# Patient Record
Sex: MALE | Race: WHITE | NOT HISPANIC OR LATINO | Employment: OTHER | URBAN - METROPOLITAN AREA
[De-identification: names, ages, dates, MRNs, and addresses within clinical notes are randomized per-mention and may not be internally consistent; named-entity substitution may affect disease eponyms.]

---

## 2020-11-18 PROCEDURE — U0003 INFECTIOUS AGENT DETECTION BY NUCLEIC ACID (DNA OR RNA); SEVERE ACUTE RESPIRATORY SYNDROME CORONAVIRUS 2 (SARS-COV-2) (CORONAVIRUS DISEASE [COVID-19]), AMPLIFIED PROBE TECHNIQUE, MAKING USE OF HIGH THROUGHPUT TECHNOLOGIES AS DESCRIBED BY CMS-2020-01-R: HCPCS | Performed by: FAMILY MEDICINE

## 2022-12-03 ENCOUNTER — HOSPITAL ENCOUNTER (INPATIENT)
Facility: HOSPITAL | Age: 62
LOS: 17 days | Discharge: HOME | End: 2022-12-21
Attending: INTERNAL MEDICINE | Admitting: FAMILY MEDICINE
Payer: MEDICAID

## 2022-12-03 ENCOUNTER — APPOINTMENT (OUTPATIENT)
Dept: GENERAL RADIOLOGY | Facility: HOSPITAL | Age: 62
End: 2022-12-03
Payer: MEDICAID

## 2022-12-03 DIAGNOSIS — R06.02 SHORTNESS OF BREATH: ICD-10-CM

## 2022-12-03 DIAGNOSIS — J44.1 COPD WITH ACUTE EXACERBATION: ICD-10-CM

## 2022-12-03 DIAGNOSIS — J10.1 INFLUENZA A: ICD-10-CM

## 2022-12-03 DIAGNOSIS — R09.02 HYPOXIA: Primary | ICD-10-CM

## 2022-12-03 LAB
ALBUMIN SERPL-MCNC: 4.1 G/DL (ref 3.5–5.2)
ALBUMIN/GLOB SERPL: 1.4 G/DL
ALP SERPL-CCNC: 53 U/L (ref 39–117)
ALT SERPL W P-5'-P-CCNC: 15 U/L (ref 1–41)
AMORPH URATE CRY URNS QL MICRO: ABNORMAL /HPF
ANION GAP SERPL CALCULATED.3IONS-SCNC: 15 MMOL/L (ref 5–15)
AST SERPL-CCNC: 20 U/L (ref 1–40)
BACTERIA UR QL AUTO: ABNORMAL /HPF
BILIRUB SERPL-MCNC: 0.3 MG/DL (ref 0–1.2)
BILIRUB UR QL STRIP: NEGATIVE
BUN SERPL-MCNC: 20 MG/DL (ref 8–23)
BUN/CREAT SERPL: 27.4 (ref 7–25)
CALCIUM SPEC-SCNC: 9.1 MG/DL (ref 8.6–10.5)
CHLORIDE SERPL-SCNC: 91 MMOL/L (ref 98–107)
CLARITY UR: CLEAR
CO2 SERPL-SCNC: 24 MMOL/L (ref 22–29)
COLOR UR: YELLOW
CREAT SERPL-MCNC: 0.73 MG/DL (ref 0.76–1.27)
DEPRECATED RDW RBC AUTO: 49.4 FL (ref 37–54)
EGFRCR SERPLBLD CKD-EPI 2021: 102.2 ML/MIN/1.73
ERYTHROCYTE [DISTWIDTH] IN BLOOD BY AUTOMATED COUNT: 15 % (ref 12.3–15.4)
FLUAV SUBTYP SPEC NAA+PROBE: DETECTED
FLUBV RNA ISLT QL NAA+PROBE: NOT DETECTED
GLOBULIN UR ELPH-MCNC: 2.9 GM/DL
GLUCOSE SERPL-MCNC: 91 MG/DL (ref 65–99)
GLUCOSE UR STRIP-MCNC: NEGATIVE MG/DL
GRAN CASTS URNS QL MICRO: ABNORMAL /LPF
HCT VFR BLD AUTO: 44 % (ref 37.5–51)
HGB BLD-MCNC: 15.4 G/DL (ref 13–17.7)
HGB UR QL STRIP.AUTO: NEGATIVE
HYALINE CASTS UR QL AUTO: ABNORMAL /LPF
KETONES UR QL STRIP: NEGATIVE
LEUKOCYTE ESTERASE UR QL STRIP.AUTO: NEGATIVE
LYMPHOCYTES # BLD MANUAL: 0.6 10*3/MM3 (ref 0.7–3.1)
LYMPHOCYTES NFR BLD MANUAL: 6 % (ref 5–12)
MAGNESIUM SERPL-MCNC: 1.7 MG/DL (ref 1.6–2.4)
MCH RBC QN AUTO: 31.5 PG (ref 26.6–33)
MCHC RBC AUTO-ENTMCNC: 35.1 G/DL (ref 31.5–35.7)
MCV RBC AUTO: 89.8 FL (ref 79–97)
MONOCYTES # BLD: 0.6 10*3/MM3 (ref 0.1–0.9)
MUCOUS THREADS URNS QL MICRO: ABNORMAL /HPF
NEUTROPHILS # BLD AUTO: 8.8 10*3/MM3 (ref 1.7–7)
NEUTROPHILS NFR BLD MANUAL: 81 % (ref 42.7–76)
NEUTS BAND NFR BLD MANUAL: 7 % (ref 0–5)
NITRITE UR QL STRIP: NEGATIVE
NT-PROBNP SERPL-MCNC: 109.5 PG/ML (ref 0–900)
PH UR STRIP.AUTO: 5.5 [PH] (ref 5–8)
PLAT MORPH BLD: NORMAL
PLATELET # BLD AUTO: 148 10*3/MM3 (ref 140–450)
PMV BLD AUTO: 8.1 FL (ref 6–12)
POTASSIUM SERPL-SCNC: 4.5 MMOL/L (ref 3.5–5.2)
PROT SERPL-MCNC: 7 G/DL (ref 6–8.5)
PROT UR QL STRIP: ABNORMAL
RBC # BLD AUTO: 4.9 10*6/MM3 (ref 4.14–5.8)
RBC # UR STRIP: ABNORMAL /HPF
RBC MORPH BLD: NORMAL
REF LAB TEST METHOD: ABNORMAL
SARS-COV-2 RNA PNL SPEC NAA+PROBE: NOT DETECTED
SCAN SLIDE: NORMAL
SODIUM SERPL-SCNC: 130 MMOL/L (ref 136–145)
SP GR UR STRIP: 1.02 (ref 1–1.03)
SQUAMOUS #/AREA URNS HPF: ABNORMAL /HPF
TROPONIN T SERPL-MCNC: <0.01 NG/ML (ref 0–0.03)
UROBILINOGEN UR QL STRIP: ABNORMAL
VARIANT LYMPHS NFR BLD MANUAL: 6 % (ref 19.6–45.3)
WBC # UR STRIP: ABNORMAL /HPF
WBC MORPH BLD: NORMAL
WBC NRBC COR # BLD: 10 10*3/MM3 (ref 3.4–10.8)

## 2022-12-03 PROCEDURE — 94799 UNLISTED PULMONARY SVC/PX: CPT

## 2022-12-03 PROCEDURE — 93005 ELECTROCARDIOGRAM TRACING: CPT | Performed by: EMERGENCY MEDICINE

## 2022-12-03 PROCEDURE — 87502 INFLUENZA DNA AMP PROBE: CPT

## 2022-12-03 PROCEDURE — 94760 N-INVAS EAR/PLS OXIMETRY 1: CPT

## 2022-12-03 PROCEDURE — 84484 ASSAY OF TROPONIN QUANT: CPT

## 2022-12-03 PROCEDURE — 85025 COMPLETE CBC W/AUTO DIFF WBC: CPT

## 2022-12-03 PROCEDURE — 94761 N-INVAS EAR/PLS OXIMETRY MLT: CPT

## 2022-12-03 PROCEDURE — 25010000002 METHYLPREDNISOLONE PER 125 MG

## 2022-12-03 PROCEDURE — 85007 BL SMEAR W/DIFF WBC COUNT: CPT

## 2022-12-03 PROCEDURE — 99285 EMERGENCY DEPT VISIT HI MDM: CPT

## 2022-12-03 PROCEDURE — 25010000002 ENOXAPARIN PER 10 MG: Performed by: NURSE PRACTITIONER

## 2022-12-03 PROCEDURE — 83735 ASSAY OF MAGNESIUM: CPT

## 2022-12-03 PROCEDURE — 94664 DEMO&/EVAL PT USE INHALER: CPT

## 2022-12-03 PROCEDURE — 80053 COMPREHEN METABOLIC PANEL: CPT

## 2022-12-03 PROCEDURE — 87635 SARS-COV-2 COVID-19 AMP PRB: CPT

## 2022-12-03 PROCEDURE — 81001 URINALYSIS AUTO W/SCOPE: CPT

## 2022-12-03 PROCEDURE — 71045 X-RAY EXAM CHEST 1 VIEW: CPT

## 2022-12-03 PROCEDURE — 87086 URINE CULTURE/COLONY COUNT: CPT

## 2022-12-03 PROCEDURE — 25010000002 METHYLPREDNISOLONE PER 125 MG: Performed by: NURSE PRACTITIONER

## 2022-12-03 PROCEDURE — 94640 AIRWAY INHALATION TREATMENT: CPT

## 2022-12-03 PROCEDURE — 83880 ASSAY OF NATRIURETIC PEPTIDE: CPT

## 2022-12-03 RX ORDER — ASPIRIN 81 MG/1
81 TABLET ORAL DAILY
Status: DISCONTINUED | OUTPATIENT
Start: 2022-12-04 | End: 2022-12-21 | Stop reason: HOSPADM

## 2022-12-03 RX ORDER — IPRATROPIUM BROMIDE AND ALBUTEROL SULFATE 2.5; .5 MG/3ML; MG/3ML
3 SOLUTION RESPIRATORY (INHALATION)
Status: DISCONTINUED | OUTPATIENT
Start: 2022-12-03 | End: 2022-12-04

## 2022-12-03 RX ORDER — UMECLIDINIUM BROMIDE AND VILANTEROL TRIFENATATE 62.5; 25 UG/1; UG/1
1 POWDER RESPIRATORY (INHALATION)
COMMUNITY

## 2022-12-03 RX ORDER — GLIPIZIDE 5 MG/1
10 TABLET ORAL
Status: DISCONTINUED | OUTPATIENT
Start: 2022-12-04 | End: 2022-12-21 | Stop reason: HOSPADM

## 2022-12-03 RX ORDER — SODIUM CHLORIDE 0.9 % (FLUSH) 0.9 %
3 SYRINGE (ML) INJECTION EVERY 12 HOURS SCHEDULED
Status: DISCONTINUED | OUTPATIENT
Start: 2022-12-03 | End: 2022-12-21 | Stop reason: HOSPADM

## 2022-12-03 RX ORDER — ASPIRIN 81 MG/1
81 TABLET ORAL DAILY
COMMUNITY

## 2022-12-03 RX ORDER — FAMOTIDINE 20 MG/1
40 TABLET, FILM COATED ORAL DAILY
Status: DISCONTINUED | OUTPATIENT
Start: 2022-12-03 | End: 2022-12-15

## 2022-12-03 RX ORDER — MONTELUKAST SODIUM 10 MG/1
10 TABLET ORAL NIGHTLY
Status: DISCONTINUED | OUTPATIENT
Start: 2022-12-03 | End: 2022-12-21 | Stop reason: HOSPADM

## 2022-12-03 RX ORDER — OSELTAMIVIR PHOSPHATE 75 MG/1
75 CAPSULE ORAL EVERY 12 HOURS SCHEDULED
Status: COMPLETED | OUTPATIENT
Start: 2022-12-03 | End: 2022-12-08

## 2022-12-03 RX ORDER — ALBUTEROL SULFATE 90 UG/1
2 AEROSOL, METERED RESPIRATORY (INHALATION) ONCE
Status: COMPLETED | OUTPATIENT
Start: 2022-12-03 | End: 2022-12-03

## 2022-12-03 RX ORDER — ALBUTEROL SULFATE 90 UG/1
2 AEROSOL, METERED RESPIRATORY (INHALATION) EVERY 4 HOURS PRN
Status: DISCONTINUED | OUTPATIENT
Start: 2022-12-03 | End: 2022-12-08

## 2022-12-03 RX ORDER — ACETAMINOPHEN 325 MG/1
650 TABLET ORAL EVERY 4 HOURS PRN
Status: DISCONTINUED | OUTPATIENT
Start: 2022-12-03 | End: 2022-12-21 | Stop reason: HOSPADM

## 2022-12-03 RX ORDER — SODIUM CHLORIDE 0.9 % (FLUSH) 0.9 %
10 SYRINGE (ML) INJECTION AS NEEDED
Status: DISCONTINUED | OUTPATIENT
Start: 2022-12-03 | End: 2022-12-21 | Stop reason: HOSPADM

## 2022-12-03 RX ORDER — LISINOPRIL 20 MG/1
40 TABLET ORAL EVERY EVENING
Status: DISCONTINUED | OUTPATIENT
Start: 2022-12-04 | End: 2022-12-17

## 2022-12-03 RX ORDER — METHYLPREDNISOLONE SODIUM SUCCINATE 125 MG/2ML
80 INJECTION, POWDER, LYOPHILIZED, FOR SOLUTION INTRAMUSCULAR; INTRAVENOUS EVERY 12 HOURS
Status: DISCONTINUED | OUTPATIENT
Start: 2022-12-03 | End: 2022-12-04

## 2022-12-03 RX ORDER — ENOXAPARIN SODIUM 100 MG/ML
40 INJECTION SUBCUTANEOUS
Status: DISCONTINUED | OUTPATIENT
Start: 2022-12-03 | End: 2022-12-21 | Stop reason: HOSPADM

## 2022-12-03 RX ORDER — SODIUM CHLORIDE 0.9 % (FLUSH) 0.9 %
3-10 SYRINGE (ML) INJECTION AS NEEDED
Status: DISCONTINUED | OUTPATIENT
Start: 2022-12-03 | End: 2022-12-21 | Stop reason: HOSPADM

## 2022-12-03 RX ORDER — METHYLPREDNISOLONE SODIUM SUCCINATE 125 MG/2ML
125 INJECTION, POWDER, LYOPHILIZED, FOR SOLUTION INTRAMUSCULAR; INTRAVENOUS ONCE
Status: COMPLETED | OUTPATIENT
Start: 2022-12-03 | End: 2022-12-03

## 2022-12-03 RX ORDER — ONDANSETRON 2 MG/ML
4 INJECTION INTRAMUSCULAR; INTRAVENOUS EVERY 6 HOURS PRN
Status: DISCONTINUED | OUTPATIENT
Start: 2022-12-03 | End: 2022-12-21 | Stop reason: HOSPADM

## 2022-12-03 RX ORDER — NITROGLYCERIN 0.4 MG/1
0.4 TABLET SUBLINGUAL
Status: DISCONTINUED | OUTPATIENT
Start: 2022-12-03 | End: 2022-12-21 | Stop reason: HOSPADM

## 2022-12-03 RX ORDER — SODIUM CHLORIDE 9 MG/ML
40 INJECTION, SOLUTION INTRAVENOUS AS NEEDED
Status: DISCONTINUED | OUTPATIENT
Start: 2022-12-03 | End: 2022-12-21 | Stop reason: HOSPADM

## 2022-12-03 RX ADMIN — METHYLPREDNISOLONE SODIUM SUCCINATE 80 MG: 125 INJECTION, POWDER, FOR SOLUTION INTRAMUSCULAR; INTRAVENOUS at 21:00

## 2022-12-03 RX ADMIN — Medication 3 ML: at 21:01

## 2022-12-03 RX ADMIN — ALBUTEROL SULFATE 2 PUFF: 108 INHALANT RESPIRATORY (INHALATION) at 11:05

## 2022-12-03 RX ADMIN — FAMOTIDINE 40 MG: 20 TABLET, FILM COATED ORAL at 21:01

## 2022-12-03 RX ADMIN — MONTELUKAST 10 MG: 10 TABLET, FILM COATED ORAL at 21:19

## 2022-12-03 RX ADMIN — METFORMIN HYDROCHLORIDE 1000 MG: 500 TABLET ORAL at 21:19

## 2022-12-03 RX ADMIN — IPRATROPIUM BROMIDE AND ALBUTEROL SULFATE 3 ML: .5; 3 SOLUTION RESPIRATORY (INHALATION) at 21:34

## 2022-12-03 RX ADMIN — METHYLPREDNISOLONE SODIUM SUCCINATE 125 MG: 125 INJECTION, POWDER, FOR SOLUTION INTRAMUSCULAR; INTRAVENOUS at 10:53

## 2022-12-03 RX ADMIN — SODIUM CHLORIDE 500 ML: 9 INJECTION, SOLUTION INTRAVENOUS at 12:03

## 2022-12-03 RX ADMIN — ENOXAPARIN SODIUM 40 MG: 100 INJECTION SUBCUTANEOUS at 21:00

## 2022-12-03 RX ADMIN — OSELTAMIVIR PHOSPHATE 75 MG: 75 CAPSULE ORAL at 21:01

## 2022-12-03 NOTE — ED NOTES
Patient is sating at 88% on RA.  States he feels that his chest congestion is breaking up.  Reported O2 sats to provider. Patient placed back on 2L NC

## 2022-12-03 NOTE — ED NOTES
Patient stated he was sweating. He asked for something to eat.  After checking with HCP, patient was given a sandwich and drink.  Call light within reach.  No new complaints.

## 2022-12-03 NOTE — ED NOTES
Oxygen turned down to 2L.  Will monitor saturations.  Patient given water to drink after checking with provider.

## 2022-12-03 NOTE — ED PROVIDER NOTES
Subjective   History of Present Illness  Chief Complaint: Shortness of breath    Context: Patient is a pleasant 62-year-old obese  male presenting today with complaints of shortness of breath.  Patient states that he went to urgent care this morning because his \"breathing got so bad he could not stand anymore.\"  Patient states that the symptoms started on Wednesday, when he had a runny nose that he states was constant.  His symptoms have progressed since then and he has been having issues breathing as well.  He has had a cough but is not coughing up anything.  Patient reports smoking 1-1/2 packs of cigarettes daily.  He does not typically wear oxygen but presents today on 2 L by nasal cannula.  He reports a history of diabetes and high blood pressure.  He states that he takes metformin, glipizide, lisinopril, Singulair and a baby aspirin daily.  He has no known drug allergies.  He states that he is vaccinated against COVID and influenza and has had no known exposure.  He had a doctor's appointment on 11/29 with his primary care provider for basic diabetes check.  He states that his blood sugar had been elevated but everything checked out okay.  He denies any pain at this point.  He has no known drug allergies.  He denies use of drugs and alcohol.        Review of Systems   Constitutional: Negative for chills and fever.   HENT: Positive for rhinorrhea and sinus pressure. Negative for congestion and sore throat.    Respiratory: Positive for cough, chest tightness and shortness of breath.    Cardiovascular: Negative for chest pain and palpitations.   Gastrointestinal: Negative for abdominal pain, diarrhea, nausea and vomiting.   Genitourinary: Negative for flank pain and urgency.   Musculoskeletal: Negative for arthralgias and myalgias.   Skin: Negative.    Neurological: Negative for dizziness, syncope, weakness and headaches.   Psychiatric/Behavioral: Negative for confusion. The patient is not  nervous/anxious.    All other systems reviewed and are negative.      Past Medical History:   Diagnosis Date   • COPD (chronic obstructive pulmonary disease) (HCC)    • Diabetes mellitus (HCC)    • Hypertension        No Known Allergies    No past surgical history on file.    No family history on file.    Social History     Socioeconomic History   • Marital status: Single   Tobacco Use   • Smoking status: Every Day     Packs/day: 1.50     Years: 25.00     Pack years: 37.50     Types: Cigarettes     Passive exposure: Never   • Smokeless tobacco: Never   Vaping Use   • Vaping Use: Never used   • Passive vaping exposure: Yes   Substance and Sexual Activity   • Alcohol use: Yes     Comment: RARE   • Drug use: Never   • Sexual activity: Defer           Objective   Physical Exam  Vitals and nursing note reviewed.   Constitutional:       General: He is awake. He is not in acute distress.     Appearance: Normal appearance. He is well-developed. He is not ill-appearing.   HENT:      Head: Normocephalic and atraumatic. No raccoon eyes or Lopez's sign.      Right Ear: Hearing, tympanic membrane and ear canal normal.      Left Ear: Hearing, tympanic membrane and ear canal normal.   Eyes:      General: Vision grossly intact. Gaze aligned appropriately.      Extraocular Movements: Extraocular movements intact.      Pupils: Pupils are equal, round, and reactive to light.   Cardiovascular:      Rate and Rhythm: Normal rate.      Pulses: Normal pulses.      Heart sounds: Normal heart sounds. No murmur heard.  Pulmonary:      Effort: Pulmonary effort is normal. No respiratory distress.      Breath sounds: Examination of the right-upper field reveals decreased breath sounds. Examination of the left-upper field reveals decreased breath sounds. Examination of the right-lower field reveals decreased breath sounds. Examination of the left-lower field reveals decreased breath sounds. Decreased breath sounds present.   Chest:      Chest  wall: No tenderness.   Abdominal:      General: Bowel sounds are normal.      Palpations: Abdomen is soft.      Tenderness: There is no abdominal tenderness.   Musculoskeletal:         General: Normal range of motion.      Cervical back: Normal range of motion and neck supple.      Right lower leg: No edema.      Left lower leg: No edema.   Skin:     General: Skin is warm and dry.      Capillary Refill: Capillary refill takes less than 2 seconds.   Neurological:      General: No focal deficit present.      Mental Status: He is alert and oriented to person, place, and time. Mental status is at baseline.      GCS: GCS eye subscore is 4. GCS verbal subscore is 5. GCS motor subscore is 6.      Cranial Nerves: Cranial nerves 2-12 are intact.      Sensory: Sensation is intact.      Motor: Motor function is intact.      Deep Tendon Reflexes: Reflexes are normal and symmetric.   Psychiatric:         Mood and Affect: Mood normal.         Behavior: Behavior normal.         Procedures           ED Course  ED Course as of 12/03/22 1340   Sat Dec 03, 2022   1203 EKG was reviewed myself and read by Dr. White reveals sinus rhythm with a regular rate of 90.  No acute ST elevation or ectopy.  No significant change from previous study completed in 2019. [SJ]      ED Course User Index  [SJ] Gianna Cobos G, APRN      /62   Pulse 84   Temp 98.3 °F (36.8 °C)   Resp 18   Ht 180.3 cm (71\")   Wt 98.4 kg (217 lb)   SpO2 91%   BMI 30.27 kg/m²   Labs Reviewed   INFLUENZA ANTIGEN, RAPID - Abnormal; Notable for the following components:       Result Value    Influenza A PCR Detected (*)     All other components within normal limits   COMPREHENSIVE METABOLIC PANEL - Abnormal; Notable for the following components:    Creatinine 0.73 (*)     Sodium 130 (*)     Chloride 91 (*)     BUN/Creatinine Ratio 27.4 (*)     All other components within normal limits    Narrative:     GFR Normal >60  Chronic Kidney Disease <60  Kidney Failure  <15     URINALYSIS W/ MICROSCOPIC IF INDICATED (NO CULTURE) - Abnormal; Notable for the following components:    Protein, UA 30 mg/dL (1+) (*)     All other components within normal limits   URINALYSIS, MICROSCOPIC ONLY - Abnormal; Notable for the following components:    RBC, UA 0-2 (*)     WBC, UA 0-2 (*)     Bacteria, UA Trace (*)     Mucus, UA Small/1+ (*)     All other components within normal limits   MANUAL DIFFERENTIAL - Abnormal; Notable for the following components:    Neutrophil % 81.0 (*)     Lymphocyte % 6.0 (*)     Bands %  7.0 (*)     Neutrophils Absolute 8.80 (*)     Lymphocytes Absolute 0.60 (*)     All other components within normal limits   COVID-19,CEPHEID/LEONEL,COR/TORY/PAD/JUWAN/MAD IN-HOUSE,NP SWAB IN TRANSPORT MEDIA 3-4 HR TAT, RT-PCR - Normal    Narrative:     Fact sheet for providers: https://www.fda.gov/media/807387/download     Fact sheet for patients: https://www.fda.gov/media/856650/download  Fact sheet for providers: https://www.fda.gov/media/890155/download    Fact sheet for patients: https://www.fda.gov/media/384752/download    Test performed by PCR.   BNP (IN-HOUSE) - Normal    Narrative:     Among patients with dyspnea, NT-proBNP is highly sensitive for the detection of acute congestive heart failure. In addition NT-proBNP of <300 pg/ml effectively rules out acute congestive heart failure with 99% negative predictive value.     TROPONIN (IN-HOUSE) - Normal    Narrative:     Troponin T Reference Range:  <= 0.03 ng/mL-   Negative for AMI  >0.03 ng/mL-     Abnormal for myocardial necrosis.  Clinicians would have to utilize clinical acumen, EKG, Troponin and serial changes to determine if it is an Acute Myocardial Infarction or myocardial injury due to an underlying chronic condition.       Results may be falsely decreased if patient taking Biotin.     MAGNESIUM - Normal   CBC WITH AUTO DIFFERENTIAL - Normal    Narrative:     Modified report. Previous result was Hemogram on 12/3/2022 at  1043 EST.  The previously reported component NRBC is no longer being reported. Previous result was 0.1 /100 WBC (Reference Range: 0.0-0.2 /100 WBC) on 12/3/2022 at 1043 EST.   COVID PRE-OP / PRE-PROCEDURE SCREENING ORDER (NO ISOLATION)    Narrative:     The following orders were created for panel order COVID PRE-OP / PRE-PROCEDURE SCREENING ORDER (NO ISOLATION) - Swab, Nasopharynx.  Procedure                               Abnormality         Status                     ---------                               -----------         ------                     COVID-19,CEPHEID/LEONEL,CO...[[id number]]  Normal              Final result                 Please view results for these tests on the individual orders.   URINE CULTURE   SCAN SLIDE   CBC AND DIFFERENTIAL    Narrative:     The following orders were created for panel order CBC & Differential.  Procedure                               Abnormality         Status                     ---------                               -----------         ------                     CBC Auto Differential[[id number]]        Normal              Final result               Scan Slide[[id number]]                                       Final result                 Please view results for these tests on the individual orders.     Medications   sodium chloride 0.9 % flush 10 mL (has no administration in time range)   methylPREDNISolone sodium succinate (SOLU-Medrol) injection 125 mg (125 mg Intravenous Given 12/3/22 1053)   albuterol sulfate HFA (PROVENTIL HFA;VENTOLIN HFA;PROAIR HFA) inhaler 2 puff (2 puffs Inhalation Given 12/3/22 1105)   sodium chloride 0.9 % bolus 500 mL (0 mL Intravenous Stopped 12/3/22 1256)     XR Chest 1 View    Result Date: 12/3/2022   1. Chronic scarring in the right lung base. 2. Emphysema. 3. No active pulmonary disease.  Electronically Signed By-James Grey MD On:12/3/2022 11:39 AM This report was finalized on [id number] by  James Grey MD.                                          MDM  Number of Diagnoses or Management Options  Hypoxia  Influenza A  Shortness of breath  Diagnosis management comments: Patient was placed in a gown prior to assessment.  He is alert, nontoxic-appearing and stable on exam.  Patient presents on oxygen via nasal cannula.  No acute distress is noted on arrival.  IV was established labs were obtained.  Patient was medicated with prednisone.  He received an albuterol inhaler treatment and a fluid bolus in the emergency room.    Patient is influenza A positive today.  X-ray reveals chronic scarring in the right lung base with emphysema present.  No active pulmonary disease is noted.  Urinalysis reveals trace bacteria and culture was ordered.  No leukocytes or nitrites present.  White blood count is negative.  Patient is aware that discharge does not meAt this time, I do not believe patient requires UTI treatment due to asymptomatic bacteremia.  Troponin and BNP are negative.  COVID is negative.  Patient was taken off supplemental oxygen but oxygen saturations decreased to 88% with symptoms worsening.  Patient states that he is typically 95% on room air.  Patient was placed back on oxygen and it improved immediately.  Given this observation, I believe it is best to keep patient in the hospital tonight for further evaluation.  Patient is agreeable to admission at this time.  He verbalizes understanding.  He was going to be placed in the ED observation unit but is an [facility] patient.  I spoke with BETTY Bravo with the [facility] group.  She accepted patient on behalf of of Dr. Singh, who will be providing inpatient care.  Patient has remained alert and stable while in the emergency room.  He is in no acute distress.    Comorbidities: History of COPD, current smoker  Differentials: Influenza, COVID-19, pneumonia, acute UTI.  Not all inclusive of differentials considered.   EKG reviewed by me and interpreted by Dr. White    Lab Interpretation: As  above  Radiology Interpretation: As above    Appropriate PPE worn during exam.    This document is intended for medical expert use only.  Reading of this document by patients and/or patient's family without participating medical staff guidance may result in misinterpretation and unintended morbidity.  Any interpretation of such data is the responsibility of the patient and/or family member responsible for the patient in concert with their primary or specialist providers, not to be left for sources of online search as such as [employer], [employer] or similar queries.  Relying on these approaches to knowledge may result in misinterpretation, misguided goals of care and even death should patient or family members try recommendations outside of the realm of professional medical care in a supervised inpatient environment.    This medical document was created using Dragon dictation system. Some errors in speech recognition may occur.       Amount and/or Complexity of Data Reviewed  Clinical lab tests: ordered and reviewed  Tests in the radiology section of CPT®: ordered and reviewed    Risk of Complications, Morbidity, and/or Mortality  Presenting problems: high  Diagnostic procedures: high  Management options: high    Patient Progress  Patient progress: improved      Final diagnoses:   Influenza A   Hypoxia   Shortness of breath       ED Disposition  ED Disposition     ED Disposition   Decision to Admit    Condition   --    Comment   Level of Care: Telemetry [5]   Admitting Physician: NGOC ORTIZ [[id number]]   Attending Physician: NGOC ORTIZ [[id number]]               No follow-up provider specified.       Medication List      No changes were made to your prescriptions during this visit.          Gianna Cobos, APRN  12/03/22 0549

## 2022-12-03 NOTE — ED NOTES
Patient was picked up from [facility] due to low O2 sats. Patient states he started with SOA a few days ago.  It started with runny nose. Denies fever, has had runny stools.  No vomiting/nausea.  Patient feels like he could cough something up but is is no productive.   Patient was placed on O2 at [facility].  Sats mid to upper 90s on 4L.  Room air in ER is 86%.  Patient placed on 2L O2

## 2022-12-03 NOTE — Clinical Note
Level of Care: Telemetry [5]   Admitting Physician: NGOC ORTIZ [[id number]]   Attending Physician: NGOC ORTIZ [[id number]]

## 2022-12-04 PROBLEM — J44.1 COPD WITH ACUTE EXACERBATION: Status: ACTIVE | Noted: 2022-12-04

## 2022-12-04 LAB
ANION GAP SERPL CALCULATED.3IONS-SCNC: 10 MMOL/L (ref 5–15)
ANISOCYTOSIS BLD QL: ABNORMAL
BACTERIA SPEC AEROBE CULT: NO GROWTH
BUN SERPL-MCNC: 18 MG/DL (ref 8–23)
BUN/CREAT SERPL: 27.7 (ref 7–25)
CALCIUM SPEC-SCNC: 8.9 MG/DL (ref 8.6–10.5)
CHLORIDE SERPL-SCNC: 97 MMOL/L (ref 98–107)
CO2 SERPL-SCNC: 26 MMOL/L (ref 22–29)
CREAT SERPL-MCNC: 0.65 MG/DL (ref 0.76–1.27)
DEPRECATED RDW RBC AUTO: 49 FL (ref 37–54)
EGFRCR SERPLBLD CKD-EPI 2021: 105.9 ML/MIN/1.73
ERYTHROCYTE [DISTWIDTH] IN BLOOD BY AUTOMATED COUNT: 15.3 % (ref 12.3–15.4)
GLUCOSE BLDC GLUCOMTR-MCNC: 133 MG/DL (ref 70–105)
GLUCOSE BLDC GLUCOMTR-MCNC: 146 MG/DL (ref 70–105)
GLUCOSE SERPL-MCNC: 185 MG/DL (ref 65–99)
HCT VFR BLD AUTO: 43.4 % (ref 37.5–51)
HGB BLD-MCNC: 14.3 G/DL (ref 13–17.7)
LYMPHOCYTES # BLD MANUAL: 0.42 10*3/MM3 (ref 0.7–3.1)
LYMPHOCYTES NFR BLD MANUAL: 6 % (ref 5–12)
MCH RBC QN AUTO: 30.3 PG (ref 26.6–33)
MCHC RBC AUTO-ENTMCNC: 32.9 G/DL (ref 31.5–35.7)
MCV RBC AUTO: 92.1 FL (ref 79–97)
METAMYELOCYTES NFR BLD MANUAL: 2 % (ref 0–0)
MONOCYTES # BLD: 0.5 10*3/MM3 (ref 0.1–0.9)
NEUTROPHILS # BLD AUTO: 7.22 10*3/MM3 (ref 1.7–7)
NEUTROPHILS NFR BLD MANUAL: 71 % (ref 42.7–76)
NEUTS BAND NFR BLD MANUAL: 16 % (ref 0–5)
NEUTS VAC BLD QL SMEAR: ABNORMAL
PLAT MORPH BLD: NORMAL
PLATELET # BLD AUTO: 151 10*3/MM3 (ref 140–450)
PMV BLD AUTO: 8.5 FL (ref 6–12)
POTASSIUM SERPL-SCNC: 5.1 MMOL/L (ref 3.5–5.2)
RBC # BLD AUTO: 4.71 10*6/MM3 (ref 4.14–5.8)
SCAN SLIDE: NORMAL
SODIUM SERPL-SCNC: 133 MMOL/L (ref 136–145)
VARIANT LYMPHS NFR BLD MANUAL: 5 % (ref 19.6–45.3)
WBC NRBC COR # BLD: 8.3 10*3/MM3 (ref 3.4–10.8)

## 2022-12-04 PROCEDURE — 36415 COLL VENOUS BLD VENIPUNCTURE: CPT | Performed by: NURSE PRACTITIONER

## 2022-12-04 PROCEDURE — 94799 UNLISTED PULMONARY SVC/PX: CPT

## 2022-12-04 PROCEDURE — 25010000002 METHYLPREDNISOLONE PER 40 MG: Performed by: FAMILY MEDICINE

## 2022-12-04 PROCEDURE — 84484 ASSAY OF TROPONIN QUANT: CPT | Performed by: FAMILY MEDICINE

## 2022-12-04 PROCEDURE — 85007 BL SMEAR W/DIFF WBC COUNT: CPT | Performed by: NURSE PRACTITIONER

## 2022-12-04 PROCEDURE — 85025 COMPLETE CBC W/AUTO DIFF WBC: CPT | Performed by: NURSE PRACTITIONER

## 2022-12-04 PROCEDURE — 25010000002 ENOXAPARIN PER 10 MG: Performed by: NURSE PRACTITIONER

## 2022-12-04 PROCEDURE — 25010000002 METHYLPREDNISOLONE PER 125 MG: Performed by: NURSE PRACTITIONER

## 2022-12-04 PROCEDURE — 82962 GLUCOSE BLOOD TEST: CPT

## 2022-12-04 PROCEDURE — 80048 BASIC METABOLIC PNL TOTAL CA: CPT | Performed by: NURSE PRACTITIONER

## 2022-12-04 RX ORDER — NICOTINE POLACRILEX 4 MG
15 LOZENGE BUCCAL
Status: DISCONTINUED | OUTPATIENT
Start: 2022-12-04 | End: 2022-12-21 | Stop reason: HOSPADM

## 2022-12-04 RX ORDER — INSULIN LISPRO 100 [IU]/ML
2-9 INJECTION, SOLUTION INTRAVENOUS; SUBCUTANEOUS
Status: DISCONTINUED | OUTPATIENT
Start: 2022-12-04 | End: 2022-12-21 | Stop reason: HOSPADM

## 2022-12-04 RX ORDER — DEXTROSE MONOHYDRATE 25 G/50ML
25 INJECTION, SOLUTION INTRAVENOUS
Status: DISCONTINUED | OUTPATIENT
Start: 2022-12-04 | End: 2022-12-21 | Stop reason: HOSPADM

## 2022-12-04 RX ORDER — OLANZAPINE 10 MG/2ML
1 INJECTION, POWDER, LYOPHILIZED, FOR SOLUTION INTRAMUSCULAR
Status: DISCONTINUED | OUTPATIENT
Start: 2022-12-04 | End: 2022-12-21 | Stop reason: HOSPADM

## 2022-12-04 RX ORDER — METHYLPREDNISOLONE SODIUM SUCCINATE 40 MG/ML
40 INJECTION, POWDER, LYOPHILIZED, FOR SOLUTION INTRAMUSCULAR; INTRAVENOUS EVERY 12 HOURS
Status: DISCONTINUED | OUTPATIENT
Start: 2022-12-04 | End: 2022-12-05

## 2022-12-04 RX ORDER — IPRATROPIUM BROMIDE AND ALBUTEROL SULFATE 2.5; .5 MG/3ML; MG/3ML
3 SOLUTION RESPIRATORY (INHALATION)
Status: DISCONTINUED | OUTPATIENT
Start: 2022-12-04 | End: 2022-12-08

## 2022-12-04 RX ADMIN — MONTELUKAST 10 MG: 10 TABLET, FILM COATED ORAL at 20:06

## 2022-12-04 RX ADMIN — Medication 3 ML: at 08:57

## 2022-12-04 RX ADMIN — OSELTAMIVIR PHOSPHATE 75 MG: 75 CAPSULE ORAL at 08:55

## 2022-12-04 RX ADMIN — OSELTAMIVIR PHOSPHATE 75 MG: 75 CAPSULE ORAL at 20:06

## 2022-12-04 RX ADMIN — ASPIRIN 81 MG: 81 TABLET, COATED ORAL at 09:45

## 2022-12-04 RX ADMIN — Medication 3 ML: at 20:06

## 2022-12-04 RX ADMIN — METFORMIN HYDROCHLORIDE 1000 MG: 500 TABLET ORAL at 18:52

## 2022-12-04 RX ADMIN — METHYLPREDNISOLONE SODIUM SUCCINATE 80 MG: 125 INJECTION, POWDER, FOR SOLUTION INTRAMUSCULAR; INTRAVENOUS at 08:55

## 2022-12-04 RX ADMIN — LISINOPRIL 40 MG: 20 TABLET ORAL at 16:49

## 2022-12-04 RX ADMIN — IPRATROPIUM BROMIDE AND ALBUTEROL SULFATE 3 ML: 2.5; .5 SOLUTION RESPIRATORY (INHALATION) at 21:26

## 2022-12-04 RX ADMIN — GLIPIZIDE 10 MG: 5 TABLET ORAL at 16:49

## 2022-12-04 RX ADMIN — ENOXAPARIN SODIUM 40 MG: 100 INJECTION SUBCUTANEOUS at 15:35

## 2022-12-04 RX ADMIN — FAMOTIDINE 40 MG: 20 TABLET, FILM COATED ORAL at 08:53

## 2022-12-04 RX ADMIN — METFORMIN HYDROCHLORIDE 1000 MG: 500 TABLET ORAL at 08:53

## 2022-12-04 RX ADMIN — GLIPIZIDE 10 MG: 5 TABLET ORAL at 08:53

## 2022-12-04 RX ADMIN — METHYLPREDNISOLONE SODIUM SUCCINATE 40 MG: 40 INJECTION, POWDER, FOR SOLUTION INTRAMUSCULAR; INTRAVENOUS at 20:06

## 2022-12-04 RX ADMIN — BISOPROLOL FUMARATE: 5 TABLET ORAL at 08:54

## 2022-12-04 RX ADMIN — IPRATROPIUM BROMIDE AND ALBUTEROL SULFATE 3 ML: 2.5; .5 SOLUTION RESPIRATORY (INHALATION) at 15:08

## 2022-12-04 NOTE — H&P
Patient Care Team:  Qi Hicks APRN as PCP - General (Nurse Practitioner)    Chief Conplaint  Subjective     The patient is a 62 y.o. male presents with progressive shortness of breath with evidence of acute exacerbation of panlobular COPD with emphysema and an acute influenza A lower respiratory infection    HPI  The patient was in his usual state of health until approximately 1 week prior to presentation when he began experiencing insidious onset of some progressive weakness associated with a cough and some shortness of breath.  He decompensated and presented to the [facility] emergency room for evaluation where he was admitted with an acute exacerbation of COPD and an acute influenza A infection  Review of Systems  Review of Systems   Constitutional: Positive for activity change and appetite change.   Respiratory: Positive for shortness of breath. Negative for chest tightness.    Cardiovascular: Negative.    Gastrointestinal: Negative.    Neurological: Positive for weakness.       History  Past Medical History:   Diagnosis Date   • COPD (chronic obstructive pulmonary disease) (HCC)    • Diabetes mellitus (HCC)    • Hypertension      No past surgical history on file.  No family history on file.  Social History     Tobacco Use   • Smoking status: Every Day     Packs/day: 1.50     Years: 25.00     Pack years: 37.50     Types: Cigarettes     Passive exposure: Never   • Smokeless tobacco: Never   Vaping Use   • Vaping Use: Never used   • Passive vaping exposure: Yes   Substance Use Topics   • Alcohol use: Yes     Comment: RARE   • Drug use: Never     Allergies:  Patient has no known allergies.    Objective     Vital Signs  Temp:  [97.9 °F (36.6 °C)-98.4 °F (36.9 °C)] 97.9 °F (36.6 °C)  Heart Rate:  [80-89] 82  Resp:  [16-20] 16  BP: ()/(59-75) 131/75      Physical Exam:   Physical Exam  Vitals reviewed.   Constitutional:       General: He is not in acute distress.     Appearance: He is  ill-appearing and diaphoretic. He is not toxic-appearing.   HENT:      Head: Normocephalic and atraumatic.   Cardiovascular:      Rate and Rhythm: Rhythm irregular.      Heart sounds: Normal heart sounds.   Pulmonary:      Effort: No respiratory distress.      Breath sounds: Wheezing present.   Skin:     General: Skin is warm.   Neurological:      General: No focal deficit present.      Mental Status: He is alert.              Results Review:   CBC    Results from last 7 days   Lab Units 12/04/22  0655 12/03/22  1028   WBC 10*3/mm3 8.30 10.00   HEMOGLOBIN g/dL 14.3 15.4   PLATELETS 10*3/mm3 151 148     BMP   Results from last 7 days   Lab Units 12/04/22  0655 12/03/22  1028   SODIUM mmol/L 133* 130*   POTASSIUM mmol/L 5.1 4.5   CHLORIDE mmol/L 97* 91*   CO2 mmol/L 26.0 24.0   BUN mg/dL 18 20   CREATININE mg/dL 0.65* 0.73*   GLUCOSE mg/dL 185* 91   MAGNESIUM mg/dL  --  1.7     Cr Clearance Estimated Creatinine Clearance: 139 mL/min (A) (by C-G formula based on SCr of 0.65 mg/dL (L)).  Coag     HbA1C No results found for: HGBA1C  Blood Glucose No results found for: POCGLU  Infection     CMP   Results from last 7 days   Lab Units 12/04/22  0655 12/03/22  1028   SODIUM mmol/L 133* 130*   POTASSIUM mmol/L 5.1 4.5   CHLORIDE mmol/L 97* 91*   CO2 mmol/L 26.0 24.0   BUN mg/dL 18 20   CREATININE mg/dL 0.65* 0.73*   GLUCOSE mg/dL 185* 91   ALBUMIN g/dL  --  4.10   BILIRUBIN mg/dL  --  0.3   ALK PHOS U/L  --  53   AST (SGOT) U/L  --  20   ALT (SGPT) U/L  --  15     UA    Results from last 7 days   Lab Units 12/03/22  1100   NITRITE UA  Negative   WBC UA /HPF 0-2*   BACTERIA UA /HPF Trace*   SQUAM EPITHEL UA /HPF 0-2     Radiology(recent) XR Chest 1 View    Result Date: 12/3/2022   1. Chronic scarring in the right lung base. 2. Emphysema. 3. No active pulmonary disease.  Electronically Signed By-James Grey MD On:12/3/2022 11:39 AM This report was finalized on [id number] by  James Grey MD.       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     James Craig MD  12/04/22  11:26 EST

## 2022-12-04 NOTE — PLAN OF CARE
Problem: Adult Inpatient Plan of Care  Goal: Plan of Care Review  Outcome: Ongoing, Progressing  Flowsheets (Taken 12/3/2022 2106)  Progress: no change  Goal: Patient-Specific Goal (Individualized)  Outcome: Ongoing, Progressing  Goal: Absence of Hospital-Acquired Illness or Injury  Outcome: Ongoing, Progressing  Goal: Optimal Comfort and Wellbeing  Outcome: Ongoing, Progressing  Goal: Readiness for Transition of Care  Outcome: Ongoing, Progressing     Problem: Breathing Pattern Ineffective  Goal: Effective Breathing Pattern  Outcome: Ongoing, Progressing   Goal Outcome Evaluation:           Progress: no change   Patient being admitted with SOB requiring oxygen with the flu, patient started on home medications, and will be weaned off O2 as tolerated.

## 2022-12-05 ENCOUNTER — APPOINTMENT (OUTPATIENT)
Dept: CT IMAGING | Facility: HOSPITAL | Age: 62
End: 2022-12-05
Payer: MEDICAID

## 2022-12-05 LAB
ANION GAP SERPL CALCULATED.3IONS-SCNC: 10 MMOL/L (ref 5–15)
BUN SERPL-MCNC: 22 MG/DL (ref 8–23)
BUN/CREAT SERPL: 32.8 (ref 7–25)
CALCIUM SPEC-SCNC: 9.2 MG/DL (ref 8.6–10.5)
CHLORIDE SERPL-SCNC: 93 MMOL/L (ref 98–107)
CO2 SERPL-SCNC: 30 MMOL/L (ref 22–29)
CREAT SERPL-MCNC: 0.67 MG/DL (ref 0.76–1.27)
DEPRECATED RDW RBC AUTO: 49 FL (ref 37–54)
EGFRCR SERPLBLD CKD-EPI 2021: 104.9 ML/MIN/1.73
ERYTHROCYTE [DISTWIDTH] IN BLOOD BY AUTOMATED COUNT: 15.2 % (ref 12.3–15.4)
GLUCOSE BLDC GLUCOMTR-MCNC: 101 MG/DL (ref 70–105)
GLUCOSE BLDC GLUCOMTR-MCNC: 107 MG/DL (ref 70–105)
GLUCOSE BLDC GLUCOMTR-MCNC: 134 MG/DL (ref 70–105)
GLUCOSE BLDC GLUCOMTR-MCNC: 90 MG/DL (ref 70–105)
GLUCOSE SERPL-MCNC: 114 MG/DL (ref 65–99)
HCT VFR BLD AUTO: 44.6 % (ref 37.5–51)
HGB BLD-MCNC: 14.5 G/DL (ref 13–17.7)
LARGE PLATELETS: ABNORMAL
LYMPHOCYTES # BLD MANUAL: 0.26 10*3/MM3 (ref 0.7–3.1)
LYMPHOCYTES NFR BLD MANUAL: 10 % (ref 5–12)
MCH RBC QN AUTO: 30.3 PG (ref 26.6–33)
MCHC RBC AUTO-ENTMCNC: 32.6 G/DL (ref 31.5–35.7)
MCV RBC AUTO: 92.8 FL (ref 79–97)
MONOCYTES # BLD: 1.29 10*3/MM3 (ref 0.1–0.9)
NEUTROPHILS # BLD AUTO: 11.35 10*3/MM3 (ref 1.7–7)
NEUTROPHILS NFR BLD MANUAL: 77 % (ref 42.7–76)
NEUTS BAND NFR BLD MANUAL: 11 % (ref 0–5)
NEUTS VAC BLD QL SMEAR: ABNORMAL
PLATELET # BLD AUTO: 185 10*3/MM3 (ref 140–450)
PMV BLD AUTO: 8.6 FL (ref 6–12)
POTASSIUM SERPL-SCNC: 5.1 MMOL/L (ref 3.5–5.2)
RBC # BLD AUTO: 4.81 10*6/MM3 (ref 4.14–5.8)
RBC MORPH BLD: NORMAL
SCAN SLIDE: NORMAL
SODIUM SERPL-SCNC: 133 MMOL/L (ref 136–145)
TROPONIN T SERPL-MCNC: <0.01 NG/ML (ref 0–0.03)
VARIANT LYMPHS NFR BLD MANUAL: 2 % (ref 19.6–45.3)
WBC NRBC COR # BLD: 12.9 10*3/MM3 (ref 3.4–10.8)

## 2022-12-05 PROCEDURE — 71250 CT THORAX DX C-: CPT

## 2022-12-05 PROCEDURE — 94664 DEMO&/EVAL PT USE INHALER: CPT

## 2022-12-05 PROCEDURE — 25010000002 METHYLPREDNISOLONE PER 40 MG: Performed by: FAMILY MEDICINE

## 2022-12-05 PROCEDURE — 82962 GLUCOSE BLOOD TEST: CPT

## 2022-12-05 PROCEDURE — 94799 UNLISTED PULMONARY SVC/PX: CPT

## 2022-12-05 PROCEDURE — 25010000002 ENOXAPARIN PER 10 MG: Performed by: NURSE PRACTITIONER

## 2022-12-05 PROCEDURE — 63710000001 PREDNISONE PER 1 MG: Performed by: NURSE PRACTITIONER

## 2022-12-05 PROCEDURE — 94761 N-INVAS EAR/PLS OXIMETRY MLT: CPT

## 2022-12-05 RX ORDER — BUDESONIDE 0.5 MG/2ML
0.5 INHALANT ORAL
Status: DISCONTINUED | OUTPATIENT
Start: 2022-12-05 | End: 2022-12-21 | Stop reason: HOSPADM

## 2022-12-05 RX ORDER — PREDNISONE 20 MG/1
20 TABLET ORAL 2 TIMES DAILY WITH MEALS
Status: DISCONTINUED | OUTPATIENT
Start: 2022-12-05 | End: 2022-12-06

## 2022-12-05 RX ADMIN — LISINOPRIL 40 MG: 20 TABLET ORAL at 17:22

## 2022-12-05 RX ADMIN — METFORMIN HYDROCHLORIDE 1000 MG: 500 TABLET ORAL at 09:12

## 2022-12-05 RX ADMIN — BUDESONIDE 0.5 MG: 0.5 INHALANT RESPIRATORY (INHALATION) at 18:56

## 2022-12-05 RX ADMIN — OSELTAMIVIR PHOSPHATE 75 MG: 75 CAPSULE ORAL at 09:12

## 2022-12-05 RX ADMIN — FAMOTIDINE 40 MG: 20 TABLET, FILM COATED ORAL at 09:12

## 2022-12-05 RX ADMIN — IPRATROPIUM BROMIDE AND ALBUTEROL SULFATE 3 ML: 2.5; .5 SOLUTION RESPIRATORY (INHALATION) at 18:56

## 2022-12-05 RX ADMIN — METFORMIN HYDROCHLORIDE 1000 MG: 500 TABLET ORAL at 17:22

## 2022-12-05 RX ADMIN — MONTELUKAST 10 MG: 10 TABLET, FILM COATED ORAL at 20:00

## 2022-12-05 RX ADMIN — IPRATROPIUM BROMIDE AND ALBUTEROL SULFATE 3 ML: 2.5; .5 SOLUTION RESPIRATORY (INHALATION) at 15:04

## 2022-12-05 RX ADMIN — GLIPIZIDE 10 MG: 5 TABLET ORAL at 17:22

## 2022-12-05 RX ADMIN — GLIPIZIDE 10 MG: 5 TABLET ORAL at 09:12

## 2022-12-05 RX ADMIN — ENOXAPARIN SODIUM 40 MG: 100 INJECTION SUBCUTANEOUS at 17:22

## 2022-12-05 RX ADMIN — BISOPROLOL FUMARATE: 5 TABLET ORAL at 09:12

## 2022-12-05 RX ADMIN — OSELTAMIVIR PHOSPHATE 75 MG: 75 CAPSULE ORAL at 20:00

## 2022-12-05 RX ADMIN — Medication 3 ML: at 20:01

## 2022-12-05 RX ADMIN — ASPIRIN 81 MG: 81 TABLET, COATED ORAL at 09:12

## 2022-12-05 RX ADMIN — IPRATROPIUM BROMIDE AND ALBUTEROL SULFATE 3 ML: 2.5; .5 SOLUTION RESPIRATORY (INHALATION) at 08:51

## 2022-12-05 RX ADMIN — PREDNISONE 20 MG: 20 TABLET ORAL at 17:22

## 2022-12-05 RX ADMIN — Medication 3 ML: at 09:12

## 2022-12-05 RX ADMIN — METHYLPREDNISOLONE SODIUM SUCCINATE 40 MG: 40 INJECTION, POWDER, FOR SOLUTION INTRAMUSCULAR; INTRAVENOUS at 09:12

## 2022-12-05 NOTE — PROGRESS NOTES
LOS: 1 day   Patient Care Team:  Qi Hicks APRN as PCP - General (Nurse Practitioner)    Subjective     Difficulty breathing worse with exertion    Review of Systems   Constitutional: Positive for activity change and fatigue.   HENT: Negative.    Respiratory: Positive for cough and shortness of breath.    Cardiovascular: Negative.    Gastrointestinal: Negative.    Genitourinary: Negative.    Musculoskeletal: Negative.    Neurological: Positive for weakness.   Psychiatric/Behavioral: Negative.            Objective     Vital Signs  Temp:  [97.4 °F (36.3 °C)-98 °F (36.7 °C)] 97.6 °F (36.4 °C)  Heart Rate:  [73-87] 73  Resp:  [18-24] 20  BP: (113-145)/(69-81) 127/81      Physical Exam  Vitals reviewed.   Constitutional:       Appearance: He is not ill-appearing.   HENT:      Head: Normocephalic and atraumatic.      Right Ear: External ear normal.      Left Ear: External ear normal.      Nose: Nose normal.      Mouth/Throat:      Mouth: Mucous membranes are dry.   Eyes:      General:         Right eye: No discharge.         Left eye: No discharge.   Cardiovascular:      Rate and Rhythm: Normal rate and regular rhythm.      Pulses: Normal pulses.      Heart sounds: Normal heart sounds.   Pulmonary:      Effort: Pulmonary effort is normal.      Breath sounds: Wheezing present.   Abdominal:      General: Bowel sounds are normal.      Palpations: Abdomen is soft.   Musculoskeletal:         General: Normal range of motion.      Cervical back: Normal range of motion.   Skin:     General: Skin is warm and dry.   Neurological:      Mental Status: He is alert and oriented to person, place, and time.   Psychiatric:         Behavior: Behavior normal.              Results Review:    Lab Results (last 24 hours)     Procedure Component Value Units Date/Time    POC Glucose Once [[id number]]  (Normal) Collected: 12/05/22 1144    Specimen: Blood Updated: 12/05/22 1146     Glucose 90 mg/dL      Comment: Serial Number:  [id number]:  [id number]       POC Glucose Once [[id number]]  (Abnormal) Collected: 12/05/22 0730    Specimen: Blood Updated: 12/05/22 0732     Glucose 107 mg/dL      Comment: Serial Number: [id number]:  [id number]       Manual Differential [[id number]]  (Abnormal) Collected: 12/04/22 2348    Specimen: Blood Updated: 12/05/22 0112     Neutrophil % 77.0 %      Lymphocyte % 2.0 %      Monocyte % 10.0 %      Bands %  11.0 %      Neutrophils Absolute 11.35 10*3/mm3      Lymphocytes Absolute 0.26 10*3/mm3      Monocytes Absolute 1.29 10*3/mm3      RBC Morphology Normal     Vacuolated Neutrophils Slight/1+     Large Platelets Slight/1+    CBC & Differential [[id number]]  (Abnormal) Collected: 12/04/22 2348    Specimen: Blood Updated: 12/05/22 0112    Narrative:      The following orders were created for panel order CBC & Differential.  Procedure                               Abnormality         Status                     ---------                               -----------         ------                     CBC Auto Differential[[id number]]        Abnormal            Final result               Scan Slide[[id number]]                                       Final result                 Please view results for these tests on the individual orders.    Scan Slide [[id number]] Collected: 12/04/22 2348    Specimen: Blood Updated: 12/05/22 0112     Scan Slide --     Comment: See Manual Differential Results       CBC Auto Differential [[id number]]  (Abnormal) Collected: 12/04/22 2348    Specimen: Blood Updated: 12/05/22 0112     WBC 12.90 10*3/mm3      RBC 4.81 10*6/mm3      Hemoglobin 14.5 g/dL      Hematocrit 44.6 %      MCV 92.8 fL      MCH 30.3 pg      MCHC 32.6 g/dL      RDW 15.2 %      RDW-SD 49.0 fl      MPV 8.6 fL      Platelets 185 10*3/mm3     Narrative:      Modified report. Previous result was Hemogram on 12/5/2022 at 0015 EST.  The previously reported component NRBC is no longer being reported. Previous result was  0.0 /100 WBC (Reference Range: 0.0-0.2 /100 WBC) on 12/5/2022 at 0015 EST.    Basic Metabolic Panel [[id number]]  (Abnormal) Collected: 12/04/22 2348    Specimen: Blood Updated: 12/05/22 0022     Glucose 114 mg/dL      BUN 22 mg/dL      Creatinine 0.67 mg/dL      Sodium 133 mmol/L      Potassium 5.1 mmol/L      Chloride 93 mmol/L      CO2 30.0 mmol/L      Calcium 9.2 mg/dL      BUN/Creatinine Ratio 32.8     Anion Gap 10.0 mmol/L      eGFR 104.9 mL/min/1.73      Comment: National Kidney Foundation and American Society of Nephrology (ASN) Task Force recommended calculation based on the Chronic Kidney Disease Epidemiology Collaboration (CKD-EPI) equation refit without adjustment for race.       Narrative:      GFR Normal >60  Chronic Kidney Disease <60  Kidney Failure <15      Troponin [[id number]]  (Normal) Collected: 12/04/22 2348    Specimen: Blood Updated: 12/05/22 0022     Troponin T <0.010 ng/mL     Narrative:      Troponin T Reference Range:  <= 0.03 ng/mL-   Negative for AMI  >0.03 ng/mL-     Abnormal for myocardial necrosis.  Clinicians would have to utilize clinical acumen, EKG, Troponin and serial changes to determine if it is an Acute Myocardial Infarction or myocardial injury due to an underlying chronic condition.       Results may be falsely decreased if patient taking Biotin.      POC Glucose Once [[id number]]  (Normal) Collected: 12/05/22 0009    Specimen: Blood Updated: 12/05/22 0010     Glucose 101 mg/dL      Comment: Serial Number: [id number]:  [id number]       POC Glucose Once [[id number]]  (Abnormal) Collected: 12/04/22 1719    Specimen: Blood Updated: 12/04/22 1720     Glucose 133 mg/dL      Comment: Serial Number: [id number]:  [id number]       Urine Culture - Urine, Urine, Clean Catch [[id number]]  (Normal) Collected: 12/03/22 1100    Specimen: Urine, Clean Catch Updated: 12/04/22 1459     Urine Culture No growth           Imaging Results (Last 24 Hours)     Procedure Component Value  Units Date/Time    CT Chest Without Contrast Diagnostic [[id number]] Resulted: 12/05/22 1300     Updated: 12/05/22 1307               I reviewed the patient's new clinical results.    Medication Review:   Scheduled Meds:aspirin, 81 mg, Oral, Daily  bisoprolol-hydroCHLOROthiazide (ZIAC) 10-6.25 mg combo dose, , Oral, Q24H  enoxaparin, 40 mg, Subcutaneous, Q24H  famotidine, 40 mg, Oral, Daily  glipizide, 10 mg, Oral, BID AC  insulin lispro, 2-9 Units, Subcutaneous, TID With Meals  ipratropium-albuterol, 3 mL, Nebulization, 4x Daily - RT  lisinopril, 40 mg, Oral, Q PM  metFORMIN, 1,000 mg, Oral, BID With Meals  montelukast, 10 mg, Oral, Nightly  oseltamivir, 75 mg, Oral, Q12H  predniSONE, 20 mg, Oral, BID With Meals  sodium chloride, 3 mL, Intravenous, Q12H      Continuous Infusions:Pharmacy to Dose enoxaparin (LOVENOX),       PRN Meds:.•  acetaminophen  •  albuterol sulfate HFA  •  dextrose  •  dextrose  •  glucagon (human recombinant)  •  nitroglycerin  •  ondansetron  •  Pharmacy to Dose enoxaparin (LOVENOX)  •  [COMPLETED] Insert Peripheral IV **AND** sodium chloride  •  sodium chloride  •  sodium chloride     Interval History:    12/5 patient continues with shortness of breath; will add corticosteroid nebs; CT chest; continue supportive care may need oxygen at home temporarily; leukocytosis most likely secondary to steroids     Assessment & Plan     Acute COPD exacerbation most likely related to flu A  Diabetes  Hypertension  Tobacco abuse     Plan for disposition: CARA Barraza  12/05/22  13:31 EST

## 2022-12-05 NOTE — PLAN OF CARE
Goal Outcome Evaluation:   VSS. Aox4. Up ad ezra. No complaints of pain. Maintaining 90% O2 on 3 L o2. Call light in reach. Able to make needs known. Care plan ongoing.

## 2022-12-05 NOTE — CASE MANAGEMENT/SOCIAL WORK
Discharge Planning Assessment  [facility]     Patient Name: Maximino Bhatia  MRN: [id number]  Today's Date: 12/5/2022    Admit Date: 12/3/2022    Plan: Watch for home o2 needs   Discharge Needs Assessment     Row Name 12/05/22 1512       Living Environment    Current Living Arrangements home    Primary Care Provided by self    Provides Primary Care For no one    Family Caregiver if Needed none    Able to Return to Prior Arrangements yes       Resource/Environmental Concerns    Resource/Environmental Concerns none    Transportation Concerns none       Transition Planning    Patient/Family Anticipates Transition to home    Patient/Family Anticipated Services at Transition none    Transportation Anticipated family or friend will provide       Discharge Needs Assessment    Equipment Currently Used at Home none    Concerns to be Addressed discharge planning    Anticipated Changes Related to Illness none    Equipment Needed After Discharge none    Provided Post Acute Provider List? N/A    Provided Post Acute Provider Quality & Resource List? N/A               Discharge Plan     Row Name 12/05/22 1513       Plan    Plan Watch for home o2 needs    Plan Comments Denies dc needs at this time. PCP and pharmacy confirmed. Barriers to dc: neds. Iv steroids, oxygenation              Continued Care and Services - Admitted Since 12/3/2022    Coordination has not been started for this encounter.       Expected Discharge Date and Time     Expected Discharge Date Expected Discharge Time    Dec 6, 2022          Demographic Summary     Row Name 12/05/22 1512       General Information    Reason for Consult discharge planning    Preferred Language English    Row Name 12/05/22 1448       General Information    Admission Type inpatient    Arrived From emergency department    Referral Source admission list               Functional Status     Row Name 12/05/22 1512       Functional Status    Usual Activity Tolerance moderate    Current Activity  Tolerance moderate       Functional Status, IADL    Medications independent    Meal Preparation independent    Housekeeping independent    Laundry independent    Shopping independent       Mental Status    General Appearance WDL WDL       Mental Status Summary    Recent Changes in Mental Status/Cognitive Functioning no changes                         Norma Foy RN

## 2022-12-05 NOTE — PAYOR COMM NOTE
Maximino Murillo (62 y.o. Male)   Requesting IP Auth for ER Medical Admit  Policy no. [id number]  AUTHORIZATION PENDING  PLEASE FORWARD DETERMINATION TO FOLLOWING CONTACT:    CONSUELO VERMA LPN UR  Utilization Review Nurse  [facility]  Direct & confidential phone # [phone]  Fax # [phone]      Date of Birth   1960    Social Security Number       Address   [address] [place] [address] [place] [address]    Home Phone   [phone]    MRN   [id number]       Christianity   None    Marital Status   Single                            Admission Date   12/3/22    Admission Type   Emergency    Admitting Provider   Judit Singh MD    Attending Provider   Judit Singh MD    Department, Room/Bed   [facility] SURGICAL INPATIENT, 4131/1       Discharge Date       Discharge Disposition       Discharge Destination                               Attending Provider: Judit Singh MD    Allergies: No Known Allergies    Isolation: Droplet   Infection: Influenza (12/03/22)   Code Status: CPR    Ht: 180.3 cm (70.98\")   Wt: 98.4 kg (216 lb 14.9 oz)    Admission Cmt: None   Principal Problem: COPD with acute exacerbation (HCC) [J44.1]                 Active Insurance as of 12/3/2022     Primary Coverage     Payor Plan Insurance Group Employer/Plan Group    [id number]     Payor Plan Address Payor Plan Phone Number Payor Plan Fax Number Effective Dates    MAIL STOP:   4/8/2020 - None Entered    [address] [address]       [facility] [address]       Subscriber Name Subscriber Birth Date Member ID       MAXIMINO MURILLO 1960 [id number]                 Emergency Contacts      (Rel.) Home Phone Work Phone Mobile Phone    ERIN CERVANTES (Friend) [phone] -- --               History & Physical      James Craig MD at 12/04/22 1126          Patient Care Team:  Qi Hicks APRN as PCP - General (Nurse Practitioner)    Chief  Conplaint  Subjective      The patient is a 62 y.o. male presents with progressive shortness of breath with evidence of acute exacerbation of panlobular COPD with emphysema and an acute influenza A lower respiratory infection    HPI  The patient was in his usual state of health until approximately 1 week prior to presentation when he began experiencing insidious onset of some progressive weakness associated with a cough and some shortness of breath.  He decompensated and presented to the [facility] emergency room for evaluation where he was admitted with an acute exacerbation of COPD and an acute influenza A infection  Review of Systems  Review of Systems   Constitutional: Positive for activity change and appetite change.   Respiratory: Positive for shortness of breath. Negative for chest tightness.    Cardiovascular: Negative.    Gastrointestinal: Negative.    Neurological: Positive for weakness.       History  Past Medical History:   Diagnosis Date   • COPD (chronic obstructive pulmonary disease) (HCC)    • Diabetes mellitus (HCC)    • Hypertension      No past surgical history on file.  No family history on file.  Social History     Tobacco Use   • Smoking status: Every Day     Packs/day: 1.50     Years: 25.00     Pack years: 37.50     Types: Cigarettes     Passive exposure: Never   • Smokeless tobacco: Never   Vaping Use   • Vaping Use: Never used   • Passive vaping exposure: Yes   Substance Use Topics   • Alcohol use: Yes     Comment: RARE   • Drug use: Never     Allergies:  Patient has no known allergies.    Objective      Vital Signs  Temp:  [97.9 °F (36.6 °C)-98.4 °F (36.9 °C)] 97.9 °F (36.6 °C)  Heart Rate:  [80-89] 82  Resp:  [16-20] 16  BP: ()/(59-75) 131/75      Physical Exam:   Physical Exam  Vitals reviewed.   Constitutional:       General: He is not in acute distress.     Appearance: He is ill-appearing and diaphoretic. He is not toxic-appearing.   HENT:      Head: Normocephalic and  atraumatic.   Cardiovascular:      Rate and Rhythm: Rhythm irregular.      Heart sounds: Normal heart sounds.   Pulmonary:      Effort: No respiratory distress.      Breath sounds: Wheezing present.   Skin:     General: Skin is warm.   Neurological:      General: No focal deficit present.      Mental Status: He is alert.              Results Review:   CBC    Results from last 7 days   Lab Units 12/04/22  0655 12/03/22  1028   WBC 10*3/mm3 8.30 10.00   HEMOGLOBIN g/dL 14.3 15.4   PLATELETS 10*3/mm3 151 148     BMP   Results from last 7 days   Lab Units 12/04/22  0655 12/03/22  1028   SODIUM mmol/L 133* 130*   POTASSIUM mmol/L 5.1 4.5   CHLORIDE mmol/L 97* 91*   CO2 mmol/L 26.0 24.0   BUN mg/dL 18 20   CREATININE mg/dL 0.65* 0.73*   GLUCOSE mg/dL 185* 91   MAGNESIUM mg/dL  --  1.7     Cr Clearance Estimated Creatinine Clearance: 139 mL/min (A) (by C-G formula based on SCr of 0.65 mg/dL (L)).  Coag     HbA1C No results found for: HGBA1C  Blood Glucose No results found for: POCGLU  Infection     CMP   Results from last 7 days   Lab Units 12/04/22  0655 12/03/22  1028   SODIUM mmol/L 133* 130*   POTASSIUM mmol/L 5.1 4.5   CHLORIDE mmol/L 97* 91*   CO2 mmol/L 26.0 24.0   BUN mg/dL 18 20   CREATININE mg/dL 0.65* 0.73*   GLUCOSE mg/dL 185* 91   ALBUMIN g/dL  --  4.10   BILIRUBIN mg/dL  --  0.3   ALK PHOS U/L  --  53   AST (SGOT) U/L  --  20   ALT (SGPT) U/L  --  15     UA    Results from last 7 days   Lab Units 12/03/22  1100   NITRITE UA  Negative   WBC UA /HPF 0-2*   BACTERIA UA /HPF Trace*   SQUAM EPITHEL UA /HPF 0-2     Radiology(recent) XR Chest 1 View    Result Date: 12/3/2022   1. Chronic scarring in the right lung base. 2. Emphysema. 3. No active pulmonary disease.  Electronically Signed By-James Grey MD On:12/3/2022 11:39 AM This report was finalized on [id number] by  James Grey MD.       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     James Craig MD  12/04/22  11:26 EST          Electronically signed by James Craig MD at 12/04/22 1129          Emergency Department Notes      Laya Francisco, RN at 12/03/22 1012        [place] [facility] [place] sats. Patient states he started with SOA a few days ago.  It started with runny nose. Denies fever, has had runny stools.  No vomiting/nausea.  Patient feels like he could cough something up but is is no productive.   Patient was [place] [facility]  Sats mid to upper 90s on 4L.  Room air in ER is 86%.  Patient placed on 2L O2    Electronically signed by Laya Francisco RN at 12/03/22 1014     Laya Francisco RN at 12/03/22 1101        Patient resting.  Urine sample obtained.  No call light within reach.      Electronically signed by Laya Francisco RN at 12/03/22 1101     Gainna Cobos APRN at 12/03/22 1145     Attestation signed by Manpreet Henry MD at 12/03/22 1359        NON FACE TO FACE: This visit was performed by BOTH a physician and an APC. I performed all aspects of the MDM as documented.  Manpreet Henry MD 12/3/2022 13:58 EST                         Subjective   History of Present Illness  Chief Complaint: Shortness of breath    Context: Patient is a pleasant 62-year-old obese  male presenting today with complaints of shortness of  breath.  Patient states that he went to urgent care this morning because his \"breathing got so bad he could not stand anymore.\"  Patient states that the symptoms started on Wednesday, when he had a runny nose that he states was constant.  His symptoms have progressed since then and he has been having issues breathing as well.  He has had a cough but is not coughing up anything.  Patient reports smoking 1-1/2 packs of cigarettes daily.  He does not typically wear oxygen but presents today on 2 L by nasal cannula.  He reports a history of diabetes and high blood pressure.  He states that he takes metformin, glipizide, lisinopril, Singulair and a baby aspirin daily.  He has no known drug allergies.  He states that he is vaccinated against COVID and influenza and has had no known exposure.  He had a doctor's appointment on 11/29 with his primary care provider for basic diabetes check.  He states that his blood sugar had been elevated but everything checked out okay.  He denies any pain at this point.  He has no known drug allergies.  He denies use of drugs and alcohol.        Review of Systems   Constitutional: Negative for chills and fever.   HENT: Positive for rhinorrhea and sinus pressure. Negative for congestion and sore throat.    Respiratory: Positive for cough, chest tightness and shortness of breath.    Cardiovascular: Negative for chest pain and palpitations.   Gastrointestinal: Negative for abdominal pain, diarrhea, nausea and vomiting.   Genitourinary: Negative for flank pain and urgency.   Musculoskeletal: Negative for arthralgias and myalgias.   Skin: Negative.    Neurological: Negative for dizziness, syncope, weakness and headaches.   Psychiatric/Behavioral: Negative for confusion. The patient is not nervous/anxious.    All other systems reviewed and are negative.      Past Medical History:   Diagnosis Date   • COPD (chronic obstructive pulmonary disease) (HCC)    • Diabetes mellitus (HCC)    •  Hypertension        No Known Allergies    No past surgical history on file.    No family history on file.    Social History     Socioeconomic History   • Marital status: Single   Tobacco Use   • Smoking status: Every Day     Packs/day: 1.50     Years: 25.00     Pack years: 37.50     Types: Cigarettes     Passive exposure: Never   • Smokeless tobacco: Never   Vaping Use   • Vaping Use: Never used   • Passive vaping exposure: Yes   Substance and Sexual Activity   • Alcohol use: Yes     Comment: RARE   • Drug use: Never   • Sexual activity: Defer           Objective   Physical Exam  Vitals and nursing note reviewed.   Constitutional:       General: He is awake. He is not in acute distress.     Appearance: Normal appearance. He is well-developed. He is not ill-appearing.   HENT:      Head: Normocephalic and atraumatic. No raccoon eyes or Lopez's sign.      Right Ear: Hearing, tympanic membrane and ear canal normal.      Left Ear: Hearing, tympanic membrane and ear canal normal.   Eyes:      General: Vision grossly intact. Gaze aligned appropriately.      Extraocular Movements: Extraocular movements intact.      Pupils: Pupils are equal, round, and reactive to light.   Cardiovascular:      Rate and Rhythm: Normal rate.      Pulses: Normal pulses.      Heart sounds: Normal heart sounds. No murmur heard.  Pulmonary:      Effort: Pulmonary effort is normal. No respiratory distress.      Breath sounds: Examination of the right-upper field reveals decreased breath sounds. Examination of the left-upper field reveals decreased breath sounds. Examination of the right-lower field reveals decreased breath sounds. Examination of the left-lower field reveals decreased breath sounds. Decreased breath sounds present.   Chest:      Chest wall: No tenderness.   Abdominal:      General: Bowel sounds are normal.      Palpations: Abdomen is soft.      Tenderness: There is no abdominal tenderness.   Musculoskeletal:         General: Normal  range of motion.      Cervical back: Normal range of motion and neck supple.      Right lower leg: No edema.      Left lower leg: No edema.   Skin:     General: Skin is warm and dry.      Capillary Refill: Capillary refill takes less than 2 seconds.   Neurological:      General: No focal deficit present.      Mental Status: He is alert and oriented to person, place, and time. Mental status is at baseline.      GCS: GCS eye subscore is 4. GCS verbal subscore is 5. GCS motor subscore is 6.      Cranial Nerves: Cranial nerves 2-12 are intact.      Sensory: Sensation is intact.      Motor: Motor function is intact.      Deep Tendon Reflexes: Reflexes are normal and symmetric.   Psychiatric:         Mood and Affect: Mood normal.         Behavior: Behavior normal.         Procedures          ED Course  ED Course as of 12/03/22 1340   Sat Dec 03, 2022   1203 EKG was reviewed myself and read by Dr. Whiet reveals sinus rhythm with a regular rate of 90.  No acute ST elevation or ectopy.  No significant change from previous study completed in 2019. [SJ]      ED Course User Index  [SJ] Gianna Cobos G, APRN      /62   Pulse 84   Temp 98.3 °F (36.8 °C)   Resp 18   Ht 180.3 cm (71\")   Wt 98.4 kg (217 lb)   SpO2 91%   BMI 30.27 kg/m²   Labs Reviewed   INFLUENZA ANTIGEN, RAPID - Abnormal; Notable for the following components:       Result Value    Influenza A PCR Detected (*)     All other components within normal limits   COMPREHENSIVE METABOLIC PANEL - Abnormal; Notable for the following components:    Creatinine 0.73 (*)     Sodium 130 (*)     Chloride 91 (*)     BUN/Creatinine Ratio 27.4 (*)     All other components within normal limits    Narrative:     GFR Normal >60  Chronic Kidney Disease <60  Kidney Failure <15     URINALYSIS W/ MICROSCOPIC IF INDICATED (NO CULTURE) - Abnormal; Notable for the following components:    Protein, UA 30 mg/dL (1+) (*)     All other components within normal limits   URINALYSIS,  MICROSCOPIC ONLY - Abnormal; Notable for the following components:    RBC, UA 0-2 (*)     WBC, UA 0-2 (*)     Bacteria, UA Trace (*)     Mucus, UA Small/1+ (*)     All other components within normal limits   MANUAL DIFFERENTIAL - Abnormal; Notable for the following components:    Neutrophil % 81.0 (*)     Lymphocyte % 6.0 (*)     Bands %  7.0 (*)     Neutrophils Absolute 8.80 (*)     Lymphocytes Absolute 0.60 (*)     All other components within normal limits   COVID-19,CEPHEID/LEONEL,COR/TORY/PAD/JUWAN/MAD IN-HOUSE,NP SWAB IN TRANSPORT MEDIA 3-4 HR TAT, RT-PCR - Normal    Narrative:     Fact sheet for providers: https://www.fda.gov/media/890462/download     Fact sheet for patients: https://www.fda.gov/media/477690/download  Fact sheet for providers: https://www.fda.gov/media/092842/download    Fact sheet for patients: https://www.[employer].gov/media/073918/download    Test performed by PCR.   BNP (IN-HOUSE) - Normal    Narrative:     Among patients with dyspnea, NT-proBNP is highly sensitive for the detection of acute congestive heart failure. In addition NT-proBNP of <300 pg/ml effectively rules out acute congestive heart failure with 99% negative predictive value.     TROPONIN (IN-HOUSE) - Normal    Narrative:     Troponin T Reference Range:  <= 0.03 ng/mL-   Negative for AMI  >0.03 ng/mL-     Abnormal for myocardial necrosis.  Clinicians would have to utilize clinical acumen, EKG, Troponin and serial changes to determine if it is an Acute Myocardial Infarction or myocardial injury due to an underlying chronic condition.       Results may be falsely decreased if patient taking Biotin.     MAGNESIUM - Normal   CBC WITH AUTO DIFFERENTIAL - Normal    Narrative:     Modified report. Previous result was Hemogram on 12/3/2022 at 1043 EST.  The previously reported component NRBC is no longer being reported. Previous result was 0.1 /100 WBC (Reference Range: 0.0-0.2 /100 WBC) on 12/3/2022 at 1043 EST.   COVID PRE-OP / PRE-PROCEDURE  SCREENING ORDER (NO ISOLATION)    Narrative:     The following orders were created for panel order COVID PRE-OP / PRE-PROCEDURE SCREENING ORDER (NO ISOLATION) - Swab, Nasopharynx.  Procedure                               Abnormality         Status                     ---------                               -----------         ------                     COVID-19,CEPHEID/LEONEL,CO...[[id number]]  Normal              Final result                 Please view results for these tests on the individual orders.   URINE CULTURE   SCAN SLIDE   CBC AND DIFFERENTIAL    Narrative:     The following orders were created for panel order CBC & Differential.  Procedure                               Abnormality         Status                     ---------                               -----------         ------                     CBC Auto Differential[[id number]]        Normal              Final result               Scan Slide[[id number]]                                       Final result                 Please view results for these tests on the individual orders.     Medications   sodium chloride 0.9 % flush 10 mL (has no administration in time range)   methylPREDNISolone sodium succinate (SOLU-Medrol) injection 125 mg (125 mg Intravenous Given 12/3/22 1053)   albuterol sulfate HFA (PROVENTIL HFA;VENTOLIN HFA;PROAIR HFA) inhaler 2 puff (2 puffs Inhalation Given 12/3/22 1105)   sodium chloride 0.9 % bolus 500 mL (0 mL Intravenous Stopped 12/3/22 1256)     XR Chest 1 View    Result Date: 12/3/2022   1. Chronic scarring in the right lung base. 2. Emphysema. 3. No active pulmonary disease.  Electronically Signed By-James Grey MD On:12/3/2022 11:39 AM This report was finalized on [id number] by  James Grey MD.                                         MDM  Number of Diagnoses or Management Options  Hypoxia  Influenza A  Shortness of breath  Diagnosis management comments: Patient was placed in a gown prior to assessment.  He is alert,  nontoxic-appearing and stable on exam.  Patient presents on oxygen via nasal cannula.  No acute distress is noted on arrival.  IV was established labs were obtained.  Patient was medicated with prednisone.  He received an albuterol inhaler treatment and a fluid bolus in the emergency room.    Patient is influenza A positive today.  X-ray reveals chronic scarring in the right lung base with emphysema present.  No active pulmonary disease is noted.  Urinalysis reveals trace bacteria and culture was ordered.  No leukocytes or nitrites present.  White blood count is negative.  Patient is aware that discharge does not meAt this time, I do not believe patient requires UTI treatment due to asymptomatic bacteremia.  Troponin and BNP are negative.  COVID is negative.  Patient was taken off supplemental oxygen but oxygen saturations decreased to 88% with symptoms worsening.  Patient states that he is typically 95% on room air.  Patient was placed back on oxygen and it improved immediately.  Given this observation, I believe it is best to keep patient in the hospital tonight for further evaluation.  Patient is agreeable to admission at this time.  He verbalizes understanding.  He was going to be placed in the ED observation unit but is an [facility] patient.  I spoke with BETTY Bravo with the [facility] group.  She accepted patient on behalf of of Dr. Singh, who will be providing inpatient care.  Patient has remained alert and stable while in the emergency room.  He is in no acute distress.    Comorbidities: History of COPD, current smoker  Differentials: Influenza, COVID-19, pneumonia, acute UTI.  Not all inclusive of differentials considered.   EKG reviewed by me and interpreted by Dr. White    Lab Interpretation: As above  Radiology Interpretation: As above    Appropriate PPE worn during exam.    This document is intended for medical expert use only.  Reading of this document by patients and/or patient's family without  participating medical staff guidance may result in misinterpretation and unintended morbidity.  Any interpretation of such data is the responsibility of the patient and/or family member responsible for the patient in concert with their primary or specialist providers, not to be left for sources of online search as such as [employer], [employer] or similar queries.  Relying on these approaches to knowledge may result in misinterpretation, misguided goals of care and even death should patient or family members try recommendations outside of the realm of professional medical care in a supervised inpatient environment.    This medical document was created using Dragon dictation system. Some errors in speech recognition may occur.       Amount and/or Complexity of Data Reviewed  Clinical lab tests: ordered and reviewed  Tests in the radiology section of CPT®: ordered and reviewed    Risk of Complications, Morbidity, and/or Mortality  Presenting problems: high  Diagnostic procedures: high  Management options: high    Patient Progress  Patient progress: improved      Final diagnoses:   Influenza A   Hypoxia   Shortness of breath       ED Disposition  ED Disposition     ED Disposition   Decision to Admit    Condition   --    Comment   Level of Care: Telemetry [5]   Admitting Physician: NGOC ORTIZ [[id number]]   Attending Physician: NGOC ORTIZ [[id number]]               No follow-up provider specified.       Medication List      No changes were made to your prescriptions during this visit.          Gianna Cobos APRN  12/03/22 1340      Electronically signed by Manpreet Henry MD at 12/03/22 1359     Laya Francisco, RN at 12/03/22 1210        Oxygen turned down to 2L.  Will monitor saturations.  Patient given water to drink after checking with provider.    Electronically signed by Laya Francisco RN at 12/03/22 1210     Laya Francisco RN at 12/03/22 1240        Patient O2 turned off.  Will continue to monitor O2  sats.    Electronically signed by Laya Francisco RN at 12/03/22 1240     Laya Francisco RN at 12/03/22 1251        Patient is sating at 88% on RA.  States he feels that his chest congestion is breaking up.  Reported O2 sats to provider. Patient placed back on 2L NC    Electronically signed by Laya Francisco RN at 12/03/22 1257     Laya Francisco RN at 12/03/22 1258        Patient O2 sats staying at 89% on 2L.  Changed O2 to 3L NC.  O2 sats up to 92%    Electronically signed by Laya Francisco RN at 12/03/22 1250     Laya Francisco RN at 12/03/22 4236        Patient stated he was sweating. He asked for something to eat.  After checking with HCP, patient was given a sandwich and drink.  Call light within reach.  No new complaints.    Electronically signed by Laya Francisco RN at 12/03/22 6743

## 2022-12-05 NOTE — PLAN OF CARE
Goal Outcome Evaluation:  Plan of Care Reviewed With: patient        Progress: no change  Outcome Evaluation: Pt on 3L NC. Had some shortness of breath present at the beginning of the shift but after receiving breathing treatment shortness of breath subsided. Pt is able to ambulate by himself. Has had no complaints of pain and is able to make needs known. VSS and call light is within reach. Plan ongoing.

## 2022-12-06 LAB
ARTERIAL PATENCY WRIST A: POSITIVE
ATMOSPHERIC PRESS: ABNORMAL MM[HG]
BASE EXCESS BLDA CALC-SCNC: 6.8 MMOL/L (ref 0–3)
BDY SITE: ABNORMAL
CO2 BLDA-SCNC: 37.1 MMOL/L (ref 22–29)
GLUCOSE BLDC GLUCOMTR-MCNC: 170 MG/DL (ref 70–105)
GLUCOSE BLDC GLUCOMTR-MCNC: 182 MG/DL (ref 70–105)
GLUCOSE BLDC GLUCOMTR-MCNC: 222 MG/DL (ref 70–105)
HCO3 BLDA-SCNC: 35.2 MMOL/L (ref 21–28)
HEMODILUTION: NO
INHALED O2 CONCENTRATION: 36 %
MODALITY: ABNORMAL
PCO2 BLDA: 62.5 MM HG (ref 35–48)
PH BLDA: 7.36 PH UNITS (ref 7.35–7.45)
PO2 BLDA: 54 MM HG (ref 83–108)
QT INTERVAL: 332 MS
SAO2 % BLDCOA: 85 % (ref 94–98)

## 2022-12-06 PROCEDURE — 94799 UNLISTED PULMONARY SVC/PX: CPT

## 2022-12-06 PROCEDURE — 25010000002 ENOXAPARIN PER 10 MG: Performed by: NURSE PRACTITIONER

## 2022-12-06 PROCEDURE — 94664 DEMO&/EVAL PT USE INHALER: CPT

## 2022-12-06 PROCEDURE — 82803 BLOOD GASES ANY COMBINATION: CPT

## 2022-12-06 PROCEDURE — 82962 GLUCOSE BLOOD TEST: CPT

## 2022-12-06 PROCEDURE — 36600 WITHDRAWAL OF ARTERIAL BLOOD: CPT

## 2022-12-06 PROCEDURE — 63710000001 PREDNISONE PER 1 MG: Performed by: NURSE PRACTITIONER

## 2022-12-06 PROCEDURE — 94660 CPAP INITIATION&MGMT: CPT

## 2022-12-06 PROCEDURE — 94761 N-INVAS EAR/PLS OXIMETRY MLT: CPT

## 2022-12-06 PROCEDURE — 25010000002 METHYLPREDNISOLONE PER 40 MG: Performed by: NURSE PRACTITIONER

## 2022-12-06 PROCEDURE — 63710000001 INSULIN LISPRO (HUMAN) PER 5 UNITS: Performed by: FAMILY MEDICINE

## 2022-12-06 RX ORDER — METHYLPREDNISOLONE SODIUM SUCCINATE 40 MG/ML
40 INJECTION, POWDER, LYOPHILIZED, FOR SOLUTION INTRAMUSCULAR; INTRAVENOUS EVERY 8 HOURS
Status: DISCONTINUED | OUTPATIENT
Start: 2022-12-06 | End: 2022-12-07

## 2022-12-06 RX ADMIN — INSULIN LISPRO 2 UNITS: 100 INJECTION, SOLUTION INTRAVENOUS; SUBCUTANEOUS at 12:00

## 2022-12-06 RX ADMIN — Medication 3 ML: at 08:56

## 2022-12-06 RX ADMIN — BISOPROLOL FUMARATE: 5 TABLET ORAL at 08:52

## 2022-12-06 RX ADMIN — METFORMIN HYDROCHLORIDE 1000 MG: 500 TABLET ORAL at 17:24

## 2022-12-06 RX ADMIN — BUDESONIDE 0.5 MG: 0.5 INHALANT RESPIRATORY (INHALATION) at 17:40

## 2022-12-06 RX ADMIN — GLIPIZIDE 10 MG: 5 TABLET ORAL at 17:24

## 2022-12-06 RX ADMIN — OSELTAMIVIR PHOSPHATE 75 MG: 75 CAPSULE ORAL at 08:52

## 2022-12-06 RX ADMIN — METHYLPREDNISOLONE SODIUM SUCCINATE 40 MG: 40 INJECTION, POWDER, FOR SOLUTION INTRAMUSCULAR; INTRAVENOUS at 20:30

## 2022-12-06 RX ADMIN — MONTELUKAST 10 MG: 10 TABLET, FILM COATED ORAL at 20:30

## 2022-12-06 RX ADMIN — INSULIN LISPRO 4 UNITS: 100 INJECTION, SOLUTION INTRAVENOUS; SUBCUTANEOUS at 17:24

## 2022-12-06 RX ADMIN — GLIPIZIDE 10 MG: 5 TABLET ORAL at 08:52

## 2022-12-06 RX ADMIN — Medication 3 ML: at 20:29

## 2022-12-06 RX ADMIN — IPRATROPIUM BROMIDE AND ALBUTEROL SULFATE 3 ML: 2.5; .5 SOLUTION RESPIRATORY (INHALATION) at 11:42

## 2022-12-06 RX ADMIN — BUDESONIDE 0.5 MG: 0.5 INHALANT RESPIRATORY (INHALATION) at 08:25

## 2022-12-06 RX ADMIN — FAMOTIDINE 40 MG: 20 TABLET, FILM COATED ORAL at 08:55

## 2022-12-06 RX ADMIN — ENOXAPARIN SODIUM 40 MG: 100 INJECTION SUBCUTANEOUS at 17:24

## 2022-12-06 RX ADMIN — PREDNISONE 20 MG: 20 TABLET ORAL at 08:55

## 2022-12-06 RX ADMIN — IPRATROPIUM BROMIDE AND ALBUTEROL SULFATE 3 ML: 2.5; .5 SOLUTION RESPIRATORY (INHALATION) at 17:40

## 2022-12-06 RX ADMIN — OSELTAMIVIR PHOSPHATE 75 MG: 75 CAPSULE ORAL at 20:30

## 2022-12-06 RX ADMIN — ASPIRIN 81 MG: 81 TABLET, COATED ORAL at 08:55

## 2022-12-06 RX ADMIN — IPRATROPIUM BROMIDE AND ALBUTEROL SULFATE 3 ML: 2.5; .5 SOLUTION RESPIRATORY (INHALATION) at 08:25

## 2022-12-06 RX ADMIN — IPRATROPIUM BROMIDE AND ALBUTEROL SULFATE 3 ML: 2.5; .5 SOLUTION RESPIRATORY (INHALATION) at 14:50

## 2022-12-06 RX ADMIN — METHYLPREDNISOLONE SODIUM SUCCINATE 40 MG: 40 INJECTION, POWDER, FOR SOLUTION INTRAMUSCULAR; INTRAVENOUS at 12:00

## 2022-12-06 RX ADMIN — INSULIN LISPRO 2 UNITS: 100 INJECTION, SOLUTION INTRAVENOUS; SUBCUTANEOUS at 08:55

## 2022-12-06 RX ADMIN — METFORMIN HYDROCHLORIDE 1000 MG: 500 TABLET ORAL at 08:52

## 2022-12-06 RX ADMIN — LISINOPRIL 40 MG: 20 TABLET ORAL at 17:24

## 2022-12-06 NOTE — PLAN OF CARE
Goal Outcome Evaluation:  Plan of Care Reviewed With: patient           Outcome Evaluation: Pt with increased signs of respiratory distress, ABGs ordered per provided, provider notified of results. Pt transferring to PCU bed today.

## 2022-12-06 NOTE — PROGRESS NOTES
LOS: 2 days   Patient Care Team:  Qi Hicks APRN as PCP - General (Nurse Practitioner)    Subjective     Difficulty breathing worse with exertion    Review of Systems   Constitutional: Positive for activity change and fatigue.   HENT: Negative.    Respiratory: Positive for cough and shortness of breath.    Cardiovascular: Negative.    Gastrointestinal: Negative.    Genitourinary: Negative.    Musculoskeletal: Negative.    Neurological: Positive for weakness.   Psychiatric/Behavioral: Negative.            Objective     Vital Signs  Temp:  [97.2 °F (36.2 °C)-98.2 °F (36.8 °C)] 98.2 °F (36.8 °C)  Heart Rate:  [] 107  Resp:  [18-26] 24  BP: (117-140)/(73-81) 137/81      Physical Exam  Vitals reviewed.   Constitutional:       Appearance: He is not ill-appearing.   HENT:      Head: Normocephalic and atraumatic.      Right Ear: External ear normal.      Left Ear: External ear normal.      Nose: Nose normal.      Mouth/Throat:      Mouth: Mucous membranes are dry.   Eyes:      General:         Right eye: No discharge.         Left eye: No discharge.   Cardiovascular:      Rate and Rhythm: Normal rate and regular rhythm.      Pulses: Normal pulses.      Heart sounds: Normal heart sounds.   Pulmonary:      Effort: Tachypnea, accessory muscle usage and respiratory distress present.      Breath sounds: Wheezing and rhonchi present.   Abdominal:      General: Bowel sounds are normal.      Palpations: Abdomen is soft.   Musculoskeletal:         General: Normal range of motion.      Cervical back: Normal range of motion.   Skin:     General: Skin is warm and dry.   Neurological:      Mental Status: He is alert and oriented to person, place, and time.   Psychiatric:         Behavior: Behavior normal.              Results Review:    Lab Results (last 24 hours)     Procedure Component Value Units Date/Time    POC Glucose Once [[id number]]  (Abnormal) Collected: 12/06/22 0759    Specimen: Blood Updated: 12/06/22  0802     Glucose 182 mg/dL      Comment: Serial Number: [id number]:  [id number]       POC Glucose Once [[id number]]  (Abnormal) Collected: 12/05/22 1640    Specimen: Blood Updated: 12/05/22 1641     Glucose 134 mg/dL      Comment: Serial Number: [id number]:  [id number]       POC Glucose Once [[id number]]  (Normal) Collected: 12/05/22 1144    Specimen: Blood Updated: 12/05/22 1146     Glucose 90 mg/dL      Comment: Serial Number: [id number]:  [id number]              Imaging Results (Last 24 Hours)     Procedure Component Value Units Date/Time    CT Chest Without Contrast Diagnostic [[id number]] Collected: 12/05/22 1453     Updated: 12/05/22 1500    Narrative:         DATE OF EXAM:  12/5/2022 12:59 PM     PROCEDURE:  CT CHEST WO CONTRAST DIAGNOSTIC-     INDICATIONS:   Dyspnea, chronic, unclear etiology; R09.02-Hypoxemia; J10.1-Influenza  due to other identified influenza virus with other respiratory  manifestations; R06.02-Shortness of breath     COMPARISON:   CT chest without contrast 04/16/2013, chest radiograph 12/03/2022     TECHNIQUE:  Routine transaxial slices were obtained through the chest without the  administration of intravenous contrast. Reconstructed coronal and  sagittal images were also obtained. Automated exposure control and  iterative construction methods were used.      FINDINGS:  Visualized soft tissues of the lower neck without acute abnormality.  Heart size normal. Three-vessel coronary artery calcifications noted. No  pericardial effusion or pleural effusion. Scattered mediastinal lymph  nodes below size criteria. No hilar adenopathy. No axillary adenopathy.     Trachea and mainstem bronchi are patent. Severe emphysema. No  pneumothorax. No new pulmonary nodule or mass. Respiratory motion  partially limits pulmonary parenchymal assessment. Mild tree-in-bud  nodularity noted in the posterior right upper lobe and bilateral lower  lobes suggesting endobronchial infection or  inflammation.     Upper abdomen demonstrates normal noncontrast visualized portions of the  liver, spleen, and right adrenal gland. Bilateral adrenal gland nodules  noted on the right measuring 1.8 cm and on the left measuring 2.9 cm  which is stable likely adenomas. Remote healed fracture at the right  lateral ninth rib. No aggressive osseous lesion or fracture.       Impression:      1. Mild tree-in-bud nodularity involving the bilateral lower lobes and  posterior right upper lobe most consistent with endobronchial infectious  or inflammatory process.  2. Severe emphysema.  3. Extensive coronary calcifications.     Electronically Signed By-Boone Rasheed MD On:12/5/2022 2:58 PM  This report was finalized on [id number] by  Boone Rasheed MD.               I reviewed the patient's new clinical results.    Medication Review:   Scheduled Meds:aspirin, 81 mg, Oral, Daily  bisoprolol-hydroCHLOROthiazide (ZIAC) 10-6.25 mg combo dose, , Oral, Q24H  budesonide, 0.5 mg, Nebulization, BID - RT  enoxaparin, 40 mg, Subcutaneous, Q24H  famotidine, 40 mg, Oral, Daily  glipizide, 10 mg, Oral, BID AC  insulin lispro, 2-9 Units, Subcutaneous, TID With Meals  ipratropium-albuterol, 3 mL, Nebulization, 4x Daily - RT  lisinopril, 40 mg, Oral, Q PM  metFORMIN, 1,000 mg, Oral, BID With Meals  montelukast, 10 mg, Oral, Nightly  oseltamivir, 75 mg, Oral, Q12H  predniSONE, 20 mg, Oral, BID With Meals  sodium chloride, 3 mL, Intravenous, Q12H      Continuous Infusions:Pharmacy to Dose enoxaparin (LOVENOX),       PRN Meds:.•  acetaminophen  •  albuterol sulfate HFA  •  dextrose  •  dextrose  •  glucagon (human recombinant)  •  nitroglycerin  •  ondansetron  •  Pharmacy to Dose enoxaparin (LOVENOX)  •  [COMPLETED] Insert Peripheral IV **AND** sodium chloride  •  sodium chloride  •  sodium chloride     Interval History:    12/5 patient continues with shortness of breath; will add corticosteroid nebs; CT chest; continue supportive care may need  oxygen at home temporarily; leukocytosis most likely secondary to steroids     12/6 worsening respiratory status today with distress will add pulm consult and abg; CT scan yesterday with severe emphysema and will increase steroids    Assessment & Plan     Acute COPD exacerbation most likely related to flu A  Diabetes  Hypertension  Tobacco abuse     Plan for disposition: CARA Barraza  12/06/22  10:17 EST

## 2022-12-06 NOTE — CONSULTS
[facility]: [facility]         CONSULT NOTE    Patient Identification:  Maximino Bhatia  62 y.o.  male  1960  [id number]            Requesting physician: Attending physician    Reason for Consultation: SOA, COPD exacerbation        History of Present Illness:  61 y/o male with h/o COPD, DM, and HTN who presented 12/3/2022 with c/o 1 wk of cough, SOA, weakness and URI symptoms.  he was admitted with an acute exacerbation of COPD and an acute influenza A infection  Due to tachypnea he was transferred to PCU and placed on BiPAP.      Assessment:  COPD with acute exacerbation (HCC)  Acute hypoxemic respiratory failure  Influenza A infection  CT chest 12/5/2022: mild bibasilar infection, severe emphysema  DM  HTN  Tob smoking      Recommendations:    Oxygen supplement and titration to maintain saturation 90 to 95%: AVAPS  Bronchodilatores  ASA  BP control  Iv steroids  Singulair  Tamiflu  DVT/GI prophylaxis  Check daily labs and correct electrolytes as needed        Review of Sytems:  Constitutional: Negative for chills, fever and positive for malaise/fatigue.   HENT: Negative.    Eyes: Negative.    Cardiovascular: Negative.    Respiratory: Positive for cough and shortness of breath.    Skin: Negative.    Musculoskeletal: Negative.    Gastrointestinal: Negative.    Genitourinary: Negative.    Neurological: Negative.    Psychiatric/Behavioral: Negative.    Past Medical History:  Past Medical History:   Diagnosis Date   • COPD (chronic obstructive pulmonary disease) (HCC)    • Diabetes mellitus (HCC)    • Hypertension        Past Surgical History:  No past surgical history on file.     Home Meds:  Medications Prior to Admission   Medication Sig Dispense Refill Last Dose   • aspirin 81 MG EC tablet Take 81 mg by mouth Daily.   12/3/2022   • bisoprolol-hydrochlorothiazide (ZIAC) 10-6.25 MG per tablet Take 1 tablet by mouth Daily.   12/3/2022   • glipizide (GLUCOTROL) 10 MG tablet Take 10 mg by mouth 2 (Two) Times  a Day Before Meals.   12/3/2022   • lisinopril (PRINIVIL,ZESTRIL) 40 MG tablet Take 40 mg by mouth Every Evening.   12/3/2022   • metFORMIN (GLUCOPHAGE) 1000 MG tablet Take 1,000 mg by mouth 2 (Two) Times a Day With Meals.   12/3/2022   • montelukast (SINGULAIR) 10 MG tablet Take 10 mg by mouth Every Night.   12/2/2022   • Multiple Vitamin (multivitamin) capsule Take 1 capsule by mouth Daily.   12/3/2022   • Umeclidinium-Vilanterol (Anoro Ellipta) 62.5-25 MCG/ACT aerosol powder  inhaler Inhale 1 puff Daily.   12/3/2022   • albuterol sulfate  (90 Base) MCG/ACT inhaler Inhale 2 puffs Every 4 (Four) Hours As Needed for Wheezing. 18 g 0        Allergies:  No Known Allergies    Social History:   Social History     Socioeconomic History   • Marital status: Single   Tobacco Use   • Smoking status: Every Day     Packs/day: 1.50     Years: 25.00     Pack years: 37.50     Types: Cigarettes     Passive exposure: Never   • Smokeless tobacco: Never   Vaping Use   • Vaping Use: Never used   • Passive vaping exposure: Yes   Substance and Sexual Activity   • Alcohol use: Yes     Comment: RARE   • Drug use: Never   • Sexual activity: Defer       Family History:  No family history on file.    Physical Exam:  /92 (BP Location: Left arm, Patient Position: Lying)   Pulse 85   Temp 97.7 °F (36.5 °C) (Oral)   Resp 20   Ht 180.3 cm (70.98\")   Wt 98.4 kg (216 lb 14.9 oz)   SpO2 95%   BMI 30.27 kg/m²  Body mass index is 30.27 kg/m². 95% 98.4 kg (216 lb 14.9 oz)  General Appearance:  Alert   HEENT:  Normocephalic, without obvious abnormality, Conjunctiva/corneas clear,.   Nares normal, no drainage     Neck:  Supple, symmetrical, trachea midline. No JVD.  Lungs /Chest wall:   Bilateral basal rhonchi, respirations unlabored, symmetrical wall movement.     Heart:  Regular rate and rhythm, S1 S2 normal  Abdomen: Soft, non-tender, no masses, no organomegaly.    Extremities: No edema, no clubbing or cyanosis    LABS:  Lab  Results   Component Value Date    CALCIUM 9.2 12/04/2022     Results from last 7 days   Lab Units 12/04/22  2348 12/04/22  0655 12/03/22  1028   MAGNESIUM mg/dL  --   --  1.7   SODIUM mmol/L 133* 133* 130*   POTASSIUM mmol/L 5.1 5.1 4.5   CHLORIDE mmol/L 93* 97* 91*   CO2 mmol/L 30.0* 26.0 24.0   BUN mg/dL 22 18 20   CREATININE mg/dL 0.67* 0.65* 0.73*   GLUCOSE mg/dL 114* 185* 91   CALCIUM mg/dL 9.2 8.9 9.1   WBC 10*3/mm3 12.90* 8.30 10.00   HEMOGLOBIN g/dL 14.5 14.3 15.4   PLATELETS 10*3/mm3 185 151 148   ALT (SGPT) U/L  --   --  15   AST (SGOT) U/L  --   --  20   PROBNP pg/mL  --   --  109.5     Lab Results   Component Value Date    TROPONINT <0.010 12/04/2022     Results from last 7 days   Lab Units 12/04/22  2348 12/03/22  1028   TROPONIN T ng/mL <0.010 <0.010     Results from last 7 days   Lab Units 12/03/22  1100   URINECX  No growth         Results from last 7 days   Lab Units 12/06/22  1143   PH, ARTERIAL pH units 7.359   PCO2, ARTERIAL mm Hg 62.5*   PO2 ART mm Hg 54.0*   O2 SATURATION ART % 85.0*   MODALITY  Cannula     Results from last 7 days   Lab Units 12/03/22  1025   INFLUENZA B PCR  Not Detected   INFLUENZA A PCR  Detected*         Results from last 7 days   Lab Units 12/03/22  1100   URINECX  No growth     No results found for: TSH  Estimated Creatinine Clearance: 134.9 mL/min (A) (by C-G formula based on SCr of 0.67 mg/dL (L)).  Results from last 7 days   Lab Units 12/03/22  1100   NITRITE UA  Negative   WBC UA /HPF 0-2*   BACTERIA UA /HPF Trace*   SQUAM EPITHEL UA /HPF 0-2   URINECX  No growth        Imaging:  Imaging Results (Last 24 Hours)     Procedure Component Value Units Date/Time    CT Chest Without Contrast Diagnostic [[id number]] Collected: 12/05/22 1453     Updated: 12/05/22 1500    Narrative:         DATE OF EXAM:  12/5/2022 12:59 PM     PROCEDURE:  CT CHEST WO CONTRAST DIAGNOSTIC-     INDICATIONS:   Dyspnea, chronic, unclear etiology; R09.02-Hypoxemia; J10.1-Influenza  due to other  identified influenza virus with other respiratory  manifestations; R06.02-Shortness of breath     COMPARISON:   CT chest without contrast 04/16/2013, chest radiograph 12/03/2022     TECHNIQUE:  Routine transaxial slices were obtained through the chest without the  administration of intravenous contrast. Reconstructed coronal and  sagittal images were also obtained. Automated exposure control and  iterative construction methods were used.      FINDINGS:  Visualized soft tissues of the lower neck without acute abnormality.  Heart size normal. Three-vessel coronary artery calcifications noted. No  pericardial effusion or pleural effusion. Scattered mediastinal lymph  nodes below size criteria. No hilar adenopathy. No axillary adenopathy.     Trachea and mainstem bronchi are patent. Severe emphysema. No  pneumothorax. No new pulmonary nodule or mass. Respiratory motion  partially limits pulmonary parenchymal assessment. Mild tree-in-bud  nodularity noted in the posterior right upper lobe and bilateral lower  lobes suggesting endobronchial infection or inflammation.     Upper abdomen demonstrates normal noncontrast visualized portions of the  liver, spleen, and right adrenal gland. Bilateral adrenal gland nodules  noted on the right measuring 1.8 cm and on the left measuring 2.9 cm  which is stable likely adenomas. Remote healed fracture at the right  lateral ninth rib. No aggressive osseous lesion or fracture.       Impression:      1. Mild tree-in-bud nodularity involving the bilateral lower lobes and  posterior right upper lobe most consistent with endobronchial infectious  or inflammatory process.  2. Severe emphysema.  3. Extensive coronary calcifications.     Electronically Signed By-Boone Rasheed MD On:12/5/2022 2:58 PM  This report was finalized on [id number] by  Boone Rasheed MD.            Current Meds:   SCHEDULE  aspirin, 81 mg, Oral, Daily  bisoprolol-hydroCHLOROthiazide (ZIAC) 10-6.25 mg combo dose, ,  Oral, Q24H  budesonide, 0.5 mg, Nebulization, BID - RT  enoxaparin, 40 mg, Subcutaneous, Q24H  famotidine, 40 mg, Oral, Daily  glipizide, 10 mg, Oral, BID AC  insulin lispro, 2-9 Units, Subcutaneous, TID With Meals  ipratropium-albuterol, 3 mL, Nebulization, 4x Daily - RT  lisinopril, 40 mg, Oral, Q PM  metFORMIN, 1,000 mg, Oral, BID With Meals  methylPREDNISolone sodium succinate, 40 mg, Intravenous, Q8H  montelukast, 10 mg, Oral, Nightly  oseltamivir, 75 mg, Oral, Q12H  sodium chloride, 3 mL, Intravenous, Q12H      Infusions  Pharmacy to Dose enoxaparin (LOVENOX),       PRNs  •  acetaminophen  •  albuterol sulfate HFA  •  dextrose  •  dextrose  •  glucagon (human recombinant)  •  nitroglycerin  •  ondansetron  •  Pharmacy to Dose enoxaparin (LOVENOX)  •  [COMPLETED] Insert Peripheral IV **AND** sodium chloride  •  sodium chloride  •  sodium chloride        Meeta Ford MD  12/6/2022  14:39 EST      Much of this encounter note is an electronic transcription/translation of spoken language to printed text using Dragon Software.

## 2022-12-06 NOTE — PLAN OF CARE
Goal Outcome Evaluation:  Plan of Care Reviewed With: patient        Progress: no change  Outcome Evaluation: Pt on 3L NC. Pt is able to ambulate by himself. Has had no complaints of pain. VSS and call light is within reach. Plan ongoing.

## 2022-12-07 LAB
ARTERIAL PATENCY WRIST A: POSITIVE
ATMOSPHERIC PRESS: ABNORMAL MM[HG]
BASE EXCESS BLDA CALC-SCNC: 7.7 MMOL/L (ref 0–3)
BDY SITE: ABNORMAL
CO2 BLDA-SCNC: 37.8 MMOL/L (ref 22–29)
GLUCOSE BLDC GLUCOMTR-MCNC: 106 MG/DL (ref 70–105)
GLUCOSE BLDC GLUCOMTR-MCNC: 163 MG/DL (ref 70–105)
GLUCOSE BLDC GLUCOMTR-MCNC: 178 MG/DL (ref 70–105)
GLUCOSE BLDC GLUCOMTR-MCNC: 183 MG/DL (ref 70–105)
HCO3 BLDA-SCNC: 35.8 MMOL/L (ref 21–28)
HEMODILUTION: NO
INHALED O2 CONCENTRATION: 44 %
MODALITY: ABNORMAL
PCO2 BLDA: 63.8 MM HG (ref 35–48)
PH BLDA: 7.36 PH UNITS (ref 7.35–7.45)
PO2 BLDA: 75.3 MM HG (ref 83–108)
SAO2 % BLDCOA: 93.7 % (ref 94–98)

## 2022-12-07 PROCEDURE — 94664 DEMO&/EVAL PT USE INHALER: CPT

## 2022-12-07 PROCEDURE — 94761 N-INVAS EAR/PLS OXIMETRY MLT: CPT

## 2022-12-07 PROCEDURE — 63710000001 INSULIN LISPRO (HUMAN) PER 5 UNITS: Performed by: FAMILY MEDICINE

## 2022-12-07 PROCEDURE — 94799 UNLISTED PULMONARY SVC/PX: CPT

## 2022-12-07 PROCEDURE — 94660 CPAP INITIATION&MGMT: CPT

## 2022-12-07 PROCEDURE — 80048 BASIC METABOLIC PNL TOTAL CA: CPT | Performed by: FAMILY MEDICINE

## 2022-12-07 PROCEDURE — 25010000002 ENOXAPARIN PER 10 MG: Performed by: NURSE PRACTITIONER

## 2022-12-07 PROCEDURE — 82962 GLUCOSE BLOOD TEST: CPT

## 2022-12-07 PROCEDURE — 85025 COMPLETE CBC W/AUTO DIFF WBC: CPT | Performed by: FAMILY MEDICINE

## 2022-12-07 PROCEDURE — 36600 WITHDRAWAL OF ARTERIAL BLOOD: CPT

## 2022-12-07 PROCEDURE — 25010000002 METHYLPREDNISOLONE PER 40 MG: Performed by: NURSE PRACTITIONER

## 2022-12-07 PROCEDURE — 82803 BLOOD GASES ANY COMBINATION: CPT

## 2022-12-07 PROCEDURE — 85007 BL SMEAR W/DIFF WBC COUNT: CPT | Performed by: FAMILY MEDICINE

## 2022-12-07 RX ORDER — METHYLPREDNISOLONE SODIUM SUCCINATE 40 MG/ML
40 INJECTION, POWDER, LYOPHILIZED, FOR SOLUTION INTRAMUSCULAR; INTRAVENOUS EVERY 12 HOURS
Status: DISCONTINUED | OUTPATIENT
Start: 2022-12-08 | End: 2022-12-09

## 2022-12-07 RX ADMIN — IPRATROPIUM BROMIDE AND ALBUTEROL SULFATE 3 ML: 2.5; .5 SOLUTION RESPIRATORY (INHALATION) at 18:40

## 2022-12-07 RX ADMIN — IPRATROPIUM BROMIDE AND ALBUTEROL SULFATE 3 ML: 2.5; .5 SOLUTION RESPIRATORY (INHALATION) at 16:02

## 2022-12-07 RX ADMIN — LISINOPRIL 40 MG: 20 TABLET ORAL at 17:36

## 2022-12-07 RX ADMIN — ENOXAPARIN SODIUM 40 MG: 100 INJECTION SUBCUTANEOUS at 17:35

## 2022-12-07 RX ADMIN — OSELTAMIVIR PHOSPHATE 75 MG: 75 CAPSULE ORAL at 08:31

## 2022-12-07 RX ADMIN — INSULIN LISPRO 2 UNITS: 100 INJECTION, SOLUTION INTRAVENOUS; SUBCUTANEOUS at 08:31

## 2022-12-07 RX ADMIN — BISOPROLOL FUMARATE: 5 TABLET ORAL at 08:31

## 2022-12-07 RX ADMIN — MONTELUKAST 10 MG: 10 TABLET, FILM COATED ORAL at 20:01

## 2022-12-07 RX ADMIN — Medication 10 ML: at 04:22

## 2022-12-07 RX ADMIN — METHYLPREDNISOLONE SODIUM SUCCINATE 40 MG: 40 INJECTION, POWDER, FOR SOLUTION INTRAMUSCULAR; INTRAVENOUS at 04:22

## 2022-12-07 RX ADMIN — BUDESONIDE 0.5 MG: 0.5 INHALANT RESPIRATORY (INHALATION) at 08:36

## 2022-12-07 RX ADMIN — IPRATROPIUM BROMIDE AND ALBUTEROL SULFATE 3 ML: 2.5; .5 SOLUTION RESPIRATORY (INHALATION) at 08:36

## 2022-12-07 RX ADMIN — METFORMIN HYDROCHLORIDE 1000 MG: 500 TABLET ORAL at 08:31

## 2022-12-07 RX ADMIN — Medication 3 ML: at 08:32

## 2022-12-07 RX ADMIN — METHYLPREDNISOLONE SODIUM SUCCINATE 40 MG: 40 INJECTION, POWDER, FOR SOLUTION INTRAMUSCULAR; INTRAVENOUS at 12:17

## 2022-12-07 RX ADMIN — INSULIN LISPRO 2 UNITS: 100 INJECTION, SOLUTION INTRAVENOUS; SUBCUTANEOUS at 12:17

## 2022-12-07 RX ADMIN — OSELTAMIVIR PHOSPHATE 75 MG: 75 CAPSULE ORAL at 20:01

## 2022-12-07 RX ADMIN — INSULIN LISPRO 2 UNITS: 100 INJECTION, SOLUTION INTRAVENOUS; SUBCUTANEOUS at 17:36

## 2022-12-07 RX ADMIN — IPRATROPIUM BROMIDE AND ALBUTEROL SULFATE 3 ML: 2.5; .5 SOLUTION RESPIRATORY (INHALATION) at 12:05

## 2022-12-07 RX ADMIN — METFORMIN HYDROCHLORIDE 1000 MG: 500 TABLET ORAL at 17:36

## 2022-12-07 RX ADMIN — FAMOTIDINE 40 MG: 20 TABLET, FILM COATED ORAL at 08:31

## 2022-12-07 RX ADMIN — Medication 3 ML: at 20:01

## 2022-12-07 RX ADMIN — GLIPIZIDE 10 MG: 5 TABLET ORAL at 17:35

## 2022-12-07 RX ADMIN — ASPIRIN 81 MG: 81 TABLET, COATED ORAL at 08:31

## 2022-12-07 RX ADMIN — METHYLPREDNISOLONE SODIUM SUCCINATE 40 MG: 40 INJECTION, POWDER, FOR SOLUTION INTRAMUSCULAR; INTRAVENOUS at 23:13

## 2022-12-07 RX ADMIN — BUDESONIDE 0.5 MG: 0.5 INHALANT RESPIRATORY (INHALATION) at 18:40

## 2022-12-07 RX ADMIN — GLIPIZIDE 10 MG: 5 TABLET ORAL at 08:32

## 2022-12-07 NOTE — PLAN OF CARE
Goal Outcome Evaluation:  Plan of Care Reviewed With: patient        Progress: no change  Outcome Evaluation: Patient wore bipap overnight. Still very SOA and tachypneic. Continue to monitor

## 2022-12-07 NOTE — PROGRESS NOTES
Daily Progress Note          Assessment    COPD with acute exacerbation (HCC)  Acute hypoxemic respiratory failure  Influenza A infection  CT chest 12/5/2022: mild bibasilar infection, severe emphysema  DM  HTN  Tob smoking        Recommendations:     Oxygen supplement and titration to maintain saturation 90 to 95%: 6 L by nasal cannula, the patient does not use oxygen at home  Bronchodilatores  ASA  BP control  Iv steroids  Singulair  Tamiflu  DVT/GI prophylaxis  Check daily labs and correct electrolytes as needed                LOS: 3 days     Subjective   Patient reports shortness of breath and mild cough      Objective     Vital signs for last 24 hours:  Vitals:    12/07/22 1210 12/07/22 1300 12/07/22 1602 12/07/22 1606   BP:  102/63     BP Location:  Right arm     Patient Position:  Sitting     Pulse: 79 81 79 81   Resp: 12 (!) 38 15 14   Temp:  97.7 °F (36.5 °C)     TempSrc:  Oral     SpO2: 91% 94% 93% 93%   Weight:       Height:           Intake/Output last 3 shifts:  I/O last 3 completed shifts:  In: 840 [P.O.:840]  Out: 700 [Urine:700]  Intake/Output this shift:  I/O this shift:  In: 120 [P.O.:120]  Out: -       Radiology  Imaging Results (Last 24 Hours)     ** No results found for the last 24 hours. **          Labs:  Results from last 7 days   Lab Units 12/04/22  2348   WBC 10*3/mm3 12.90*   HEMOGLOBIN g/dL 14.5   HEMATOCRIT % 44.6   PLATELETS 10*3/mm3 185     Results from last 7 days   Lab Units 12/04/22  2348 12/04/22  0655 12/03/22  1028   SODIUM mmol/L 133*   < > 130*   POTASSIUM mmol/L 5.1   < > 4.5   CHLORIDE mmol/L 93*   < > 91*   CO2 mmol/L 30.0*   < > 24.0   BUN mg/dL 22   < > 20   CREATININE mg/dL 0.67*   < > 0.73*   CALCIUM mg/dL 9.2   < > 9.1   BILIRUBIN mg/dL  --   --  0.3   ALK PHOS U/L  --   --  53   ALT (SGPT) U/L  --   --  15   AST (SGOT) U/L  --   --  20   GLUCOSE mg/dL 114*   < > 91    < > = values in this interval not displayed.     Results from last 7 days   Lab Units 12/06/22  1144    PH, ARTERIAL pH units 7.359   PO2 ART mm Hg 54.0*   PCO2, ARTERIAL mm Hg 62.5*   HCO3 ART mmol/L 35.2*     Results from last 7 days   Lab Units 12/03/22  1028   ALBUMIN g/dL 4.10     Results from last 7 days   Lab Units 12/04/22  2348 12/03/22  1028   TROPONIN T ng/mL <0.010 <0.010         Results from last 7 days   Lab Units 12/03/22  1028   MAGNESIUM mg/dL 1.7                   Meds:   SCHEDULE  aspirin, 81 mg, Oral, Daily  bisoprolol-hydroCHLOROthiazide (ZIAC) 10-6.25 mg combo dose, , Oral, Q24H  budesonide, 0.5 mg, Nebulization, BID - RT  enoxaparin, 40 mg, Subcutaneous, Q24H  famotidine, 40 mg, Oral, Daily  glipizide, 10 mg, Oral, BID AC  insulin lispro, 2-9 Units, Subcutaneous, TID With Meals  ipratropium-albuterol, 3 mL, Nebulization, 4x Daily - RT  lisinopril, 40 mg, Oral, Q PM  metFORMIN, 1,000 mg, Oral, BID With Meals  methylPREDNISolone sodium succinate, 40 mg, Intravenous, Q8H  montelukast, 10 mg, Oral, Nightly  oseltamivir, 75 mg, Oral, Q12H  sodium chloride, 3 mL, Intravenous, Q12H      Infusions  Pharmacy to Dose enoxaparin (LOVENOX),       PRNs  •  acetaminophen  •  albuterol sulfate HFA  •  dextrose  •  dextrose  •  glucagon (human recombinant)  •  nitroglycerin  •  ondansetron  •  Pharmacy to Dose enoxaparin (LOVENOX)  •  [COMPLETED] Insert Peripheral IV **AND** sodium chloride  •  sodium chloride  •  sodium chloride    Physical Exam:  General Appearance:  Alert   HEENT:  Normocephalic, without obvious abnormality, Conjunctiva/corneas clear,.   Nares normal, no drainage     Neck:  Supple, symmetrical, trachea midline.   Lungs /Chest wall: Expiratory wheezing and bilateral basal rhonchi, respirations unlabored, symmetrical wall movement.     Heart:  Regular rate and rhythm, S1 S2 normal  Abdomen: Soft, non-tender, no masses, no organomegaly.    Extremities: No edema, no clubbing or cyanosis     ROS  Constitutional: Negative for chills, fever and positive for malaise/fatigue.   HENT: Negative.     Eyes: Negative.    Cardiovascular: Negative.    Respiratory: Positive for cough and shortness of breath.    Skin: Negative.    Musculoskeletal: Negative.    Gastrointestinal: Negative.    Genitourinary: Negative.    Neurological: Negative.    Psychiatric/Behavioral: Negative.      I reviewed the recent clinical results    Much of this encounter note is an electronic transcription/translation of spoken language to printed text using Dragon Software which might include inadvertent errors in transcription.

## 2022-12-07 NOTE — CASE MANAGEMENT/SOCIAL WORK
Continued Stay Note  JHOAN Perry     Patient Name: Maximino Bhatia  MRN: [id number]  Today's Date: 12/7/2022    Admit Date: 12/3/2022    Plan: Anticipate home.  Watch for home O2 needs.   Discharge Plan     Row Name 12/07/22 1626       Plan    Plan Anticipate home.  Watch for home O2 needs.    Patient/Family in Agreement with Plan yes    Plan Comments Barriers to discharge: IV steroids, %L O2/ bipap @ HS.              Expected Discharge Date and Time     Expected Discharge Date Expected Discharge Time    Dec 8, 2022           Phone communication or documentation only - no physical contact with patient or family.  Camille Mohamud RN      Office Phone [phone]  Office Cell [phone]

## 2022-12-07 NOTE — SIGNIFICANT NOTE
Patient came out to the nurses station in street clothes asking for coffee. Escorted patent back to room and educated him on keeping his monitor on and the importance of also keeping his oxygen on. Patient frustrated but agreeable. Patient very sob and sats at 79% when put back on the monitor. Patient slowly recovered with deep breaths and pursed lipped breathing and sats now 93%

## 2022-12-08 LAB
ANION GAP SERPL CALCULATED.3IONS-SCNC: 8 MMOL/L (ref 5–15)
BUN SERPL-MCNC: 44 MG/DL (ref 8–23)
BUN/CREAT SERPL: 50.6 (ref 7–25)
CALCIUM SPEC-SCNC: 9.5 MG/DL (ref 8.6–10.5)
CHLORIDE SERPL-SCNC: 94 MMOL/L (ref 98–107)
CO2 SERPL-SCNC: 35 MMOL/L (ref 22–29)
CREAT SERPL-MCNC: 0.87 MG/DL (ref 0.76–1.27)
DACRYOCYTES BLD QL SMEAR: ABNORMAL
DEPRECATED RDW RBC AUTO: 49 FL (ref 37–54)
EGFRCR SERPLBLD CKD-EPI 2021: 97 ML/MIN/1.73
ERYTHROCYTE [DISTWIDTH] IN BLOOD BY AUTOMATED COUNT: 15 % (ref 12.3–15.4)
GLUCOSE BLDC GLUCOMTR-MCNC: 109 MG/DL (ref 70–105)
GLUCOSE BLDC GLUCOMTR-MCNC: 113 MG/DL (ref 70–105)
GLUCOSE BLDC GLUCOMTR-MCNC: 122 MG/DL (ref 70–105)
GLUCOSE BLDC GLUCOMTR-MCNC: 134 MG/DL (ref 70–105)
GLUCOSE SERPL-MCNC: 68 MG/DL (ref 65–99)
HCT VFR BLD AUTO: 40 % (ref 37.5–51)
HGB BLD-MCNC: 13 G/DL (ref 13–17.7)
LYMPHOCYTES # BLD MANUAL: 1.66 10*3/MM3 (ref 0.7–3.1)
LYMPHOCYTES NFR BLD MANUAL: 13 % (ref 5–12)
MCH RBC QN AUTO: 30 PG (ref 26.6–33)
MCHC RBC AUTO-ENTMCNC: 32.6 G/DL (ref 31.5–35.7)
MCV RBC AUTO: 92.1 FL (ref 79–97)
MONOCYTES # BLD: 1.96 10*3/MM3 (ref 0.1–0.9)
NEUTROPHILS # BLD AUTO: 11.48 10*3/MM3 (ref 1.7–7)
NEUTROPHILS NFR BLD MANUAL: 58 % (ref 42.7–76)
NEUTS BAND NFR BLD MANUAL: 18 % (ref 0–5)
PLATELET # BLD AUTO: 255 10*3/MM3 (ref 140–450)
PMV BLD AUTO: 8.9 FL (ref 6–12)
POIKILOCYTOSIS BLD QL SMEAR: ABNORMAL
POTASSIUM SERPL-SCNC: 4.7 MMOL/L (ref 3.5–5.2)
RBC # BLD AUTO: 4.34 10*6/MM3 (ref 4.14–5.8)
SCAN SLIDE: NORMAL
SMALL PLATELETS BLD QL SMEAR: ADEQUATE
SODIUM SERPL-SCNC: 137 MMOL/L (ref 136–145)
VARIANT LYMPHS NFR BLD MANUAL: 2 % (ref 0–5)
VARIANT LYMPHS NFR BLD MANUAL: 9 % (ref 19.6–45.3)
WBC MORPH BLD: NORMAL
WBC NRBC COR # BLD: 15.1 10*3/MM3 (ref 3.4–10.8)

## 2022-12-08 PROCEDURE — 25010000002 ENOXAPARIN PER 10 MG: Performed by: NURSE PRACTITIONER

## 2022-12-08 PROCEDURE — 82962 GLUCOSE BLOOD TEST: CPT

## 2022-12-08 PROCEDURE — 94660 CPAP INITIATION&MGMT: CPT

## 2022-12-08 PROCEDURE — 94799 UNLISTED PULMONARY SVC/PX: CPT

## 2022-12-08 PROCEDURE — 25010000002 METHYLPREDNISOLONE PER 40 MG: Performed by: INTERNAL MEDICINE

## 2022-12-08 PROCEDURE — 94664 DEMO&/EVAL PT USE INHALER: CPT

## 2022-12-08 RX ORDER — IPRATROPIUM BROMIDE AND ALBUTEROL SULFATE 2.5; .5 MG/3ML; MG/3ML
3 SOLUTION RESPIRATORY (INHALATION) EVERY 6 HOURS PRN
Status: DISCONTINUED | OUTPATIENT
Start: 2022-12-08 | End: 2022-12-21 | Stop reason: HOSPADM

## 2022-12-08 RX ADMIN — LISINOPRIL 40 MG: 20 TABLET ORAL at 16:33

## 2022-12-08 RX ADMIN — ASPIRIN 81 MG: 81 TABLET, COATED ORAL at 08:10

## 2022-12-08 RX ADMIN — IPRATROPIUM BROMIDE AND ALBUTEROL SULFATE 3 ML: 2.5; .5 SOLUTION RESPIRATORY (INHALATION) at 15:14

## 2022-12-08 RX ADMIN — FAMOTIDINE 40 MG: 20 TABLET, FILM COATED ORAL at 08:09

## 2022-12-08 RX ADMIN — BISOPROLOL FUMARATE: 5 TABLET ORAL at 08:09

## 2022-12-08 RX ADMIN — METFORMIN HYDROCHLORIDE 1000 MG: 500 TABLET ORAL at 08:10

## 2022-12-08 RX ADMIN — IPRATROPIUM BROMIDE AND ALBUTEROL SULFATE 3 ML: 2.5; .5 SOLUTION RESPIRATORY (INHALATION) at 11:13

## 2022-12-08 RX ADMIN — Medication 3 ML: at 21:00

## 2022-12-08 RX ADMIN — IPRATROPIUM BROMIDE AND ALBUTEROL SULFATE 3 ML: 2.5; .5 SOLUTION RESPIRATORY (INHALATION) at 06:48

## 2022-12-08 RX ADMIN — BUDESONIDE 0.5 MG: 0.5 INHALANT RESPIRATORY (INHALATION) at 06:49

## 2022-12-08 RX ADMIN — Medication 3 ML: at 08:15

## 2022-12-08 RX ADMIN — GLIPIZIDE 10 MG: 5 TABLET ORAL at 08:10

## 2022-12-08 RX ADMIN — METHYLPREDNISOLONE SODIUM SUCCINATE 40 MG: 40 INJECTION, POWDER, FOR SOLUTION INTRAMUSCULAR; INTRAVENOUS at 23:46

## 2022-12-08 RX ADMIN — OSELTAMIVIR PHOSPHATE 75 MG: 75 CAPSULE ORAL at 08:09

## 2022-12-08 RX ADMIN — METFORMIN HYDROCHLORIDE 1000 MG: 500 TABLET ORAL at 16:33

## 2022-12-08 RX ADMIN — ENOXAPARIN SODIUM 40 MG: 100 INJECTION SUBCUTANEOUS at 16:33

## 2022-12-08 RX ADMIN — METHYLPREDNISOLONE SODIUM SUCCINATE 40 MG: 40 INJECTION, POWDER, FOR SOLUTION INTRAMUSCULAR; INTRAVENOUS at 12:44

## 2022-12-08 RX ADMIN — BUDESONIDE 0.5 MG: 0.5 INHALANT RESPIRATORY (INHALATION) at 19:28

## 2022-12-08 RX ADMIN — GLIPIZIDE 10 MG: 5 TABLET ORAL at 16:33

## 2022-12-08 NOTE — PROGRESS NOTES
Daily Progress Note          Assessment    COPD with acute exacerbation (HCC)  Acute hypoxemic respiratory failure  Influenza A infection  CT chest 12/5/2022: mild bibasilar infection, severe emphysema  DM  HTN  Tob smoking        Recommendations:     Oxygen supplement and titration to maintain saturation 90 to 95%: Currently on 6 L by nasal cannula, the patient does not use oxygen at home  Bronchodilatores  ASA  BP control  Iv steroids  Singulair  Tamiflu  DVT/GI prophylaxis  Check daily labs and correct electrolytes as needed                LOS: 4 days     Subjective   Patient reports shortness of breath and mild cough      Objective     Vital signs for last 24 hours:  Vitals:    12/08/22 0949 12/08/22 1113 12/08/22 1116 12/08/22 1300   BP: 103/67   96/62   BP Location:       Patient Position:       Pulse: 78 76 77 83   Resp: 26 14 12 18   Temp: 97.7 °F (36.5 °C)   97.9 °F (36.6 °C)   TempSrc: Oral   Oral   SpO2: 96% 94% 97% 93%   Weight:       Height:           Intake/Output last 3 shifts:  I/O last 3 completed shifts:  In: 1080 [P.O.:1080]  Out: 2075 [Urine:2075]  Intake/Output this shift:  I/O this shift:  In: 480 [P.O.:480]  Out: 500 [Urine:500]      Radiology  Imaging Results (Last 24 Hours)     ** No results found for the last 24 hours. **          Labs:  Results from last 7 days   Lab Units 12/07/22  2345   WBC 10*3/mm3 15.10*   HEMOGLOBIN g/dL 13.0   HEMATOCRIT % 40.0   PLATELETS 10*3/mm3 255     Results from last 7 days   Lab Units 12/07/22  2345 12/04/22  0655 12/03/22  1028   SODIUM mmol/L 137   < > 130*   POTASSIUM mmol/L 4.7   < > 4.5   CHLORIDE mmol/L 94*   < > 91*   CO2 mmol/L 35.0*   < > 24.0   BUN mg/dL 44*   < > 20   CREATININE mg/dL 0.87   < > 0.73*   CALCIUM mg/dL 9.5   < > 9.1   BILIRUBIN mg/dL  --   --  0.3   ALK PHOS U/L  --   --  53   ALT (SGPT) U/L  --   --  15   AST (SGOT) U/L  --   --  20   GLUCOSE mg/dL 68   < > 91    < > = values in this interval not displayed.     Results from last 7  days   Lab Units 12/07/22  1900   PH, ARTERIAL pH units 7.357   PO2 ART mm Hg 75.3*   PCO2, ARTERIAL mm Hg 63.8*   HCO3 ART mmol/L 35.8*     Results from last 7 days   Lab Units 12/03/22  1028   ALBUMIN g/dL 4.10     Results from last 7 days   Lab Units 12/04/22  2348 12/03/22  1028   TROPONIN T ng/mL <0.010 <0.010         Results from last 7 days   Lab Units 12/03/22  1028   MAGNESIUM mg/dL 1.7                   Meds:   SCHEDULE  aspirin, 81 mg, Oral, Daily  bisoprolol-hydroCHLOROthiazide (ZIAC) 10-6.25 mg combo dose, , Oral, Q24H  budesonide, 0.5 mg, Nebulization, BID - RT  enoxaparin, 40 mg, Subcutaneous, Q24H  famotidine, 40 mg, Oral, Daily  glipizide, 10 mg, Oral, BID AC  insulin lispro, 2-9 Units, Subcutaneous, TID With Meals  ipratropium-albuterol, 3 mL, Nebulization, 4x Daily - RT  lisinopril, 40 mg, Oral, Q PM  metFORMIN, 1,000 mg, Oral, BID With Meals  methylPREDNISolone sodium succinate, 40 mg, Intravenous, Q12H  montelukast, 10 mg, Oral, Nightly  sodium chloride, 3 mL, Intravenous, Q12H      Infusions  Pharmacy to Dose enoxaparin (LOVENOX),       PRNs  •  acetaminophen  •  albuterol sulfate HFA  •  dextrose  •  dextrose  •  glucagon (human recombinant)  •  nitroglycerin  •  ondansetron  •  Pharmacy to Dose enoxaparin (LOVENOX)  •  [COMPLETED] Insert Peripheral IV **AND** sodium chloride  •  sodium chloride  •  sodium chloride    Physical Exam:  General Appearance:  Alert   HEENT:  Normocephalic, without obvious abnormality, Conjunctiva/corneas clear,.   Nares normal, no drainage     Neck:  Supple, symmetrical, trachea midline.   Lungs /Chest wall: Expiratory wheezing and bilateral basal rhonchi, respirations unlabored, symmetrical wall movement.     Heart:  Regular rate and rhythm, S1 S2 normal  Abdomen: Soft, non-tender, no masses, no organomegaly.    Extremities: No edema, no clubbing or cyanosis     ROS  Constitutional: Negative for chills, fever and positive for malaise/fatigue.   HENT: Negative.     Eyes: Negative.    Cardiovascular: Negative.    Respiratory: Positive for cough and shortness of breath.    Skin: Negative.    Musculoskeletal: Negative.    Gastrointestinal: Negative.    Genitourinary: Negative.    Neurological: Negative.    Psychiatric/Behavioral: Negative.      I reviewed the recent clinical results    Much of this encounter note is an electronic transcription/translation of spoken language to printed text using Dragon Software which might include inadvertent errors in transcription.

## 2022-12-08 NOTE — PLAN OF CARE
Goal Outcome Evaluation:    Patient on 6L O2 high-flow NC and maintaining saturation. Continues on Tamiflu with no adverse reaction. VSS. No c/o pain or discomfort. Safety measures in place. Call light within reach.

## 2022-12-08 NOTE — PLAN OF CARE
Goal Outcome Evaluation:      Patient has been on 6L high flow n/c. Attempted to wean patient to 4 and 5L and patient dropped to mid 80s. Will continue to monitor.       Problem: Adult Inpatient Plan of Care  Goal: Plan of Care Review  Outcome: Ongoing, Progressing  Goal: Patient-Specific Goal (Individualized)  Outcome: Ongoing, Progressing  Goal: Absence of Hospital-Acquired Illness or Injury  Outcome: Ongoing, Progressing  Intervention: Identify and Manage Fall Risk  Recent Flowsheet Documentation  Taken 12/8/2022 1600 by Suzi Pinto RN  Safety Promotion/Fall Prevention:   activity supervised   clutter free environment maintained   nonskid shoes/slippers when out of bed   safety round/check completed  Taken 12/8/2022 1400 by Suzi Pinto RN  Safety Promotion/Fall Prevention:   activity supervised   clutter free environment maintained   nonskid shoes/slippers when out of bed   safety round/check completed  Taken 12/8/2022 1200 by Suzi Pinto RN  Safety Promotion/Fall Prevention:   activity supervised   clutter free environment maintained   nonskid shoes/slippers when out of bed   safety round/check completed  Taken 12/8/2022 1000 by Suzi Pinto RN  Safety Promotion/Fall Prevention:   activity supervised   clutter free environment maintained   nonskid shoes/slippers when out of bed   safety round/check completed  Taken 12/8/2022 0800 by Suzi Pinto RN  Safety Promotion/Fall Prevention:   activity supervised   clutter free environment maintained   nonskid shoes/slippers when out of bed   safety round/check completed  Intervention: Prevent Skin Injury  Recent Flowsheet Documentation  Taken 12/8/2022 0800 by Suzi Pinto RN  Skin Protection: incontinence pads utilized  Intervention: Prevent Infection  Recent Flowsheet Documentation  Taken 12/8/2022 1600 by Suzi Pinto RN  Infection Prevention:   hand hygiene promoted   personal protective equipment utilized  Taken  12/8/2022 1200 by Suzi Pinto RN  Infection Prevention:   hand hygiene promoted   personal protective equipment utilized  Taken 12/8/2022 1000 by Suzi Pinto RN  Infection Prevention:   hand hygiene promoted   personal protective equipment utilized  Taken 12/8/2022 0800 by Suzi Pinto RN  Infection Prevention:   hand hygiene promoted   personal protective equipment utilized  Goal: Optimal Comfort and Wellbeing  Outcome: Ongoing, Progressing  Goal: Readiness for Transition of Care  Outcome: Ongoing, Progressing

## 2022-12-08 NOTE — PROGRESS NOTES
LOS: 3 days   Patient Care Team:  Qi Hicks APRN as PCP - General (Nurse Practitioner)    Subjective:  better    Objective:   Conversational dyspnea//afebrile      Review of Systems:   Review of Systems   Constitutional: Positive for activity change.   Respiratory: Positive for cough, shortness of breath and wheezing.    Neurological: Positive for weakness.           Vital Signs  Temp:  [97.7 °F (36.5 °C)-98.8 °F (37.1 °C)] 97.8 °F (36.6 °C)  Heart Rate:  [75-93] 84  Resp:  [9-38] 10  BP: (102-128)/(62-73) 111/67    Physical Exam:  Physical Exam  Constitutional:       Appearance: Normal appearance.   Cardiovascular:      Rate and Rhythm: Normal rate.      Heart sounds: Normal heart sounds.   Pulmonary:      Breath sounds: Normal breath sounds.   Skin:     General: Skin is warm.   Neurological:      General: No focal deficit present.      Mental Status: He is alert.          Radiology:  CT Chest Without Contrast Diagnostic    Result Date: 12/5/2022  1. Mild tree-in-bud nodularity involving the bilateral lower lobes and posterior right upper lobe most consistent with endobronchial infectious or inflammatory process. 2. Severe emphysema. 3. Extensive coronary calcifications.  Electronically Signed By-Boone Rasheed MD On:12/5/2022 2:58 PM This report was finalized on [id number] by  Boone Rasheed MD.    XR Chest 1 View    Result Date: 12/3/2022   1. Chronic scarring in the right lung base. 2. Emphysema. 3. No active pulmonary disease.  Electronically Signed By-James Grey MD On:12/3/2022 11:39 AM This report was finalized on [id number] by  James Grey MD.         Results Review:     I reviewed the patient's new clinical results.  I reviewed the patient's new imaging results and agree with the interpretation.    Medication Review:   Scheduled Meds:aspirin, 81 mg, Oral, Daily  bisoprolol-hydroCHLOROthiazide (ZIAC) 10-6.25 mg combo dose, , Oral, Q24H  budesonide, 0.5 mg, Nebulization, BID -  RT  enoxaparin, 40 mg, Subcutaneous, Q24H  famotidine, 40 mg, Oral, Daily  glipizide, 10 mg, Oral, BID AC  insulin lispro, 2-9 Units, Subcutaneous, TID With Meals  ipratropium-albuterol, 3 mL, Nebulization, 4x Daily - RT  lisinopril, 40 mg, Oral, Q PM  metFORMIN, 1,000 mg, Oral, BID With Meals  [START ON 12/8/2022] methylPREDNISolone sodium succinate, 40 mg, Intravenous, Q12H  montelukast, 10 mg, Oral, Nightly  oseltamivir, 75 mg, Oral, Q12H  sodium chloride, 3 mL, Intravenous, Q12H      Continuous Infusions:Pharmacy to Dose enoxaparin (LOVENOX),       PRN Meds:.•  acetaminophen  •  albuterol sulfate HFA  •  dextrose  •  dextrose  •  glucagon (human recombinant)  •  nitroglycerin  •  ondansetron  •  Pharmacy to Dose enoxaparin (LOVENOX)  •  [COMPLETED] Insert Peripheral IV **AND** sodium chloride  •  sodium chloride  •  sodium chloride    Labs:    CBC    Results from last 7 days   Lab Units 12/04/22  2348 12/04/22  0655 12/03/22  1028   WBC 10*3/mm3 12.90* 8.30 10.00   HEMOGLOBIN g/dL 14.5 14.3 15.4   PLATELETS 10*3/mm3 185 151 148     BMP   Results from last 7 days   Lab Units 12/04/22  2348 12/04/22  0655 12/03/22  1028   SODIUM mmol/L 133* 133* 130*   POTASSIUM mmol/L 5.1 5.1 4.5   CHLORIDE mmol/L 93* 97* 91*   CO2 mmol/L 30.0* 26.0 24.0   BUN mg/dL 22 18 20   CREATININE mg/dL 0.67* 0.65* 0.73*   GLUCOSE mg/dL 114* 185* 91   MAGNESIUM mg/dL  --   --  1.7     Cr Clearance Estimated Creatinine Clearance: 132.2 mL/min (A) (by C-G formula based on SCr of 0.67 mg/dL (L)).  Coag     HbA1C No results found for: HGBA1C  Blood Glucose   Glucose   Date/Time Value Ref Range Status   12/07/2022 1714 163 (H) 70 - 105 mg/dL Final     Comment:     Serial Number: [id number]:  [id number]   12/07/2022 1159 183 (H) 70 - 105 mg/dL Final     Comment:     Serial Number: [id number]:  [id number]   12/07/2022 0711 178 (H) 70 - 105 mg/dL Final     Comment:     Serial Number: [id number]:  [id number]   12/06/2022 1719 222  (H) 70 - 105 mg/dL Final     Comment:     Serial Number: [id number]:  [id number]   12/06/2022 1112 170 (H) 70 - 105 mg/dL Final     Comment:     Serial Number: [id number]:  [id number]   12/06/2022 0759 182 (H) 70 - 105 mg/dL Final     Comment:     Serial Number: [id number]:  [id number]   12/05/2022 2147 106 (H) 70 - 105 mg/dL Final     Comment:     Serial Number: [id number]:  [id number]   12/05/2022 1640 134 (H) 70 - 105 mg/dL Final     Comment:     Serial Number: [id number]:  [id number]     Infection   Results from last 7 days   Lab Units 12/03/22  1100   URINECX  No growth     CMP   Results from last 7 days   Lab Units 12/04/22  2348 12/04/22  0655 12/03/22  1028   SODIUM mmol/L 133* 133* 130*   POTASSIUM mmol/L 5.1 5.1 4.5   CHLORIDE mmol/L 93* 97* 91*   CO2 mmol/L 30.0* 26.0 24.0   BUN mg/dL 22 18 20   CREATININE mg/dL 0.67* 0.65* 0.73*   GLUCOSE mg/dL 114* 185* 91   ALBUMIN g/dL  --   --  4.10   BILIRUBIN mg/dL  --   --  0.3   ALK PHOS U/L  --   --  53   AST (SGOT) U/L  --   --  20   ALT (SGPT) U/L  --   --  15     UA    Results from last 7 days   Lab Units 12/03/22  1100   NITRITE UA  Negative   WBC UA /HPF 0-2*   BACTERIA UA /HPF Trace*   SQUAM EPITHEL UA /HPF 0-2   URINECX  No growth     Radiology(recent) No radiology results for the last day   Assessment:  Acute hypoxic respiratory failure  Acute exacerbation of panlobular COPD  Acute influenza A pneumonia  AEOCB   Diabetes II with neuropathic manifestations  Hypertension, essential  Nicotine dependency with nicotine use D/O, cigarettes    Plan:  IV ABX//IV steroids//LAMA/LABA therapy        James Craig MD  12/07/22  19:37 EST

## 2022-12-08 NOTE — PROGRESS NOTES
LOS: 4 days   Patient Care Team:  Qi Hicks APRN as PCP - General (Nurse Practitioner)    Subjective     Interval History:     Patient Complaints: pt still very SOB    History taken from: patient    Review of Systems   Unable to perform ROS: Other           Objective     Vital Signs  Temp:  [97.4 °F (36.3 °C)-97.8 °F (36.6 °C)] 97.7 °F (36.5 °C)  Heart Rate:  [75-93] 77  Resp:  [9-36] 12  BP: (102-120)/(57-75) 103/67    Physical Exam:     General Appearance:    Alert, cooperative, in no acute distress, getting neb tx   Head:    Normocephalic, without obvious abnormality, atraumatic   Eyes:            Lids and lashes normal, conjunctivae and sclerae normal, no   icterus, no pallor, corneas clear, PERRLA   Ears:    Ears appear intact with no abnormalities noted   Throat:   No oral lesions, no thrush, oral mucosa moist   Neck:   No adenopathy, supple, trachea midline, no thyromegaly, no   carotid bruit, no JVD   Lungs:     Tight with wheezing.  Able to take deep breaths    Heart:    Regular rhythm and normal rate, normal S1 and S2, no            murmur, no gallop, no rub, no click   Chest Wall:    No abnormalities observed   Abdomen:     Normal bowel sounds, no masses, no organomegaly, soft        non-tender, non-distended, no guarding, no rebound                tenderness   Extremities:   Moves all extremities well, no edema, no cyanosis, no             redness   Pulses:   Pulses palpable and equal bilaterally   Skin:   No bleeding, bruising or rash   Lymph nodes:   No palpable adenopathy   Neurologic:   Cranial nerves 2 - 12 grossly intact, sensation intact, DTR       present and equal bilaterally        Results Review:    Lab Results (last 24 hours)     Procedure Component Value Units Date/Time    POC Glucose Once [[id number]]  (Abnormal) Collected: 12/08/22 1115    Specimen: Blood Updated: 12/08/22 1116     Glucose 109 mg/dL      Comment: Serial Number: [id number]:  [id number]       POC Glucose Once  [[id number]]  (Abnormal) Collected: 12/08/22 0704    Specimen: Blood Updated: 12/08/22 0705     Glucose 122 mg/dL      Comment: Serial Number: [id number]:  [id number]       POC Glucose Once [[id number]]  (Abnormal) Collected: 12/08/22 0256    Specimen: Blood Updated: 12/08/22 0257     Glucose 134 mg/dL      Comment: Serial Number: [id number]:  [id number]       Manual Differential [[id number]]  (Abnormal) Collected: 12/07/22 2345    Specimen: Blood Updated: 12/08/22 0252     Neutrophil % 58.0 %      Lymphocyte % 9.0 %      Monocyte % 13.0 %      Bands %  18.0 %      Atypical Lymphocyte % 2.0 %      Neutrophils Absolute 11.48 10*3/mm3      Lymphocytes Absolute 1.66 10*3/mm3      Monocytes Absolute 1.96 10*3/mm3      Dacrocytes Slight/1+     Poikilocytes Slight/1+     WBC Morphology Normal     Platelet Estimate Adequate    CBC & Differential [[id number]]  (Abnormal) Collected: 12/07/22 2345    Specimen: Blood Updated: 12/08/22 0252    Narrative:      The following orders were created for panel order CBC & Differential.  Procedure                               Abnormality         Status                     ---------                               -----------         ------                     CBC Auto Differential[[id number]]        Abnormal            Final result               Scan Slide[[id number]]                                       Final result                 Please view results for these tests on the individual orders.    Scan Slide [[id number]] Collected: 12/07/22 2345    Specimen: Blood Updated: 12/08/22 0252     Scan Slide --     Comment: See Manual Differential Results       CBC Auto Differential [[id number]]  (Abnormal) Collected: 12/07/22 2345    Specimen: Blood Updated: 12/08/22 0252     WBC 15.10 10*3/mm3      RBC 4.34 10*6/mm3      Hemoglobin 13.0 g/dL      Hematocrit 40.0 %      MCV 92.1 fL      MCH 30.0 pg      MCHC 32.6 g/dL      RDW 15.0 %      RDW-SD 49.0 fl      MPV 8.9 fL      Platelets 255  10*3/mm3     Narrative:      Modified report. Previous result was Hemogram on 12/8/2022 at 0114 EST.  The previously reported component NRBC is no longer being reported. Previous result was 0.1 /100 WBC (Reference Range: 0.0-0.2 /100 WBC) on 12/8/2022 at 0114 EST.    Basic Metabolic Panel [[id number]]  (Abnormal) Collected: 12/07/22 2345    Specimen: Blood Updated: 12/08/22 0229     Glucose 68 mg/dL      BUN 44 mg/dL      Creatinine 0.87 mg/dL      Sodium 137 mmol/L      Potassium 4.7 mmol/L      Chloride 94 mmol/L      CO2 35.0 mmol/L      Calcium 9.5 mg/dL      BUN/Creatinine Ratio 50.6     Anion Gap 8.0 mmol/L      eGFR 97.0 mL/min/1.73      Comment: National Kidney Foundation and American Society of Nephrology (ASN) Task Force recommended calculation based on the Chronic Kidney Disease Epidemiology Collaboration (CKD-EPI) equation refit without adjustment for race.       Narrative:      GFR Normal >60  Chronic Kidney Disease <60  Kidney Failure <15      Blood Gas, Arterial - [[id number]]  (Abnormal) Collected: 12/07/22 1900    Specimen: Arterial Blood Updated: 12/07/22 1904     Site Right Radial     Felix's Test Positive     pH, Arterial 7.357 pH units      pCO2, Arterial 63.8 mm Hg      pO2, Arterial 75.3 mm Hg      HCO3, Arterial 35.8 mmol/L      Base Excess, Arterial 7.7 mmol/L      Comment: Serial Number: [id number]:  [id number]        O2 Saturation, Arterial 93.7 %      CO2 Content 37.8 mmol/L      Barometric Pressure for Blood Gas --     Comment: N/A        Modality HFNC     FIO2 44 %      Hemodilution No    POC Glucose Once [[id number]]  (Abnormal) Collected: 12/07/22 1714    Specimen: Blood Updated: 12/07/22 1716     Glucose 163 mg/dL      Comment: Serial Number: [id number]:  [id number]              Imaging Results (Last 24 Hours)     ** No results found for the last 24 hours. **               I reviewed the patient's new clinical results.    Medication Review:   Scheduled Meds:aspirin, 81 mg, Oral,  Daily  bisoprolol-hydroCHLOROthiazide (ZIAC) 10-6.25 mg combo dose, , Oral, Q24H  budesonide, 0.5 mg, Nebulization, BID - RT  enoxaparin, 40 mg, Subcutaneous, Q24H  famotidine, 40 mg, Oral, Daily  glipizide, 10 mg, Oral, BID AC  insulin lispro, 2-9 Units, Subcutaneous, TID With Meals  ipratropium-albuterol, 3 mL, Nebulization, 4x Daily - RT  lisinopril, 40 mg, Oral, Q PM  metFORMIN, 1,000 mg, Oral, BID With Meals  methylPREDNISolone sodium succinate, 40 mg, Intravenous, Q12H  montelukast, 10 mg, Oral, Nightly  sodium chloride, 3 mL, Intravenous, Q12H      Continuous Infusions:Pharmacy to Dose enoxaparin (LOVENOX),       PRN Meds:.•  acetaminophen  •  albuterol sulfate HFA  •  dextrose  •  dextrose  •  glucagon (human recombinant)  •  nitroglycerin  •  ondansetron  •  Pharmacy to Dose enoxaparin (LOVENOX)  •  [COMPLETED] Insert Peripheral IV **AND** sodium chloride  •  sodium chloride  •  sodium chloride     Assessment & Plan       COPD with acute exacerbation (HCC)    Acute COPD exacerbation most likely related to flu A  - CT scan with severe emphysema; mild bibasilar infection  - pulm following  - IV steroids, O2, bronchodilators, singulair  - tamiflu  Diabetes - home meds  Hypertension - home meds  Tobacco abuse      Plan for disposition:SAMSON Chavez MD  12/08/22  13:09 EST

## 2022-12-09 LAB
GLUCOSE BLDC GLUCOMTR-MCNC: 114 MG/DL (ref 70–105)
GLUCOSE BLDC GLUCOMTR-MCNC: 130 MG/DL (ref 70–105)
GLUCOSE BLDC GLUCOMTR-MCNC: 170 MG/DL (ref 70–105)
GLUCOSE BLDC GLUCOMTR-MCNC: 182 MG/DL (ref 70–105)

## 2022-12-09 PROCEDURE — 94664 DEMO&/EVAL PT USE INHALER: CPT

## 2022-12-09 PROCEDURE — 63710000001 INSULIN LISPRO (HUMAN) PER 5 UNITS: Performed by: FAMILY MEDICINE

## 2022-12-09 PROCEDURE — 94799 UNLISTED PULMONARY SVC/PX: CPT

## 2022-12-09 PROCEDURE — 63710000001 PREDNISONE PER 5 MG: Performed by: NURSE PRACTITIONER

## 2022-12-09 PROCEDURE — 25010000002 ENOXAPARIN PER 10 MG: Performed by: NURSE PRACTITIONER

## 2022-12-09 PROCEDURE — 94660 CPAP INITIATION&MGMT: CPT

## 2022-12-09 PROCEDURE — 63710000001 PREDNISONE PER 1 MG: Performed by: NURSE PRACTITIONER

## 2022-12-09 PROCEDURE — 94761 N-INVAS EAR/PLS OXIMETRY MLT: CPT

## 2022-12-09 PROCEDURE — 82962 GLUCOSE BLOOD TEST: CPT

## 2022-12-09 RX ORDER — PREDNISONE 10 MG/1
10 TABLET ORAL DAILY
Status: DISCONTINUED | OUTPATIENT
Start: 2022-12-13 | End: 2022-12-10

## 2022-12-09 RX ORDER — PREDNISONE 20 MG/1
20 TABLET ORAL DAILY
Status: DISCONTINUED | OUTPATIENT
Start: 2022-12-11 | End: 2022-12-10

## 2022-12-09 RX ADMIN — MONTELUKAST 10 MG: 10 TABLET, FILM COATED ORAL at 21:25

## 2022-12-09 RX ADMIN — LISINOPRIL 40 MG: 20 TABLET ORAL at 16:19

## 2022-12-09 RX ADMIN — METFORMIN HYDROCHLORIDE 1000 MG: 500 TABLET ORAL at 09:13

## 2022-12-09 RX ADMIN — GLIPIZIDE 10 MG: 5 TABLET ORAL at 09:13

## 2022-12-09 RX ADMIN — FAMOTIDINE 40 MG: 20 TABLET, FILM COATED ORAL at 09:13

## 2022-12-09 RX ADMIN — BISOPROLOL FUMARATE: 5 TABLET ORAL at 09:13

## 2022-12-09 RX ADMIN — METFORMIN HYDROCHLORIDE 1000 MG: 500 TABLET ORAL at 16:19

## 2022-12-09 RX ADMIN — ASPIRIN 81 MG: 81 TABLET, COATED ORAL at 09:13

## 2022-12-09 RX ADMIN — ENOXAPARIN SODIUM 40 MG: 100 INJECTION SUBCUTANEOUS at 16:19

## 2022-12-09 RX ADMIN — PREDNISONE 30 MG: 20 TABLET ORAL at 12:24

## 2022-12-09 RX ADMIN — BUDESONIDE 0.5 MG: 0.5 INHALANT RESPIRATORY (INHALATION) at 18:28

## 2022-12-09 RX ADMIN — GLIPIZIDE 10 MG: 5 TABLET ORAL at 16:19

## 2022-12-09 RX ADMIN — BUDESONIDE 0.5 MG: 0.5 INHALANT RESPIRATORY (INHALATION) at 07:10

## 2022-12-09 RX ADMIN — INSULIN LISPRO 2 UNITS: 100 INJECTION, SOLUTION INTRAVENOUS; SUBCUTANEOUS at 09:14

## 2022-12-09 RX ADMIN — Medication 3 ML: at 09:15

## 2022-12-09 RX ADMIN — Medication 3 ML: at 22:25

## 2022-12-09 NOTE — PLAN OF CARE
Goal Outcome Evaluation:           Pt finished tamiflu course. Steroids being weaned down and switched to po. Pt currently on 5L high flow n/c.       Problem: Adult Inpatient Plan of Care  Goal: Plan of Care Review  Outcome: Ongoing, Progressing  Goal: Patient-Specific Goal (Individualized)  Outcome: Ongoing, Progressing  Goal: Absence of Hospital-Acquired Illness or Injury  Outcome: Ongoing, Progressing  Intervention: Identify and Manage Fall Risk  Recent Flowsheet Documentation  Taken 12/9/2022 1200 by Suzi Pinto RN  Safety Promotion/Fall Prevention:   activity supervised   clutter free environment maintained   nonskid shoes/slippers when out of bed   safety round/check completed  Taken 12/9/2022 0800 by Suzi Pinto, RN  Safety Promotion/Fall Prevention:   activity supervised   assistive device/personal items within reach   nonskid shoes/slippers when out of bed   safety round/check completed  Goal: Optimal Comfort and Wellbeing  Outcome: Ongoing, Progressing  Goal: Readiness for Transition of Care  Outcome: Ongoing, Progressing

## 2022-12-09 NOTE — PROGRESS NOTES
Daily Progress Note          Assessment    COPD with acute exacerbation (HCC)  Acute hypoxemic respiratory failure  Influenza A infection  CT chest 12/5/2022: mild bibasilar infection, severe emphysema  DM  HTN  Tob smoking        Recommendations:     Oxygen supplement and titration to maintain saturation 90 to 95%: Currently on 5 L by nasal cannula, the patient does not use oxygen at home  Bronchodilatores  ASA  BP control  Prednisone p.o. tapering dose  Singulair  Tamiflu was complete  DVT/GI prophylaxis  Check daily labs and correct electrolytes as needed                LOS: 5 days     Subjective   Patient reports shortness of breath and mild cough      Objective     Vital signs for last 24 hours:  Vitals:    12/09/22 0509 12/09/22 0710 12/09/22 0713 12/09/22 0926   BP: 123/66   125/71   BP Location: Right arm      Patient Position: Lying      Pulse: 81 83 80 84   Resp: 20 19 20 (!) 34   Temp: 98 °F (36.7 °C)   98 °F (36.7 °C)   TempSrc: Oral   Oral   SpO2: 95% 91% 92% 91%   Weight: 89.5 kg (197 lb 5 oz)      Height:           Intake/Output last 3 shifts:  I/O last 3 completed shifts:  In: 2400 [P.O.:2400]  Out: 975 [Urine:975]  Intake/Output this shift:  I/O this shift:  In: 480 [P.O.:480]  Out: -       Radiology  Imaging Results (Last 24 Hours)     ** No results found for the last 24 hours. **          Labs:  Results from last 7 days   Lab Units 12/07/22  2345   WBC 10*3/mm3 15.10*   HEMOGLOBIN g/dL 13.0   HEMATOCRIT % 40.0   PLATELETS 10*3/mm3 255     Results from last 7 days   Lab Units 12/07/22  2345 12/04/22  0655 12/03/22  1028   SODIUM mmol/L 137   < > 130*   POTASSIUM mmol/L 4.7   < > 4.5   CHLORIDE mmol/L 94*   < > 91*   CO2 mmol/L 35.0*   < > 24.0   BUN mg/dL 44*   < > 20   CREATININE mg/dL 0.87   < > 0.73*   CALCIUM mg/dL 9.5   < > 9.1   BILIRUBIN mg/dL  --   --  0.3   ALK PHOS U/L  --   --  53   ALT (SGPT) U/L  --   --  15   AST (SGOT) U/L  --   --  20   GLUCOSE mg/dL 68   < > 91    < > = values in  this interval not displayed.     Results from last 7 days   Lab Units 12/07/22  1900   PH, ARTERIAL pH units 7.357   PO2 ART mm Hg 75.3*   PCO2, ARTERIAL mm Hg 63.8*   HCO3 ART mmol/L 35.8*     Results from last 7 days   Lab Units 12/03/22  1028   ALBUMIN g/dL 4.10     Results from last 7 days   Lab Units 12/04/22  2348 12/03/22  1028   TROPONIN T ng/mL <0.010 <0.010         Results from last 7 days   Lab Units 12/03/22  1028   MAGNESIUM mg/dL 1.7                   Meds:   SCHEDULE  aspirin, 81 mg, Oral, Daily  bisoprolol-hydroCHLOROthiazide (ZIAC) 10-6.25 mg combo dose, , Oral, Q24H  budesonide, 0.5 mg, Nebulization, BID - RT  enoxaparin, 40 mg, Subcutaneous, Q24H  famotidine, 40 mg, Oral, Daily  glipizide, 10 mg, Oral, BID AC  insulin lispro, 2-9 Units, Subcutaneous, TID With Meals  lisinopril, 40 mg, Oral, Q PM  metFORMIN, 1,000 mg, Oral, BID With Meals  montelukast, 10 mg, Oral, Nightly  predniSONE, 30 mg, Oral, Daily   Followed by  [START ON 12/11/2022] predniSONE, 20 mg, Oral, Daily   Followed by  [START ON 12/13/2022] predniSONE, 10 mg, Oral, Daily  sodium chloride, 3 mL, Intravenous, Q12H      Infusions  Pharmacy to Dose enoxaparin (LOVENOX),       PRNs  •  acetaminophen  •  dextrose  •  dextrose  •  glucagon (human recombinant)  •  ipratropium-albuterol  •  nitroglycerin  •  ondansetron  •  Pharmacy to Dose enoxaparin (LOVENOX)  •  [COMPLETED] Insert Peripheral IV **AND** sodium chloride  •  sodium chloride  •  sodium chloride    Physical Exam:  General Appearance:  Alert   HEENT:  Normocephalic, without obvious abnormality, Conjunctiva/corneas clear,.   Nares normal, no drainage     Neck:  Supple, symmetrical, trachea midline.   Lungs /Chest wall: Improvement in the expiratory wheezing and bilateral basal rhonchi, respirations unlabored, symmetrical wall movement.     Heart:  Regular rate and rhythm, S1 S2 normal  Abdomen: Soft, non-tender, no masses, no organomegaly.    Extremities: No edema, no clubbing  or cyanosis     ROS  Constitutional: Negative for chills, fever and positive for malaise/fatigue.   HENT: Negative.    Eyes: Negative.    Cardiovascular: Negative.    Respiratory: Positive for mild cough and shortness of breath.    Skin: Negative.    Musculoskeletal: Negative.    Gastrointestinal: Negative.    Genitourinary: Negative.    Neurological: Negative.    Psychiatric/Behavioral: Negative.      I reviewed the recent clinical results    Much of this encounter note is an electronic transcription/translation of spoken language to printed text using Dragon Software which might include inadvertent errors in transcription.

## 2022-12-09 NOTE — CASE MANAGEMENT/SOCIAL WORK
Continued Stay Note  JHOAN Perry     Patient Name: Maximino Bhatia  MRN: [id number]  Today's Date: 12/9/2022    Admit Date: 12/3/2022    Plan: Anticipate home. Watch for home O2 needs.  Will need walking oximetry.   Discharge Plan     Row Name 12/09/22 1652       Plan    Plan Anticipate home. Watch for home O2 needs.  Will need walking oximetry.    Patient/Family in Agreement with Plan yes    Plan Comments Barriers to discharge: IV steroids, 5L O2.              Expected Discharge Date and Time     Expected Discharge Date Expected Discharge Time    Dec 12, 2022           Phone communication or documentation only - no physical contact with patient or family.  Camille Mohamud RN     Office Phone [phone]  Office Cell [phone]

## 2022-12-09 NOTE — PLAN OF CARE
Goal Outcome Evaluation:  Plan of Care Reviewed With: patient   Patient voices no complaints; no respiratory difficulties noted this shift; gets up to bathroom independently; will continue to monitor patient.     Progress: improving

## 2022-12-09 NOTE — PROGRESS NOTES
LOS: 5 days   Patient Care Team:  Qi Hicks APRN as PCP - General (Nurse Practitioner)    Subjective     Continues to have difficulty with exertional breathing    Review of Systems   Constitutional: Positive for activity change and fatigue.   HENT: Negative.    Respiratory: Positive for cough and shortness of breath.    Cardiovascular: Negative.    Gastrointestinal: Negative.    Genitourinary: Negative.    Musculoskeletal: Negative.    Neurological: Positive for weakness.   Psychiatric/Behavioral: Negative.            Objective     Vital Signs  Temp:  [97.7 °F (36.5 °C)-98.6 °F (37 °C)] 98 °F (36.7 °C)  Heart Rate:  [72-84] 84  Resp:  [17-37] 34  BP: ()/(62-80) 125/71      Physical Exam  Vitals reviewed.   Constitutional:       Appearance: He is not ill-appearing.   HENT:      Head: Normocephalic and atraumatic.      Right Ear: External ear normal.      Left Ear: External ear normal.      Nose: Nose normal.      Mouth/Throat:      Mouth: Mucous membranes are dry.   Eyes:      General:         Right eye: No discharge.         Left eye: No discharge.   Cardiovascular:      Rate and Rhythm: Normal rate and regular rhythm.      Pulses: Normal pulses.      Heart sounds: Normal heart sounds.   Pulmonary:      Effort: Tachypnea, accessory muscle usage and respiratory distress present.      Breath sounds: Wheezing and rhonchi present.   Abdominal:      General: Bowel sounds are normal.      Palpations: Abdomen is soft.   Musculoskeletal:         General: Normal range of motion.      Cervical back: Normal range of motion.   Skin:     General: Skin is warm and dry.   Neurological:      Mental Status: He is alert and oriented to person, place, and time.   Psychiatric:         Behavior: Behavior normal.              Results Review:    Lab Results (last 24 hours)     Procedure Component Value Units Date/Time    POC Glucose Once [[id number]]  (Abnormal) Collected: 12/09/22 1111    Specimen: Blood Updated:  12/09/22 1113     Glucose 130 mg/dL      Comment: Serial Number: [id number]:  [id number]       POC Glucose Once [[id number]]  (Abnormal) Collected: 12/09/22 1030    Specimen: Blood Updated: 12/09/22 1030     Glucose 182 mg/dL      Comment: Serial Number: [id number]:  [id number]       POC Glucose Once [[id number]]  (Abnormal) Collected: 12/09/22 0700    Specimen: Blood Updated: 12/09/22 0702     Glucose 170 mg/dL      Comment: Serial Number: [id number]:  [id number]       POC Glucose Once [[id number]]  (Abnormal) Collected: 12/08/22 1612    Specimen: Blood Updated: 12/08/22 1614     Glucose 113 mg/dL      Comment: Serial Number: [id number]:  [id number]              Imaging Results (Last 24 Hours)     ** No results found for the last 24 hours. **               I reviewed the patient's new clinical results.    Medication Review:   Scheduled Meds:aspirin, 81 mg, Oral, Daily  bisoprolol-hydroCHLOROthiazide (ZIAC) 10-6.25 mg combo dose, , Oral, Q24H  budesonide, 0.5 mg, Nebulization, BID - RT  enoxaparin, 40 mg, Subcutaneous, Q24H  famotidine, 40 mg, Oral, Daily  glipizide, 10 mg, Oral, BID AC  insulin lispro, 2-9 Units, Subcutaneous, TID With Meals  lisinopril, 40 mg, Oral, Q PM  metFORMIN, 1,000 mg, Oral, BID With Meals  montelukast, 10 mg, Oral, Nightly  predniSONE, 30 mg, Oral, Daily   Followed by  [START ON 12/11/2022] predniSONE, 20 mg, Oral, Daily   Followed by  [START ON 12/13/2022] predniSONE, 10 mg, Oral, Daily  sodium chloride, 3 mL, Intravenous, Q12H      Continuous Infusions:Pharmacy to Dose enoxaparin (LOVENOX),       PRN Meds:.•  acetaminophen  •  dextrose  •  dextrose  •  glucagon (human recombinant)  •  ipratropium-albuterol  •  nitroglycerin  •  ondansetron  •  Pharmacy to Dose enoxaparin (LOVENOX)  •  [COMPLETED] Insert Peripheral IV **AND** sodium chloride  •  sodium chloride  •  sodium chloride     Interval History:    12/5 patient continues with shortness of breath; will add  corticosteroid nebs; CT chest; continue supportive care may need oxygen at home temporarily; leukocytosis most likely secondary to steroids     12/6 worsening respiratory status today with distress will add pulm consult and abg; CT scan yesterday with severe emphysema and will increase steroids    12/9 patient is most likely at his new baseline and anticipate he will need oxygen at home and sleep study as outpt; bipap at night as needed    Assessment & Plan     Acute COPD exacerbation most likely related to flu A  Diabetes  Hypertension  Tobacco abuse     Plan for disposition: SAMSON Sears, [facility]  12/09/22  11:43 EST

## 2022-12-10 ENCOUNTER — APPOINTMENT (OUTPATIENT)
Dept: GENERAL RADIOLOGY | Facility: HOSPITAL | Age: 62
End: 2022-12-10
Payer: MEDICAID

## 2022-12-10 LAB
AMMONIA BLD-SCNC: 56 UMOL/L (ref 16–60)
ANISOCYTOSIS BLD QL: ABNORMAL
ARTERIAL PATENCY WRIST A: POSITIVE
ATMOSPHERIC PRESS: ABNORMAL MM[HG]
BASE EXCESS BLDA CALC-SCNC: 9.6 MMOL/L (ref 0–3)
BDY SITE: ABNORMAL
CO2 BLDA-SCNC: 43.3 MMOL/L (ref 22–29)
DEPRECATED RDW RBC AUTO: 48.6 FL (ref 37–54)
ERYTHROCYTE [DISTWIDTH] IN BLOOD BY AUTOMATED COUNT: 15.2 % (ref 12.3–15.4)
GLUCOSE BLDC GLUCOMTR-MCNC: 134 MG/DL (ref 70–105)
GLUCOSE BLDC GLUCOMTR-MCNC: 144 MG/DL (ref 70–105)
GLUCOSE BLDC GLUCOMTR-MCNC: 155 MG/DL (ref 70–105)
HCO3 BLDA-SCNC: 40.8 MMOL/L (ref 21–28)
HCT VFR BLD AUTO: 44.7 % (ref 37.5–51)
HEMODILUTION: NO
HGB BLD-MCNC: 14.6 G/DL (ref 13–17.7)
INHALED O2 CONCENTRATION: 60 %
LARGE PLATELETS: ABNORMAL
LYMPHOCYTES # BLD MANUAL: 1.51 10*3/MM3 (ref 0.7–3.1)
LYMPHOCYTES NFR BLD MANUAL: 10 % (ref 5–12)
MCH RBC QN AUTO: 30 PG (ref 26.6–33)
MCHC RBC AUTO-ENTMCNC: 32.6 G/DL (ref 31.5–35.7)
MCV RBC AUTO: 92 FL (ref 79–97)
MODALITY: ABNORMAL
MONOCYTES # BLD: 2.16 10*3/MM3 (ref 0.1–0.9)
NEUTROPHILS # BLD AUTO: 17.93 10*3/MM3 (ref 1.7–7)
NEUTROPHILS NFR BLD MANUAL: 72 % (ref 42.7–76)
NEUTS BAND NFR BLD MANUAL: 11 % (ref 0–5)
PCO2 BLDA: 84.1 MM HG (ref 35–48)
PH BLDA: 7.29 PH UNITS (ref 7.35–7.45)
PLATELET # BLD AUTO: 410 10*3/MM3 (ref 140–450)
PMV BLD AUTO: 7.9 FL (ref 6–12)
PO2 BLDA: 76.8 MM HG (ref 83–108)
RBC # BLD AUTO: 4.86 10*6/MM3 (ref 4.14–5.8)
SAO2 % BLDCOA: 92.4 % (ref 94–98)
SCAN SLIDE: NORMAL
VARIANT LYMPHS NFR BLD MANUAL: 1 % (ref 0–5)
VARIANT LYMPHS NFR BLD MANUAL: 6 % (ref 19.6–45.3)
WBC MORPH BLD: NORMAL
WBC NRBC COR # BLD: 21.6 10*3/MM3 (ref 3.4–10.8)

## 2022-12-10 PROCEDURE — 25010000002 ENOXAPARIN PER 10 MG: Performed by: NURSE PRACTITIONER

## 2022-12-10 PROCEDURE — 71045 X-RAY EXAM CHEST 1 VIEW: CPT

## 2022-12-10 PROCEDURE — 63710000001 PREDNISONE PER 1 MG: Performed by: NURSE PRACTITIONER

## 2022-12-10 PROCEDURE — 85007 BL SMEAR W/DIFF WBC COUNT: CPT | Performed by: NURSE PRACTITIONER

## 2022-12-10 PROCEDURE — 36600 WITHDRAWAL OF ARTERIAL BLOOD: CPT

## 2022-12-10 PROCEDURE — 94664 DEMO&/EVAL PT USE INHALER: CPT

## 2022-12-10 PROCEDURE — 94799 UNLISTED PULMONARY SVC/PX: CPT

## 2022-12-10 PROCEDURE — 82140 ASSAY OF AMMONIA: CPT | Performed by: FAMILY MEDICINE

## 2022-12-10 PROCEDURE — 82962 GLUCOSE BLOOD TEST: CPT

## 2022-12-10 PROCEDURE — 63710000001 PREDNISONE PER 5 MG: Performed by: NURSE PRACTITIONER

## 2022-12-10 PROCEDURE — 82803 BLOOD GASES ANY COMBINATION: CPT

## 2022-12-10 PROCEDURE — 63710000001 INSULIN LISPRO (HUMAN) PER 5 UNITS: Performed by: FAMILY MEDICINE

## 2022-12-10 PROCEDURE — 85025 COMPLETE CBC W/AUTO DIFF WBC: CPT | Performed by: NURSE PRACTITIONER

## 2022-12-10 RX ORDER — METHYLPREDNISOLONE SODIUM SUCCINATE 40 MG/ML
40 INJECTION, POWDER, LYOPHILIZED, FOR SOLUTION INTRAMUSCULAR; INTRAVENOUS EVERY 12 HOURS
Status: DISCONTINUED | OUTPATIENT
Start: 2022-12-11 | End: 2022-12-13

## 2022-12-10 RX ORDER — LORAZEPAM 2 MG/ML
0.25 INJECTION INTRAMUSCULAR ONCE AS NEEDED
Status: DISCONTINUED | OUTPATIENT
Start: 2022-12-10 | End: 2022-12-21 | Stop reason: HOSPADM

## 2022-12-10 RX ADMIN — METFORMIN HYDROCHLORIDE 1000 MG: 500 TABLET ORAL at 17:43

## 2022-12-10 RX ADMIN — METFORMIN HYDROCHLORIDE 1000 MG: 500 TABLET ORAL at 08:03

## 2022-12-10 RX ADMIN — BISOPROLOL FUMARATE: 5 TABLET ORAL at 08:02

## 2022-12-10 RX ADMIN — Medication 3 ML: at 08:04

## 2022-12-10 RX ADMIN — Medication 3 ML: at 21:16

## 2022-12-10 RX ADMIN — FAMOTIDINE 40 MG: 20 TABLET, FILM COATED ORAL at 08:03

## 2022-12-10 RX ADMIN — MONTELUKAST 10 MG: 10 TABLET, FILM COATED ORAL at 21:16

## 2022-12-10 RX ADMIN — INSULIN LISPRO 2 UNITS: 100 INJECTION, SOLUTION INTRAVENOUS; SUBCUTANEOUS at 08:03

## 2022-12-10 RX ADMIN — IPRATROPIUM BROMIDE AND ALBUTEROL SULFATE 3 ML: .5; 3 SOLUTION RESPIRATORY (INHALATION) at 23:05

## 2022-12-10 RX ADMIN — ASPIRIN 81 MG: 81 TABLET, COATED ORAL at 08:03

## 2022-12-10 RX ADMIN — GLIPIZIDE 10 MG: 5 TABLET ORAL at 17:43

## 2022-12-10 RX ADMIN — BUDESONIDE 0.5 MG: 0.5 INHALANT RESPIRATORY (INHALATION) at 18:36

## 2022-12-10 RX ADMIN — PREDNISONE 30 MG: 20 TABLET ORAL at 08:03

## 2022-12-10 RX ADMIN — ENOXAPARIN SODIUM 40 MG: 100 INJECTION SUBCUTANEOUS at 17:43

## 2022-12-10 RX ADMIN — GLIPIZIDE 10 MG: 5 TABLET ORAL at 08:04

## 2022-12-10 RX ADMIN — LISINOPRIL 40 MG: 20 TABLET ORAL at 17:43

## 2022-12-10 RX ADMIN — BUDESONIDE 0.5 MG: 0.5 INHALANT RESPIRATORY (INHALATION) at 07:19

## 2022-12-10 NOTE — PROGRESS NOTES
LOS: 6 days   Patient Care Team:  Qi Hicks APRN as PCP - General (Nurse Practitioner)    Subjective     Continues to have difficulty with exertional breathing    Review of Systems   Constitutional: Positive for activity change and fatigue.   HENT: Negative.    Respiratory: Positive for shortness of breath. Negative for cough.    Cardiovascular: Negative.    Gastrointestinal: Negative.    Genitourinary: Negative.    Musculoskeletal: Negative.    Neurological: Positive for weakness.   Psychiatric/Behavioral: Negative.            Objective     Vital Signs  Temp:  [97.6 °F (36.4 °C)-98.3 °F (36.8 °C)] 97.7 °F (36.5 °C)  Heart Rate:  [76-90] 89  Resp:  [18-30] 18  BP: (115-139)/(69-78) 136/73      Physical Exam  Vitals reviewed.   Constitutional:       Appearance: He is not ill-appearing.   HENT:      Head: Normocephalic and atraumatic.      Right Ear: External ear normal.      Left Ear: External ear normal.      Nose: Nose normal.      Mouth/Throat:      Mouth: Mucous membranes are dry.   Eyes:      General:         Right eye: No discharge.         Left eye: No discharge.   Cardiovascular:      Rate and Rhythm: Normal rate and regular rhythm.      Pulses: Normal pulses.      Heart sounds: Normal heart sounds.   Pulmonary:      Effort: Tachypnea, accessory muscle usage and respiratory distress present.      Breath sounds: Wheezing and rhonchi present.   Abdominal:      General: Bowel sounds are normal.      Palpations: Abdomen is soft.   Musculoskeletal:         General: Normal range of motion.      Cervical back: Normal range of motion.   Skin:     General: Skin is warm and dry.   Neurological:      Mental Status: He is alert and oriented to person, place, and time.   Psychiatric:         Behavior: Behavior normal.              Results Review:    Lab Results (last 24 hours)     Procedure Component Value Units Date/Time    POC Glucose Once [[id number]]  (Abnormal) Collected: 12/10/22 1053    Specimen: Blood  Updated: 12/10/22 1054     Glucose 144 mg/dL      Comment: Serial Number: [id number]:  [id number]       POC Glucose Once [[id number]]  (Abnormal) Collected: 12/10/22 0708    Specimen: Blood Updated: 12/10/22 0709     Glucose 155 mg/dL      Comment: Serial Number: [id number]:  [id number]       POC Glucose Once [[id number]]  (Abnormal) Collected: 12/09/22 1610    Specimen: Blood Updated: 12/09/22 1612     Glucose 114 mg/dL      Comment: Serial Number: [id number]:  [id number]              Imaging Results (Last 24 Hours)     ** No results found for the last 24 hours. **               I reviewed the patient's new clinical results.    Medication Review:   Scheduled Meds:aspirin, 81 mg, Oral, Daily  bisoprolol-hydroCHLOROthiazide (ZIAC) 10-6.25 mg combo dose, , Oral, Q24H  budesonide, 0.5 mg, Nebulization, BID - RT  enoxaparin, 40 mg, Subcutaneous, Q24H  famotidine, 40 mg, Oral, Daily  glipizide, 10 mg, Oral, BID AC  insulin lispro, 2-9 Units, Subcutaneous, TID With Meals  lisinopril, 40 mg, Oral, Q PM  metFORMIN, 1,000 mg, Oral, BID With Meals  montelukast, 10 mg, Oral, Nightly  [START ON 12/11/2022] predniSONE, 20 mg, Oral, Daily   Followed by  [START ON 12/13/2022] predniSONE, 10 mg, Oral, Daily  sodium chloride, 3 mL, Intravenous, Q12H      Continuous Infusions:Pharmacy to Dose enoxaparin (LOVENOX),       PRN Meds:.•  acetaminophen  •  dextrose  •  dextrose  •  glucagon (human recombinant)  •  ipratropium-albuterol  •  nitroglycerin  •  ondansetron  •  Pharmacy to Dose enoxaparin (LOVENOX)  •  [COMPLETED] Insert Peripheral IV **AND** sodium chloride  •  sodium chloride  •  sodium chloride     Interval History:    12/5 patient continues with shortness of breath; will add corticosteroid nebs; CT chest; continue supportive care may need oxygen at home temporarily; leukocytosis most likely secondary to steroids     12/6 worsening respiratory status today with distress will add pulm consult and abg; CT scan  yesterday with severe emphysema and will increase steroids    12/9 patient is most likely at his new baseline and anticipate he will need oxygen at home and sleep study as outpt; bipap at night as needed    12/10 cxr in am and labs    Assessment & Plan     Acute COPD exacerbation most likely related to flu A  Diabetes  Hypertension  Tobacco abuse     Plan for disposition: [facility] Barraza  12/10/22  11:33 EST

## 2022-12-10 NOTE — PLAN OF CARE
Problem: Adult Inpatient Plan of Care  Goal: Plan of Care Review  Outcome: Ongoing, Progressing  Flowsheets (Taken 12/10/2022 1504)  Progress: improving  Plan of Care Reviewed With: patient  Outcome Evaluation: Pt's Sats above 90% with 7L HF. no other c/o. will continue to monitor.

## 2022-12-10 NOTE — PLAN OF CARE
Problem: Adult Inpatient Plan of Care  Goal: Plan of Care Review  Outcome: Ongoing, Progressing  Flowsheets (Taken 12/10/2022 0600)  Progress: improving  Outcome Evaluation: Patient transferred from PCU. Slept well throgh the night. No complaints or concerns. SOA on exertion. Currently requiring supplemental oxygen at 5 liters high flow.   Goal Outcome Evaluation:           Progress: improving  Outcome Evaluation: Patient transferred from PCU. Slept well throgh the night. No complaints or concerns. SOA on exertion. Currently requiring supplemental oxygen at 5 liters high flow.

## 2022-12-10 NOTE — PROGRESS NOTES
Daily Progress Note        COPD with acute exacerbation (HCC)      Assessment    COPD with acute exacerbation (HCC)  Acute hypoxemic respiratory failure  Influenza A infection  CT chest 12/5/2022: mild bibasilar infection, severe emphysema  DM  HTN  Tob smoking     Recommendations:     Add theophylline 300 mg daily   oxygen supplement and titration to maintain saturation 90 to 95%: Currently on 7 L by nasal cannula  -Patient does not use oxygen at home  -We will need 6-minute walk prior to discharge    Bronchodilatores\inhaled corticosteroid  ASA  BP control  Prednisone p.o. tapering dose  Tamiflu was complete  DVT/GI prophylaxis  Check daily labs and correct electrolytes as needed    Will need work-up for obstructive sleep apnea as outpatient           LOS: 6 days     Subjective         Objective     Vital signs for last 24 hours:  Vitals:    12/10/22 0320 12/10/22 0700 12/10/22 0719 12/10/22 0721   BP: 115/69 136/73     BP Location: Left arm Right arm     Patient Position: Lying Sitting     Pulse: 80 90 89 89   Resp: 18  18 18   Temp: 98.1 °F (36.7 °C) 97.7 °F (36.5 °C)     TempSrc: Oral Oral     SpO2: 96% 90% 91% 91%   Weight:       Height:           Intake/Output last 3 shifts:  I/O last 3 completed shifts:  In: 1920 [P.O.:1920]  Out: -   Intake/Output this shift:  No intake/output data recorded.      Radiology  Imaging Results (Last 24 Hours)     ** No results found for the last 24 hours. **          Labs:  Results from last 7 days   Lab Units 12/07/22  2345   WBC 10*3/mm3 15.10*   HEMOGLOBIN g/dL 13.0   HEMATOCRIT % 40.0   PLATELETS 10*3/mm3 255     Results from last 7 days   Lab Units 12/07/22  2345 12/04/22  0655 12/03/22  1028   SODIUM mmol/L 137   < > 130*   POTASSIUM mmol/L 4.7   < > 4.5   CHLORIDE mmol/L 94*   < > 91*   CO2 mmol/L 35.0*   < > 24.0   BUN mg/dL 44*   < > 20   CREATININE mg/dL 0.87   < > 0.73*   CALCIUM mg/dL 9.5   < > 9.1   BILIRUBIN mg/dL  --   --  0.3   ALK PHOS U/L  --   --  53   ALT  (SGPT) U/L  --   --  15   AST (SGOT) U/L  --   --  20   GLUCOSE mg/dL 68   < > 91    < > = values in this interval not displayed.     Results from last 7 days   Lab Units 12/07/22  1900   PH, ARTERIAL pH units 7.357   PO2 ART mm Hg 75.3*   PCO2, ARTERIAL mm Hg 63.8*   HCO3 ART mmol/L 35.8*     Results from last 7 days   Lab Units 12/03/22  1028   ALBUMIN g/dL 4.10     Results from last 7 days   Lab Units 12/04/22  2348 12/03/22  1028   TROPONIN T ng/mL <0.010 <0.010         Results from last 7 days   Lab Units 12/03/22  1028   MAGNESIUM mg/dL 1.7                   Meds:   SCHEDULE  aspirin, 81 mg, Oral, Daily  bisoprolol-hydroCHLOROthiazide (ZIAC) 10-6.25 mg combo dose, , Oral, Q24H  budesonide, 0.5 mg, Nebulization, BID - RT  enoxaparin, 40 mg, Subcutaneous, Q24H  famotidine, 40 mg, Oral, Daily  glipizide, 10 mg, Oral, BID AC  insulin lispro, 2-9 Units, Subcutaneous, TID With Meals  lisinopril, 40 mg, Oral, Q PM  metFORMIN, 1,000 mg, Oral, BID With Meals  montelukast, 10 mg, Oral, Nightly  predniSONE, 30 mg, Oral, Daily   Followed by  [START ON 12/11/2022] predniSONE, 20 mg, Oral, Daily   Followed by  [START ON 12/13/2022] predniSONE, 10 mg, Oral, Daily  sodium chloride, 3 mL, Intravenous, Q12H      Infusions  Pharmacy to Dose enoxaparin (LOVENOX),       PRNs  •  acetaminophen  •  dextrose  •  dextrose  •  glucagon (human recombinant)  •  ipratropium-albuterol  •  nitroglycerin  •  ondansetron  •  Pharmacy to Dose enoxaparin (LOVENOX)  •  [COMPLETED] Insert Peripheral IV **AND** sodium chloride  •  sodium chloride  •  sodium chloride    Physical Exam:  Physical Exam  Cardiovascular:      Heart sounds: Murmur heard.   Pulmonary:      Breath sounds: Rhonchi present.         ROS  Review of Systems    I have reviewed the patient's new clinical results.    Electronically signed by Caleb Almazan MD

## 2022-12-11 LAB
GLUCOSE BLDC GLUCOMTR-MCNC: 126 MG/DL (ref 70–105)
GLUCOSE BLDC GLUCOMTR-MCNC: 170 MG/DL (ref 70–105)
GLUCOSE BLDC GLUCOMTR-MCNC: 300 MG/DL (ref 70–105)
GLUCOSE BLDC GLUCOMTR-MCNC: 84 MG/DL (ref 70–105)
MRSA DNA SPEC QL NAA+PROBE: NORMAL

## 2022-12-11 PROCEDURE — 25010000002 ENOXAPARIN PER 10 MG: Performed by: NURSE PRACTITIONER

## 2022-12-11 PROCEDURE — 82962 GLUCOSE BLOOD TEST: CPT

## 2022-12-11 PROCEDURE — 87040 BLOOD CULTURE FOR BACTERIA: CPT | Performed by: INTERNAL MEDICINE

## 2022-12-11 PROCEDURE — 94799 UNLISTED PULMONARY SVC/PX: CPT

## 2022-12-11 PROCEDURE — 94761 N-INVAS EAR/PLS OXIMETRY MLT: CPT

## 2022-12-11 PROCEDURE — 25010000002 CEFEPIME PER 500 MG: Performed by: INTERNAL MEDICINE

## 2022-12-11 PROCEDURE — 63710000001 INSULIN LISPRO (HUMAN) PER 5 UNITS: Performed by: FAMILY MEDICINE

## 2022-12-11 PROCEDURE — 94664 DEMO&/EVAL PT USE INHALER: CPT

## 2022-12-11 PROCEDURE — 87641 MR-STAPH DNA AMP PROBE: CPT | Performed by: INTERNAL MEDICINE

## 2022-12-11 PROCEDURE — 25010000002 METHYLPREDNISOLONE PER 40 MG: Performed by: FAMILY MEDICINE

## 2022-12-11 PROCEDURE — 94660 CPAP INITIATION&MGMT: CPT

## 2022-12-11 RX ORDER — MIRTAZAPINE 15 MG/1
15 TABLET, FILM COATED ORAL NIGHTLY
Status: DISCONTINUED | OUTPATIENT
Start: 2022-12-11 | End: 2022-12-21 | Stop reason: HOSPADM

## 2022-12-11 RX ORDER — ARFORMOTEROL TARTRATE 15 UG/2ML
15 SOLUTION RESPIRATORY (INHALATION)
Status: DISCONTINUED | OUTPATIENT
Start: 2022-12-11 | End: 2022-12-21 | Stop reason: HOSPADM

## 2022-12-11 RX ADMIN — INSULIN LISPRO 7 UNITS: 100 INJECTION, SOLUTION INTRAVENOUS; SUBCUTANEOUS at 17:52

## 2022-12-11 RX ADMIN — LISINOPRIL 40 MG: 20 TABLET ORAL at 17:52

## 2022-12-11 RX ADMIN — METFORMIN HYDROCHLORIDE 1000 MG: 500 TABLET ORAL at 17:52

## 2022-12-11 RX ADMIN — FAMOTIDINE 40 MG: 20 TABLET, FILM COATED ORAL at 08:19

## 2022-12-11 RX ADMIN — INSULIN LISPRO 2 UNITS: 100 INJECTION, SOLUTION INTRAVENOUS; SUBCUTANEOUS at 08:19

## 2022-12-11 RX ADMIN — Medication 3 ML: at 08:20

## 2022-12-11 RX ADMIN — Medication 3 ML: at 21:51

## 2022-12-11 RX ADMIN — BUDESONIDE 0.5 MG: 0.5 INHALANT RESPIRATORY (INHALATION) at 07:45

## 2022-12-11 RX ADMIN — CEFEPIME 2 G: 2 INJECTION, POWDER, FOR SOLUTION INTRAVENOUS at 17:52

## 2022-12-11 RX ADMIN — METHYLPREDNISOLONE SODIUM SUCCINATE 40 MG: 40 INJECTION, POWDER, FOR SOLUTION INTRAMUSCULAR; INTRAVENOUS at 00:50

## 2022-12-11 RX ADMIN — CEFEPIME 2 G: 2 INJECTION, POWDER, FOR SOLUTION INTRAVENOUS at 09:25

## 2022-12-11 RX ADMIN — GLIPIZIDE 10 MG: 5 TABLET ORAL at 17:52

## 2022-12-11 RX ADMIN — ENOXAPARIN SODIUM 40 MG: 100 INJECTION SUBCUTANEOUS at 15:36

## 2022-12-11 RX ADMIN — MIRTAZAPINE 15 MG: 15 TABLET, FILM COATED ORAL at 20:49

## 2022-12-11 RX ADMIN — METHYLPREDNISOLONE SODIUM SUCCINATE 40 MG: 40 INJECTION, POWDER, FOR SOLUTION INTRAMUSCULAR; INTRAVENOUS at 12:51

## 2022-12-11 RX ADMIN — ARFORMOTEROL TARTRATE 15 MCG: 15 SOLUTION RESPIRATORY (INHALATION) at 18:58

## 2022-12-11 RX ADMIN — BUDESONIDE 0.5 MG: 0.5 INHALANT RESPIRATORY (INHALATION) at 18:58

## 2022-12-11 RX ADMIN — ASPIRIN 81 MG: 81 TABLET, COATED ORAL at 08:19

## 2022-12-11 RX ADMIN — MONTELUKAST 10 MG: 10 TABLET, FILM COATED ORAL at 20:49

## 2022-12-11 NOTE — PLAN OF CARE
A&O female fell while going up steps on stepstool, striking left knee on corner of steps. Pain and swelling in left knee. Took 600mg Ibuprofen at aprox 2100. Goal Outcome Evaluation:      Patient tolerated high flow n/c at 8L in AM. Patient is agreeable to wear when he sleeps. Patient is alert and oriented, very pleasant.

## 2022-12-11 NOTE — PROGRESS NOTES
Daily Progress Note        COPD with acute exacerbation (HCC)      Assessment    COPD with acute exacerbation (HCC)  Acute hypoxemic/hypercapnic respiratory failure  Pneumonia: Unidentified organism  Influenza A infection  CT chest 12/5/2022: mild bibasilar infection, severe emphysema  DM  HTN  Tob smoking     Recommendations:     Transferred to ICU 12/10/2022 at the PCU level for continuous BiPAP support due to hypercapnia  Patient has new basilar infiltrate with leukocytosis indicative of new bacterial pneumonia: We will start cefepime  theophylline 300 mg daily   oxygen supplement and titration to maintain saturation 90 to 95%: Currently on  AVAPS with 55% FiO2  -Patient does not use oxygen at home  -We will need 6-minute walk prior to discharge    Bronchodilatores\inhaled corticosteroid  ASA  BP control  Prednisone p.o. tapering dose  Tamiflu was complete  DVT/GI prophylaxis  Check daily labs and correct electrolytes as needed    Will need work-up for obstructive sleep apnea as outpatient               LOS: 7 days     Subjective     Patient reports shortness of breath and cough    Objective     Vital signs for last 24 hours:  Vitals:    12/11/22 0500 12/11/22 0600 12/11/22 0745 12/11/22 0749   BP:       BP Location:       Patient Position:       Pulse: 79 95 85 86   Resp: 25  24 24   Temp:       TempSrc:       SpO2: 96% 99% 97% 98%   Weight:       Height:           Intake/Output last 3 shifts:  I/O last 3 completed shifts:  In: 360 [P.O.:360]  Out: 300 [Urine:300]  Intake/Output this shift:  No intake/output data recorded.      Radiology  Imaging Results (Last 24 Hours)     Procedure Component Value Units Date/Time    XR Chest 1 View [[id number]] Collected: 12/10/22 1212     Updated: 12/10/22 1215    Narrative:         DATE OF EXAM:   12/10/2022 11:43 AM     PROCEDURE:   XR CHEST 1 VW-     INDICATIONS:   dyspnea; R09.02-Hypoxemia; J10.1-Influenza due to other identified  influenza virus with other respiratory  manifestations; R06.02-Shortness  of breath     COMPARISON:  12/3/2022 and prior     TECHNIQUE:   Portable Chest     FINDINGS:      Lungs are hyperexpanded. There is prominence of the perihilar markings  bilaterally and prominence of the interstitial markings with a perihilar  and bibasilar predominance again appreciated. Mild increased patchy  opacity noted at the right lung base. Accounting for differences in  technique there is no great change otherwise noted. Osseous structures  are unremarkable       Impression:      IMPRESSION :   Hyperexpansion of lungs and prominence of the perihilar interstitial  markings compatible with a viral or atypical pneumonia. More focal  patchy opacity noted at the right lung base is increased from the  comparison exam[     Electronically Signed By-Yaw Nieto On:12/10/2022 12:13 PM  This report was finalized on [id number] by  Yaw Nieto, .          Labs:  Results from last 7 days   Lab Units 12/10/22  2307   WBC 10*3/mm3 21.60*   HEMOGLOBIN g/dL 14.6   HEMATOCRIT % 44.7   PLATELETS 10*3/mm3 410     Results from last 7 days   Lab Units 12/07/22  2345   SODIUM mmol/L 137   POTASSIUM mmol/L 4.7   CHLORIDE mmol/L 94*   CO2 mmol/L 35.0*   BUN mg/dL 44*   CREATININE mg/dL 0.87   CALCIUM mg/dL 9.5   GLUCOSE mg/dL 68     Results from last 7 days   Lab Units 12/10/22  2316   PH, ARTERIAL pH units 7.294*   PO2 ART mm Hg 76.8*   PCO2, ARTERIAL mm Hg 84.1*   HCO3 ART mmol/L 40.8*         Results from last 7 days   Lab Units 12/04/22  2348   TROPONIN T ng/mL <0.010                           Meds:   SCHEDULE  aspirin, 81 mg, Oral, Daily  bisoprolol-hydroCHLOROthiazide (ZIAC) 10-6.25 mg combo dose, , Oral, Q24H  budesonide, 0.5 mg, Nebulization, BID - RT  enoxaparin, 40 mg, Subcutaneous, Q24H  famotidine, 40 mg, Oral, Daily  glipizide, 10 mg, Oral, BID AC  insulin lispro, 2-9 Units, Subcutaneous, TID With Meals  lisinopril, 40 mg, Oral, Q PM  metFORMIN, 1,000 mg, Oral, BID With  Meals  methylPREDNISolone sodium succinate, 40 mg, Intravenous, Q12H  montelukast, 10 mg, Oral, Nightly  sodium chloride, 3 mL, Intravenous, Q12H      Infusions  Pharmacy to Dose enoxaparin (LOVENOX),       PRNs  •  acetaminophen  •  dextrose  •  dextrose  •  glucagon (human recombinant)  •  ipratropium-albuterol  •  LORazepam  •  nitroglycerin  •  ondansetron  •  Pharmacy to Dose enoxaparin (LOVENOX)  •  [COMPLETED] Insert Peripheral IV **AND** sodium chloride  •  sodium chloride  •  sodium chloride    Physical Exam:  General Appearance:  Alert   HEENT:  Normocephalic, without obvious abnormality, Conjunctiva/corneas clear,.   Nares normal, no drainage     Neck:  Supple, symmetrical, trachea midline. No JVD.  Lungs /Chest wall:   Bilateral basal rhonchi, respirations unlabored, symmetrical wall movement.     Heart:  Regular rate and rhythm, S1 S2 normal  Abdomen: Soft, non-tender, no masses, no organomegaly.    Extremities: No edema, no clubbing or cyanosis  ROS  Review of Systems  Constitutional: Negative for chills, fever and positive for malaise/fatigue.   HENT: Negative.    Eyes: Negative.    Cardiovascular: Negative.    Respiratory: Positive for cough and shortness of breath.    Skin: Negative.    Musculoskeletal: Negative.    Gastrointestinal: Negative.    Genitourinary: Negative.    Neurological: Negative.    Psychiatric/Behavioral: Negative.    I reviewed the recent clinical results    Much of this encounter note is an electronic transcription/translation of spoken language to printed text using Dragon Software which might include inadvertent errors in transcription.

## 2022-12-11 NOTE — NURSING NOTE
Pt transferred to higher level of care for continuous bipap per Dr Craig. Report called to Anne HAM. Pt to ICU [id number].

## 2022-12-11 NOTE — PLAN OF CARE
Goal Outcome Evaluation:  Plan of Care Reviewed With: patient        Progress: no change     Pt transferred to ICU from  for continuous bipap need. Pt is PCU/NAKIA OF. Pt has not been compliant with wearing the bipap this shift. Pt has remained confused and pulling off lines, gown, and bipap. No gtts infusing.

## 2022-12-11 NOTE — PROGRESS NOTES
LOS: 7 days   Patient Care Team:  Qi Hicks APRN as PCP - General (Nurse Practitioner)    Subjective     Complains about bipap we discussed in detail this am how important it is to wear it    Review of Systems   Constitutional: Positive for activity change, appetite change and fatigue.   HENT: Negative.    Respiratory: Positive for shortness of breath. Negative for cough.    Cardiovascular: Negative.    Gastrointestinal: Negative.    Genitourinary: Negative.    Musculoskeletal: Negative.    Neurological: Positive for weakness.   Psychiatric/Behavioral: Negative.            Objective     Vital Signs  Temp:  [96.6 °F (35.9 °C)-98.4 °F (36.9 °C)] 96.6 °F (35.9 °C)  Heart Rate:  [66-95] 86  Resp:  [20-40] 24  BP: ()/(58-79) 94/69      Physical Exam  Vitals reviewed.   Constitutional:       Appearance: He is not ill-appearing.   HENT:      Head: Normocephalic and atraumatic.      Right Ear: External ear normal.      Left Ear: External ear normal.      Nose: Nose normal.      Mouth/Throat:      Mouth: Mucous membranes are dry.   Eyes:      General:         Right eye: No discharge.         Left eye: No discharge.   Cardiovascular:      Rate and Rhythm: Normal rate and regular rhythm.      Pulses: Normal pulses.      Heart sounds: Normal heart sounds.   Pulmonary:      Effort: Tachypnea, accessory muscle usage and respiratory distress present.      Breath sounds: Wheezing and rhonchi present.   Abdominal:      General: Bowel sounds are normal.      Palpations: Abdomen is soft.   Musculoskeletal:         General: Normal range of motion.      Cervical back: Normal range of motion.   Skin:     General: Skin is warm and dry.   Neurological:      Mental Status: He is alert and oriented to person, place, and time.   Psychiatric:         Behavior: Behavior normal.              Results Review:    Lab Results (last 24 hours)     Procedure Component Value Units Date/Time    Blood Culture - Blood, Arm, Left  [[id number]] Collected: 12/11/22 0847    Specimen: Blood from Arm, Left Updated: 12/11/22 0852    POC Glucose Once [[id number]]  (Abnormal) Collected: 12/11/22 0746    Specimen: Blood Updated: 12/11/22 0747     Glucose 170 mg/dL      Comment: Serial Number: [id number]:  [id number]       Manual Differential [[id number]]  (Abnormal) Collected: 12/10/22 2307    Specimen: Blood Updated: 12/10/22 2348     Neutrophil % 72.0 %      Lymphocyte % 6.0 %      Monocyte % 10.0 %      Bands %  11.0 %      Atypical Lymphocyte % 1.0 %      Neutrophils Absolute 17.93 10*3/mm3      Lymphocytes Absolute 1.51 10*3/mm3      Monocytes Absolute 2.16 10*3/mm3      Anisocytosis Slight/1+     WBC Morphology Normal     Large Platelets Slight/1+    CBC & Differential [[id number]]  (Abnormal) Collected: 12/10/22 2307    Specimen: Blood Updated: 12/10/22 2348    Narrative:      The following orders were created for panel order CBC & Differential.  Procedure                               Abnormality         Status                     ---------                               -----------         ------                     CBC Auto Differential[[id number]]        Abnormal            Final result               Scan Slide[[id number]]                                       Final result                 Please view results for these tests on the individual orders.    Scan Slide [[id number]] Collected: 12/10/22 2307    Specimen: Blood Updated: 12/10/22 2348     Scan Slide --     Comment: See Manual Differential Results       CBC Auto Differential [[id number]]  (Abnormal) Collected: 12/10/22 2307    Specimen: Blood Updated: 12/10/22 2348     WBC 21.60 10*3/mm3      RBC 4.86 10*6/mm3      Hemoglobin 14.6 g/dL      Hematocrit 44.7 %      MCV 92.0 fL      MCH 30.0 pg      MCHC 32.6 g/dL      RDW 15.2 %      RDW-SD 48.6 fl      MPV 7.9 fL      Platelets 410 10*3/mm3     Narrative:      Modified report. Previous result was Hemogram on 12/10/2022 at 2322 EST.  The  previously reported component NRBC is no longer being reported. Previous result was 0.0 /100 WBC (Reference Range: 0.0-0.2 /100 WBC) on 12/10/2022 at 2322 EST.    Ammonia [[id number]]  (Normal) Collected: 12/10/22 2307    Specimen: Blood Updated: 12/10/22 2335     Ammonia 56 umol/L     Blood Gas, Arterial - [[id number]]  (Abnormal) Collected: 12/10/22 2316    Specimen: Arterial Blood Updated: 12/10/22 2325     Site Left Radial     Felix's Test Positive     pH, Arterial 7.294 pH units      pCO2, Arterial 84.1 mm Hg      pO2, Arterial 76.8 mm Hg      HCO3, Arterial 40.8 mmol/L      Base Excess, Arterial 9.6 mmol/L      Comment: Serial Number: [id number]:  [id number]        O2 Saturation, Arterial 92.4 %      CO2 Content 43.3 mmol/L      Barometric Pressure for Blood Gas --     Comment: N/A        Modality HFNC     FIO2 60 %      Hemodilution No    POC Glucose Once [[id number]]  (Abnormal) Collected: 12/10/22 1552    Specimen: Blood Updated: 12/10/22 1618     Glucose 134 mg/dL      Comment: Serial Number: [id number]:  [id number]       POC Glucose Once [[id number]]  (Abnormal) Collected: 12/10/22 1053    Specimen: Blood Updated: 12/10/22 1054     Glucose 144 mg/dL      Comment: Serial Number: [id number]:  [id number]              Imaging Results (Last 24 Hours)     Procedure Component Value Units Date/Time    XR Chest 1 View [[id number]] Collected: 12/10/22 1212     Updated: 12/10/22 1215    Narrative:         DATE OF EXAM:   12/10/2022 11:43 AM     PROCEDURE:   XR CHEST 1 VW-     INDICATIONS:   dyspnea; R09.02-Hypoxemia; J10.1-Influenza due to other identified  influenza virus with other respiratory manifestations; R06.02-Shortness  of breath     COMPARISON:  12/3/2022 and prior     TECHNIQUE:   Portable Chest     FINDINGS:      Lungs are hyperexpanded. There is prominence of the perihilar markings  bilaterally and prominence of the interstitial markings with a perihilar  and bibasilar predominance again  appreciated. Mild increased patchy  opacity noted at the right lung base. Accounting for differences in  technique there is no great change otherwise noted. Osseous structures  are unremarkable       Impression:      IMPRESSION :   Hyperexpansion of lungs and prominence of the perihilar interstitial  markings compatible with a viral or atypical pneumonia. More focal  patchy opacity noted at the right lung base is increased from the  comparison exam[     Electronically Signed By-Yaw Nieto On:12/10/2022 12:13 PM  This report was finalized on [id number] by  Yaw Nieto, .               I reviewed the patient's new clinical results.    Medication Review:   Scheduled Meds:aspirin, 81 mg, Oral, Daily  bisoprolol-hydroCHLOROthiazide (ZIAC) 10-6.25 mg combo dose, , Oral, Q24H  budesonide, 0.5 mg, Nebulization, BID - RT  cefepime, 2 g, Intravenous, Once  cefepime, 2 g, Intravenous, Q8H  enoxaparin, 40 mg, Subcutaneous, Q24H  famotidine, 40 mg, Oral, Daily  glipizide, 10 mg, Oral, BID AC  insulin lispro, 2-9 Units, Subcutaneous, TID With Meals  lisinopril, 40 mg, Oral, Q PM  metFORMIN, 1,000 mg, Oral, BID With Meals  methylPREDNISolone sodium succinate, 40 mg, Intravenous, Q12H  montelukast, 10 mg, Oral, Nightly  sodium chloride, 3 mL, Intravenous, Q12H      Continuous Infusions:Pharmacy to Dose enoxaparin (LOVENOX),       PRN Meds:.•  acetaminophen  •  dextrose  •  dextrose  •  glucagon (human recombinant)  •  ipratropium-albuterol  •  LORazepam  •  nitroglycerin  •  ondansetron  •  Pharmacy to Dose enoxaparin (LOVENOX)  •  [COMPLETED] Insert Peripheral IV **AND** sodium chloride  •  sodium chloride  •  sodium chloride     Interval History:    12/5 patient continues with shortness of breath; will add corticosteroid nebs; CT chest; continue supportive care may need oxygen at home temporarily; leukocytosis most likely secondary to steroids     12/6 worsening respiratory status today with distress will add pulm  consult and abg; CT scan yesterday with severe emphysema and will increase steroids    12/9 patient is most likely at his new baseline and anticipate he will need oxygen at home and sleep study as outpt; bipap at night as needed    12/10 cxr in am and labs    12/11 had respiratory distress yesterday with hypercapnia and hypoxia; he is having difficulty wearing bipap which was explained in detail regarding CO2 and oxygen requirements; also appetite is down will add remeron and supplements; additional antibiotics for leukocytosis and pna    Assessment & Plan     Acute COPD exacerbation most likely related to flu A  Diabetes  Hypertension  Tobacco abuse     Plan for disposition: SAMSON Sears, CARA  12/11/22  09:07 EST

## 2022-12-12 ENCOUNTER — APPOINTMENT (OUTPATIENT)
Dept: GENERAL RADIOLOGY | Facility: HOSPITAL | Age: 62
End: 2022-12-12
Payer: MEDICAID

## 2022-12-12 LAB
ANION GAP SERPL CALCULATED.3IONS-SCNC: 6 MMOL/L (ref 5–15)
ANISOCYTOSIS BLD QL: ABNORMAL
BUN SERPL-MCNC: 55 MG/DL (ref 8–23)
BUN/CREAT SERPL: 53.9 (ref 7–25)
CALCIUM SPEC-SCNC: 9.4 MG/DL (ref 8.6–10.5)
CHLORIDE SERPL-SCNC: 92 MMOL/L (ref 98–107)
CO2 SERPL-SCNC: 37 MMOL/L (ref 22–29)
CREAT SERPL-MCNC: 1.02 MG/DL (ref 0.76–1.27)
DEPRECATED RDW RBC AUTO: 51.6 FL (ref 37–54)
EGFRCR SERPLBLD CKD-EPI 2021: 82.6 ML/MIN/1.73
ERYTHROCYTE [DISTWIDTH] IN BLOOD BY AUTOMATED COUNT: 15.1 % (ref 12.3–15.4)
GLUCOSE BLDC GLUCOMTR-MCNC: 114 MG/DL (ref 70–105)
GLUCOSE BLDC GLUCOMTR-MCNC: 161 MG/DL (ref 70–105)
GLUCOSE BLDC GLUCOMTR-MCNC: 217 MG/DL (ref 70–105)
GLUCOSE BLDC GLUCOMTR-MCNC: 71 MG/DL (ref 70–105)
GLUCOSE SERPL-MCNC: 81 MG/DL (ref 65–99)
HCT VFR BLD AUTO: 38.7 % (ref 37.5–51)
HGB BLD-MCNC: 12.9 G/DL (ref 13–17.7)
LARGE PLATELETS: ABNORMAL
LYMPHOCYTES # BLD MANUAL: 1.24 10*3/MM3 (ref 0.7–3.1)
LYMPHOCYTES NFR BLD MANUAL: 5 % (ref 5–12)
MCH RBC QN AUTO: 30.5 PG (ref 26.6–33)
MCHC RBC AUTO-ENTMCNC: 33.4 G/DL (ref 31.5–35.7)
MCV RBC AUTO: 91.3 FL (ref 79–97)
METAMYELOCYTES NFR BLD MANUAL: 1 % (ref 0–0)
MONOCYTES # BLD: 1.55 10*3/MM3 (ref 0.1–0.9)
NEUTROPHILS # BLD AUTO: 27.9 10*3/MM3 (ref 1.7–7)
NEUTROPHILS NFR BLD MANUAL: 88 % (ref 42.7–76)
NEUTS BAND NFR BLD MANUAL: 2 % (ref 0–5)
PLATELET # BLD AUTO: 359 10*3/MM3 (ref 140–450)
PMV BLD AUTO: 8.3 FL (ref 6–12)
POTASSIUM SERPL-SCNC: 5.6 MMOL/L (ref 3.5–5.2)
RBC # BLD AUTO: 4.25 10*6/MM3 (ref 4.14–5.8)
SCAN SLIDE: NORMAL
SODIUM SERPL-SCNC: 135 MMOL/L (ref 136–145)
VARIANT LYMPHS NFR BLD MANUAL: 4 % (ref 19.6–45.3)
WBC MORPH BLD: NORMAL
WBC NRBC COR # BLD: 31 10*3/MM3 (ref 3.4–10.8)
WHOLE BLOOD HOLD SPECIMEN: NORMAL

## 2022-12-12 PROCEDURE — 82962 GLUCOSE BLOOD TEST: CPT

## 2022-12-12 PROCEDURE — 71045 X-RAY EXAM CHEST 1 VIEW: CPT

## 2022-12-12 PROCEDURE — 94799 UNLISTED PULMONARY SVC/PX: CPT

## 2022-12-12 PROCEDURE — 25010000002 ENOXAPARIN PER 10 MG: Performed by: NURSE PRACTITIONER

## 2022-12-12 PROCEDURE — 85007 BL SMEAR W/DIFF WBC COUNT: CPT | Performed by: NURSE PRACTITIONER

## 2022-12-12 PROCEDURE — 94664 DEMO&/EVAL PT USE INHALER: CPT

## 2022-12-12 PROCEDURE — 25010000002 CEFEPIME PER 500 MG: Performed by: INTERNAL MEDICINE

## 2022-12-12 PROCEDURE — 85025 COMPLETE CBC W/AUTO DIFF WBC: CPT | Performed by: NURSE PRACTITIONER

## 2022-12-12 PROCEDURE — 94660 CPAP INITIATION&MGMT: CPT

## 2022-12-12 PROCEDURE — 97167 OT EVAL HIGH COMPLEX 60 MIN: CPT

## 2022-12-12 PROCEDURE — 63710000001 INSULIN LISPRO (HUMAN) PER 5 UNITS: Performed by: FAMILY MEDICINE

## 2022-12-12 PROCEDURE — 80048 BASIC METABOLIC PNL TOTAL CA: CPT | Performed by: NURSE PRACTITIONER

## 2022-12-12 PROCEDURE — 25010000002 FUROSEMIDE PER 20 MG: Performed by: INTERNAL MEDICINE

## 2022-12-12 PROCEDURE — 25010000002 METHYLPREDNISOLONE PER 40 MG: Performed by: FAMILY MEDICINE

## 2022-12-12 RX ORDER — FUROSEMIDE 10 MG/ML
40 INJECTION INTRAMUSCULAR; INTRAVENOUS ONCE
Status: COMPLETED | OUTPATIENT
Start: 2022-12-12 | End: 2022-12-12

## 2022-12-12 RX ORDER — GUAIFENESIN 600 MG/1
600 TABLET, EXTENDED RELEASE ORAL EVERY 12 HOURS SCHEDULED
Status: DISCONTINUED | OUTPATIENT
Start: 2022-12-12 | End: 2022-12-21 | Stop reason: HOSPADM

## 2022-12-12 RX ADMIN — Medication 3 ML: at 20:26

## 2022-12-12 RX ADMIN — GUAIFENESIN 600 MG: 600 TABLET, EXTENDED RELEASE ORAL at 20:22

## 2022-12-12 RX ADMIN — ARFORMOTEROL TARTRATE 15 MCG: 15 SOLUTION RESPIRATORY (INHALATION) at 18:49

## 2022-12-12 RX ADMIN — GUAIFENESIN 600 MG: 600 TABLET, EXTENDED RELEASE ORAL at 11:28

## 2022-12-12 RX ADMIN — GLIPIZIDE 10 MG: 5 TABLET ORAL at 17:23

## 2022-12-12 RX ADMIN — CEFEPIME 2 G: 2 INJECTION, POWDER, FOR SOLUTION INTRAVENOUS at 00:49

## 2022-12-12 RX ADMIN — ENOXAPARIN SODIUM 40 MG: 100 INJECTION SUBCUTANEOUS at 17:25

## 2022-12-12 RX ADMIN — METFORMIN HYDROCHLORIDE 1000 MG: 500 TABLET ORAL at 07:45

## 2022-12-12 RX ADMIN — CEFEPIME 2 G: 2 INJECTION, POWDER, FOR SOLUTION INTRAVENOUS at 17:24

## 2022-12-12 RX ADMIN — ASPIRIN 81 MG: 81 TABLET, COATED ORAL at 07:45

## 2022-12-12 RX ADMIN — GLIPIZIDE 10 MG: 5 TABLET ORAL at 07:45

## 2022-12-12 RX ADMIN — BISOPROLOL FUMARATE: 5 TABLET ORAL at 07:44

## 2022-12-12 RX ADMIN — BUDESONIDE 0.5 MG: 0.5 INHALANT RESPIRATORY (INHALATION) at 06:34

## 2022-12-12 RX ADMIN — METFORMIN HYDROCHLORIDE 1000 MG: 500 TABLET ORAL at 17:23

## 2022-12-12 RX ADMIN — FAMOTIDINE 40 MG: 20 TABLET, FILM COATED ORAL at 07:44

## 2022-12-12 RX ADMIN — INSULIN LISPRO 4 UNITS: 100 INJECTION, SOLUTION INTRAVENOUS; SUBCUTANEOUS at 07:44

## 2022-12-12 RX ADMIN — INSULIN LISPRO 2 UNITS: 100 INJECTION, SOLUTION INTRAVENOUS; SUBCUTANEOUS at 17:23

## 2022-12-12 RX ADMIN — Medication 3 ML: at 07:45

## 2022-12-12 RX ADMIN — MIRTAZAPINE 15 MG: 15 TABLET, FILM COATED ORAL at 20:22

## 2022-12-12 RX ADMIN — CEFEPIME 2 G: 2 INJECTION, POWDER, FOR SOLUTION INTRAVENOUS at 07:44

## 2022-12-12 RX ADMIN — FUROSEMIDE 40 MG: 10 INJECTION, SOLUTION INTRAMUSCULAR; INTRAVENOUS at 17:25

## 2022-12-12 RX ADMIN — METHYLPREDNISOLONE SODIUM SUCCINATE 40 MG: 40 INJECTION, POWDER, FOR SOLUTION INTRAMUSCULAR; INTRAVENOUS at 00:49

## 2022-12-12 RX ADMIN — MONTELUKAST 10 MG: 10 TABLET, FILM COATED ORAL at 20:23

## 2022-12-12 RX ADMIN — ARFORMOTEROL TARTRATE 15 MCG: 15 SOLUTION RESPIRATORY (INHALATION) at 06:30

## 2022-12-12 RX ADMIN — BUDESONIDE 0.5 MG: 0.5 INHALANT RESPIRATORY (INHALATION) at 18:49

## 2022-12-12 RX ADMIN — METHYLPREDNISOLONE SODIUM SUCCINATE 40 MG: 40 INJECTION, POWDER, FOR SOLUTION INTRAMUSCULAR; INTRAVENOUS at 12:53

## 2022-12-12 NOTE — THERAPY EVALUATION
Patient Name: Maximino Bhatia  : 1960    MRN: [id number]                              Today's Date: 2022       Admit Date: 12/3/2022    Visit Dx:     ICD-10-CM ICD-9-CM   1. Hypoxia  R09.02 799.02   2. Influenza A  J10.1 487.1   3. Shortness of breath  R06.02 786.05     Patient Active Problem List   Diagnosis   • COPD with acute exacerbation (HCC)     Past Medical History:   Diagnosis Date   • COPD (chronic obstructive pulmonary disease) (HCC)    • Diabetes mellitus (HCC)    • Hypertension      No past surgical history on file.   General Information     Row Name 22 1544          General Information    Prior Level of Function independent:  pt is semi-retired  -     Existing Precautions/Restrictions fall;oxygen therapy device and L/min  hypercapnia; on bipap at 8LPM  -     Barriers to Rehab medically complex  -     Row Name 22 1544          Living Environment    People in Home alone  -     Row Name 22 1544          Cognition    Orientation Status (Cognition) oriented x 4  -     Row Name 22 1544          Safety Issues, Functional Mobility    Impairments Affecting Function (Mobility) endurance/activity tolerance;shortness of breath  -           User Key  (r) = Recorded By, (t) = Taken By, (c) = Cosigned By    Initials Name Provider Type     Falguni Bennett, OT Occupational Therapist                 Mobility/ADL's     Row Name 22 1545          Bed Mobility    Bed Mobility bed mobility (all) activities  -     All Activities, [place] (Bed Mobility) set up;standby assist;1 person to manage equipment  -     Row Name 22 1545          Transfers    Transfers bed-chair transfer  -     Row Name 22 1545          Bed-Chair Transfer    Bed-Chair [place] (Transfers) contact guard;minimum assist (75% patient effort)  -     Row Name 22 1545          Functional Mobility    Functional Mobility- Ind. Level minimum assist (75% patient effort)  -      Functional Mobility-Distance (Feet) 10  -     Functional Mobility- Safety Issues supplemental O2  remained on bipap for walk around bed. Pt sat in recliner while bed was arranged then transerred back to bed.  -     Row Name 12/12/22 1545          Activities of Daily Living    BADL Assessment/Intervention lower body dressing;toileting  -[facility] Name 12/12/22 1545          Lower Body Dressing Assessment/Training    [place] Level (Lower Body Dressing) don;socks;dependent (less than 25% patient effort)  -[facility] Name 12/12/22 1545          Toileting Assessment/Training    [place] Level (Toileting) toileting skills;dependent (less than 25% patient effort)  -     Comment, (Toileting) is using external suction cath.  -           User Key  (r) = Recorded By, (t) = Taken By, (c) = Cosigned By    Initials Name Provider Type     Falguni Bennett OT Occupational Therapist               Obj/Interventions     [facility] Name 12/12/22 1549          Sensory Assessment (Somatosensory)    Sensory Assessment (Somatosensory) sensation intact  -[facility] Name 12/12/22 1549          Vision Assessment/Intervention    Visual Impairment/Limitations WFL  -[facility] Name 12/12/22 1549          Range of Motion Comprehensive    Comment, General Range of Motion rt shld chronicaly disloacted w/ AROM < 25%; Lt shld flex 50=75%  -[facility] Name 12/12/22 1549          Strength Comprehensive (MMT)    Comment, General Manual Muscle Testing (MMT) Assessment BUE 4-/5 in limited ROM; BLE grossly 4-/5  -[facility] Name 12/12/22 1549          Balance    Balance Assessment sitting static balance;sitting dynamic balance;standing static balance;standing dynamic balance  -     Static Sitting Balance modified independence  -     Dynamic Sitting Balance set-up  -     Position, Sitting Balance unsupported;sitting edge of bed  -     Static Standing Balance contact guard  -     Dynamic Standing Balance minimal assist  -            User Key  (r) = Recorded By, (t) = Taken By, (c) = Cosigned By    Initials Name Provider Type     Falguni Bennett, OT Occupational Therapist               Goals/Plan     Row Name 12/12/22 1555          Transfer Goal 1 (OT)    Activity/Assistive Device (Transfer Goal 1, OT) transfers, all  -     [place] Level/Cues Needed (Transfer Goal 1, OT) independent  -     Time Frame (Transfer Goal 1, OT) 2 weeks  -     Row Name 12/12/22 1555          Bathing Goal 1 (OT)    Activity/Device (Bathing Goal 1, OT) bathing skills, all;shower chair  -     [place] Level/Cues Needed (Bathing Goal 1, OT) modified independence  -     Time Frame (Bathing Goal 1, OT) 2 weeks  -     Row Name 12/12/22 1555          Dressing Goal 1 (OT)    Activity/Device (Dressing Goal 1, OT) dressing skills, all  -     [place]/Cues Needed (Dressing Goal 1, OT) modified independence  -     Time Frame (Dressing Goal 1, OT) 2 weeks  -     Row Name 12/12/22 1555          Toileting Goal 1 (OT)    Activity/Device (Toileting Goal 1, OT) toileting skills, all  -     [place] Level/Cues Needed (Toileting Goal 1, OT) modified independence  -     Time Frame (Toileting Goal 1, OT) 2 weeks  -     Row Name 12/12/22 1555          Therapy Assessment/Plan (OT)    Planned Therapy Interventions (OT) activity tolerance training;adaptive equipment training;BADL retraining;functional balance retraining;occupation/activity based interventions;ROM/therapeutic exercise;transfer/mobility retraining;wheelchair assessment/training  -           User Key  (r) = Recorded By, (t) = Taken By, (c) = Cosigned By    Initials Name Provider Type     Falguni Bennett, OT Occupational Therapist               Clinical Impression     Row Name 12/12/22 1551          Pain Assessment    Pretreatment Pain Rating 0/10 - no pain  -     Posttreatment Pain Rating 0/10 - no pain  -     Row Name 12/12/22 1551          Plan of Care Review    Plan of Care  Reviewed With patient  -     Progress improving  -     Outcome Evaluation The patient is a 62 y.o. male who presents with progressive shortness of breath with evidence of acute exacerbation of panlobular COPD with emphysema and an acute influenza A lower respiratory infection. Pt has been in ICU for management of his respiratory failure and now is downgraded to PCU for care. He is using bipap or *L HF O2 via NC for short periods of time to consume meals and take breaks from the BIPAP. At baseline pt is (I) and semi-retired. He appears capable of making good functional progress once his lines are less restrictive and his need for the bipap is decreased. Will work toward d/c home with [facility] but he lives alone and if he does not make enough progress he will require SNF.  -     Row Name 12/12/22 1551          Therapy Assessment/Plan (OT)    Rehab Potential (OT) good, to achieve stated therapy goals  -     Criteria for Skilled Therapeutic Interventions Met (OT) skilled treatment is necessary;yes  -     Therapy Frequency (OT) 5 times/wk  -     Predicted Duration of Therapy Intervention (OT) until d/c  -     Row Name 12/12/22 1551          Therapy Plan Review/Discharge Plan (OT)    Anticipated Discharge Disposition (OT) home with home health  -     Row Name 12/12/22 1551          Vital Signs    O2 Delivery Pre Treatment other (see comments)  bipap throughout evaluation  -     Pre Patient Position Supine  -     Intra Patient Position Standing  -     Post Patient Position Supine  -     Row Name 12/12/22 1551          Positioning and Restraints    Pre-Treatment Position in bed  -     Post Treatment Position bed  -     In Bed notified nsg;fowlers;call light within reach;encouraged to call for assist;exit alarm on  -           User Key  (r) = Recorded By, (t) = Taken By, (c) = Cosigned By    Initials Name Provider Type    Falguni Virk, OT Occupational Therapist               Outcome Measures      Row Name 12/12/22 1556          How much help from another is currently needed...    Putting on and taking off regular lower body clothing? 3  -MH     Bathing (including washing, rinsing, and drying) 2  -MH     Toileting (which includes using toilet bed pan or urinal) 2  -MH     Putting on and taking off regular upper body clothing 2  -MH     Taking care of personal grooming (such as brushing teeth) 3  -MH     Eating meals 3  -MH     AM-PAC 6 Clicks Score (OT) 15  -MH     Row Name 12/12/22 1410 12/12/22 0800       How much help from another person do you currently need...    Turning from your back to your side while in flat bed without using bedrails? 4  -MS --    Moving from lying on back to sitting on the side of a flat bed without bedrails? 4  -MS 4  -CC    Moving to and from a bed to a chair (including a wheelchair)? 3  -MS 3  -CC    Standing up from a chair using your arms (e.g., wheelchair, bedside chair)? 3  -MS 3  -CC    Climbing 3-5 steps with a railing? 3  -MS 3  -CC    To walk in hospital room? 4  -MS 3  -CC    AM-PAC 6 Clicks Score (PT) 21  -MS --    Highest level of mobility 6 --> Walked 10 steps or more  -MS --    Row Name 12/12/22 1556          Functional Assessment    Outcome Measure Options AM-PAC 6 Clicks Daily Activity (OT)  -           User Key  (r) = Recorded By, (t) = Taken By, (c) = Cosigned By    Initials Name Provider Type     Falguni Bennett OT Occupational Therapist    Sangeeta Sewell RN Registered Nurse    Ximena Ponce RN Registered Nurse                Occupational Therapy Education     Title: PT OT SLP Therapies (In Progress)     Topic: Occupational Therapy (In Progress)     Point: ADL training (Done)     Description:   Instruct learner(s) on proper safety adaptation and remediation techniques during self care or transfers.   Instruct in proper use of assistive devices.              Learning Progress Summary           Patient Acceptance, E,TB, VU,NR by  at 12/12/2022  0597                   Point: Home exercise program (Not Started)     Description:   Instruct learner(s) on appropriate technique for monitoring, assisting and/or progressing therapeutic exercises/activities.              Learner Progress:  Not documented in this visit.          Point: Precautions (Done)     Description:   Instruct learner(s) on prescribed precautions during self-care and functional transfers.              Learning Progress Summary           Patient Acceptance, E,TB, VU,NR by  at 12/12/2022 1557                   Point: Body mechanics (Done)     Description:   Instruct learner(s) on proper positioning and spine alignment during self-care, functional mobility activities and/or exercises.              Learning Progress Summary           Patient Acceptance, E,TB, VU,NR by  at 12/12/2022 1557                               User Key     Initials Effective Dates Name Provider Type Discipline     06/16/21 -  Falguni Bennett, ALVAREZ Occupational Therapist OT              OT Recommendation and Plan  Planned Therapy Interventions (OT): activity tolerance training, adaptive equipment training, BADL retraining, functional balance retraining, occupation/activity based interventions, ROM/therapeutic exercise, transfer/mobility retraining, wheelchair assessment/training  Therapy Frequency (OT): 5 times/wk  Plan of Care Review  Plan of Care Reviewed With: patient  Progress: improving  Outcome Evaluation: The patient is a 62 y.o. male who presents with progressive shortness of breath with evidence of acute exacerbation of panlobular COPD with emphysema and an acute influenza A lower respiratory infection. Pt has been in ICU for management of his respiratory failure and now is downgraded to PCU for care. He is using bipap or *L HF O2 via NC for short periods of time to consume meals and take breaks from the BIPAP. At baseline pt is (I) and semi-retired. He appears capable of making good functional progress once his  lines are less restrictive and his need for the bipap is decreased. Will work toward [facility] home [facility] but he lives alone and if he does not make enough progress he will require SNF.     Time Calculation:    Time Calculation- OT     Row Name 12/12/22 1557             Time Calculation-     OT Start Time 1436  -      OT Stop Time 1458  -      OT Time Calculation (min) 22 min  -      Total Timed Code Minutes- OT 0 minute(s)  -      OT Received On 12/12/22  -      OT - Next Appointment 12/13/22  -      OT Goal Re-Cert Due Date 12/26/22  -            User Key  (r) = Recorded By, (t) = Taken By, (c) = Cosigned By    Initials Name Provider Type     Falguni Bennett OT Occupational Therapist              Therapy Charges for Today     Code Description Service Date Service Provider Modifiers Qty    [id number]  OT EVAL HIGH COMPLEXITY 4 12/12/2022 Falguni Bennett OT GO 1               Falguni Bennett OT  12/12/2022

## 2022-12-12 NOTE — PLAN OF CARE
Goal Outcome Evaluation:  Plan of Care Reviewed With: patient        Progress: improving  Outcome Evaluation: The patient is a 62 y.o. male who presents with progressive shortness of breath with evidence of acute exacerbation of panlobular COPD with emphysema and an acute influenza A lower respiratory infection. Pt has been in ICU for management of his respiratory failure and now is downgraded to PCU for care. He is using bipap or 8L HF O2 via NC for short periods of time to consume meals and take breaks from the BIPAP. At baseline pt is (I) and semi-retired. He appears capable of making good functional progress once his lines are less restrictive and his need for the bipap is decreased. Will work toward d/c home with [facility] but he lives alone and if he does not make enough progress he will require SNF.

## 2022-12-12 NOTE — CASE MANAGEMENT/SOCIAL WORK
Continued Stay Note   [place]     Patient Name: Maximino Bhatia  MRN: [id number]  Today's Date: 12/12/2022    Admit Date: 12/3/2022    Plan: Dc Plan:Anticipate home. Watch for home O2 needs.  Will need walking oximetry.   Discharge Plan     Row Name 12/12/22 1508       Plan    Plan Dc Plan:Anticipate home. Watch for home O2 needs.  Will need walking oximetry.    Provided Post Acute Provider List? N/A    Provided Post Acute Provider Quality & Resource List? N/A    Plan Comments CM reviewed chart documentation to obtain clinical updates.No significant changes in condition or care plans to report.CM will continue to follow for any additional needs that may develop and adjust discharge plan accordingly. DC Barriers: 8 liters High [place] nc, IV abx/steroids, elevated WBC's, and pending cultures.           Expected Discharge Date and Time     Expected Discharge Date Expected Discharge Time    Dec 15, 2022         Phone communication or documentation only- no physical contact with patient or family.      Dee Morris RN      Office Phone: [phone]  Office Cell:     [phone]

## 2022-12-12 NOTE — PROGRESS NOTES
Daily Progress Note        COPD with acute exacerbation (HCC)      Assessment    COPD with acute exacerbation (HCC)  Acute hypoxemic/hypercapnic respiratory failure  Pneumonia: Unidentified organism  Influenza A infection    CT chest 12/5/2022: mild bibasilar infection, severe emphysema    DM  HTN  Tob smoking     Recommendations:    oxygen supplement and titration to maintain saturation 90 to 95%: Currently on  AVAPS with 55% FiO2, alternating with 8 L high flow  -Patient does not use oxygen at home  -We will need 6-minute walk prior to discharge    Antibiotic Cefepime, monitor mental status  Add mucinex  theophylline 300 mg daily   Singulair  Bronchodilatores\inhaled corticosteroid  ASA  BP control  Solu-Medrol 40 mg twice daily  DVT/GI prophylaxis  Check daily labs and correct electrolytes as needed    Completed 5-day course of Tamiflu     Will need work-up for obstructive sleep apnea as outpatient          LOS: 8 days     Subjective         Objective     Vital signs for last 24 hours:  Vitals:    12/12/22 0633 12/12/22 0634 12/12/22 0637 12/12/22 0700   BP:       BP Location:       Patient Position:       Pulse: 101 100 101 95   Resp: 20 20 20    Temp:       TempSrc:       SpO2: 95% 92% 95% 96%   Weight:       Height:           Intake/Output last 3 shifts:  I/O last 3 completed shifts:  In: 800 [P.O.:800]  Out: 1875 [Urine:1875]  Intake/Output this shift:  No intake/output data recorded.      Radiology  Imaging Results (Last 24 Hours)     ** No results found for the last 24 hours. **          Labs:  Results from last 7 days   Lab Units 12/10/22  2307   WBC 10*3/mm3 21.60*   HEMOGLOBIN g/dL 14.6   HEMATOCRIT % 44.7   PLATELETS 10*3/mm3 410     Results from last 7 days   Lab Units 12/07/22  2345   SODIUM mmol/L 137   POTASSIUM mmol/L 4.7   CHLORIDE mmol/L 94*   CO2 mmol/L 35.0*   BUN mg/dL 44*   CREATININE mg/dL 0.87   CALCIUM mg/dL 9.5   GLUCOSE mg/dL 68     Results from last 7 days   Lab Units 12/10/22  2316    PH, ARTERIAL pH units 7.294*   PO2 ART mm Hg 76.8*   PCO2, ARTERIAL mm Hg 84.1*   HCO3 ART mmol/L 40.8*                                   Meds:   SCHEDULE  arformoterol, 15 mcg, Nebulization, BID - RT  aspirin, 81 mg, Oral, Daily  bisoprolol-hydroCHLOROthiazide (ZIAC) 10-6.25 mg combo dose, , Oral, Q24H  budesonide, 0.5 mg, Nebulization, BID - RT  cefepime, 2 g, Intravenous, Q8H  enoxaparin, 40 mg, Subcutaneous, Q24H  famotidine, 40 mg, Oral, Daily  glipizide, 10 mg, Oral, BID AC  insulin lispro, 2-9 Units, Subcutaneous, TID With Meals  lisinopril, 40 mg, Oral, Q PM  metFORMIN, 1,000 mg, Oral, BID With Meals  methylPREDNISolone sodium succinate, 40 mg, Intravenous, Q12H  mirtazapine, 15 mg, Oral, Nightly  montelukast, 10 mg, Oral, Nightly  sodium chloride, 3 mL, Intravenous, Q12H      Infusions  Pharmacy to Dose enoxaparin (LOVENOX),       PRNs  •  acetaminophen  •  dextrose  •  dextrose  •  glucagon (human recombinant)  •  ipratropium-albuterol  •  LORazepam  •  nitroglycerin  •  ondansetron  •  Pharmacy to Dose enoxaparin (LOVENOX)  •  [COMPLETED] Insert Peripheral IV **AND** sodium chloride  •  sodium chloride  •  sodium chloride    Physical Exam:  Physical Exam  Vitals reviewed.   Pulmonary:      Effort: Pulmonary effort is normal.      Breath sounds: Rhonchi present.   Skin:     General: Skin is warm and dry.   Neurological:      Mental Status: He is alert and oriented to person, place, and time.         ROS  Review of Systems   Respiratory: Positive for cough and shortness of breath.        I have reviewed the patient's new clinical results.    Electronically signed by Caleb Almazan MD

## 2022-12-12 NOTE — PROGRESS NOTES
LOS: 8 days   Patient Care Team:  Qi Hicks APRN as PCP - General (Nurse Practitioner)    Subjective     Interval History: White blood cell count increased; continues to have significant oxygen requirements and is currently on BiPAP    Patient Complaints: Shortness of breath    History taken from: patient    Review of Systems   Constitutional: Positive for activity change and fatigue. Negative for appetite change and fever.   HENT: Negative for facial swelling.    Eyes: Negative for visual disturbance.   Respiratory: Positive for cough and shortness of breath. Negative for wheezing and stridor.    Cardiovascular: Negative for chest pain, palpitations and leg swelling.   Gastrointestinal: Negative for abdominal pain, constipation, diarrhea, nausea and vomiting.   Genitourinary: Negative for dysuria.   Musculoskeletal: Positive for arthralgias. Negative for back pain.   Skin: Negative for rash and wound.   Neurological: Negative for dizziness, light-headedness and headaches.   Psychiatric/Behavioral: Negative for confusion.           Objective     Vital Signs  Temp:  [97.9 °F (36.6 °C)-98.4 °F (36.9 °C)] 97.9 °F (36.6 °C)  Heart Rate:  [] 74  Resp:  [20-24] 20  BP: (97-98)/(57-68) 98/65    Physical Exam:     General Appearance:    Alert, cooperative, in no acute distress,   Head:    Normocephalic, without obvious abnormality, atraumatic   Eyes:            Lids and lashes normal, conjunctivae and sclerae normal, no   icterus, no pallor, corneas clear, PERRLA   Ears:    Ears appear intact with no abnormalities noted   Throat:   No oral lesions, no thrush, oral mucosa moist   Neck:   No adenopathy, supple, trachea midline, no thyromegaly, no   carotid bruit, no JVD   Lungs:    Diffuse wheezing    Heart:    Regular rhythm and normal rate, normal S1 and S2, no            murmur, no gallop, no rub, no click   Chest Wall:    No abnormalities observed   Abdomen:     Normal bowel sounds, no masses, no  organomegaly, soft        Non-tender non-distended, no guarding,   Extremities:   Moves all extremities well, no edema, no cyanosis, no             Redness   Pulses:   Pulses palpable and equal bilaterally   Skin:   No bleeding, bruising or rash   Lymph nodes:   No palpable adenopathy   Neurologic:   Cranial nerves 2 - 12 grossly intact, sensation intact, DTR       present and equal bilaterally        Results Review:    Lab Results (last 24 hours)     Procedure Component Value Units Date/Time    Basic Metabolic Panel [[id number]] Collected: 12/12/22 1235    Specimen: Blood Updated: 12/12/22 1236    POC Glucose Once [[id number]]  (Abnormal) Collected: 12/12/22 1116    Specimen: Blood Updated: 12/12/22 1120     Glucose 114 mg/dL      Comment: Serial Number: [id number]:  [id number]       Manual Differential [[id number]]  (Abnormal) Collected: 12/12/22 0939    Specimen: Blood Updated: 12/12/22 1038     Neutrophil % 88.0 %      Lymphocyte % 4.0 %      Monocyte % 5.0 %      Bands %  2.0 %      Metamyelocyte % 1.0 %      Neutrophils Absolute 27.90 10*3/mm3      Lymphocytes Absolute 1.24 10*3/mm3      Monocytes Absolute 1.55 10*3/mm3      Anisocytosis Slight/1+     WBC Morphology Normal     Large Platelets Slight/1+    CBC & Differential [[id number]]  (Abnormal) Collected: 12/12/22 0939    Specimen: Blood Updated: 12/12/22 1038    Narrative:      The following orders were created for panel order CBC & Differential.  Procedure                               Abnormality         Status                     ---------                               -----------         ------                     CBC Auto Differential[[id number]]        Abnormal            Final result               Scan Slide[[id number]]                                       Final result                 Please view results for these tests on the individual orders.    Scan Slide [[id number]] Collected: 12/12/22 0939    Specimen: Blood Updated: 12/12/22 1038     Scan  Slide --     Comment: See Manual Differential Results       CBC Auto Differential [[id number]]  (Abnormal) Collected: 12/12/22 0939    Specimen: Blood Updated: 12/12/22 1038     WBC 31.00 10*3/mm3      RBC 4.25 10*6/mm3      Hemoglobin 12.9 g/dL      Hematocrit 38.7 %      MCV 91.3 fL      MCH 30.5 pg      MCHC 33.4 g/dL      RDW 15.1 %      RDW-SD 51.6 fl      MPV 8.3 fL      Platelets 359 10*3/mm3     Narrative:      Modified report. Previous result was Hemogram on 12/12/2022 at 1022 EST.  The previously reported component NRBC is no longer being reported. Previous result was 0.0 /100 WBC (Reference Range: 0.0-0.2 /100 WBC) on 12/12/2022 at 1022 EST.    Blood Culture - Blood, Arm, Left [[id number]]  (Normal) Collected: 12/11/22 0847    Specimen: Blood from Arm, Left Updated: 12/12/22 0900     Blood Culture No growth at 24 hours    Narrative:      Less than seven (7) mL's of blood was collected.  Insufficient quantity may yield false negative results.    POC Glucose Once [[id number]]  (Abnormal) Collected: 12/12/22 0713    Specimen: Blood Updated: 12/12/22 0720     Glucose 217 mg/dL      Comment: Serial Number: [id number]:  [id number]       POC Glucose Once [[id number]]  (Normal) Collected: 12/11/22 2046    Specimen: Blood Updated: 12/11/22 2110     Glucose 84 mg/dL      Comment: Serial Number: [id number]:  [id number]       POC Glucose Once [[id number]]  (Abnormal) Collected: 12/11/22 1744    Specimen: Blood Updated: 12/11/22 1745     Glucose 300 mg/dL      Comment: Serial Number: [id number]:  [id number]       MRSA Screen, PCR (Inpatient) - Swab, Nares [[id number]]  (Normal) Collected: 12/11/22 1252    Specimen: Swab from Nares Updated: 12/11/22 1409     MRSA PCR No MRSA Detected    Narrative:      The negative predictive value of this diagnostic test is high and should only be used to consider de-escalating anti-MRSA therapy. A positive result may indicate colonization with MRSA and must be correlated  clinically.           Imaging Results (Last 24 Hours)     Procedure Component Value Units Date/Time    XR Chest 1 View [[id number]] Collected: 12/12/22 0833     Updated: 12/12/22 0841    Narrative:      Examination: XR CHEST 1 VW-     Date of Exam: 12/12/2022 8:10 AM     Indication: Follow up; R09.02-Hypoxemia; J10.1-Influenza due to other  identified influenza virus with other respiratory manifestations;  R06.02-Shortness of breath.     Comparison: 12/10/2022. CT chest 12/5/2022.     Technique: Single radiographic view of the chest was obtained.     Findings:  There is persistent interstitial prominence and lung hyperexpansion.  There is no pneumothorax, pleural effusion or focal airspace  consolidation. Cardiomediastinal silhouette is unremarkable. Pulmonary  vasculature appears within normal limits. Regional bones appear grossly  intact.        Impression:      Findings are suggestive of COPD without definite superimposed acute  process of the chest.     Electronically Signed By-Christopher Wade MD On:12/12/2022 8:39 AM  This report was finalized on [id number] by  Christopher Wade MD.               I reviewed the patient's new clinical results.    Medication Review:   Scheduled Meds:arformoterol, 15 mcg, Nebulization, BID - RT  aspirin, 81 mg, Oral, Daily  bisoprolol-hydroCHLOROthiazide (ZIAC) 10-6.25 mg combo dose, , Oral, Q24H  budesonide, 0.5 mg, Nebulization, BID - RT  cefepime, 2 g, Intravenous, Q8H  enoxaparin, 40 mg, Subcutaneous, Q24H  famotidine, 40 mg, Oral, Daily  glipizide, 10 mg, Oral, BID AC  guaiFENesin, 600 mg, Oral, Q12H  insulin lispro, 2-9 Units, Subcutaneous, TID With Meals  lisinopril, 40 mg, Oral, Q PM  metFORMIN, 1,000 mg, Oral, BID With Meals  methylPREDNISolone sodium succinate, 40 mg, Intravenous, Q12H  mirtazapine, 15 mg, Oral, Nightly  montelukast, 10 mg, Oral, Nightly  sodium chloride, 3 mL, Intravenous, Q12H      Continuous Infusions:Pharmacy to Dose enoxaparin (LOVENOX),       PRN  Meds:.•  acetaminophen  •  dextrose  •  dextrose  •  glucagon (human recombinant)  •  ipratropium-albuterol  •  LORazepam  •  nitroglycerin  •  ondansetron  •  Pharmacy to Dose enoxaparin (LOVENOX)  •  [COMPLETED] Insert Peripheral IV **AND** sodium chloride  •  sodium chloride  •  sodium chloride     Assessment & Plan       COPD with acute exacerbation (HCC)  Acute hypoxemic and hypercapnic respiratory failure  Pneumonia  Influenza A  Type 2 diabetes  Essential hypertension    Cefepime added for concerns over evolving bacterial pneumonia with significant increase in white blood cell count and continued high oxygen requirements.  Continue BiPAP as needed.  Continue aggressive respiratory care with steroids, bronchodilators, mucolytics.  Continue sliding scale insulin, glipizide and metformin for control of blood sugars.    Lovenox for DVT prophylaxis  Famotidine for stress ulcer prophylaxis.    Plan for disposition: To be determined    Judit Singh MD  12/12/22  13:00 EST

## 2022-12-12 NOTE — PLAN OF CARE
Goal Outcome Evaluation:         Pt arrived on unit with 8L high-flow nasal canula with bipap with pt. No complaints at this time. Report given from Eloisa HAM. Will continue to monitor

## 2022-12-12 NOTE — PLAN OF CARE
Problem: Adult Inpatient Plan of Care  Goal: Plan of Care Review  Outcome: Ongoing, Progressing  Flowsheets (Taken 12/12/2022 1842)  Outcome Evaluation: Pt cont on bipap and 8L hf at times.  seen by OT.  Pt K+ 5.6 and notified MD with new orders recieved. Will cont to monitor   Goal Outcome Evaluation:              Outcome Evaluation: Pt cont on bipap and 8L hf at times.  seen by OT.  Pt K+ 5.6 and notified MD with new orders recieved. Will cont to monitor

## 2022-12-12 NOTE — PLAN OF CARE
Problem: Adult Inpatient Plan of Care  Goal: Plan of Care Review  Outcome: Ongoing, Progressing  Goal: Patient-Specific Goal (Individualized)  Outcome: Ongoing, Progressing  Goal: Absence of Hospital-Acquired Illness or Injury  Outcome: Ongoing, Progressing  Intervention: Identify and Manage Fall Risk  Recent Flowsheet Documentation  Taken 12/12/2022 0400 by Mirian Sanchez RN  Safety Promotion/Fall Prevention: safety round/check completed  Taken 12/12/2022 0000 by Mirian Sanchez RN  Safety Promotion/Fall Prevention: safety round/check completed  Taken 12/11/2022 2200 by Mirian Sanchez RN  Safety Promotion/Fall Prevention: safety round/check completed  Taken 12/11/2022 2000 by Mirian Sanchez RN  Safety Promotion/Fall Prevention: safety round/check completed  Intervention: Prevent and Manage VTE (Venous Thromboembolism) Risk  Recent Flowsheet Documentation  Taken 12/12/2022 0000 by Mirian Sanchez RN  VTE Prevention/Management: sequential compression devices on  Taken 12/11/2022 2000 by Mirian Sanchez RN  VTE Prevention/Management: sequential compression devices on  Intervention: Prevent Infection  Recent Flowsheet Documentation  Taken 12/12/2022 0400 by Mirian Sanchez RN  Infection Prevention: single patient room provided  Taken 12/12/2022 0000 by Mirian Sanchez RN  Infection Prevention: single patient room provided  Taken 12/11/2022 2200 by Mirian Sanchez RN  Infection Prevention: single patient room provided  Taken 12/11/2022 2000 by Mirian Sanchez RN  Infection Prevention:   single patient room provided   hand hygiene promoted  Goal: Optimal Comfort and Wellbeing  Outcome: Ongoing, Progressing  Intervention: Provide Person-Centered Care  Recent Flowsheet Documentation  Taken 12/11/2022 2000 by Mirian Sanchez RN  Trust Relationship/Rapport:   care explained   questions answered  Goal: Readiness for Transition of Care  Outcome: Ongoing, Progressing     Problem:  Breathing Pattern Ineffective  Goal: Effective Breathing Pattern  Outcome: Ongoing, Progressing     Problem: Fall Injury Risk  Goal: Absence of Fall and Fall-Related Injury  Outcome: Ongoing, Progressing  Intervention: Identify and Manage Contributors  Recent Flowsheet Documentation  Taken 12/12/2022 0400 by Mirian Sanchez RN  Medication Review/Management: medications reviewed  Taken 12/12/2022 0000 by Mirian Sanchez RN  Medication Review/Management: medications reviewed  Taken 12/11/2022 2200 by Mirian Sanchez RN  Medication Review/Management: medications reviewed  Taken 12/11/2022 2000 by Mirian Sanchez RN  Medication Review/Management: medications reviewed  Intervention: Promote Injury-Free Environment  Recent Flowsheet Documentation  Taken 12/12/2022 0400 by Mirian Sanchez RN  Safety Promotion/Fall Prevention: safety round/check completed  Taken 12/12/2022 0000 by Mirian Sanchez RN  Safety Promotion/Fall Prevention: safety round/check completed  Taken 12/11/2022 2200 by Mirian Sanchez RN  Safety Promotion/Fall Prevention: safety round/check completed  Taken 12/11/2022 2000 by Mirian Sanchez RN  Safety Promotion/Fall Prevention: safety round/check completed     Problem: Skin Injury Risk Increased  Goal: Skin Health and Integrity  Outcome: Ongoing, Progressing   Goal Outcome Evaluation:      Care plan reviewed at bedside. Patient going between bipap and hi abraham nasal cannula,  Patient became restless at about 3am and bipap removed.  No acute changes this shift.  Receiving antibiotic therapy via IV .

## 2022-12-13 LAB
ANION GAP SERPL CALCULATED.3IONS-SCNC: 8 MMOL/L (ref 5–15)
ANISOCYTOSIS BLD QL: ABNORMAL
BUN SERPL-MCNC: 53 MG/DL (ref 8–23)
BUN/CREAT SERPL: 67.9 (ref 7–25)
CALCIUM SPEC-SCNC: 9.4 MG/DL (ref 8.6–10.5)
CHLORIDE SERPL-SCNC: 88 MMOL/L (ref 98–107)
CO2 SERPL-SCNC: 35 MMOL/L (ref 22–29)
CREAT SERPL-MCNC: 0.78 MG/DL (ref 0.76–1.27)
DEPRECATED RDW RBC AUTO: 49.9 FL (ref 37–54)
EGFRCR SERPLBLD CKD-EPI 2021: 100.2 ML/MIN/1.73
ERYTHROCYTE [DISTWIDTH] IN BLOOD BY AUTOMATED COUNT: 14.9 % (ref 12.3–15.4)
GLUCOSE BLDC GLUCOMTR-MCNC: 139 MG/DL (ref 70–105)
GLUCOSE BLDC GLUCOMTR-MCNC: 201 MG/DL (ref 70–105)
GLUCOSE BLDC GLUCOMTR-MCNC: 81 MG/DL (ref 70–105)
GLUCOSE SERPL-MCNC: 185 MG/DL (ref 65–99)
HCT VFR BLD AUTO: 39.3 % (ref 37.5–51)
HGB BLD-MCNC: 12.9 G/DL (ref 13–17.7)
LARGE PLATELETS: ABNORMAL
LYMPHOCYTES # BLD MANUAL: 2.42 10*3/MM3 (ref 0.7–3.1)
LYMPHOCYTES NFR BLD MANUAL: 6 % (ref 5–12)
MCH RBC QN AUTO: 29.6 PG (ref 26.6–33)
MCHC RBC AUTO-ENTMCNC: 32.7 G/DL (ref 31.5–35.7)
MCV RBC AUTO: 90.8 FL (ref 79–97)
MONOCYTES # BLD: 1.61 10*3/MM3 (ref 0.1–0.9)
NEUTROPHILS # BLD AUTO: 22.87 10*3/MM3 (ref 1.7–7)
NEUTROPHILS NFR BLD MANUAL: 80 % (ref 42.7–76)
NEUTS BAND NFR BLD MANUAL: 5 % (ref 0–5)
PLATELET # BLD AUTO: 375 10*3/MM3 (ref 140–450)
PMV BLD AUTO: 8.2 FL (ref 6–12)
POTASSIUM SERPL-SCNC: 5.8 MMOL/L (ref 3.5–5.2)
RBC # BLD AUTO: 4.33 10*6/MM3 (ref 4.14–5.8)
SCAN SLIDE: NORMAL
SODIUM SERPL-SCNC: 131 MMOL/L (ref 136–145)
TOXIC GRANULATION: ABNORMAL
VARIANT LYMPHS NFR BLD MANUAL: 9 % (ref 19.6–45.3)
WBC NRBC COR # BLD: 26.9 10*3/MM3 (ref 3.4–10.8)

## 2022-12-13 PROCEDURE — 63710000001 PREDNISONE PER 5 MG

## 2022-12-13 PROCEDURE — 94664 DEMO&/EVAL PT USE INHALER: CPT

## 2022-12-13 PROCEDURE — 80048 BASIC METABOLIC PNL TOTAL CA: CPT | Performed by: INTERNAL MEDICINE

## 2022-12-13 PROCEDURE — 94799 UNLISTED PULMONARY SVC/PX: CPT

## 2022-12-13 PROCEDURE — 94660 CPAP INITIATION&MGMT: CPT

## 2022-12-13 PROCEDURE — 25010000002 CEFEPIME PER 500 MG: Performed by: INTERNAL MEDICINE

## 2022-12-13 PROCEDURE — 63710000001 INSULIN LISPRO (HUMAN) PER 5 UNITS: Performed by: FAMILY MEDICINE

## 2022-12-13 PROCEDURE — 63710000001 PREDNISONE PER 1 MG

## 2022-12-13 PROCEDURE — 82962 GLUCOSE BLOOD TEST: CPT

## 2022-12-13 PROCEDURE — 85007 BL SMEAR W/DIFF WBC COUNT: CPT | Performed by: INTERNAL MEDICINE

## 2022-12-13 PROCEDURE — 25010000002 ENOXAPARIN PER 10 MG: Performed by: NURSE PRACTITIONER

## 2022-12-13 PROCEDURE — 25010000002 METHYLPREDNISOLONE PER 40 MG: Performed by: FAMILY MEDICINE

## 2022-12-13 PROCEDURE — 85025 COMPLETE CBC W/AUTO DIFF WBC: CPT | Performed by: INTERNAL MEDICINE

## 2022-12-13 RX ORDER — SODIUM CHLORIDE 9 MG/ML
75 INJECTION, SOLUTION INTRAVENOUS CONTINUOUS
Status: DISCONTINUED | OUTPATIENT
Start: 2022-12-13 | End: 2022-12-14

## 2022-12-13 RX ORDER — PREDNISONE 10 MG/1
10 TABLET ORAL DAILY
Status: COMPLETED | OUTPATIENT
Start: 2022-12-17 | End: 2022-12-18

## 2022-12-13 RX ORDER — PREDNISONE 20 MG/1
20 TABLET ORAL DAILY
Status: COMPLETED | OUTPATIENT
Start: 2022-12-15 | End: 2022-12-16

## 2022-12-13 RX ADMIN — BUDESONIDE 0.5 MG: 0.5 INHALANT RESPIRATORY (INHALATION) at 07:22

## 2022-12-13 RX ADMIN — Medication 3 ML: at 20:06

## 2022-12-13 RX ADMIN — CEFEPIME 2 G: 2 INJECTION, POWDER, FOR SOLUTION INTRAVENOUS at 17:31

## 2022-12-13 RX ADMIN — GLIPIZIDE 10 MG: 5 TABLET ORAL at 17:31

## 2022-12-13 RX ADMIN — Medication 3 ML: at 08:36

## 2022-12-13 RX ADMIN — GLIPIZIDE 10 MG: 5 TABLET ORAL at 08:35

## 2022-12-13 RX ADMIN — CEFEPIME 2 G: 2 INJECTION, POWDER, FOR SOLUTION INTRAVENOUS at 08:36

## 2022-12-13 RX ADMIN — INSULIN LISPRO 4 UNITS: 100 INJECTION, SOLUTION INTRAVENOUS; SUBCUTANEOUS at 08:33

## 2022-12-13 RX ADMIN — ENOXAPARIN SODIUM 40 MG: 100 INJECTION SUBCUTANEOUS at 17:31

## 2022-12-13 RX ADMIN — METHYLPREDNISOLONE SODIUM SUCCINATE 40 MG: 40 INJECTION, POWDER, FOR SOLUTION INTRAMUSCULAR; INTRAVENOUS at 01:19

## 2022-12-13 RX ADMIN — FAMOTIDINE 40 MG: 20 TABLET, FILM COATED ORAL at 08:35

## 2022-12-13 RX ADMIN — ARFORMOTEROL TARTRATE 15 MCG: 15 SOLUTION RESPIRATORY (INHALATION) at 18:50

## 2022-12-13 RX ADMIN — METFORMIN HYDROCHLORIDE 1000 MG: 500 TABLET ORAL at 17:31

## 2022-12-13 RX ADMIN — GUAIFENESIN 600 MG: 600 TABLET, EXTENDED RELEASE ORAL at 08:36

## 2022-12-13 RX ADMIN — CEFEPIME 2 G: 2 INJECTION, POWDER, FOR SOLUTION INTRAVENOUS at 01:19

## 2022-12-13 RX ADMIN — MONTELUKAST 10 MG: 10 TABLET, FILM COATED ORAL at 20:06

## 2022-12-13 RX ADMIN — SODIUM CHLORIDE 75 ML/HR: 9 INJECTION, SOLUTION INTRAVENOUS at 11:11

## 2022-12-13 RX ADMIN — METFORMIN HYDROCHLORIDE 1000 MG: 500 TABLET ORAL at 08:36

## 2022-12-13 RX ADMIN — ASPIRIN 81 MG: 81 TABLET, COATED ORAL at 08:35

## 2022-12-13 RX ADMIN — PREDNISONE 30 MG: 20 TABLET ORAL at 08:35

## 2022-12-13 RX ADMIN — MIRTAZAPINE 15 MG: 15 TABLET, FILM COATED ORAL at 20:06

## 2022-12-13 RX ADMIN — BUDESONIDE 0.5 MG: 0.5 INHALANT RESPIRATORY (INHALATION) at 18:50

## 2022-12-13 RX ADMIN — BISOPROLOL FUMARATE: 5 TABLET ORAL at 08:36

## 2022-12-13 RX ADMIN — SODIUM ZIRCONIUM CYCLOSILICATE 10 G: 10 POWDER, FOR SUSPENSION ORAL at 20:06

## 2022-12-13 RX ADMIN — SODIUM ZIRCONIUM CYCLOSILICATE 10 G: 10 POWDER, FOR SUSPENSION ORAL at 11:11

## 2022-12-13 RX ADMIN — ARFORMOTEROL TARTRATE 15 MCG: 15 SOLUTION RESPIRATORY (INHALATION) at 07:18

## 2022-12-13 RX ADMIN — GUAIFENESIN 600 MG: 600 TABLET, EXTENDED RELEASE ORAL at 20:06

## 2022-12-13 RX ADMIN — LISINOPRIL 40 MG: 20 TABLET ORAL at 17:31

## 2022-12-13 NOTE — PLAN OF CARE
Goal Outcome Evaluation:         Potassium 5.8 this am. Lokelma ordered and given; started on NS at 75 mL/hr. Patient using Bipap off and on through out the day maintaining sats above 90%. Isolation precautions maintained. Stable throughout the shift, continuing to monitor.

## 2022-12-13 NOTE — SIGNIFICANT NOTE
12/13/22 1451   OTHER   Discipline physical therapist   Rehab Time/Intention   Session Not Performed unable to evaluate, medical status change  (on/off avaps currently; hold pending respiratory improvement)   Recommendation   PT - Next Appointment 12/14/22

## 2022-12-13 NOTE — PROGRESS NOTES
Daily Progress Note        COPD with acute exacerbation (HCC)      Assessment    COPD with acute exacerbation (HCC)  Acute hypoxemic/hypercapnic respiratory failure  Pneumonia: Unidentified organism  Influenza A infection    CT chest 12/5/2022: mild bibasilar infection, severe emphysema    DM  HTN  Tob smoking     Recommendations:    oxygen supplement and titration to maintain saturation 90 to 95%: Currently on  AVAPS, alternating with 8 L high flow  -Patient does not use oxygen at home  -We will need 6-minute walk prior to discharge    Antibiotic Cefepime, monitor mental status  Mucinex twice daily  theophylline 300 mg daily   Singulair  Bronchodilatores\inhaled corticosteroid  ASA  BP control  Discontinue Solu-Medrol and start prednisone 6-day taper  DVT/GI prophylaxis  Check daily labs and correct electrolytes as needed    Completed 5-day course of Tamiflu     Will need work-up for obstructive sleep apnea as outpatient          LOS: 9 days     Subjective         Objective     Vital signs for last 24 hours:  Vitals:    12/12/22 2152 12/12/22 2155 12/13/22 0141 12/13/22 0429   BP:  90/47 99/61 122/69   BP Location:  Left arm Left arm Left arm   Patient Position:  Lying Lying Lying   Pulse: 79 74 81 81   Resp: 27  28 22   Temp: 99 °F (37.2 °C)  97.4 °F (36.3 °C) 97.5 °F (36.4 °C)   TempSrc: Axillary  Oral Oral   SpO2: 97% 98% 90% 90%   Weight:    93 kg (205 lb 0.4 oz)   Height:           Intake/Output last 3 shifts:  I/O last 3 completed shifts:  In: 800 [P.O.:800]  Out: 1575 [Urine:1575]  Intake/Output this shift:  I/O this shift:  In: -   Out: 800 [Urine:800]      Radiology  Imaging Results (Last 24 Hours)     Procedure Component Value Units Date/Time    XR Chest 1 View [[id number]] Collected: 12/12/22 0833     Updated: 12/12/22 0841    Narrative:      Examination: XR CHEST 1 VW-     Date of Exam: 12/12/2022 8:10 AM     Indication: Follow up; R09.02-Hypoxemia; J10.1-Influenza due to other  identified influenza  virus with other respiratory manifestations;  R06.02-Shortness of breath.     Comparison: 12/10/2022. CT chest 12/5/2022.     Technique: Single radiographic view of the chest was obtained.     Findings:  There is persistent interstitial prominence and lung hyperexpansion.  There is no pneumothorax, pleural effusion or focal airspace  consolidation. Cardiomediastinal silhouette is unremarkable. Pulmonary  vasculature appears within normal limits. Regional bones appear grossly  intact.        Impression:      Findings are suggestive of COPD without definite superimposed acute  process of the chest.     Electronically Signed By-Christopher Wade MD On:12/12/2022 8:39 AM  This report was finalized on [id number] by  Christopher Wade MD.          Labs:  Results from last 7 days   Lab Units 12/12/22  0939   WBC 10*3/mm3 31.00*   HEMOGLOBIN g/dL 12.9*   HEMATOCRIT % 38.7   PLATELETS 10*3/mm3 359     Results from last 7 days   Lab Units 12/12/22  1235   SODIUM mmol/L 135*   POTASSIUM mmol/L 5.6*   CHLORIDE mmol/L 92*   CO2 mmol/L 37.0*   BUN mg/dL 55*   CREATININE mg/dL 1.02   CALCIUM mg/dL 9.4   GLUCOSE mg/dL 81     Results from last 7 days   Lab Units 12/10/22  2316   PH, ARTERIAL pH units 7.294*   PO2 ART mm Hg 76.8*   PCO2, ARTERIAL mm Hg 84.1*   HCO3 ART mmol/L 40.8*                                   Meds:   SCHEDULE  arformoterol, 15 mcg, Nebulization, BID - RT  aspirin, 81 mg, Oral, Daily  bisoprolol-hydroCHLOROthiazide (ZIAC) 10-6.25 mg combo dose, , Oral, Q24H  budesonide, 0.5 mg, Nebulization, BID - RT  cefepime, 2 g, Intravenous, Q8H  enoxaparin, 40 mg, Subcutaneous, Q24H  famotidine, 40 mg, Oral, Daily  glipizide, 10 mg, Oral, BID AC  guaiFENesin, 600 mg, Oral, Q12H  insulin lispro, 2-9 Units, Subcutaneous, TID With Meals  lisinopril, 40 mg, Oral, Q PM  metFORMIN, 1,000 mg, Oral, BID With Meals  methylPREDNISolone sodium succinate, 40 mg, Intravenous, Q12H  mirtazapine, 15 mg, Oral, Nightly  montelukast, 10 mg,  Oral, Nightly  sodium chloride, 3 mL, Intravenous, Q12H      Infusions  Pharmacy to Dose enoxaparin (LOVENOX),       PRNs  •  acetaminophen  •  dextrose  •  dextrose  •  glucagon (human recombinant)  •  ipratropium-albuterol  •  LORazepam  •  nitroglycerin  •  ondansetron  •  Pharmacy to Dose enoxaparin (LOVENOX)  •  [COMPLETED] Insert Peripheral IV **AND** sodium chloride  •  sodium chloride  •  sodium chloride    Physical Exam:  Physical Exam  Vitals reviewed.   Pulmonary:      Effort: Pulmonary effort is normal.      Breath sounds: Rhonchi present.   Skin:     General: Skin is warm and dry.   Neurological:      Mental Status: He is alert and oriented to person, place, and time.         ROS  Review of Systems   Respiratory: Positive for cough and shortness of breath.        I have reviewed the patient's new clinical results.    Electronically signed by Caleb Almazan MD

## 2022-12-13 NOTE — PROGRESS NOTES
LOS: 9 days   Patient Care Team:  Qi Hicks APRN as PCP - General (Nurse Practitioner)    Subjective     He is tolerating avaps better and states no worsening and no real improvement with his breathing    Review of Systems   Constitutional: Positive for activity change, appetite change and fatigue.   HENT: Negative.    Respiratory: Positive for shortness of breath. Negative for cough.    Cardiovascular: Negative.    Gastrointestinal: Negative.    Genitourinary: Negative.    Musculoskeletal: Negative.    Neurological: Positive for weakness.   Psychiatric/Behavioral: Negative.            Objective     Vital Signs  Temp:  [97.4 °F (36.3 °C)-99 °F (37.2 °C)] 97.5 °F (36.4 °C)  Heart Rate:  [72-87] 83  Resp:  [18-34] 22  BP: ()/(47-69) 122/69      Physical Exam  Vitals reviewed.   Constitutional:       Appearance: He is not ill-appearing.   HENT:      Head: Normocephalic and atraumatic.      Right Ear: External ear normal.      Left Ear: External ear normal.      Nose: Nose normal.      Mouth/Throat:      Mouth: Mucous membranes are dry.   Eyes:      General:         Right eye: No discharge.         Left eye: No discharge.   Cardiovascular:      Rate and Rhythm: Normal rate and regular rhythm.      Pulses: Normal pulses.      Heart sounds: Normal heart sounds.   Pulmonary:      Effort: Tachypnea, accessory muscle usage and respiratory distress present.      Breath sounds: Wheezing and rhonchi present.   Abdominal:      General: Bowel sounds are normal.      Palpations: Abdomen is soft.   Musculoskeletal:         General: Normal range of motion.      Cervical back: Normal range of motion.   Skin:     General: Skin is warm and dry.   Neurological:      Mental Status: He is alert and oriented to person, place, and time.   Psychiatric:         Behavior: Behavior normal.              Results Review:    Lab Results (last 24 hours)     Procedure Component Value Units Date/Time    Blood Culture - Blood, Arm,  Left [[id number]]  (Normal) Collected: 12/11/22 0847    Specimen: Blood from Arm, Left Updated: 12/13/22 0900     Blood Culture No growth at 2 days    Narrative:      Less than seven (7) mL's of blood was collected.  Insufficient quantity may yield false negative results.    Manual Differential [[id number]]  (Abnormal) Collected: 12/13/22 0802    Specimen: Blood Updated: 12/13/22 0830     Neutrophil % 80.0 %      Lymphocyte % 9.0 %      Monocyte % 6.0 %      Bands %  5.0 %      Neutrophils Absolute 22.87 10*3/mm3      Lymphocytes Absolute 2.42 10*3/mm3      Monocytes Absolute 1.61 10*3/mm3      Anisocytosis Slight/1+     Toxic Granulation Slight/1+     Large Platelets Slight/1+    CBC & Differential [[id number]]  (Abnormal) Collected: 12/13/22 0802    Specimen: Blood Updated: 12/13/22 0830    Narrative:      The following orders were created for panel order CBC & Differential.  Procedure                               Abnormality         Status                     ---------                               -----------         ------                     CBC Auto Differential[[id number]]        Abnormal            Final result               Scan Slide[[id number]]                                       Final result                 Please view results for these tests on the individual orders.    Scan Slide [[id number]] Collected: 12/13/22 0802    Specimen: Blood Updated: 12/13/22 0830     Scan Slide --     Comment: See Manual Differential Results       CBC Auto Differential [[id number]]  (Abnormal) Collected: 12/13/22 0802    Specimen: Blood Updated: 12/13/22 0830     WBC 26.90 10*3/mm3      RBC 4.33 10*6/mm3      Hemoglobin 12.9 g/dL      Hematocrit 39.3 %      MCV 90.8 fL      MCH 29.6 pg      MCHC 32.7 g/dL      RDW 14.9 %      RDW-SD 49.9 fl      MPV 8.2 fL      Platelets 375 10*3/mm3     Narrative:      Modified report. Previous result was Hemogram on 12/13/2022 at 0813 EST.  The previously reported component NRBC is no longer  being reported. Previous result was 0.0 /100 WBC (Reference Range: 0.0-0.2 /100 WBC) on 12/13/2022 at 0813 EST.    Basic Metabolic Panel [[id number]]  (Abnormal) Collected: 12/13/22 0802    Specimen: Blood Updated: 12/13/22 0829     Glucose 185 mg/dL      BUN 53 mg/dL      Creatinine 0.78 mg/dL      Sodium 131 mmol/L      Potassium 5.8 mmol/L      Chloride 88 mmol/L      CO2 35.0 mmol/L      Calcium 9.4 mg/dL      BUN/Creatinine Ratio 67.9     Anion Gap 8.0 mmol/L      eGFR 100.2 mL/min/1.73      Comment: National Kidney Foundation and American Society of Nephrology (ASN) Task Force recommended calculation based on the Chronic Kidney Disease Epidemiology Collaboration (CKD-EPI) equation refit without adjustment for race.       Narrative:      GFR Normal >60  Chronic Kidney Disease <60  Kidney Failure <15      POC Glucose Once [[id number]]  (Abnormal) Collected: 12/13/22 0740    Specimen: Blood Updated: 12/13/22 0742     Glucose 201 mg/dL      Comment: Serial Number: [id number]:  [id number]       POC Glucose Once [[id number]]  (Normal) Collected: 12/12/22 2010    Specimen: Blood Updated: 12/12/22 2011     Glucose 71 mg/dL      Comment: Serial Number: [id number]:  [id number]       POC Glucose Once [[id number]]  (Abnormal) Collected: 12/12/22 1636    Specimen: Blood Updated: 12/12/22 1637     Glucose 161 mg/dL      Comment: Serial Number: [id number]:  [id number]       Basic Metabolic Panel [[id number]]  (Abnormal) Collected: 12/12/22 1235    Specimen: Blood Updated: 12/12/22 1432     Glucose 81 mg/dL      BUN 55 mg/dL      Creatinine 1.02 mg/dL      Sodium 135 mmol/L      Potassium 5.6 mmol/L      Chloride 92 mmol/L      CO2 37.0 mmol/L      Calcium 9.4 mg/dL      BUN/Creatinine Ratio 53.9     Anion Gap 6.0 mmol/L      eGFR 82.6 mL/min/1.73      Comment: National Kidney Foundation and American Society of Nephrology (ASN) Task Force recommended calculation based on the Chronic Kidney Disease Epidemiology  Collaboration (CKD-EPI) equation refit without adjustment for race.       Narrative:      GFR Normal >60  Chronic Kidney Disease <60  Kidney Failure <15      Extra Tubes [[id number]] Collected: 12/12/22 1235    Specimen: Blood, Venous Line Updated: 12/12/22 1345    Narrative:      The following orders were created for panel order Extra Tubes.  Procedure                               Abnormality         Status                     ---------                               -----------         ------                     Lavender Top[[id number]]                                     Final result                 Please view results for these tests on the individual orders.    Lavender Top [[id number]] Collected: 12/12/22 1235    Specimen: Blood Updated: 12/12/22 1345     Extra Tube hold for add-on     Comment: Auto resulted       POC Glucose Once [[id number]]  (Abnormal) Collected: 12/12/22 1116    Specimen: Blood Updated: 12/12/22 1120     Glucose 114 mg/dL      Comment: Serial Number: [id number]:  [id number]       Manual Differential [[id number]]  (Abnormal) Collected: 12/12/22 0939    Specimen: Blood Updated: 12/12/22 1038     Neutrophil % 88.0 %      Lymphocyte % 4.0 %      Monocyte % 5.0 %      Bands %  2.0 %      Metamyelocyte % 1.0 %      Neutrophils Absolute 27.90 10*3/mm3      Lymphocytes Absolute 1.24 10*3/mm3      Monocytes Absolute 1.55 10*3/mm3      Anisocytosis Slight/1+     WBC Morphology Normal     Large Platelets Slight/1+    CBC & Differential [[id number]]  (Abnormal) Collected: 12/12/22 0939    Specimen: Blood Updated: 12/12/22 1038    Narrative:      The following orders were created for panel order CBC & Differential.  Procedure                               Abnormality         Status                     ---------                               -----------         ------                     CBC Auto Differential[[id number]]        Abnormal            Final result               Scan Slide[[id number]]                                        Final result                 Please view results for these tests on the individual orders.    Scan Slide [[id number]] Collected: 12/12/22 0939    Specimen: Blood Updated: 12/12/22 1038     Scan Slide --     Comment: See Manual Differential Results       CBC Auto Differential [[id number]]  (Abnormal) Collected: 12/12/22 0939    Specimen: Blood Updated: 12/12/22 1038     WBC 31.00 10*3/mm3      RBC 4.25 10*6/mm3      Hemoglobin 12.9 g/dL      Hematocrit 38.7 %      MCV 91.3 fL      MCH 30.5 pg      MCHC 33.4 g/dL      RDW 15.1 %      RDW-SD 51.6 fl      MPV 8.3 fL      Platelets 359 10*3/mm3     Narrative:      Modified report. Previous result was Hemogram on 12/12/2022 at 1022 EST.  The previously reported component NRBC is no longer being reported. Previous result was 0.0 /100 WBC (Reference Range: 0.0-0.2 /100 WBC) on 12/12/2022 at 1022 EST.           Imaging Results (Last 24 Hours)     ** No results found for the last 24 hours. **               I reviewed the patient's new clinical results.    Medication Review:   Scheduled Meds:arformoterol, 15 mcg, Nebulization, BID - RT  aspirin, 81 mg, Oral, Daily  bisoprolol-hydroCHLOROthiazide (ZIAC) 10-6.25 mg combo dose, , Oral, Q24H  budesonide, 0.5 mg, Nebulization, BID - RT  cefepime, 2 g, Intravenous, Q8H  enoxaparin, 40 mg, Subcutaneous, Q24H  famotidine, 40 mg, Oral, Daily  glipizide, 10 mg, Oral, BID AC  guaiFENesin, 600 mg, Oral, Q12H  insulin lispro, 2-9 Units, Subcutaneous, TID With Meals  lisinopril, 40 mg, Oral, Q PM  metFORMIN, 1,000 mg, Oral, BID With Meals  mirtazapine, 15 mg, Oral, Nightly  montelukast, 10 mg, Oral, Nightly  predniSONE, 30 mg, Oral, Daily   Followed by  [START ON 12/15/2022] predniSONE, 20 mg, Oral, Daily   Followed by  [START ON 12/17/2022] predniSONE, 10 mg, Oral, Daily  sodium chloride, 3 mL, Intravenous, Q12H      Continuous Infusions:Pharmacy to Dose enoxaparin (LOVENOX),       PRN Meds:.•  acetaminophen  •   dextrose  •  dextrose  •  glucagon (human recombinant)  •  ipratropium-albuterol  •  LORazepam  •  nitroglycerin  •  ondansetron  •  Pharmacy to Dose enoxaparin (LOVENOX)  •  [COMPLETED] Insert Peripheral IV **AND** sodium chloride  •  sodium chloride  •  sodium chloride     Interval History:    12/5 patient continues with shortness of breath; will add corticosteroid nebs; CT chest; continue supportive care may need oxygen at home temporarily; leukocytosis most likely secondary to steroids     12/6 worsening respiratory status today with distress will add pulm consult and abg; CT scan yesterday with severe emphysema and will increase steroids    12/9 patient is most likely at his new baseline and anticipate he will need oxygen at home and sleep study as outpt; bipap at night as needed    12/10 cxr in am and labs    12/11 had respiratory distress yesterday with hypercapnia and hypoxia; he is having difficulty wearing bipap which was explained in detail regarding CO2 and oxygen requirements; also appetite is down will add remeron and supplements; additional antibiotics for leukocytosis and pna    12/13 continues to require avap and high levels of oxygen; will obtain ECHO; cefipime added for poss bacteria infection with mild fever and leukocytosis; lokelma for hyperkalemia will add IV fluids for poor intake and hyponatremia    Assessment & Plan     Acute COPD exacerbation most likely related to flu A  Diabetes  Hyperkalemia  Hyponatremia  Hypertension  Tobacco abuse     Plan for disposition: CARA Barraza  12/13/22  09:44 EST

## 2022-12-13 NOTE — PROGRESS NOTES
Nutrition Services    Patient Name: Maximino Bhatia  YOB: 1960  MRN: [id number]  Admission date: 12/3/2022    PPE Documentation        PPE Worn By Provider Did not enter room this encounter   PPE Worn By Patient  None      NUTRITION SCREENING      Encounter Information: Check on for LOS x 10 days.  Admitted for SOB with COPD and Flu A.         PO Diet: Diet: Diabetic Diets; Consistent Carbohydrate; Texture: Regular Texture (IDDSI 7); Fluid Consistency: Thin (IDDSI 0)   PO Supplements: Boost Plus TID   PO Intake:  73% average PO intakes since admission x 20 documented meals        Labs (reviewed below): Hyponatremia  Hyperkalemia - Lokelma noted   Elevated BUN  Hyperglycemia        GI Function:  Last BM 12/12 (yesterday)       Skin: Intact        Weight Hx Review: Wt Readings from Last 40 Encounters:   12/13/22 93 kg (205 lb 0.4 oz)   12/03/22 98.4 kg (217 lb)   02/06/21 98.9 kg (218 lb)   11/18/20 99.8 kg (220 lb)          Nutrition Intervention: Monitor for need for low K+ diet, fluid restriction        Results from last 7 days   Lab Units 12/13/22  0802 12/12/22  1235 12/07/22  2345   SODIUM mmol/L 131* 135* 137   POTASSIUM mmol/L 5.8* 5.6* 4.7   CHLORIDE mmol/L 88* 92* 94*   CO2 mmol/L 35.0* 37.0* 35.0*   BUN mg/dL 53* 55* 44*   CREATININE mg/dL 0.78 1.02 0.87   CALCIUM mg/dL 9.4 9.4 9.5   GLUCOSE mg/dL 185* 81 68     Results from last 7 days   Lab Units 12/13/22  0802   HEMOGLOBIN g/dL 12.9*   HEMATOCRIT % 39.3     COVID19   Date Value Ref Range Status   12/03/2022 Not Detected Not Detected - Ref. Range Final     No results found for: HGBA1C    RD to follow up per protocol.    Electronically signed by:  Bobbi Cazares RD  12/13/22 10:34 EST

## 2022-12-14 ENCOUNTER — APPOINTMENT (OUTPATIENT)
Dept: CARDIOLOGY | Facility: HOSPITAL | Age: 62
End: 2022-12-14
Payer: MEDICAID

## 2022-12-14 LAB
ANION GAP SERPL CALCULATED.3IONS-SCNC: 8 MMOL/L (ref 5–15)
BUN SERPL-MCNC: 26 MG/DL (ref 8–23)
BUN/CREAT SERPL: 50 (ref 7–25)
CALCIUM SPEC-SCNC: 9 MG/DL (ref 8.6–10.5)
CHLORIDE SERPL-SCNC: 91 MMOL/L (ref 98–107)
CO2 SERPL-SCNC: 32 MMOL/L (ref 22–29)
CREAT SERPL-MCNC: 0.52 MG/DL (ref 0.76–1.27)
DEPRECATED RDW RBC AUTO: 48.6 FL (ref 37–54)
EGFRCR SERPLBLD CKD-EPI 2021: 113.3 ML/MIN/1.73
ERYTHROCYTE [DISTWIDTH] IN BLOOD BY AUTOMATED COUNT: 15.1 % (ref 12.3–15.4)
GLUCOSE BLDC GLUCOMTR-MCNC: 106 MG/DL (ref 70–105)
GLUCOSE BLDC GLUCOMTR-MCNC: 127 MG/DL (ref 70–105)
GLUCOSE BLDC GLUCOMTR-MCNC: 154 MG/DL (ref 70–105)
GLUCOSE BLDC GLUCOMTR-MCNC: 86 MG/DL (ref 70–105)
GLUCOSE SERPL-MCNC: 96 MG/DL (ref 65–99)
HCT VFR BLD AUTO: 40.7 % (ref 37.5–51)
HGB BLD-MCNC: 13.1 G/DL (ref 13–17.7)
LARGE PLATELETS: ABNORMAL
LYMPHOCYTES # BLD MANUAL: 1.07 10*3/MM3 (ref 0.7–3.1)
LYMPHOCYTES NFR BLD MANUAL: 6 % (ref 5–12)
MCH RBC QN AUTO: 29.6 PG (ref 26.6–33)
MCHC RBC AUTO-ENTMCNC: 32.1 G/DL (ref 31.5–35.7)
MCV RBC AUTO: 92.3 FL (ref 79–97)
METAMYELOCYTES NFR BLD MANUAL: 2 % (ref 0–0)
MONOCYTES # BLD: 1.28 10*3/MM3 (ref 0.1–0.9)
MYELOCYTES NFR BLD MANUAL: 1 % (ref 0–0)
NEUTROPHILS # BLD AUTO: 18.4 10*3/MM3 (ref 1.7–7)
NEUTROPHILS NFR BLD MANUAL: 69 % (ref 42.7–76)
NEUTS BAND NFR BLD MANUAL: 17 % (ref 0–5)
PLATELET # BLD AUTO: 357 10*3/MM3 (ref 140–450)
PMV BLD AUTO: 8.1 FL (ref 6–12)
POTASSIUM SERPL-SCNC: 5.3 MMOL/L (ref 3.5–5.2)
RBC # BLD AUTO: 4.41 10*6/MM3 (ref 4.14–5.8)
RBC MORPH BLD: NORMAL
SCAN SLIDE: NORMAL
SODIUM SERPL-SCNC: 131 MMOL/L (ref 136–145)
TOXIC GRANULATION: ABNORMAL
VARIANT LYMPHS NFR BLD MANUAL: 5 % (ref 19.6–45.3)
WBC NRBC COR # BLD: 21.4 10*3/MM3 (ref 3.4–10.8)

## 2022-12-14 PROCEDURE — 94660 CPAP INITIATION&MGMT: CPT

## 2022-12-14 PROCEDURE — 94799 UNLISTED PULMONARY SVC/PX: CPT

## 2022-12-14 PROCEDURE — 82962 GLUCOSE BLOOD TEST: CPT

## 2022-12-14 PROCEDURE — 63710000001 PREDNISONE PER 1 MG

## 2022-12-14 PROCEDURE — 25010000002 CEFEPIME PER 500 MG: Performed by: INTERNAL MEDICINE

## 2022-12-14 PROCEDURE — 97535 SELF CARE MNGMENT TRAINING: CPT

## 2022-12-14 PROCEDURE — 85007 BL SMEAR W/DIFF WBC COUNT: CPT | Performed by: NURSE PRACTITIONER

## 2022-12-14 PROCEDURE — 94664 DEMO&/EVAL PT USE INHALER: CPT

## 2022-12-14 PROCEDURE — 63710000001 PREDNISONE PER 5 MG

## 2022-12-14 PROCEDURE — 80048 BASIC METABOLIC PNL TOTAL CA: CPT | Performed by: NURSE PRACTITIONER

## 2022-12-14 PROCEDURE — 25010000002 ENOXAPARIN PER 10 MG: Performed by: NURSE PRACTITIONER

## 2022-12-14 PROCEDURE — 93306 TTE W/DOPPLER COMPLETE: CPT | Performed by: INTERNAL MEDICINE

## 2022-12-14 PROCEDURE — 85025 COMPLETE CBC W/AUTO DIFF WBC: CPT | Performed by: NURSE PRACTITIONER

## 2022-12-14 PROCEDURE — 94761 N-INVAS EAR/PLS OXIMETRY MLT: CPT

## 2022-12-14 PROCEDURE — 93306 TTE W/DOPPLER COMPLETE: CPT

## 2022-12-14 PROCEDURE — 97162 PT EVAL MOD COMPLEX 30 MIN: CPT

## 2022-12-14 RX ORDER — SODIUM CHLORIDE 9 MG/ML
75 INJECTION, SOLUTION INTRAVENOUS CONTINUOUS
Status: DISCONTINUED | OUTPATIENT
Start: 2022-12-14 | End: 2022-12-15

## 2022-12-14 RX ORDER — ROFLUMILAST 500 UG/1
500 TABLET ORAL DAILY
Status: DISCONTINUED | OUTPATIENT
Start: 2022-12-14 | End: 2022-12-21 | Stop reason: HOSPADM

## 2022-12-14 RX ADMIN — GUAIFENESIN 600 MG: 600 TABLET, EXTENDED RELEASE ORAL at 20:08

## 2022-12-14 RX ADMIN — GLIPIZIDE 10 MG: 5 TABLET ORAL at 17:48

## 2022-12-14 RX ADMIN — ARFORMOTEROL TARTRATE 15 MCG: 15 SOLUTION RESPIRATORY (INHALATION) at 06:36

## 2022-12-14 RX ADMIN — LISINOPRIL 40 MG: 20 TABLET ORAL at 17:48

## 2022-12-14 RX ADMIN — METFORMIN HYDROCHLORIDE 1000 MG: 500 TABLET ORAL at 09:23

## 2022-12-14 RX ADMIN — FAMOTIDINE 40 MG: 20 TABLET, FILM COATED ORAL at 09:23

## 2022-12-14 RX ADMIN — BUDESONIDE 0.5 MG: 0.5 INHALANT RESPIRATORY (INHALATION) at 06:36

## 2022-12-14 RX ADMIN — ARFORMOTEROL TARTRATE 15 MCG: 15 SOLUTION RESPIRATORY (INHALATION) at 18:19

## 2022-12-14 RX ADMIN — GUAIFENESIN 600 MG: 600 TABLET, EXTENDED RELEASE ORAL at 09:22

## 2022-12-14 RX ADMIN — SODIUM CHLORIDE 75 ML/HR: 9 INJECTION, SOLUTION INTRAVENOUS at 15:19

## 2022-12-14 RX ADMIN — BISOPROLOL FUMARATE: 5 TABLET ORAL at 09:22

## 2022-12-14 RX ADMIN — Medication 3 ML: at 20:08

## 2022-12-14 RX ADMIN — CEFEPIME 2 G: 2 INJECTION, POWDER, FOR SOLUTION INTRAVENOUS at 00:32

## 2022-12-14 RX ADMIN — ROFLUMILAST 500 MCG: 500 TABLET ORAL at 09:31

## 2022-12-14 RX ADMIN — THEOPHYLLINE ANHYDROUS 300 MG: 300 CAPSULE, EXTENDED RELEASE ORAL at 09:31

## 2022-12-14 RX ADMIN — METFORMIN HYDROCHLORIDE 1000 MG: 500 TABLET ORAL at 17:48

## 2022-12-14 RX ADMIN — ASPIRIN 81 MG: 81 TABLET, COATED ORAL at 09:23

## 2022-12-14 RX ADMIN — MONTELUKAST 10 MG: 10 TABLET, FILM COATED ORAL at 20:08

## 2022-12-14 RX ADMIN — PREDNISONE 30 MG: 20 TABLET ORAL at 09:24

## 2022-12-14 RX ADMIN — ENOXAPARIN SODIUM 40 MG: 100 INJECTION SUBCUTANEOUS at 15:19

## 2022-12-14 RX ADMIN — MIRTAZAPINE 15 MG: 15 TABLET, FILM COATED ORAL at 20:08

## 2022-12-14 RX ADMIN — GLIPIZIDE 10 MG: 5 TABLET ORAL at 09:23

## 2022-12-14 RX ADMIN — BUDESONIDE 0.5 MG: 0.5 INHALANT RESPIRATORY (INHALATION) at 18:18

## 2022-12-14 RX ADMIN — SODIUM ZIRCONIUM CYCLOSILICATE 10 G: 10 POWDER, FOR SUSPENSION ORAL at 15:19

## 2022-12-14 RX ADMIN — Medication 3 ML: at 09:28

## 2022-12-14 NOTE — PROGRESS NOTES
Daily Progress Note        COPD with acute exacerbation (HCC)      Assessment    COPD with acute exacerbation (HCC)  Acute hypoxemic/hypercapnic respiratory failure  Pneumonia: Unidentified organism  Influenza A infection    CT chest 12/5/2022: mild bibasilar infection, severe emphysema    Hyperkalemia  Leukocytosis    DM  HTN  Tob smoking     Recommendations:     Add theophylline and Daliresp  Discontinue cefepime    oxygen supplement and titration to maintain saturation 90 to 95%: Currently on  AVAPS, alternating with 8 L high flow  -Patient does not use oxygen at home  -We will need 6-minute walk prior to discharge    Mucinex twice daily  Singulair  Bronchodilatores\inhaled corticosteroid  ASA  BP control  prednisone 6-day taper  DVT/GI prophylaxis  Check daily labs and correct electrolytes as needed    Completed 5-day course of Tamiflu     Will need work-up for obstructive sleep apnea as outpatient          LOS: 10 days     Subjective         Objective     Vital signs for last 24 hours:  Vitals:    12/13/22 2120 12/14/22 0129 12/14/22 0337 12/14/22 0448   BP: 101/60 96/64  105/62   BP Location: Right arm Left arm  Left arm   Patient Position: Lying Lying  Lying   Pulse: 81 77 75 75   Resp: 24 28  28   Temp: 97.8 °F (36.6 °C) 97.6 °F (36.4 °C)  98.4 °F (36.9 °C)   TempSrc: Oral Oral  Oral   SpO2: 96% 92% 93% 93%   Weight:    92.5 kg (203 lb 14.8 oz)   Height:           Intake/Output last 3 shifts:  I/O last 3 completed shifts:  In: 950 [I.V.:950]  Out: 1325 [Urine:1325]  Intake/Output this shift:  I/O this shift:  In: -   Out: 1750 [Urine:1750]      Radiology  Imaging Results (Last 24 Hours)     ** No results found for the last 24 hours. **          Labs:  Results from last 7 days   Lab Units 12/13/22  0802   WBC 10*3/mm3 26.90*   HEMOGLOBIN g/dL 12.9*   HEMATOCRIT % 39.3   PLATELETS 10*3/mm3 375     Results from last 7 days   Lab Units 12/13/22  0802   SODIUM mmol/L 131*   POTASSIUM mmol/L 5.8*   CHLORIDE mmol/L  88*   CO2 mmol/L 35.0*   BUN mg/dL 53*   CREATININE mg/dL 0.78   CALCIUM mg/dL 9.4   GLUCOSE mg/dL 185*     Results from last 7 days   Lab Units 12/10/22  2316   PH, ARTERIAL pH units 7.294*   PO2 ART mm Hg 76.8*   PCO2, ARTERIAL mm Hg 84.1*   HCO3 ART mmol/L 40.8*                                   Meds:   SCHEDULE  arformoterol, 15 mcg, Nebulization, BID - RT  aspirin, 81 mg, Oral, Daily  bisoprolol-hydroCHLOROthiazide (ZIAC) 10-6.25 mg combo dose, , Oral, Q24H  budesonide, 0.5 mg, Nebulization, BID - RT  cefepime, 2 g, Intravenous, Q8H  enoxaparin, 40 mg, Subcutaneous, Q24H  famotidine, 40 mg, Oral, Daily  glipizide, 10 mg, Oral, BID AC  guaiFENesin, 600 mg, Oral, Q12H  insulin lispro, 2-9 Units, Subcutaneous, TID With Meals  lisinopril, 40 mg, Oral, Q PM  metFORMIN, 1,000 mg, Oral, BID With Meals  mirtazapine, 15 mg, Oral, Nightly  montelukast, 10 mg, Oral, Nightly  predniSONE, 30 mg, Oral, Daily   Followed by  [START ON 12/15/2022] predniSONE, 20 mg, Oral, Daily   Followed by  [START ON 12/17/2022] predniSONE, 10 mg, Oral, Daily  sodium chloride, 3 mL, Intravenous, Q12H      Infusions  Pharmacy to Dose enoxaparin (LOVENOX),   sodium chloride, 75 mL/hr, Last Rate: 75 mL/hr (12/13/22 1111)      PRNs  •  acetaminophen  •  dextrose  •  dextrose  •  glucagon (human recombinant)  •  ipratropium-albuterol  •  LORazepam  •  nitroglycerin  •  ondansetron  •  Pharmacy to Dose enoxaparin (LOVENOX)  •  [COMPLETED] Insert Peripheral IV **AND** sodium chloride  •  sodium chloride  •  sodium chloride    Physical Exam:  Physical Exam  Vitals reviewed.   Pulmonary:      Effort: Pulmonary effort is normal.      Breath sounds: Rhonchi present.   Skin:     General: Skin is warm and dry.   Neurological:      Mental Status: He is alert and oriented to person, place, and time.         ROS  Review of Systems   Respiratory: Positive for cough and shortness of breath.        I have reviewed the patient's new clinical  results.    Electronically signed by Caleb Almazan MD

## 2022-12-14 NOTE — PLAN OF CARE
Assessment: Maximino Bhatia presents with ADL impairments below baseline abilities which indicate the need for continued skilled intervention while inpatient. Patient able to complete bed mobility and transfers with SBA-CGA for management of lines. Patient able to wash UB with min A due to impaired R shoulder ROM and wash LB with SBA while sitting in recline. Patient SBA-CGA for changing into clean hospital gown. Tolerating session today without incident. Will continue to follow and progress as tolerated.     Plan/Recommendations:   Low Intensity Therapy recommended post-acute care - This is recommended as therapy feels this patient would require 2-3 visits per week. OP or HH would be the best option depending on patient's home bound status. Consider, if the patient has other  \"skilled\" needs such as wounds, IV antibiotics, etc. Combined with \"low intensity\" could also equate to a SNF. If patient is medically complex, consider LTAC.. Pt requires no DME at discharge.

## 2022-12-14 NOTE — PLAN OF CARE
Goal Outcome Evaluation:      Pt switching between nasal cannula and bipap. Remains on 8L high flow. Receiving cefepime. Lokelma given for elevated potassium. VSS  Problem: Adult Inpatient Plan of Care  Goal: Absence of Hospital-Acquired Illness or Injury  Intervention: Identify and Manage Fall Risk  Recent Flowsheet Documentation  Taken 12/14/2022 0400 by India Wyman RN  Safety Promotion/Fall Prevention: safety round/check completed  Taken 12/14/2022 0200 by India Wyman RN  Safety Promotion/Fall Prevention: safety round/check completed  Taken 12/14/2022 0006 by India Wyman RN  Safety Promotion/Fall Prevention: safety round/check completed  Taken 12/13/2022 2200 by India Wyman RN  Safety Promotion/Fall Prevention: safety round/check completed  Taken 12/13/2022 2000 by India Wyman RN  Safety Promotion/Fall Prevention: safety round/check completed  Intervention: Prevent Skin Injury  Recent Flowsheet Documentation  Taken 12/13/2022 2000 by India Wyman RN  Skin Protection: adhesive use limited  Intervention: Prevent and Manage VTE (Venous Thromboembolism) Risk  Recent Flowsheet Documentation  Taken 12/13/2022 2000 by India Wyman RN  Activity Management: activity adjusted per tolerance  Intervention: Prevent Infection  Recent Flowsheet Documentation  Taken 12/14/2022 0400 by India Wyman RN  Infection Prevention: rest/sleep promoted  Taken 12/14/2022 0200 by India Wyman RN  Infection Prevention: rest/sleep promoted  Taken 12/14/2022 0006 by India Wyman RN  Infection Prevention: rest/sleep promoted  Taken 12/13/2022 2200 by India Wyman RN  Infection Prevention: rest/sleep promoted  Taken 12/13/2022 2000 by India Wyman RN  Infection Prevention: rest/sleep promoted     Problem: Breathing Pattern Ineffective  Goal: Effective Breathing Pattern  Intervention: Promote Improved Breathing Pattern  Recent Flowsheet  Documentation  Taken 12/14/2022 0400 by India Wyman RN  Supportive Measures: active listening utilized  Taken 12/14/2022 0006 by India Wyman RN  Supportive Measures: active listening utilized  Taken 12/13/2022 2000 by India Wyman RN  Supportive Measures: active listening utilized     Problem: Fall Injury Risk  Goal: Absence of Fall and Fall-Related Injury  Intervention: Identify and Manage Contributors  Recent Flowsheet Documentation  Taken 12/13/2022 2000 by India Wyman RN  Medication Review/Management: medications reviewed  Intervention: Promote Injury-Free Environment  Recent Flowsheet Documentation  Taken 12/14/2022 0400 by India Wyman RN  Safety Promotion/Fall Prevention: safety round/check completed  Taken 12/14/2022 0200 by India Wyman RN  Safety Promotion/Fall Prevention: safety round/check completed  Taken 12/14/2022 0006 by India Wyman RN  Safety Promotion/Fall Prevention: safety round/check completed  Taken 12/13/2022 2200 by India Wyman RN  Safety Promotion/Fall Prevention: safety round/check completed  Taken 12/13/2022 2000 by India Wyman RN  Safety Promotion/Fall Prevention: safety round/check completed     Problem: Skin Injury Risk Increased  Goal: Skin Health and Integrity  Intervention: Optimize Skin Protection  Recent Flowsheet Documentation  Taken 12/13/2022 2000 by India Wyman RN  Pressure Reduction Techniques: frequent weight shift encouraged  Pressure Reduction Devices: pressure-redistributing mattress utilized  Skin Protection: adhesive use limited

## 2022-12-14 NOTE — THERAPY EVALUATION
Patient Name: Maximino Bhatia  : 1960    MRN: [id number]                              Today's Date: 2022       Admit Date: 12/3/2022    Visit Dx:     ICD-10-CM ICD-9-CM   1. Hypoxia  R09.02 799.02   2. Influenza A  J10.1 487.1   3. Shortness of breath  R06.02 786.05     Patient Active Problem List   Diagnosis   • COPD with acute exacerbation (HCC)     Past Medical History:   Diagnosis Date   • COPD (chronic obstructive pulmonary disease) (HCC)    • Diabetes mellitus (HCC)    • Hypertension      No past surgical history on file.   General Information     Row Name 22 170          Physical Therapy Time and Intention    Document Type evaluation  -     Mode of Treatment physical therapy  -     Row Name 22          General Information    Patient Profile Reviewed yes  -     Prior Level of Function independent:  pt is semi retired  -     Existing Precautions/Restrictions fall;oxygen therapy device and L/min  8L high flow O2  -     Barriers to Rehab medically complex  -     Row Name 22 170          Living Environment    People in Home alone  -     Row Name 22 170          Home Main Entrance    Number of Stairs, Main Entrance none  -SS     Row Name 22 170          Stairs Within Home, Primary    Number of Stairs, Within Home, Primary none  -     Row Name 22 170          Cognition    Orientation Status (Cognition) oriented x 4  -     Row Name 22          Safety Issues, Functional Mobility    Impairments Affecting Function (Mobility) endurance/activity tolerance;shortness of breath  -           User Key  (r) = Recorded By, (t) = Taken By, (c) = Cosigned By    Initials Name Provider Type     Taylor Spann PT Physical Therapist               Mobility     Row Name 22          Bed Mobility    All Activities, [place] (Bed Mobility) supervision  -     Row Name 22          Bed-Chair Transfer    Bed-Chair  [place] (Transfers) supervision;contact guard  -     Row Name 12/14/22 1703          Gait/Stairs (Locomotion)    Comment, (Gait/Stairs) not able to advance to ambulation due to poor endurance  -           User Key  (r) = Recorded By, (t) = Taken By, (c) = Cosigned By    Initials Name Provider Type    SS Taylor Spann PT Physical Therapist               Obj/Interventions     Row Name 12/14/22 1703          Range of Motion Comprehensive    Comment, General Range of Motion B LE WFL  -     Row Name 12/14/22 1703          Strength Comprehensive (MMT)    Comment, General Manual Muscle Testing (MMT) Assessment B LE 4 to 5/5  -     Row Name 12/14/22 1703          Balance    Static Sitting Balance independent  -     Static Standing Balance supervision  -     Row Name 12/14/22 1703          Sensory Assessment (Somatosensory)    Sensory Assessment (Somatosensory) sensation intact  -           User Key  (r) = Recorded By, (t) = Taken By, (c) = Cosigned By    Initials Name Provider Type     Taylor Spann PT Physical Therapist               Goals/Plan     Row Name 12/14/22 1705          Bed Mobility Goal 1 (PT)    Activity/Assistive Device (Bed Mobility Goal 1, PT) bed mobility activities, all  -SS     [place] Level/Cues Needed (Bed Mobility Goal 1, PT) modified independence  -SS     Time Frame (Bed Mobility Goal 1, PT) long term goal (LTG);2 weeks  -     Row Name 12/14/22 1705          Transfer Goal 1 (PT)    Activity/Assistive Device (Transfer Goal 1, PT) transfers, all  -SS     [place] Level/Cues Needed (Transfer Goal 1, PT) independent  -SS     Time Frame (Transfer Goal 1, PT) long term goal (LTG);2 weeks  -     Row Name 12/14/22 1705          Gait Training Goal 1 (PT)    Activity/Assistive Device (Gait Training Goal 1, PT) gait (walking locomotion)  -SS     [place] Level (Gait Training Goal 1, PT) independent  -SS     Distance (Gait Training Goal 1, PT) 75  -SS     Time  Frame (Gait Training Goal 1, PT) long term goal (LTG);2 weeks  -     Row Name 12/14/22 1705          Therapy Assessment/Plan (PT)    Planned Therapy Interventions (PT) balance training;bed mobility training;gait training;home exercise program;transfer training;strengthening;patient/family education  -           User Key  (r) = Recorded By, (t) = Taken By, (c) = Cosigned By    Initials Name Provider Type     Taylor Spann, PT Physical Therapist               Clinical Impression     Row Name 12/14/22 1704          Pain    Additional Documentation Pain Scale: FACES Pre/Post-Treatment (Group)  -     Row Name 12/14/22 1704          Pain Scale: FACES Pre/Post-Treatment    Pain: FACES Scale, Pretreatment 0-->no hurt  -     Posttreatment Pain Rating 0-->no hurt  -     Row Name 12/14/22 1704 12/14/22 1702       Plan of Care Review    Plan of Care Reviewed With patient  -SS --    Outcome Evaluation -- 61 y/o M who presented with progressive SOA with evidence of AE COPD. Diagnosed with influenza A. He is independent and semi-retired. He lives alone in a one story home with no SYLVIA. He is not on supp o2 at baseline. He is on 8L high flow currently. He was experiencing a fib earlier today but at time of eval, rate controlled. HR was 73bpm. Pt was SBA for bed mobility. CGA for transfer to chair. Pt was dyspneic after transfer but SaO2 was WNL. Did not progress to ambulation as pt was planning for bathing task with OT and was already dyspneic and had previously been in a fib. Patient would benefit from HHPT vs OPPT at d/c to ensure return to baseline level of function. Will need 6 min walk test.  -    Row Name 12/14/22 1706          Therapy Assessment/Plan (PT)    Rehab Potential (PT) good, to achieve stated therapy goals  -     Criteria for Skilled Interventions Met (PT) yes;meets criteria  -     Therapy Frequency (PT) 3 times/wk  -     Predicted Duration of Therapy Intervention (PT) until d/c  -      Row Name 12/14/22 1704          Positioning and Restraints    Pre-Treatment Position in bed  -SS     Post Treatment Position chair  -SS           User Key  (r) = Recorded By, (t) = Taken By, (c) = Cosigned By    Initials Name Provider Type    SS Taylor Spann PT Physical Therapist               Outcome Measures    No documentation.                              Physical Therapy Education     Title: PT OT SLP Therapies (In Progress)     Topic: Physical Therapy (Done)     Point: Mobility training (Done)     Learning Progress Summary           Patient Acceptance, E, VU by  at 12/14/2022 1706                               User Key     Initials Effective Dates Name Provider Type Discipline     06/16/21 -  Taylor Spann PT Physical Therapist PT              PT Recommendation and Plan  Planned Therapy Interventions (PT): balance training, bed mobility training, gait training, home exercise program, transfer training, strengthening, patient/family education  Plan of Care Reviewed With: patient  Outcome Evaluation: 63 y/o M who presented with progressive SOA with evidence of AE COPD. Diagnosed with influenza A. He is independent and semi-retired. He lives alone in a one story home with no SYLVIA. He is not on supp o2 at baseline. He is on 8L high flow currently. He was experiencing a fib earlier today but at time of eval, rate controlled. HR was 73bpm. Pt was SBA for bed mobility. CGA for transfer to chair. Pt was dyspneic after transfer but SaO2 was WNL. Did not progress to ambulation as pt was planning for bathing task with OT and was already dyspneic and had previously been in a fib. Patient would benefit from HHPT vs OPPT at d/c to ensure return to baseline level of function. Will need 6 min walk test.     Time Calculation:    PT Charges     Row Name 12/14/22 1654             Time Calculation    Start Time 1145  -SS      Stop Time 1215  -SS      Time Calculation (min) 30 min  -SS      PT Received On  12/14/22  -      PT - Next Appointment 12/15/22  -      PT Goal Re-Cert Due Date 12/28/22  -         Time Calculation- PT    Total Timed Code Minutes- PT 0 minute(s)  -            User Key  (r) = Recorded By, (t) = Taken By, (c) = Cosigned By    Initials Name Provider Type     Taylor Spann, PT Physical Therapist              Therapy Charges for Today     Code Description Service Date Service Provider Modifiers Qty    [id number] HC PT EVAL MOD COMPLEXITY 4 12/14/2022 Taylor Spann, PT GP 1          PT G-Codes  Outcome Measure Options: AM-PAC 6 Clicks Daily Activity (OT)  AM-PAC 6 Clicks Score (PT): 21  AM-PAC 6 Clicks Score (OT): 15  PT Discharge Summary  Anticipated Discharge Disposition (PT): home with outpatient therapy services, home with home health    Taylor Spann, GINA  12/14/2022

## 2022-12-14 NOTE — CASE MANAGEMENT/SOCIAL WORK
Continued Stay Note  JHOAN Perry     Patient Name: Maximino Bhatia  MRN: [id number]  Today's Date: 12/14/2022    Admit Date: 12/3/2022    Plan: DC Plan: Anticipate routine home. Watch O2 needs, may require walking oximetry to be performed 24-48 hrs prior to dc.   Discharge Plan     Row Name 12/14/22 1341       Plan    Plan DC Plan: Anticipate routine home. Watch O2 needs, may require walking oximetry to be performed 24-48 hrs prior to dc.    Plan Comments DC Barriers: O2 requirements (currently on 8L HF O2), pulmonology following.              Phone communication or documentation only - no physical contact with patient or family.    Cecelia Bradford RN     Office Phone: [phone]  Office Cell: [phone]

## 2022-12-14 NOTE — PLAN OF CARE
Problem: Adult Inpatient Plan of Care  Goal: Plan of Care Review  Outcome: Ongoing, Progressing  Flowsheets (Taken 12/14/2022 1331)  Outcome Evaluation: Patient mostly on hf o2 today. Patient still very sob with any activity and using accesory muscles at times. VSS  Goal: Patient-Specific Goal (Individualized)  Outcome: Ongoing, Progressing  Goal: Absence of Hospital-Acquired Illness or Injury  Outcome: Ongoing, Progressing  Intervention: Identify and Manage Fall Risk  Recent Flowsheet Documentation  Taken 12/14/2022 0830 by Maria Wong, RN  Safety Promotion/Fall Prevention:   room organization consistent   safety round/check completed   nonskid shoes/slippers when out of bed  Goal: Optimal Comfort and Wellbeing  Outcome: Ongoing, Progressing  Goal: Readiness for Transition of Care  Outcome: Ongoing, Progressing   Goal Outcome Evaluation:              Outcome Evaluation: Patient mostly on hf o2 today. Patient still very sob with any activity and using accesory muscles at times. VSS

## 2022-12-14 NOTE — PROGRESS NOTES
LOS: 10 days   Patient Care Team:  Qi Hicks APRN as PCP - General (Nurse Practitioner)    Subjective     He denies any complaints feels like his breathing has improved a little    Review of Systems   Constitutional: Positive for activity change, appetite change and fatigue.   HENT: Negative.    Respiratory: Positive for shortness of breath. Negative for cough.    Cardiovascular: Negative.    Gastrointestinal: Negative.    Genitourinary: Negative.    Musculoskeletal: Negative.    Neurological: Positive for weakness.   Psychiatric/Behavioral: Negative.            Objective     Vital Signs  Temp:  [97.5 °F (36.4 °C)-98.4 °F (36.9 °C)] 98.4 °F (36.9 °C)  Heart Rate:  [72-87] 85  Resp:  [18-28] 18  BP: ()/(60-73) 105/62      Physical Exam  Vitals reviewed.   Constitutional:       Appearance: He is not ill-appearing.   HENT:      Head: Normocephalic and atraumatic.      Right Ear: External ear normal.      Left Ear: External ear normal.      Nose: Nose normal.      Mouth/Throat:      Mouth: Mucous membranes are dry.   Eyes:      General:         Right eye: No discharge.         Left eye: No discharge.   Cardiovascular:      Rate and Rhythm: Normal rate and regular rhythm.      Pulses: Normal pulses.      Heart sounds: Normal heart sounds.   Pulmonary:      Effort: Tachypnea, accessory muscle usage and respiratory distress present.      Breath sounds: Wheezing and rhonchi present.   Abdominal:      General: Bowel sounds are normal.      Palpations: Abdomen is soft.   Musculoskeletal:         General: Normal range of motion.      Cervical back: Normal range of motion.   Skin:     General: Skin is warm and dry.   Neurological:      Mental Status: He is alert and oriented to person, place, and time.   Psychiatric:         Behavior: Behavior normal.              Results Review:    Lab Results (last 24 hours)     Procedure Component Value Units Date/Time    Blood Culture - Blood, Arm, Left [[id number]]   (Normal) Collected: 12/11/22 0847    Specimen: Blood from Arm, Left Updated: 12/14/22 0901     Blood Culture No growth at 3 days    Narrative:      Less than seven (7) mL's of blood was collected.  Insufficient quantity may yield false negative results.    POC Glucose Once [[id number]]  (Abnormal) Collected: 12/14/22 0747    Specimen: Blood Updated: 12/14/22 0748     Glucose 106 mg/dL      Comment: Serial Number: [id number]:  [id number]       POC Glucose Once [[id number]]  (Abnormal) Collected: 12/13/22 1545    Specimen: Blood Updated: 12/13/22 1546     Glucose 139 mg/dL      Comment: Serial Number: [id number]:  [id number]       POC Glucose Once [[id number]]  (Normal) Collected: 12/13/22 1151    Specimen: Blood Updated: 12/13/22 1153     Glucose 81 mg/dL      Comment: Serial Number: [id number]:  [id number]              Imaging Results (Last 24 Hours)     ** No results found for the last 24 hours. **               I reviewed the patient's new clinical results.    Medication Review:   Scheduled Meds:arformoterol, 15 mcg, Nebulization, BID - RT  aspirin, 81 mg, Oral, Daily  bisoprolol-hydroCHLOROthiazide (ZIAC) 10-6.25 mg combo dose, , Oral, Q24H  budesonide, 0.5 mg, Nebulization, BID - RT  enoxaparin, 40 mg, Subcutaneous, Q24H  famotidine, 40 mg, Oral, Daily  glipizide, 10 mg, Oral, BID AC  guaiFENesin, 600 mg, Oral, Q12H  insulin lispro, 2-9 Units, Subcutaneous, TID With Meals  lisinopril, 40 mg, Oral, Q PM  metFORMIN, 1,000 mg, Oral, BID With Meals  mirtazapine, 15 mg, Oral, Nightly  montelukast, 10 mg, Oral, Nightly  [START ON 12/15/2022] predniSONE, 20 mg, Oral, Daily   Followed by  [START ON 12/17/2022] predniSONE, 10 mg, Oral, Daily  roflumilast, 500 mcg, Oral, Daily  sodium chloride, 3 mL, Intravenous, Q12H  theophylline, 300 mg, Oral, Q24H      Continuous Infusions:Pharmacy to Dose enoxaparin (LOVENOX),       PRN Meds:.•  acetaminophen  •  dextrose  •  dextrose  •  glucagon (human  recombinant)  •  ipratropium-albuterol  •  LORazepam  •  nitroglycerin  •  ondansetron  •  Pharmacy to Dose enoxaparin (LOVENOX)  •  [COMPLETED] Insert Peripheral IV **AND** sodium chloride  •  sodium chloride  •  sodium chloride     Interval History:    12/5 patient continues with shortness of breath; will add corticosteroid nebs; CT chest; continue supportive care may need oxygen at home temporarily; leukocytosis most likely secondary to steroids     12/6 worsening respiratory status today with distress will add pulm consult and abg; CT scan yesterday with severe emphysema and will increase steroids    12/9 patient is most likely at his new baseline and anticipate he will need oxygen at home and sleep study as outpt; bipap at night as needed    12/10 cxr in am and labs    12/11 had respiratory distress yesterday with hypercapnia and hypoxia; he is having difficulty wearing bipap which was explained in detail regarding CO2 and oxygen requirements; also appetite is down will add remeron and supplements; additional antibiotics for leukocytosis and pna    12/13 continues to require avap and high levels of oxygen; will obtain ECHO; cefipime added for poss bacteria infection with mild fever and leukocytosis; lokelma for hyperkalemia will add IV fluids for poor intake and hyponatremia    12/14 continue to monitor labs and electrolytes; await echo results; afebrile continue current plan and oxygen titration    Assessment & Plan     Acute COPD exacerbation most likely related to flu A  Diabetes-stable  Hyperkalemia  Hyponatremia  Hypertension  Tobacco abuse  Malnutrition     Plan for disposition: CARA Barraza  12/14/22  10:07 EST

## 2022-12-14 NOTE — PLAN OF CARE
Goal Outcome Evaluation:            61 y/o M who presented with progressive SOA with evidence of AE COPD. Diagnosed with influenza A. He is independent and semi-retired. He lives alone in a one story home with no SYLVIA. He is not on supp o2 at baseline. He is on 8L high flow currently. He was experiencing a fib earlier today but at time of eval, rate controlled. HR was 73bpm. Pt was SBA for bed mobility. CGA for transfer to chair. Pt was dyspneic after transfer but SaO2 was WNL. Did not progress to ambulation as pt was planning for bathing task with OT and was already dyspneic and had previously been in a fib. Patient would benefit from HHPT vs OPPT at d/c to ensure return to baseline level of function. Will need 6 min walk test.

## 2022-12-14 NOTE — THERAPY TREATMENT NOTE
Subjective: Pt agreeable to therapeutic plan of care.  Cognition: oriented to Person, Place, Time and Situation    Objective:     Bed Mobility: SBA   Functional Transfers: SBA and CGA for management of lines  Functional Ambulation: N/A or Not attempted.secondary to a.fib     Bathing: Min-A  ADL Position: supported sitting  ADL Comments: Patient assisted with bathing sitting in recliner with A for UB secondary impaired R shoulder ROM. Patient able to wash LB with SBA ad increased rest breaks due to SOB.     Upper Body Dressing: SBA  ADL Position: supported sitting  ADL Comments: Patient SBA to donning clean gown    Vitals:  Patient on 8L [place] O2 with O2 88-84%; slight desaturation with exertion however quickly rebounds. Patient had episode of a.fib this am however HR was stable throughout treatment.     Pain: 0 VAS  Location:   Interventions for pain: N/A  Education: Provided education on the importance of mobility in the acute care setting, ADL training and Energy conservation strategies. Patient educated on PLB during ADLs to decrease SOB and desaturation.     Assessment: Maximino Bhatia presents with ADL impairments below baseline abilities which indicate the need for continued skilled intervention while inpatient. Patient able to complete bed mobility and transfers with SBA-CGA for management of lines. Patient able to wash UB with min A due to impaired R shoulder ROM and wash LB with SBA while sitting in recline. Patient SBA-CGA for changing into clean hospital gown. Tolerating session today without incident. Will continue to follow and progress as tolerated.     Plan/Recommendations:   Low Intensity Therapy recommended post-acute care - This is recommended as therapy feels this patient would require 2-3 visits per week. OP or HH would be the best option depending on patient's home bound status. Consider, if the patient has other  \"skilled\" needs such as wounds, IV antibiotics, etc. Combined with \"low intensity\"  could also equate to a SNF. If patient is medically complex, consider LTAC.. Pt requires no DME at discharge.     Pt desires Home with Home Health at discharge. Pt cooperative; agreeable to therapeutic recommendations and plan of care.     Modified [place]: N/A = No pre-op stroke/TIA    Post-Tx Position: Up in Chair, Alarms activated and Call light and personal items within reach  PPE: gloves, surgical mask and gown

## 2022-12-15 LAB
ANION GAP SERPL CALCULATED.3IONS-SCNC: 9 MMOL/L (ref 5–15)
BH CV ECHO MEAS - ACS: 2.24 CM
BH CV ECHO MEAS - AI P1/2T: 527.1 MSEC
BH CV ECHO MEAS - AO MAX PG: 5.7 MMHG
BH CV ECHO MEAS - AO MEAN PG: 3.1 MMHG
BH CV ECHO MEAS - AO ROOT DIAM: 4 CM
BH CV ECHO MEAS - AO V2 MAX: 119.6 CM/SEC
BH CV ECHO MEAS - AO V2 VTI: 21 CM
BH CV ECHO MEAS - AVA(I,D): 3.5 CM2
BH CV ECHO MEAS - EDV(CUBED): 207.5 ML
BH CV ECHO MEAS - EDV(MOD-SP4): 140.1 ML
BH CV ECHO MEAS - EF(MOD-BP): 48 %
BH CV ECHO MEAS - EF(MOD-SP4): 47.6 %
BH CV ECHO MEAS - ESV(CUBED): 95.5 ML
BH CV ECHO MEAS - ESV(MOD-SP4): 73.3 ML
BH CV ECHO MEAS - FS: 22.8 %
BH CV ECHO MEAS - IVS/LVPW: 0.78 CM
BH CV ECHO MEAS - IVSD: 0.95 CM
BH CV ECHO MEAS - LA DIMENSION: 4.3 CM
BH CV ECHO MEAS - LV DIASTOLIC VOL/BSA (35-75): 66.7 CM2
BH CV ECHO MEAS - LV MASS(C)D: 269.7 GRAMS
BH CV ECHO MEAS - LV MAX PG: 3.5 MMHG
BH CV ECHO MEAS - LV MEAN PG: 1.94 MMHG
BH CV ECHO MEAS - LV SYSTOLIC VOL/BSA (12-30): 34.9 CM2
BH CV ECHO MEAS - LV V1 MAX: 94.2 CM/SEC
BH CV ECHO MEAS - LV V1 VTI: 16.8 CM
BH CV ECHO MEAS - LVIDD: 5.9 CM
BH CV ECHO MEAS - LVIDS: 4.6 CM
BH CV ECHO MEAS - LVOT AREA: 4.4 CM2
BH CV ECHO MEAS - LVOT DIAM: 2.37 CM
BH CV ECHO MEAS - LVPWD: 1.22 CM
BH CV ECHO MEAS - MV A MAX VEL: 82 CM/SEC
BH CV ECHO MEAS - MV DEC SLOPE: 251.9 CM/SEC2
BH CV ECHO MEAS - MV DEC TIME: 0.25 MSEC
BH CV ECHO MEAS - MV E MAX VEL: 62.5 CM/SEC
BH CV ECHO MEAS - MV E/A: 0.76
BH CV ECHO MEAS - MV MAX PG: 3.4 MMHG
BH CV ECHO MEAS - MV MEAN PG: 1.38 MMHG
BH CV ECHO MEAS - MV V2 VTI: 20.8 CM
BH CV ECHO MEAS - MVA(VTI): 3.6 CM2
BH CV ECHO MEAS - PA ACC TIME: 0.11 SEC
BH CV ECHO MEAS - PA PR(ACCEL): 27.6 MMHG
BH CV ECHO MEAS - PA V2 MAX: 107.7 CM/SEC
BH CV ECHO MEAS - PULM A REVS DUR: 0.11 SEC
BH CV ECHO MEAS - PULM A REVS VEL: 36.5 CM/SEC
BH CV ECHO MEAS - PULM DIAS VEL: 35.7 CM/SEC
BH CV ECHO MEAS - PULM S/D: 1.57
BH CV ECHO MEAS - PULM SYS VEL: 56 CM/SEC
BH CV ECHO MEAS - RAP SYSTOLE: 8 MMHG
BH CV ECHO MEAS - RV MAX PG: 1.7 MMHG
BH CV ECHO MEAS - RV V1 MAX: 65.1 CM/SEC
BH CV ECHO MEAS - RV V1 VTI: 13.2 CM
BH CV ECHO MEAS - RVDD: 3.8 CM
BH CV ECHO MEAS - SI(MOD-SP4): 31.8 ML/M2
BH CV ECHO MEAS - SV(LVOT): 74.3 ML
BH CV ECHO MEAS - SV(MOD-SP4): 66.7 ML
BUN SERPL-MCNC: 18 MG/DL (ref 8–23)
BUN/CREAT SERPL: 40 (ref 7–25)
CALCIUM SPEC-SCNC: 8.9 MG/DL (ref 8.6–10.5)
CHLORIDE SERPL-SCNC: 94 MMOL/L (ref 98–107)
CO2 SERPL-SCNC: 33 MMOL/L (ref 22–29)
CREAT SERPL-MCNC: 0.45 MG/DL (ref 0.76–1.27)
DACRYOCYTES BLD QL SMEAR: ABNORMAL
DEPRECATED RDW RBC AUTO: 48.6 FL (ref 37–54)
EGFRCR SERPLBLD CKD-EPI 2021: 118.3 ML/MIN/1.73
EOSINOPHIL # BLD MANUAL: 0.18 10*3/MM3 (ref 0–0.4)
EOSINOPHIL NFR BLD MANUAL: 1 % (ref 0.3–6.2)
ERYTHROCYTE [DISTWIDTH] IN BLOOD BY AUTOMATED COUNT: 15 % (ref 12.3–15.4)
GLUCOSE BLDC GLUCOMTR-MCNC: 115 MG/DL (ref 70–105)
GLUCOSE BLDC GLUCOMTR-MCNC: 145 MG/DL (ref 70–105)
GLUCOSE BLDC GLUCOMTR-MCNC: 165 MG/DL (ref 70–105)
GLUCOSE BLDC GLUCOMTR-MCNC: 207 MG/DL (ref 70–105)
GLUCOSE BLDC GLUCOMTR-MCNC: 74 MG/DL (ref 70–105)
GLUCOSE BLDC GLUCOMTR-MCNC: 95 MG/DL (ref 70–105)
GLUCOSE SERPL-MCNC: 59 MG/DL (ref 65–99)
HCT VFR BLD AUTO: 38 % (ref 37.5–51)
HGB BLD-MCNC: 12.4 G/DL (ref 13–17.7)
LYMPHOCYTES # BLD MANUAL: 2.15 10*3/MM3 (ref 0.7–3.1)
LYMPHOCYTES NFR BLD MANUAL: 7 % (ref 5–12)
MAXIMAL PREDICTED HEART RATE: 157 BPM
MCH RBC QN AUTO: 30.2 PG (ref 26.6–33)
MCHC RBC AUTO-ENTMCNC: 32.6 G/DL (ref 31.5–35.7)
MCV RBC AUTO: 92.8 FL (ref 79–97)
METAMYELOCYTES NFR BLD MANUAL: 2 % (ref 0–0)
MONOCYTES # BLD: 1.25 10*3/MM3 (ref 0.1–0.9)
NEUTROPHILS # BLD AUTO: 13.96 10*3/MM3 (ref 1.7–7)
NEUTROPHILS NFR BLD MANUAL: 67 % (ref 42.7–76)
NEUTS BAND NFR BLD MANUAL: 11 % (ref 0–5)
PLATELET # BLD AUTO: 299 10*3/MM3 (ref 140–450)
PMV BLD AUTO: 8.1 FL (ref 6–12)
POIKILOCYTOSIS BLD QL SMEAR: ABNORMAL
POTASSIUM SERPL-SCNC: 4.8 MMOL/L (ref 3.5–5.2)
RBC # BLD AUTO: 4.1 10*6/MM3 (ref 4.14–5.8)
SCAN SLIDE: NORMAL
SMALL PLATELETS BLD QL SMEAR: ADEQUATE
SODIUM SERPL-SCNC: 136 MMOL/L (ref 136–145)
STRESS TARGET HR: 133 BPM
VARIANT LYMPHS NFR BLD MANUAL: 3 % (ref 0–5)
VARIANT LYMPHS NFR BLD MANUAL: 9 % (ref 19.6–45.3)
WBC MORPH BLD: NORMAL
WBC NRBC COR # BLD: 17.9 10*3/MM3 (ref 3.4–10.8)

## 2022-12-15 PROCEDURE — 85025 COMPLETE CBC W/AUTO DIFF WBC: CPT | Performed by: NURSE PRACTITIONER

## 2022-12-15 PROCEDURE — 94799 UNLISTED PULMONARY SVC/PX: CPT

## 2022-12-15 PROCEDURE — 94761 N-INVAS EAR/PLS OXIMETRY MLT: CPT

## 2022-12-15 PROCEDURE — 25010000002 ENOXAPARIN PER 10 MG: Performed by: NURSE PRACTITIONER

## 2022-12-15 PROCEDURE — 94664 DEMO&/EVAL PT USE INHALER: CPT

## 2022-12-15 PROCEDURE — 82962 GLUCOSE BLOOD TEST: CPT

## 2022-12-15 PROCEDURE — 85007 BL SMEAR W/DIFF WBC COUNT: CPT | Performed by: NURSE PRACTITIONER

## 2022-12-15 PROCEDURE — 80048 BASIC METABOLIC PNL TOTAL CA: CPT | Performed by: NURSE PRACTITIONER

## 2022-12-15 PROCEDURE — 97110 THERAPEUTIC EXERCISES: CPT

## 2022-12-15 PROCEDURE — 97530 THERAPEUTIC ACTIVITIES: CPT

## 2022-12-15 PROCEDURE — 63710000001 PREDNISONE PER 1 MG

## 2022-12-15 PROCEDURE — 94660 CPAP INITIATION&MGMT: CPT

## 2022-12-15 PROCEDURE — 97116 GAIT TRAINING THERAPY: CPT

## 2022-12-15 RX ADMIN — PREDNISONE 20 MG: 20 TABLET ORAL at 09:07

## 2022-12-15 RX ADMIN — BUDESONIDE 0.5 MG: 0.5 INHALANT RESPIRATORY (INHALATION) at 07:00

## 2022-12-15 RX ADMIN — ARFORMOTEROL TARTRATE 15 MCG: 15 SOLUTION RESPIRATORY (INHALATION) at 19:46

## 2022-12-15 RX ADMIN — ASPIRIN 81 MG: 81 TABLET, COATED ORAL at 09:07

## 2022-12-15 RX ADMIN — MONTELUKAST 10 MG: 10 TABLET, FILM COATED ORAL at 20:27

## 2022-12-15 RX ADMIN — BUDESONIDE 0.5 MG: 0.5 INHALANT RESPIRATORY (INHALATION) at 19:46

## 2022-12-15 RX ADMIN — THEOPHYLLINE ANHYDROUS 300 MG: 300 CAPSULE, EXTENDED RELEASE ORAL at 09:07

## 2022-12-15 RX ADMIN — ENOXAPARIN SODIUM 40 MG: 100 INJECTION SUBCUTANEOUS at 16:36

## 2022-12-15 RX ADMIN — SODIUM CHLORIDE 75 ML/HR: 9 INJECTION, SOLUTION INTRAVENOUS at 04:43

## 2022-12-15 RX ADMIN — LISINOPRIL 40 MG: 20 TABLET ORAL at 16:37

## 2022-12-15 RX ADMIN — METFORMIN HYDROCHLORIDE 1000 MG: 500 TABLET ORAL at 17:20

## 2022-12-15 RX ADMIN — GUAIFENESIN 600 MG: 600 TABLET, EXTENDED RELEASE ORAL at 09:07

## 2022-12-15 RX ADMIN — Medication 3 ML: at 09:08

## 2022-12-15 RX ADMIN — GLIPIZIDE 10 MG: 5 TABLET ORAL at 16:37

## 2022-12-15 RX ADMIN — GUAIFENESIN 600 MG: 600 TABLET, EXTENDED RELEASE ORAL at 20:27

## 2022-12-15 RX ADMIN — ARFORMOTEROL TARTRATE 15 MCG: 15 SOLUTION RESPIRATORY (INHALATION) at 07:00

## 2022-12-15 RX ADMIN — BISOPROLOL FUMARATE: 5 TABLET ORAL at 09:06

## 2022-12-15 RX ADMIN — METFORMIN HYDROCHLORIDE 1000 MG: 500 TABLET ORAL at 09:07

## 2022-12-15 RX ADMIN — ROFLUMILAST 500 MCG: 500 TABLET ORAL at 09:07

## 2022-12-15 RX ADMIN — MIRTAZAPINE 15 MG: 15 TABLET, FILM COATED ORAL at 20:27

## 2022-12-15 RX ADMIN — FAMOTIDINE 40 MG: 20 TABLET, FILM COATED ORAL at 09:07

## 2022-12-15 RX ADMIN — Medication 3 ML: at 20:27

## 2022-12-15 RX ADMIN — GLIPIZIDE 10 MG: 5 TABLET ORAL at 09:07

## 2022-12-15 NOTE — CASE MANAGEMENT/SOCIAL WORK
Continued Stay Note  [facility]     Patient Name: Maximino Bhatia  MRN: [id number]  Today's Date: 12/15/2022    Admit Date: 12/3/2022    Plan: D/C Plan: Anticipate routine home. Pending patient decision for OP PT. Watch for home oxygen needs.   Discharge Plan     Row Name 12/15/22 1432       Plan    Plan D/C Plan: Anticipate routine home. Pending patient decision for OP PT. Watch for home oxygen needs.    Patient/Family in Agreement with Plan yes    Plan Comments  spoke to patient at bedside wearing mask and keeping distance greater than 6 feet and spent less than 15 minutes in room. CM discussed PT recommendations for OP PT, patient unsure if he needs PT at this time, will decide closer to d/c. Patient denies any d/c needs at this time. Barrier to D/C: 10L HF O2.                    Expected Discharge Date and Time     Expected Discharge Date Expected Discharge Time    Dec 17, 2022         Met with patient in room wearing PPE: mask.    Maintained distance greater than six feet and spent less than 15 minutes in the room.      AJAY MartinezN, RN    [address] [address]    Office: [phone]  Fax: [phone]

## 2022-12-15 NOTE — PROGRESS NOTES
Daily Progress Note        COPD with acute exacerbation (HCC)      Assessment    COPD with acute exacerbation (HCC)  Acute hypoxemic/hypercapnic respiratory failure  Pneumonia: Unidentified organism  Influenza A infection    CT chest 12/5/2022: mild bibasilar infection, severe emphysema    Hyperkalemia  Leukocytosis    DM  HTN  Tob smoking     Recommendations:     oxygen supplement and titration to maintain saturation 90 to 95%: Currently on  AVAPS, alternating with 8 L high flow  -Patient does not use oxygen at home  -We will need 6-minute walk prior to discharge    Daliresp and theophylline  Mucinex twice daily  Singulair  Bronchodilatores\inhaled corticosteroid  ASA  BP control  prednisone 6-day taper  DVT/GI prophylaxis  Check daily labs and correct electrolytes as needed    Completed 5-day course of Tamiflu  Completed course of cefepime     Will need work-up for obstructive sleep apnea as outpatient          LOS: 11 days     Subjective         Objective     Vital signs for last 24 hours:  Vitals:    12/14/22 1821 12/14/22 2143 12/15/22 0132 12/15/22 0528   BP:  105/64 114/65 119/70   BP Location:  Left arm Left arm Left arm   Patient Position:  Lying Sitting Lying   Pulse: 76 73 86 82   Resp: 16 15 20 (!) 35   Temp:  97.5 °F (36.4 °C) 97.7 °F (36.5 °C) 97.8 °F (36.6 °C)   TempSrc:  Oral Oral Oral   SpO2: 95% 90% 94% 94%   Weight:       Height:           Intake/Output last 3 shifts:  I/O last 3 completed shifts:  In: 1796.3 [P.O.:720; I.V.:1076.3]  Out: 3675 [Urine:3675]  Intake/Output this shift:  I/O this shift:  In: 1000 [I.V.:1000]  Out: 1650 [Urine:1650]      Radiology  Imaging Results (Last 24 Hours)     ** No results found for the last 24 hours. **          Labs:  Results from last 7 days   Lab Units 12/15/22  0033   WBC 10*3/mm3 17.90*   HEMOGLOBIN g/dL 12.4*   HEMATOCRIT % 38.0   PLATELETS 10*3/mm3 299     Results from last 7 days   Lab Units 12/15/22  0033   SODIUM mmol/L 136   POTASSIUM mmol/L 4.8    CHLORIDE mmol/L 94*   CO2 mmol/L 33.0*   BUN mg/dL 18   CREATININE mg/dL 0.45*   CALCIUM mg/dL 8.9   GLUCOSE mg/dL 59*     Results from last 7 days   Lab Units 12/10/22  2316   PH, ARTERIAL pH units 7.294*   PO2 ART mm Hg 76.8*   PCO2, ARTERIAL mm Hg 84.1*   HCO3 ART mmol/L 40.8*                                   Meds:   SCHEDULE  arformoterol, 15 mcg, Nebulization, BID - RT  aspirin, 81 mg, Oral, Daily  bisoprolol-hydroCHLOROthiazide (ZIAC) 10-6.25 mg combo dose, , Oral, Q24H  budesonide, 0.5 mg, Nebulization, BID - RT  enoxaparin, 40 mg, Subcutaneous, Q24H  famotidine, 40 mg, Oral, Daily  glipizide, 10 mg, Oral, BID AC  guaiFENesin, 600 mg, Oral, Q12H  insulin lispro, 2-9 Units, Subcutaneous, TID With Meals  lisinopril, 40 mg, Oral, Q PM  metFORMIN, 1,000 mg, Oral, BID With Meals  mirtazapine, 15 mg, Oral, Nightly  montelukast, 10 mg, Oral, Nightly  predniSONE, 20 mg, Oral, Daily   Followed by  [START ON 12/17/2022] predniSONE, 10 mg, Oral, Daily  roflumilast, 500 mcg, Oral, Daily  sodium chloride, 3 mL, Intravenous, Q12H  theophylline, 300 mg, Oral, Q24H      Infusions  Pharmacy to Dose enoxaparin (LOVENOX),   sodium chloride, 75 mL/hr, Last Rate: 75 mL/hr (12/15/22 0443)      PRNs  •  acetaminophen  •  dextrose  •  dextrose  •  glucagon (human recombinant)  •  ipratropium-albuterol  •  LORazepam  •  nitroglycerin  •  ondansetron  •  Pharmacy to Dose enoxaparin (LOVENOX)  •  [COMPLETED] Insert Peripheral IV **AND** sodium chloride  •  sodium chloride  •  sodium chloride    Physical Exam:  Physical Exam  Vitals reviewed.   Pulmonary:      Effort: Pulmonary effort is normal.      Breath sounds: Rhonchi present.   Skin:     General: Skin is warm and dry.   Neurological:      Mental Status: He is alert and oriented to person, place, and time.         ROS  Review of Systems   Respiratory: Positive for cough and shortness of breath.        I have reviewed the patient's new clinical results.    Electronically signed  by Caleb Almazan MD

## 2022-12-15 NOTE — THERAPY TREATMENT NOTE
Subjective: Pt agreeable to therapeutic plan of care. Pt requests to ambulate to toilet     Objective:     Bed mobility - Supervision  Transfers - Supervision     STS x 10     Ambulation - 30 feet x 3 CGA     Seated therex: x 10 each   Ankle pumps  LAQ  Marching in place      Vitals: WNL    Pain: 0 VAS   Location: n/a  Intervention for pain: N/A    Education: Provided education on the importance of mobility in the acute care setting and Energy conservation strategies    Assessment: Maximino Bhatia is a 61 y/o M who presented with progressive SOA with evidence of AE COPD. Diagnosed with influenza A. He is independent and semi-retired. He was able to progress to increased ambulation distance, seated exercises and sit to stands. Sao2 >86% throughout on 8L. This is the same amount of O2 as previous day but increased activity tolerance. Will continue to follow and progress as tolerated.     Plan/Recommendations:   Low Intensity Therapy recommended post-acute care - This is recommended as therapy feels this patient would require 2-3 visits per week. OP or HH would be the best option depending on patient's home bound status. Consider, if the patient has other  \"skilled\" needs such as wounds, IV antibiotics, etc. Combined with \"low intensity\" could also equate to a SNF. If patient is medically complex, consider LTAC.. Pt requires no DME at discharge.     Pt desires Home with Home Health at discharge. Pt cooperative; agreeable to therapeutic recommendations and plan of care.       Post-Tx Position: In bathroom and Call light and personal items within reach  PPE: gloves, surgical mask and gown

## 2022-12-15 NOTE — PLAN OF CARE
Goal Outcome Evaluation:  Plan of Care Reviewed With: patient       Maximino Bhatia is a 63 y/o M who presented with progressive SOA with evidence of AE COPD. Diagnosed with influenza A. He is independent and semi-retired. He was able to progress to increased ambulation distance, seated exercises and sit to stands. Sao2 >86% throughout on 8L. This is the same amount of O2 as previous day but increased activity tolerance. Will continue to follow and progress as tolerated.

## 2022-12-15 NOTE — PLAN OF CARE
Goal Outcome Evaluation:  Plan of Care Reviewed With: patient        Progress: no change  Outcome Evaluation: Still on 8L HF NC, unable to wean so far. Potassium elevated, recheck level this AM.Resp meds added, continue to monitor

## 2022-12-15 NOTE — PLAN OF CARE
Problem: Adult Inpatient Plan of Care  Goal: Plan of Care Review  Outcome: Ongoing, Progressing  Flowsheets (Taken 12/15/2022 2374)  Outcome Evaluation: Patient on 6Lhf and tolerating. No complaints of sob and patient appetite much better today. Patient able to work with PT and sit up in the chair for awhile today.  Goal: Patient-Specific Goal (Individualized)  Outcome: Ongoing, Progressing  Goal: Absence of Hospital-Acquired Illness or Injury  Outcome: Ongoing, Progressing  Intervention: Identify and Manage Fall Risk  Recent Flowsheet Documentation  Taken 12/15/2022 1015 by Maria Wong, RN  Safety Promotion/Fall Prevention:   nonskid shoes/slippers when out of bed   room organization consistent   safety round/check completed   fall prevention program maintained  Taken 12/15/2022 0845 by Maria Wong, RN  Safety Promotion/Fall Prevention:   safety round/check completed   room organization consistent   nonskid shoes/slippers when out of bed  Goal: Optimal Comfort and Wellbeing  Outcome: Ongoing, Progressing  Goal: Readiness for Transition of Care  Outcome: Ongoing, Progressing   Goal Outcome Evaluation:              Outcome Evaluation: Patient on 6Lhf and tolerating. No complaints of sob and patient appetite much better today. Patient able to work with PT and sit up in the chair for awhile today.

## 2022-12-15 NOTE — PROGRESS NOTES
LOS: 11 days   Patient Care Team:  Qi Hicks APRN as PCP - General (Nurse Practitioner)    Subjective     He denies any complaints     Review of Systems   Constitutional: Positive for activity change, appetite change and fatigue.   HENT: Negative.    Respiratory: Positive for shortness of breath. Negative for cough.    Cardiovascular: Negative.    Gastrointestinal: Negative.    Genitourinary: Negative.    Musculoskeletal: Negative.    Neurological: Positive for weakness.   Psychiatric/Behavioral: Negative.            Objective     Vital Signs  Temp:  [97.5 °F (36.4 °C)-98.2 °F (36.8 °C)] 97.8 °F (36.6 °C)  Heart Rate:  [72-88] 84  Resp:  [15-39] 24  BP: (104-119)/(62-71) 119/70      Physical Exam  Vitals reviewed.   Constitutional:       Appearance: He is not ill-appearing.   HENT:      Head: Normocephalic and atraumatic.      Right Ear: External ear normal.      Left Ear: External ear normal.      Nose: Nose normal.      Mouth/Throat:      Mouth: Mucous membranes are dry.   Eyes:      General:         Right eye: No discharge.         Left eye: No discharge.   Cardiovascular:      Rate and Rhythm: Normal rate and regular rhythm.      Pulses: Normal pulses.      Heart sounds: Normal heart sounds.   Pulmonary:      Effort: Pulmonary effort is normal. Tachypnea present.      Breath sounds: No wheezing or rhonchi.   Abdominal:      General: Bowel sounds are normal.      Palpations: Abdomen is soft.   Musculoskeletal:         General: Normal range of motion.      Cervical back: Normal range of motion.   Skin:     General: Skin is warm and dry.   Neurological:      Mental Status: He is alert and oriented to person, place, and time.   Psychiatric:         Behavior: Behavior normal.              Results Review:    Lab Results (last 24 hours)     Procedure Component Value Units Date/Time    POC Glucose Once [[id number]]  (Abnormal) Collected: 12/15/22 0711    Specimen: Blood Updated: 12/15/22 0712     Glucose  207 mg/dL      Comment: Serial Number: [id number]:  [id number]       Manual Differential [[id number]]  (Abnormal) Collected: 12/15/22 0033    Specimen: Blood Updated: 12/15/22 0226     Neutrophil % 67.0 %      Lymphocyte % 9.0 %      Monocyte % 7.0 %      Eosinophil % 1.0 %      Bands %  11.0 %      Metamyelocyte % 2.0 %      Atypical Lymphocyte % 3.0 %      Neutrophils Absolute 13.96 10*3/mm3      Lymphocytes Absolute 2.15 10*3/mm3      Monocytes Absolute 1.25 10*3/mm3      Eosinophils Absolute 0.18 10*3/mm3      Dacrocytes Slight/1+     Poikilocytes Slight/1+     WBC Morphology Normal     Platelet Estimate Adequate    CBC & Differential [[id number]]  (Abnormal) Collected: 12/15/22 0033    Specimen: Blood Updated: 12/15/22 0226    Narrative:      The following orders were created for panel order CBC & Differential.  Procedure                               Abnormality         Status                     ---------                               -----------         ------                     CBC Auto Differential[[id number]]        Abnormal            Final result               Scan Slide[[id number]]                                       Final result                 Please view results for these tests on the individual orders.    Scan Slide [[id number]] Collected: 12/15/22 0033    Specimen: Blood Updated: 12/15/22 0226     Scan Slide --     Comment: See Manual Differential Results       CBC Auto Differential [[id number]]  (Abnormal) Collected: 12/15/22 0033    Specimen: Blood Updated: 12/15/22 0226     WBC 17.90 10*3/mm3      RBC 4.10 10*6/mm3      Hemoglobin 12.4 g/dL      Hematocrit 38.0 %      MCV 92.8 fL      MCH 30.2 pg      MCHC 32.6 g/dL      RDW 15.0 %      RDW-SD 48.6 fl      MPV 8.1 fL      Platelets 299 10*3/mm3     Narrative:      Modified report. Previous result was Hemogram on 12/15/2022 at 0116 EST.  The previously reported component NRBC is no longer being reported. Previous result was 0.0 /100 WBC  (Reference Range: 0.0-0.2 /100 WBC) on 12/15/2022 at 0116 EST.    POC Glucose Once [[id number]]  (Normal) Collected: 12/15/22 0218    Specimen: Blood Updated: 12/15/22 0219     Glucose 95 mg/dL      Comment: Serial Number: [id number]:  [id number]       Basic Metabolic Panel [[id number]]  (Abnormal) Collected: 12/15/22 0033    Specimen: Blood Updated: 12/15/22 0148     Glucose 59 mg/dL      BUN 18 mg/dL      Creatinine 0.45 mg/dL      Sodium 136 mmol/L      Potassium 4.8 mmol/L      Chloride 94 mmol/L      CO2 33.0 mmol/L      Calcium 8.9 mg/dL      BUN/Creatinine Ratio 40.0     Anion Gap 9.0 mmol/L      eGFR 118.3 mL/min/1.73      Comment: National Kidney Foundation and American Society of Nephrology (ASN) Task Force recommended calculation based on the Chronic Kidney Disease Epidemiology Collaboration (CKD-EPI) equation refit without adjustment for race.       Narrative:      GFR Normal >60  Chronic Kidney Disease <60  Kidney Failure <15      POC Glucose Once [[id number]]  (Abnormal) Collected: 12/14/22 1738    Specimen: Blood Updated: 12/14/22 1740     Glucose 127 mg/dL      Comment: Serial Number: [id number]:  [id number]       POC Glucose Once [[id number]]  (Abnormal) Collected: 12/14/22 1431    Specimen: Blood Updated: 12/14/22 1432     Glucose 154 mg/dL      Comment: Serial Number: [id number]:  [id number]       Manual Differential [[id number]]  (Abnormal) Collected: 12/14/22 1326    Specimen: Blood Updated: 12/14/22 1423     Neutrophil % 69.0 %      Lymphocyte % 5.0 %      Monocyte % 6.0 %      Bands %  17.0 %      Metamyelocyte % 2.0 %      Myelocyte % 1.0 %      Neutrophils Absolute 18.40 10*3/mm3      Lymphocytes Absolute 1.07 10*3/mm3      Monocytes Absolute 1.28 10*3/mm3      RBC Morphology Normal     Toxic Granulation Slight/1+     Large Platelets Slight/1+    CBC & Differential [[id number]]  (Abnormal) Collected: 12/14/22 1326    Specimen: Blood Updated: 12/14/22 1423    Narrative:      The  following orders were created for panel order CBC & Differential.  Procedure                               Abnormality         Status                     ---------                               -----------         ------                     CBC Auto Differential[[id number]]        Abnormal            Final result               Scan Slide[[id number]]                                       Final result                 Please view results for these tests on the individual orders.    Scan Slide [[id number]] Collected: 12/14/22 1326    Specimen: Blood Updated: 12/14/22 1423     Scan Slide --     Comment: See Manual Differential Results       CBC Auto Differential [[id number]]  (Abnormal) Collected: 12/14/22 1326    Specimen: Blood Updated: 12/14/22 1423     WBC 21.40 10*3/mm3      RBC 4.41 10*6/mm3      Hemoglobin 13.1 g/dL      Hematocrit 40.7 %      MCV 92.3 fL      MCH 29.6 pg      MCHC 32.1 g/dL      RDW 15.1 %      RDW-SD 48.6 fl      MPV 8.1 fL      Platelets 357 10*3/mm3     Narrative:      Modified report. Previous result was Hemogram on 12/14/2022 at 1346 EST.  The previously reported component NRBC is no longer being reported. Previous result was 0.0 /100 WBC (Reference Range: 0.0-0.2 /100 WBC) on 12/14/2022 at 1346 EST.    Basic Metabolic Panel [[id number]]  (Abnormal) Collected: 12/14/22 1326    Specimen: Blood Updated: 12/14/22 1404     Glucose 96 mg/dL      BUN 26 mg/dL      Creatinine 0.52 mg/dL      Sodium 131 mmol/L      Potassium 5.3 mmol/L      Chloride 91 mmol/L      CO2 32.0 mmol/L      Calcium 9.0 mg/dL      BUN/Creatinine Ratio 50.0     Anion Gap 8.0 mmol/L      eGFR 113.3 mL/min/1.73      Comment: National Kidney Foundation and American Society of Nephrology (ASN) Task Force recommended calculation based on the Chronic Kidney Disease Epidemiology Collaboration (CKD-EPI) equation refit without adjustment for race.       Narrative:      GFR Normal >60  Chronic Kidney Disease <60  Kidney Failure <15       POC Glucose Once [[id number]]  (Normal) Collected: 12/14/22 1201    Specimen: Blood Updated: 12/14/22 1202     Glucose 86 mg/dL      Comment: Serial Number: [id number]:  [id number]       Blood Culture - Blood, Arm, Left [[id number]]  (Normal) Collected: 12/11/22 0847    Specimen: Blood from Arm, Left Updated: 12/14/22 0901     Blood Culture No growth at 3 days    Narrative:      Less than seven (7) mL's of blood was collected.  Insufficient quantity may yield false negative results.           Imaging Results (Last 24 Hours)     ** No results found for the last 24 hours. **               I reviewed the patient's new clinical results.    Medication Review:   Scheduled Meds:arformoterol, 15 mcg, Nebulization, BID - RT  aspirin, 81 mg, Oral, Daily  bisoprolol-hydroCHLOROthiazide (ZIAC) 10-6.25 mg combo dose, , Oral, Q24H  budesonide, 0.5 mg, Nebulization, BID - RT  enoxaparin, 40 mg, Subcutaneous, Q24H  famotidine, 40 mg, Oral, Daily  glipizide, 10 mg, Oral, BID AC  guaiFENesin, 600 mg, Oral, Q12H  insulin lispro, 2-9 Units, Subcutaneous, TID With Meals  lisinopril, 40 mg, Oral, Q PM  metFORMIN, 1,000 mg, Oral, BID With Meals  mirtazapine, 15 mg, Oral, Nightly  montelukast, 10 mg, Oral, Nightly  predniSONE, 20 mg, Oral, Daily   Followed by  [START ON 12/17/2022] predniSONE, 10 mg, Oral, Daily  roflumilast, 500 mcg, Oral, Daily  sodium chloride, 3 mL, Intravenous, Q12H  theophylline, 300 mg, Oral, Q24H      Continuous Infusions:Pharmacy to Dose enoxaparin (LOVENOX),   sodium chloride, 75 mL/hr, Last Rate: 75 mL/hr (12/15/22 0443)      PRN Meds:.•  acetaminophen  •  dextrose  •  dextrose  •  glucagon (human recombinant)  •  ipratropium-albuterol  •  LORazepam  •  nitroglycerin  •  ondansetron  •  Pharmacy to Dose enoxaparin (LOVENOX)  •  [COMPLETED] Insert Peripheral IV **AND** sodium chloride  •  sodium chloride  •  sodium chloride     Interval History:    12/5 patient continues with shortness of breath; will add  corticosteroid nebs; CT chest; continue supportive care may need oxygen at home temporarily; leukocytosis most likely secondary to steroids     12/6 worsening respiratory status today with distress will add pulm consult and abg; CT scan yesterday with severe emphysema and will increase steroids    12/9 patient is most likely at his new baseline and anticipate he will need oxygen at home and sleep study as outpt; bipap at night as needed    12/10 cxr in am and labs    12/11 had respiratory distress yesterday with hypercapnia and hypoxia; he is having difficulty wearing bipap which was explained in detail regarding CO2 and oxygen requirements; also appetite is down will add remeron and supplements; additional antibiotics for leukocytosis and pna    12/13 continues to require avap and high levels of oxygen; will obtain ECHO; cefipime added for poss bacteria infection with mild fever and leukocytosis; lokelma for hyperkalemia will add IV fluids for poor intake and hyponatremia    12/14 continue to monitor labs and electrolytes; await echo results; afebrile continue current plan and oxygen titration    12/15 overall improvement needs to be up and moving around    Assessment & Plan     Acute COPD exacerbation most likely related to flu A  Diabetes-stable  Hyperkalemia  Hyponatremia-resolved  Hypertension  Tobacco abuse  Malnutrition     Plan for disposition: CARA Barraza  12/15/22  08:04 EST

## 2022-12-16 LAB
ANION GAP SERPL CALCULATED.3IONS-SCNC: 8 MMOL/L (ref 5–15)
BACTERIA SPEC AEROBE CULT: NORMAL
BUN SERPL-MCNC: 15 MG/DL (ref 8–23)
BUN/CREAT SERPL: 27.8 (ref 7–25)
CALCIUM SPEC-SCNC: 9.2 MG/DL (ref 8.6–10.5)
CHLORIDE SERPL-SCNC: 93 MMOL/L (ref 98–107)
CO2 SERPL-SCNC: 34 MMOL/L (ref 22–29)
CREAT SERPL-MCNC: 0.54 MG/DL (ref 0.76–1.27)
DEPRECATED RDW RBC AUTO: 47.7 FL (ref 37–54)
EGFRCR SERPLBLD CKD-EPI 2021: 112 ML/MIN/1.73
ERYTHROCYTE [DISTWIDTH] IN BLOOD BY AUTOMATED COUNT: 14.9 % (ref 12.3–15.4)
GLUCOSE BLDC GLUCOMTR-MCNC: 110 MG/DL (ref 70–105)
GLUCOSE BLDC GLUCOMTR-MCNC: 119 MG/DL (ref 70–105)
GLUCOSE BLDC GLUCOMTR-MCNC: 171 MG/DL (ref 70–105)
GLUCOSE BLDC GLUCOMTR-MCNC: 174 MG/DL (ref 70–105)
GLUCOSE SERPL-MCNC: 95 MG/DL (ref 65–99)
HCT VFR BLD AUTO: 37.7 % (ref 37.5–51)
HGB BLD-MCNC: 12.4 G/DL (ref 13–17.7)
LYMPHOCYTES # BLD MANUAL: 3.58 10*3/MM3 (ref 0.7–3.1)
LYMPHOCYTES NFR BLD MANUAL: 5 % (ref 5–12)
MCH RBC QN AUTO: 30.1 PG (ref 26.6–33)
MCHC RBC AUTO-ENTMCNC: 32.9 G/DL (ref 31.5–35.7)
MCV RBC AUTO: 91.4 FL (ref 79–97)
MONOCYTES # BLD: 0.9 10*3/MM3 (ref 0.1–0.9)
NEUTROPHILS # BLD AUTO: 13.43 10*3/MM3 (ref 1.7–7)
NEUTROPHILS NFR BLD MANUAL: 72 % (ref 42.7–76)
NEUTS BAND NFR BLD MANUAL: 3 % (ref 0–5)
PLATELET # BLD AUTO: 287 10*3/MM3 (ref 140–450)
PMV BLD AUTO: 8.5 FL (ref 6–12)
POTASSIUM SERPL-SCNC: 4.6 MMOL/L (ref 3.5–5.2)
RBC # BLD AUTO: 4.13 10*6/MM3 (ref 4.14–5.8)
RBC MORPH BLD: NORMAL
SCAN SLIDE: NORMAL
SMALL PLATELETS BLD QL SMEAR: ADEQUATE
SODIUM SERPL-SCNC: 135 MMOL/L (ref 136–145)
VARIANT LYMPHS NFR BLD MANUAL: 20 % (ref 19.6–45.3)
WBC MORPH BLD: NORMAL
WBC NRBC COR # BLD: 17.9 10*3/MM3 (ref 3.4–10.8)

## 2022-12-16 PROCEDURE — 63710000001 PREDNISONE PER 1 MG

## 2022-12-16 PROCEDURE — 94799 UNLISTED PULMONARY SVC/PX: CPT

## 2022-12-16 PROCEDURE — 25010000002 ONDANSETRON PER 1 MG: Performed by: NURSE PRACTITIONER

## 2022-12-16 PROCEDURE — 97116 GAIT TRAINING THERAPY: CPT

## 2022-12-16 PROCEDURE — 80048 BASIC METABOLIC PNL TOTAL CA: CPT | Performed by: NURSE PRACTITIONER

## 2022-12-16 PROCEDURE — 25010000002 ENOXAPARIN PER 10 MG: Performed by: NURSE PRACTITIONER

## 2022-12-16 PROCEDURE — 94660 CPAP INITIATION&MGMT: CPT

## 2022-12-16 PROCEDURE — 85007 BL SMEAR W/DIFF WBC COUNT: CPT | Performed by: NURSE PRACTITIONER

## 2022-12-16 PROCEDURE — 97110 THERAPEUTIC EXERCISES: CPT

## 2022-12-16 PROCEDURE — 85025 COMPLETE CBC W/AUTO DIFF WBC: CPT | Performed by: NURSE PRACTITIONER

## 2022-12-16 PROCEDURE — 82962 GLUCOSE BLOOD TEST: CPT

## 2022-12-16 PROCEDURE — 97535 SELF CARE MNGMENT TRAINING: CPT

## 2022-12-16 RX ADMIN — ARFORMOTEROL TARTRATE 15 MCG: 15 SOLUTION RESPIRATORY (INHALATION) at 20:18

## 2022-12-16 RX ADMIN — ASPIRIN 81 MG: 81 TABLET, COATED ORAL at 08:36

## 2022-12-16 RX ADMIN — BISOPROLOL FUMARATE: 5 TABLET ORAL at 08:28

## 2022-12-16 RX ADMIN — LISINOPRIL 40 MG: 20 TABLET ORAL at 17:36

## 2022-12-16 RX ADMIN — GLIPIZIDE 10 MG: 5 TABLET ORAL at 17:36

## 2022-12-16 RX ADMIN — METFORMIN HYDROCHLORIDE 1000 MG: 500 TABLET ORAL at 08:28

## 2022-12-16 RX ADMIN — GLIPIZIDE 10 MG: 5 TABLET ORAL at 08:28

## 2022-12-16 RX ADMIN — BUDESONIDE 0.5 MG: 0.5 INHALANT RESPIRATORY (INHALATION) at 20:18

## 2022-12-16 RX ADMIN — METFORMIN HYDROCHLORIDE 1000 MG: 500 TABLET ORAL at 17:36

## 2022-12-16 RX ADMIN — ENOXAPARIN SODIUM 40 MG: 100 INJECTION SUBCUTANEOUS at 17:36

## 2022-12-16 RX ADMIN — PREDNISONE 20 MG: 20 TABLET ORAL at 08:28

## 2022-12-16 RX ADMIN — MONTELUKAST 10 MG: 10 TABLET, FILM COATED ORAL at 20:24

## 2022-12-16 RX ADMIN — Medication 10 ML: at 05:15

## 2022-12-16 RX ADMIN — ROFLUMILAST 500 MCG: 500 TABLET ORAL at 08:28

## 2022-12-16 RX ADMIN — GUAIFENESIN 600 MG: 600 TABLET, EXTENDED RELEASE ORAL at 08:28

## 2022-12-16 RX ADMIN — Medication 3 ML: at 08:37

## 2022-12-16 RX ADMIN — Medication 3 ML: at 20:24

## 2022-12-16 RX ADMIN — THEOPHYLLINE ANHYDROUS 300 MG: 300 CAPSULE, EXTENDED RELEASE ORAL at 08:28

## 2022-12-16 RX ADMIN — GUAIFENESIN 600 MG: 600 TABLET, EXTENDED RELEASE ORAL at 20:24

## 2022-12-16 RX ADMIN — ONDANSETRON 4 MG: 2 INJECTION INTRAMUSCULAR; INTRAVENOUS at 05:15

## 2022-12-16 RX ADMIN — MIRTAZAPINE 15 MG: 15 TABLET, FILM COATED ORAL at 20:24

## 2022-12-16 NOTE — PROGRESS NOTES
LOS: 12 days   Patient Care Team:  Qi Hicks APRN as PCP - General (Nurse Practitioner)    Subjective     Interval History: Improving    Patient Complaints: Improved exercise tolerance.  Cough nearly resolved    History taken from: patient    Review of Systems   Constitutional: Positive for activity change. Negative for appetite change, chills, diaphoresis, fatigue and fever.   HENT: Negative for facial swelling.    Eyes: Negative for visual disturbance.   Respiratory: Positive for shortness of breath. Negative for cough, wheezing and stridor.    Cardiovascular: Negative for chest pain, palpitations and leg swelling.   Gastrointestinal: Negative for abdominal pain, diarrhea and nausea.   Genitourinary: Negative for urgency.   Musculoskeletal: Negative for arthralgias.   Skin: Negative for rash.   Neurological: Negative for weakness.   Psychiatric/Behavioral: Negative for confusion.           Objective     Vital Signs  Temp:  [97.7 °F (36.5 °C)-98.6 °F (37 °C)] 98.6 °F (37 °C)  Heart Rate:  [83-95] 83  Resp:  [22-26] 25  BP: ()/(56-77) 90/59    Physical Exam:     General Appearance:    Alert, cooperative, in no acute distress,   Head:    Normocephalic, without obvious abnormality, atraumatic   Eyes:            Lids and lashes normal, conjunctivae and sclerae normal, no   icterus, no pallor, corneas clear, PERRLA   Ears:    Ears appear intact with no abnormalities noted   Throat:   No oral lesions, no thrush, oral mucosa moist   Neck:   No adenopathy, supple, trachea midline, no thyromegaly, no   carotid bruit, no JVD   Lungs:     Distant, few scattered wheezes    Heart:    Regular rhythm and normal rate, normal S1 and S2, no            murmur, no gallop, no rub, no click   Chest Wall:    No abnormalities observed   Abdomen:     Normal bowel sounds, no masses, no organomegaly, soft        Non-tender non-distended, no guarding,   Extremities:   Moves all extremities well, no edema, no cyanosis, no              Redness   Pulses:   Pulses palpable and equal bilaterally   Skin:   No bleeding, bruising or rash   Lymph nodes:   No palpable adenopathy   Neurologic:   Cranial nerves 2 - 12 grossly intact, sensation intact, DTR       present and equal bilaterally        Results Review:    Lab Results (last 24 hours)       Procedure Component Value Units Date/Time    POC Glucose Once [[id number]]  (Abnormal) Collected: 12/16/22 1112    Specimen: Blood Updated: 12/16/22 1113     Glucose 171 mg/dL      Comment: Serial Number: [id number]:  [id number]       Blood Culture - Blood, Arm, Left [[id number]]  (Normal) Collected: 12/11/22 0847    Specimen: Blood from Arm, Left Updated: 12/16/22 0900     Blood Culture No growth at 5 days    Narrative:      Less than seven (7) mL's of blood was collected.  Insufficient quantity may yield false negative results.    POC Glucose Once [[id number]]  (Abnormal) Collected: 12/16/22 0708    Specimen: Blood Updated: 12/16/22 0709     Glucose 110 mg/dL      Comment: Serial Number: [id number]:  [id number]       POC Glucose Once [[id number]]  (Abnormal) Collected: 12/16/22 0520    Specimen: Blood Updated: 12/16/22 0521     Glucose 119 mg/dL      Comment: Serial Number: [id number]:  [id number]       Manual Differential [[id number]]  (Abnormal) Collected: 12/16/22 0022    Specimen: Blood Updated: 12/16/22 0229     Neutrophil % 72.0 %      Lymphocyte % 20.0 %      Monocyte % 5.0 %      Bands %  3.0 %      Neutrophils Absolute 13.43 10*3/mm3      Lymphocytes Absolute 3.58 10*3/mm3      Monocytes Absolute 0.90 10*3/mm3      RBC Morphology Normal     WBC Morphology Normal     Platelet Estimate Adequate    CBC & Differential [[id number]]  (Abnormal) Collected: 12/16/22 0022    Specimen: Blood Updated: 12/16/22 0229    Narrative:      The following orders were created for panel order CBC & Differential.  Procedure                               Abnormality         Status                      ---------                               -----------         ------                     CBC Auto Differential[[id number]]        Abnormal            Final result               Scan Slide[[id number]]                                       Final result                 Please view results for these tests on the individual orders.    Scan Slide [[id number]] Collected: 12/16/22 0022    Specimen: Blood Updated: 12/16/22 0229     Scan Slide --     Comment: See Manual Differential Results       CBC Auto Differential [[id number]]  (Abnormal) Collected: 12/16/22 0022    Specimen: Blood Updated: 12/16/22 0229     WBC 17.90 10*3/mm3      RBC 4.13 10*6/mm3      Hemoglobin 12.4 g/dL      Hematocrit 37.7 %      MCV 91.4 fL      MCH 30.1 pg      MCHC 32.9 g/dL      RDW 14.9 %      RDW-SD 47.7 fl      MPV 8.5 fL      Platelets 287 10*3/mm3     Narrative:      Modified report. Previous result was Hemogram on 12/16/2022 at 0129 EST.  The previously reported component NRBC is no longer being reported. Previous result was 0.0 /100 WBC (Reference Range: 0.0-0.2 /100 WBC) on 12/16/2022 at 0129 EST.    Basic Metabolic Panel [[id number]]  (Abnormal) Collected: 12/16/22 0022    Specimen: Blood Updated: 12/16/22 0144     Glucose 95 mg/dL      BUN 15 mg/dL      Creatinine 0.54 mg/dL      Sodium 135 mmol/L      Potassium 4.6 mmol/L      Chloride 93 mmol/L      CO2 34.0 mmol/L      Calcium 9.2 mg/dL      BUN/Creatinine Ratio 27.8     Anion Gap 8.0 mmol/L      eGFR 112.0 mL/min/1.73      Comment: National Kidney Foundation and American Society of Nephrology (ASN) Task Force recommended calculation based on the Chronic Kidney Disease Epidemiology Collaboration (CKD-EPI) equation refit without adjustment for race.       Narrative:      GFR Normal >60  Chronic Kidney Disease <60  Kidney Failure <15      POC Glucose Once [[id number]]  (Abnormal) Collected: 12/15/22 1714    Specimen: Blood Updated: 12/15/22 1717     Glucose 115 mg/dL      Comment: Serial  Number: [id number]:  [id number]                Imaging Results (Last 24 Hours)       ** No results found for the last 24 hours. **                 I reviewed the patient's new clinical results.    Medication Review:   Scheduled Meds:arformoterol, 15 mcg, Nebulization, BID - RT  aspirin, 81 mg, Oral, Daily  bisoprolol-hydroCHLOROthiazide (ZIAC) 10-6.25 mg combo dose, , Oral, Q24H  budesonide, 0.5 mg, Nebulization, BID - RT  enoxaparin, 40 mg, Subcutaneous, Q24H  glipizide, 10 mg, Oral, BID AC  guaiFENesin, 600 mg, Oral, Q12H  insulin lispro, 2-9 Units, Subcutaneous, TID With Meals  lisinopril, 40 mg, Oral, Q PM  metFORMIN, 1,000 mg, Oral, BID With Meals  mirtazapine, 15 mg, Oral, Nightly  montelukast, 10 mg, Oral, Nightly  [START ON 12/17/2022] predniSONE, 10 mg, Oral, Daily  roflumilast, 500 mcg, Oral, Daily  sodium chloride, 3 mL, Intravenous, Q12H  theophylline, 300 mg, Oral, Q24H      Continuous Infusions:Pharmacy to Dose enoxaparin (LOVENOX),       PRN Meds:.•  acetaminophen  •  dextrose  •  dextrose  •  glucagon (human recombinant)  •  ipratropium-albuterol  •  LORazepam  •  nitroglycerin  •  ondansetron  •  Pharmacy to Dose enoxaparin (LOVENOX)  •  [COMPLETED] Insert Peripheral IV **AND** sodium chloride  •  sodium chloride  •  sodium chloride     Assessment & Plan       COPD with acute exacerbation (HCC)  Acute hypoxemia  DM-II  Essential hypertension    All conditions are improving.  Continue to wean oxygen.  Pt is considering skilled rehab.    Plan for disposition:SAMSON Singh MD  12/16/22  16:43 EST

## 2022-12-16 NOTE — THERAPY TREATMENT NOTE
Subjective: Pt agreeable to therapeutic plan of care.     Objective:     Bed mobility - Independent  Transfers - Modified-Independent     STS x 10     Ambulation - 50 feet Supervision    Seated therex: x 10 each   Ankle pumps  LAQ  Marching in place      Vitals: Desaturates    Pain: 0 VAS   Location: n/a  Intervention for pain: N/A    Education: home exercise program: seated exercises, sit to stands    Assessment: Maximino Bhatia  is a 61 y/o M who presented with progressive SOA with evidence of AE COPD. Diagnosed with influenza A. He is independent and semi-retired. He lives alone in a one story home with no SYLVIA. He is not on supp o2 at baseline. He is on 8L high flow currently and has not been able to wean down over the last few days. He was able to progress to increased ambulation distance, seated exercises and sit to stands. Sao2 >86% throughout on 8L. Provided with HEP to work on over the weekend. Changing recs to SNF due to high O2 needs that have not improved and are limiting activity tolerance. Patient is agreeable. Will continue to follow and progress as tolerated.     Plan/Recommendations:   Moderate Intensity Therapy recommended post-acute care. This is recommended as therapy feels the patient would require 3-4 days per week and wouldn't tolerate \"3 hour daily\" rehab intensity. SNF would be the preferred choice. If the patient does not agree to SNF, arrange HH or OP depending on home bound status. If patient is medically complex, consider LTACH.. Pt requires no DME at discharge.     Pt desires Skilled Rehab placement at discharge. Pt cooperative; agreeable to therapeutic recommendations and plan of care.         Basic Mobility 6-click:  Rollin = Total, A lot = 2, A little = 3; 4 = None  Supine>Sit:   1 = Total, A lot = 2, A little = 3; 4 = None   Sit>Stand with arms:  1 = Total, A lot = 2, A little = 3; 4 = None  Bed>Chair:   1 = Total, A lot = 2, A little = 3; 4 = None  Ambulate in room:  1 =  Total, A lot = 2, A little = 3; 4 = None  3-5 Steps with railin = Total, A lot = 2, A little = 3; 4 = None  Score: 19      Post-Tx Position: Up in Chair and Call light and personal items within reach  PPE: gloves, surgical mask and gown

## 2022-12-16 NOTE — PLAN OF CARE
Goal Outcome Evaluation:  Plan of Care Reviewed With: patient           Maximino Bhatia  is a 61 y/o M who presented with progressive SOA with evidence of AE COPD. Diagnosed with influenza A. He is independent and semi-retired. He lives alone in a one story home with no SYLVIA. He is not on supp o2 at baseline. He is on 8L high flow currently and has not been able to wean down over the last few days. He was able to progress to increased ambulation distance, seated exercises and sit to stands. Sao2 >86% throughout on 8L. Provided with HEP to work on over the weekend. Changing recs to SNF due to high O2 needs that have not improved and are limiting activity tolerance. Patient is agreeable. Will continue to follow and progress as tolerated.

## 2022-12-16 NOTE — PLAN OF CARE
Assessment: Maximino Bhatia presents with ADL impairments below baseline abilities which indicate the need for continued skilled intervention while inpatient. Pt noted to have decreased balance this date, appearing unsteady with ambulation. Pt required CGA for ambulation, however pt cautious when ambulating for increased safety awareness. Pt completes toileting tasks with SBA for increased safety. Pt desaturates with activity this date, which limits his participation in ADLs. Pt required extended time to recover. OT changing d/c recommendation to SNF when medically appropriate for d/c for safety d/t increased O2 requirements and decreased balance. Tolerating session today without incident. Will continue to follow and progress as tolerated.     Plan/Recommendations:   Moderate Intensity Therapy recommended post-acute care. This is recommended as therapy feels the patient would require 3-4 days per week and wouldn't tolerate \"3 hour daily\" rehab intensity. SNF would be the preferred choice. If the patient does not agree to SNF, arrange HH or OP depending on home bound status. If patient is medically complex, consider LTACH.. Pt requires no DME at discharge.     Pt desires Skilled Rehab placement at discharge. Pt cooperative; agreeable to therapeutic recommendations and plan of care.

## 2022-12-16 NOTE — THERAPY TREATMENT NOTE
Subjective: Pt agreeable to therapeutic plan of care. Pt sitting edge of bed upon arrival   Cognition: oriented to Person, Place, Time and Situation    Objective:     Bed Mobility: Supervision   Functional Transfers: CGA  Functional Ambulation: CGA    Toileting: SBA  ADL Position: supported sitting  ADL Comments: pt performed halie hygiene with SBA       Vitals: Desaturates   Pt desaturated with activity this date, requiring extended time to recover     Pain: 0 VAS  Location: N/A  Interventions for pain: N/A  Education: Provided education on the importance of mobility in the acute care setting Pt educated on PLB to increase O2 sats >90%.     Assessment: Maximino Bhatia presents with ADL impairments below baseline abilities which indicate the need for continued skilled intervention while inpatient. Pt noted to have decreased balance this date, appearing unsteady with ambulation. Pt required CGA for ambulation, however pt cautious when ambulating for increased safety awareness. Pt completes toileting tasks with SBA for increased safety. Pt desaturates with activity this date, which limits his participation in ADLs. Pt required extended time to recover. OT changing d/c recommendation to SNF when medically appropriate for d/c for safety d/t increased O2 requirements and decreased balance. Tolerating session today without incident. Will continue to follow and progress as tolerated.     Plan/Recommendations:   Moderate Intensity Therapy recommended post-acute care. This is recommended as therapy feels the patient would require 3-4 days per week and wouldn't tolerate \"3 hour daily\" rehab intensity. SNF would be the preferred choice. If the patient does not agree to SNF, arrange HH or OP depending on home bound status. If patient is medically complex, consider LTACH.. Pt requires no DME at discharge.     Pt desires Skilled Rehab placement at discharge. Pt cooperative; agreeable to therapeutic recommendations and plan of care.      Modified Koby: N/A = No pre-op stroke/TIA    Post-Tx Position: Up in Chair, Alarms activated and Call light and personal items within reach  PPE: gloves and surgical mask

## 2022-12-16 NOTE — PROGRESS NOTES
Nutrition Services    Patient Name: Maximino Bhatia  YOB: 1960  MRN: [id number]  Admission date: 12/3/2022    PPE Documentation        PPE Worn By Provider Did not enter room for this encounter   PPE Worn By Patient  N/A     PROGRESS NOTE      Encounter Information: Check on for PO intakes.  On 5L NC overnight.         PO Diet: Diet: Diabetic Diets; Consistent Carbohydrate; Texture: Regular Texture (IDDSI 7); Fluid Consistency: Thin (IDDSI 0)   PO Supplements: Boost Plus TID    PO Intake:  85% average PO intakes since last full review        Current nutrition support:    Nutrition support review:        Labs (reviewed below): Hyponatremia        GI Function:  Last BM 12/15 (yesterday)       Nutrition Intervention Updates: Continue current diet, supplement and encourage good PO intakes.       Results from last 7 days   Lab Units 12/16/22  0022 12/15/22  0033 12/14/22  1326   SODIUM mmol/L 135* 136 131*   POTASSIUM mmol/L 4.6 4.8 5.3*   CHLORIDE mmol/L 93* 94* 91*   CO2 mmol/L 34.0* 33.0* 32.0*   BUN mg/dL 15 18 26*   CREATININE mg/dL 0.54* 0.45* 0.52*   CALCIUM mg/dL 9.2 8.9 9.0   GLUCOSE mg/dL 95 59* 96     Results from last 7 days   Lab Units 12/16/22  0022   HEMOGLOBIN g/dL 12.4*   HEMATOCRIT % 37.7     COVID19   Date Value Ref Range Status   12/03/2022 Not Detected Not Detected - Ref. Range Final     No results found for: HGBA1C      RD to follow up per protocol.    Electronically signed by:  Bobbi Cazares RD  12/16/22 08:15 EST

## 2022-12-16 NOTE — PROGRESS NOTES
Daily Progress Note        COPD with acute exacerbation (HCC)      Assessment    COPD with acute exacerbation (HCC)  Acute hypoxemic/hypercapnic respiratory failure  Pneumonia: Unidentified organism  Influenza A infection    CT chest 12/5/2022: mild bibasilar infection, severe emphysema    Hyperkalemia  Leukocytosis    DM  HTN  Tob smoking     Recommendations:     Discussion with patient at bedside regarding discharge planning.  Encourage short-term acute rehab    oxygen supplement and titration to maintain saturation 90 to 95%: Currently requiring 6 to 8 L high flow  -Patient does not use oxygen at home  -We will need 6-minute walk prior to discharge    Daliresp and theophylline  Mucinex twice daily  Singulair  Bronchodilatores\inhaled corticosteroid  ASA  BP control  prednisone 6-day taper  DVT/GI prophylaxis  Check daily labs and correct electrolytes as needed    Completed 5-day course of Tamiflu  Completed course of cefepime     Will need work-up for obstructive sleep apnea as outpatient          LOS: 12 days     Subjective         Objective     Vital signs for last 24 hours:  Vitals:    12/15/22 1955 12/15/22 2114 12/16/22 0111 12/16/22 0509   BP:  91/62 99/62 95/65   BP Location:  Left leg  Left leg   Patient Position:  Lying  Lying   Pulse: 85 84 86 95   Resp: 22 22 26 23   Temp:  97.7 °F (36.5 °C) 98.1 °F (36.7 °C) 98.2 °F (36.8 °C)   TempSrc:  Oral Oral Oral   SpO2: (!) 89% 95% 95% 97%   Weight:    87.9 kg (193 lb 12.6 oz)   Height:           Intake/Output last 3 shifts:  I/O last 3 completed shifts:  In: 2320 [P.O.:1320; I.V.:1000]  Out: 4525 [Urine:4525]  Intake/Output this shift:  No intake/output data recorded.      Radiology  Imaging Results (Last 24 Hours)     ** No results found for the last 24 hours. **          Labs:  Results from last 7 days   Lab Units 12/16/22  0022   WBC 10*3/mm3 17.90*   HEMOGLOBIN g/dL 12.4*   HEMATOCRIT % 37.7   PLATELETS 10*3/mm3 287     Results from last 7 days   Lab Units  12/16/22  0022   SODIUM mmol/L 135*   POTASSIUM mmol/L 4.6   CHLORIDE mmol/L 93*   CO2 mmol/L 34.0*   BUN mg/dL 15   CREATININE mg/dL 0.54*   CALCIUM mg/dL 9.2   GLUCOSE mg/dL 95     Results from last 7 days   Lab Units 12/10/22  2316   PH, ARTERIAL pH units 7.294*   PO2 ART mm Hg 76.8*   PCO2, ARTERIAL mm Hg 84.1*   HCO3 ART mmol/L 40.8*                                   Meds:   SCHEDULE  arformoterol, 15 mcg, Nebulization, BID - RT  aspirin, 81 mg, Oral, Daily  bisoprolol-hydroCHLOROthiazide (ZIAC) 10-6.25 mg combo dose, , Oral, Q24H  budesonide, 0.5 mg, Nebulization, BID - RT  enoxaparin, 40 mg, Subcutaneous, Q24H  glipizide, 10 mg, Oral, BID AC  guaiFENesin, 600 mg, Oral, Q12H  insulin lispro, 2-9 Units, Subcutaneous, TID With Meals  lisinopril, 40 mg, Oral, Q PM  metFORMIN, 1,000 mg, Oral, BID With Meals  mirtazapine, 15 mg, Oral, Nightly  montelukast, 10 mg, Oral, Nightly  predniSONE, 20 mg, Oral, Daily   Followed by  [START ON 12/17/2022] predniSONE, 10 mg, Oral, Daily  roflumilast, 500 mcg, Oral, Daily  sodium chloride, 3 mL, Intravenous, Q12H  theophylline, 300 mg, Oral, Q24H      Infusions  Pharmacy to Dose enoxaparin (LOVENOX),       PRNs  •  acetaminophen  •  dextrose  •  dextrose  •  glucagon (human recombinant)  •  ipratropium-albuterol  •  LORazepam  •  nitroglycerin  •  ondansetron  •  Pharmacy to Dose enoxaparin (LOVENOX)  •  [COMPLETED] Insert Peripheral IV **AND** sodium chloride  •  sodium chloride  •  sodium chloride    Physical Exam:  Physical Exam  Vitals reviewed.   Pulmonary:      Effort: Pulmonary effort is normal.      Breath sounds: Rhonchi present.   Skin:     General: Skin is warm and dry.   Neurological:      Mental Status: He is alert and oriented to person, place, and time.         ROS  Review of Systems   Respiratory: Positive for cough and shortness of breath.        I have reviewed the patient's new clinical results.    Electronically signed by Caleb Almazan MD

## 2022-12-16 NOTE — DISCHARGE PLACEMENT REQUEST
Maximino Bhatia (62 y.o. Male)     Date of Birth   1960    Social Security Number       Address   [address] [place] [address] [place] [address]    Home Phone   [phone]    MRN   [id number]       Anabaptism   None    Marital Status   Single                            Admission Date   12/3/22    Admission Type   Emergency    Admitting Provider   James Craig MD    Attending Provider   Judit Singh MD    Department, Room/Bed   [facility], 2116/1       Discharge Date       Discharge Disposition       Discharge Destination                               Attending Provider: Judit Singh MD    Allergies: No Known Allergies    Isolation: Droplet   Infection: Influenza (12/03/22)   Code Status: CPR    Ht: 177.8 cm (70\")   Wt: 87.9 kg (193 lb 12.6 oz)    Admission Cmt: None   Principal Problem: COPD with acute exacerbation (HCC) [J44.1]                 Active Insurance as of 12/3/2022     Primary Coverage     Payor Plan Insurance Group Employer/Plan Group    [id number]     Payor Plan Address Payor Plan Phone Number Payor Plan Fax Number Effective Dates    MAIL STOP:   4/8/2020 - None Entered    [address] [address]       [facility] [address]       Subscriber Name Subscriber Birth Date Member ID       MAXIMINO BHATIA 1960 [id number]                 Emergency Contacts      (Rel.) Home Phone Work Phone Mobile Phone    HELENERIN (Friend) [phone] -- --

## 2022-12-16 NOTE — PLAN OF CARE
Goal Outcome Evaluation:      Patient on 5L HF most of the day and tolerating well. He has been up sitting in the chair most of the day and was able to work with therapy today and agreeable to going to inpt rehab after DC. Referral sent to [facility] by  per patient request. VSS and no complaints of soa or CP at this time.   Problem: Adult Inpatient Plan of Care  Goal: Plan of Care Review  Outcome: Ongoing, Progressing  Goal: Patient-Specific Goal (Individualized)  Outcome: Ongoing, Progressing  Goal: Absence of Hospital-Acquired Illness or Injury  Outcome: Ongoing, Progressing  Intervention: Identify and Manage Fall Risk  Recent Flowsheet Documentation  Taken 12/16/2022 1230 by Maria Wong, RN  Safety Promotion/Fall Prevention:   nonskid shoes/slippers when out of bed   room organization consistent   safety round/check completed   fall prevention program maintained  Taken 12/16/2022 0815 by Maria Wong, RN  Safety Promotion/Fall Prevention: fall prevention program maintained  Goal: Optimal Comfort and Wellbeing  Outcome: Ongoing, Progressing  Goal: Readiness for Transition of Care  Outcome: Ongoing, Progressing

## 2022-12-16 NOTE — CASE MANAGEMENT/SOCIAL WORK
Continued Stay Note  [facility]     Patient Name: Maximino Bhatia  MRN: [id number]  Today's Date: 12/16/2022    Admit Date: 12/3/2022    Plan: D/C Plan: [facility] referral pending acceptance. Will require precert. PASRR per facility.   Discharge Plan     Row Name 12/16/22 1541       Plan    Plan D/C Plan: [facility] referral pending acceptance. Will require precert. PASRR per facility.    Patient/Family in Agreement with Plan yes    Provided Post Acute Provider List? Yes    Post Acute Provider List Inpatient Rehab    Delivered To Patient    Method of Delivery In person    Plan Comments  spoke to patient at bedside wearing mask and keeping distance greater than 6 feet and spent less than 15 minutes in room. Patient agreeable to rehab and [facility] referral due to current O2 needs. [facility] liaison notified, referral pending acceptance, will require precert. CM contacted [facility]) liaisonCornelia, to review as alternate option pending O2 needs, pulmonology discussed LTACH with patient per RN. Barrier to D/C: pulmonology following, 5L HF O2.                    Expected Discharge Date and Time     Expected Discharge Date Expected Discharge Time    Dec 20, 2022         Met with patient in room wearing PPE: mask.    Maintained distance greater than six feet and spent less than 15 minutes in the room.    Kerri Huynh, AJAYN, RN    [address] [address]    Office: [phone]  Fax: [phone]

## 2022-12-16 NOTE — PLAN OF CARE
Goal Outcome Evaluation:  Plan of Care Reviewed With: patient        Progress: improving  Outcome Evaluation: Patient 5L NC overnight. Continue to monitor

## 2022-12-17 PROBLEM — J10.1 INFLUENZA A: Status: ACTIVE | Noted: 2022-12-17

## 2022-12-17 PROBLEM — J15.9 BACTERIAL PNEUMONIA: Status: ACTIVE | Noted: 2022-12-17

## 2022-12-17 PROBLEM — J96.01 ACUTE HYPOXEMIC RESPIRATORY FAILURE (HCC): Status: ACTIVE | Noted: 2022-12-17

## 2022-12-17 PROBLEM — D72.829 LEUKOCYTOSIS: Status: ACTIVE | Noted: 2022-12-17

## 2022-12-17 PROBLEM — I10 DIABETES MELLITUS WITH COINCIDENT HYPERTENSION: Status: ACTIVE | Noted: 2022-12-17

## 2022-12-17 PROBLEM — E11.9 DIABETES MELLITUS WITH COINCIDENT HYPERTENSION: Status: ACTIVE | Noted: 2022-12-17

## 2022-12-17 LAB
DEPRECATED RDW RBC AUTO: 46.8 FL (ref 37–54)
EOSINOPHIL # BLD MANUAL: 0.24 10*3/MM3 (ref 0–0.4)
EOSINOPHIL NFR BLD MANUAL: 1 % (ref 0.3–6.2)
ERYTHROCYTE [DISTWIDTH] IN BLOOD BY AUTOMATED COUNT: 14.7 % (ref 12.3–15.4)
GLUCOSE BLDC GLUCOMTR-MCNC: 131 MG/DL (ref 70–105)
GLUCOSE BLDC GLUCOMTR-MCNC: 143 MG/DL (ref 70–105)
GLUCOSE BLDC GLUCOMTR-MCNC: 61 MG/DL (ref 70–105)
GLUCOSE BLDC GLUCOMTR-MCNC: 97 MG/DL (ref 70–105)
HCT VFR BLD AUTO: 39.4 % (ref 37.5–51)
HGB BLD-MCNC: 12.9 G/DL (ref 13–17.7)
LYMPHOCYTES # BLD MANUAL: 2.64 10*3/MM3 (ref 0.7–3.1)
LYMPHOCYTES NFR BLD MANUAL: 4 % (ref 5–12)
MCH RBC QN AUTO: 29.9 PG (ref 26.6–33)
MCHC RBC AUTO-ENTMCNC: 32.7 G/DL (ref 31.5–35.7)
MCV RBC AUTO: 91.4 FL (ref 79–97)
MONOCYTES # BLD: 0.96 10*3/MM3 (ref 0.1–0.9)
MYELOCYTES NFR BLD MANUAL: 1 % (ref 0–0)
NEUTROPHILS # BLD AUTO: 19.92 10*3/MM3 (ref 1.7–7)
NEUTROPHILS NFR BLD MANUAL: 77 % (ref 42.7–76)
NEUTS BAND NFR BLD MANUAL: 6 % (ref 0–5)
PLAT MORPH BLD: NORMAL
PLATELET # BLD AUTO: 295 10*3/MM3 (ref 140–450)
PMV BLD AUTO: 8.4 FL (ref 6–12)
RBC # BLD AUTO: 4.31 10*6/MM3 (ref 4.14–5.8)
RBC MORPH BLD: NORMAL
SCAN SLIDE: NORMAL
TOXIC GRANULATION: ABNORMAL
VARIANT LYMPHS NFR BLD MANUAL: 11 % (ref 19.6–45.3)
WBC NRBC COR # BLD: 24 10*3/MM3 (ref 3.4–10.8)

## 2022-12-17 PROCEDURE — 94664 DEMO&/EVAL PT USE INHALER: CPT

## 2022-12-17 PROCEDURE — 25010000002 ENOXAPARIN PER 10 MG: Performed by: NURSE PRACTITIONER

## 2022-12-17 PROCEDURE — 82962 GLUCOSE BLOOD TEST: CPT

## 2022-12-17 PROCEDURE — 94761 N-INVAS EAR/PLS OXIMETRY MLT: CPT

## 2022-12-17 PROCEDURE — 94799 UNLISTED PULMONARY SVC/PX: CPT

## 2022-12-17 PROCEDURE — 94660 CPAP INITIATION&MGMT: CPT

## 2022-12-17 PROCEDURE — 63710000001 PREDNISONE PER 5 MG

## 2022-12-17 PROCEDURE — 97116 GAIT TRAINING THERAPY: CPT

## 2022-12-17 PROCEDURE — 85025 COMPLETE CBC W/AUTO DIFF WBC: CPT | Performed by: NURSE PRACTITIONER

## 2022-12-17 PROCEDURE — 85007 BL SMEAR W/DIFF WBC COUNT: CPT | Performed by: NURSE PRACTITIONER

## 2022-12-17 RX ORDER — LISINOPRIL 20 MG/1
20 TABLET ORAL EVERY EVENING
Status: DISCONTINUED | OUTPATIENT
Start: 2022-12-17 | End: 2022-12-18

## 2022-12-17 RX ADMIN — MIRTAZAPINE 15 MG: 15 TABLET, FILM COATED ORAL at 20:10

## 2022-12-17 RX ADMIN — BUDESONIDE 0.5 MG: 0.5 INHALANT RESPIRATORY (INHALATION) at 19:03

## 2022-12-17 RX ADMIN — METFORMIN HYDROCHLORIDE 1000 MG: 500 TABLET ORAL at 08:18

## 2022-12-17 RX ADMIN — Medication 3 ML: at 20:10

## 2022-12-17 RX ADMIN — ASPIRIN 81 MG: 81 TABLET, COATED ORAL at 08:17

## 2022-12-17 RX ADMIN — PREDNISONE 10 MG: 10 TABLET ORAL at 08:18

## 2022-12-17 RX ADMIN — ENOXAPARIN SODIUM 40 MG: 100 INJECTION SUBCUTANEOUS at 18:04

## 2022-12-17 RX ADMIN — BISOPROLOL FUMARATE: 5 TABLET ORAL at 08:17

## 2022-12-17 RX ADMIN — LISINOPRIL 20 MG: 20 TABLET ORAL at 18:03

## 2022-12-17 RX ADMIN — ARFORMOTEROL TARTRATE 15 MCG: 15 SOLUTION RESPIRATORY (INHALATION) at 19:03

## 2022-12-17 RX ADMIN — BUDESONIDE 0.5 MG: 0.5 INHALANT RESPIRATORY (INHALATION) at 06:45

## 2022-12-17 RX ADMIN — ROFLUMILAST 500 MCG: 500 TABLET ORAL at 08:18

## 2022-12-17 RX ADMIN — THEOPHYLLINE ANHYDROUS 300 MG: 300 CAPSULE, EXTENDED RELEASE ORAL at 08:17

## 2022-12-17 RX ADMIN — GLIPIZIDE 10 MG: 5 TABLET ORAL at 08:18

## 2022-12-17 RX ADMIN — GUAIFENESIN 600 MG: 600 TABLET, EXTENDED RELEASE ORAL at 08:18

## 2022-12-17 RX ADMIN — ARFORMOTEROL TARTRATE 15 MCG: 15 SOLUTION RESPIRATORY (INHALATION) at 06:40

## 2022-12-17 RX ADMIN — Medication 3 ML: at 08:19

## 2022-12-17 RX ADMIN — MONTELUKAST 10 MG: 10 TABLET, FILM COATED ORAL at 20:10

## 2022-12-17 RX ADMIN — GUAIFENESIN 600 MG: 600 TABLET, EXTENDED RELEASE ORAL at 20:10

## 2022-12-17 NOTE — PLAN OF CARE
Goal Outcome Evaluation:                Pt is resting at this time, no complaints of pain, vss, no new orders, plan of care continues

## 2022-12-17 NOTE — THERAPY TREATMENT NOTE
Subjective: Pt agreeable to therapeutic plan of care.    Objective:     Bed mobility - Independent  Transfers - Modified-Independent  Ambulation - 60 feet CGA    Vitals: 6L hf, sats 91%. BP 91/61  Pain: 0 VAS     Education: Provided education on the importance of mobility in the acute care setting, Verbal/Tactile Cues, ADL training and Gait Training    Assessment: Maximino Bhatia presents with functional mobility impairments which indicate the need for skilled intervention. Tolerating session today without incident. Pt lives alone and still is getting fatigued and easily SOA. Would benefit from rehab to improve overall endurance and mobility.  Will continue to follow and progress as tolerated.     Plan/Recommendations:   Moderate Intensity Therapy recommended post-acute care. This is recommended as therapy feels the patient would require 3-4 days per week and wouldn't tolerate \"3 hour daily\" rehab intensity. SNF would be the preferred choice. If the patient does not agree to SNF, arrange HH or OP depending on home bound status. If patient is medically complex, consider LTACH.. Pt requires no DME at discharge.     Pt desires Skilled Rehab placement at discharge. Pt cooperative; agreeable to therapeutic recommendations and plan of care.         Basic Mobility 6-click:  Rollin = Total, A lot = 2, A little = 3; 4 = None  Supine>Sit:   1 = Total, A lot = 2, A little = 3; 4 = None   Sit>Stand with arms:  1 = Total, A lot = 2, A little = 3; 4 = None  Bed>Chair:   1 = Total, A lot = 2, A little = 3; 4 = None  Ambulate in room:  1 = Total, A lot = 2, A little = 3; 4 = None  3-5 Steps with railin = Total, A lot = 2, A little = 3; 4 = None  Score: 21    Post-Tx Position: Up in Chair and Call light and personal items within reach  PPE: gloves and surgical mask

## 2022-12-17 NOTE — PLAN OF CARE
Assessment: Maximino Bhatia presents with functional mobility impairments which indicate the need for skilled intervention. Tolerating session today without incident. Pt lives alone and still is getting fatigued and easily SOA. Would benefit from rehab to improve overall endurance and mobility.  Will continue to follow and progress as tolerated.

## 2022-12-17 NOTE — PROGRESS NOTES
Daily Progress Note        COPD with acute exacerbation (HCC)      Assessment    COPD with acute exacerbation (HCC)  Acute hypoxemic/hypercapnic respiratory failure  Pneumonia: Unidentified organism  Influenza A infection    CT chest 12/5/2022: mild bibasilar infection, severe emphysema    Hyperkalemia  Leukocytosis    DM  HTN  Tob smoking     Recommendations:       oxygen supplement and titration to maintain saturation 90 to 95%: Currently requiring 6 to 8 L high flow  -Patient does not use oxygen at home  -We will need 6-minute walk prior to discharge    Daliresp and theophylline  Mucinex twice daily  Singulair  Bronchodilatores\inhaled corticosteroid  ASA  BP control  prednisone 6-day taper  DVT/GI prophylaxis  Check daily labs and correct electrolytes as needed    Completed 5-day course of Tamiflu  Completed course of cefepime     Will need work-up for obstructive sleep apnea as outpatient          LOS: 13 days     Subjective         Objective     Vital signs for last 24 hours:  Vitals:    12/17/22 0644 12/17/22 0645 12/17/22 0649 12/17/22 1033   BP:    97/55   BP Location:       Patient Position:       Pulse: 89 89 91 86   Resp: 21 21 21 18   Temp:    97.3 °F (36.3 °C)   TempSrc:    Oral   SpO2: 94% 93% 93% 94%   Weight: 87.6 kg (193 lb 2 oz)      Height:           Intake/Output last 3 shifts:  I/O last 3 completed shifts:  In: 1720 [P.O.:1720]  Out: 4675 [Urine:4675]  Intake/Output this shift:  I/O this shift:  In: 480 [P.O.:480]  Out: -       Radiology  Imaging Results (Last 24 Hours)     ** No results found for the last 24 hours. **          Labs:  Results from last 7 days   Lab Units 12/17/22  0948   WBC 10*3/mm3 24.00*   HEMOGLOBIN g/dL 12.9*   HEMATOCRIT % 39.4   PLATELETS 10*3/mm3 295     Results from last 7 days   Lab Units 12/16/22  0022   SODIUM mmol/L 135*   POTASSIUM mmol/L 4.6   CHLORIDE mmol/L 93*   CO2 mmol/L 34.0*   BUN mg/dL 15   CREATININE mg/dL 0.54*   CALCIUM mg/dL 9.2   GLUCOSE mg/dL 95      Results from last 7 days   Lab Units 12/10/22  2316   PH, ARTERIAL pH units 7.294*   PO2 ART mm Hg 76.8*   PCO2, ARTERIAL mm Hg 84.1*   HCO3 ART mmol/L 40.8*                                   Meds:   SCHEDULE  arformoterol, 15 mcg, Nebulization, BID - RT  aspirin, 81 mg, Oral, Daily  bisoprolol-hydroCHLOROthiazide (ZIAC) 10-6.25 mg combo dose, , Oral, Q24H  budesonide, 0.5 mg, Nebulization, BID - RT  enoxaparin, 40 mg, Subcutaneous, Q24H  glipizide, 10 mg, Oral, BID AC  guaiFENesin, 600 mg, Oral, Q12H  insulin lispro, 2-9 Units, Subcutaneous, TID With Meals  lisinopril, 40 mg, Oral, Q PM  metFORMIN, 1,000 mg, Oral, BID With Meals  mirtazapine, 15 mg, Oral, Nightly  montelukast, 10 mg, Oral, Nightly  predniSONE, 10 mg, Oral, Daily  roflumilast, 500 mcg, Oral, Daily  sodium chloride, 3 mL, Intravenous, Q12H  theophylline, 300 mg, Oral, Q24H      Infusions  Pharmacy to Dose enoxaparin (LOVENOX),       PRNs  •  acetaminophen  •  dextrose  •  dextrose  •  glucagon (human recombinant)  •  ipratropium-albuterol  •  LORazepam  •  nitroglycerin  •  ondansetron  •  Pharmacy to Dose enoxaparin (LOVENOX)  •  [COMPLETED] Insert Peripheral IV **AND** sodium chloride  •  sodium chloride  •  sodium chloride    Physical Exam:  Physical Exam  Vitals reviewed.   Pulmonary:      Effort: Pulmonary effort is normal.      Breath sounds: Rhonchi present.   Skin:     General: Skin is warm and dry.   Neurological:      Mental Status: He is alert and oriented to person, place, and time.         ROS  Review of Systems   Respiratory: Positive for cough and shortness of breath.        I have reviewed the patient's new clinical results.    Electronically signed by Caleb Almazan MD

## 2022-12-17 NOTE — PROGRESS NOTES
LOS: 13 days   Patient Care Team:  Qi Hicks APRN as PCP - General (Nurse Practitioner)    Subjective     Interval History:    Patient Complaints:  Improving- rare cough - moving about more in room     History taken from: patient    Review of Systems   Constitutional: Positive for activity change. Negative for appetite change and fever.   HENT: Negative for trouble swallowing.    Eyes: Negative for visual disturbance.   Respiratory: Positive for shortness of breath (with much activity ). Negative for cough.    Cardiovascular: Negative for chest pain, palpitations and leg swelling.   Gastrointestinal: Negative for abdominal pain, diarrhea, nausea and vomiting.   Genitourinary: Negative for difficulty urinating.   Musculoskeletal: Positive for arthralgias.   Neurological: Positive for weakness.   Psychiatric/Behavioral: Negative for confusion.           Objective     Vital Signs  Temp:  [97.3 °F (36.3 °C)-98.6 °F (37 °C)] 97.3 °F (36.3 °C)  Heart Rate:  [75-91] 86  Resp:  [18-28] 18  BP: ()/(55-72) 97/55    Physical Exam:     General Appearance:    Alert, cooperative, in no acute distress,   Head:    Normocephalic, without obvious abnormality, atraumatic   Eyes:            Lids and lashes normal, conjunctivae and sclerae normal, no   icterus, no pallor, corneas clear, PERRLA   Ears:    Ears appear intact with no abnormalities noted   Throat:   No oral lesions, no thrush, oral mucosa moist   Neck:   No adenopathy, supple, trachea midline, no thyromegaly, no   carotid bruit, no JVD   Lungs:     Dim bases o/w clear ,respirations regular, even and                  unlabored    Heart:    Regular rhythm and normal rate, normal S1 and S2, no            murmur, no gallop, no rub, no click   Chest Wall:    No abnormalities observed   Abdomen:     Normal bowel sounds, no masses, no organomegaly, soft        Non-tender non-distended, no guarding,   Extremities:   Moves all extremities well, no edema, no cyanosis,  no             Redness   Pulses:   Pulses palpable and equal bilaterally   Skin:   No bleeding, bruising or rash   Lymph nodes:   No palpable adenopathy   Neurologic:   Cranial nerves 2 - 12 grossly intact, sensation intact, DTR       present and equal bilaterally        Results Review:    Lab Results (last 24 hours)     Procedure Component Value Units Date/Time    POC Glucose Once [[id number]]  (Abnormal) Collected: 12/17/22 1251    Specimen: Blood Updated: 12/17/22 1252     Glucose 143 mg/dL      Comment: Serial Number: [id number]:  [id number]       POC Glucose Once [[id number]]  (Abnormal) Collected: 12/17/22 1207    Specimen: Blood Updated: 12/17/22 1208     Glucose 61 mg/dL      Comment: Serial Number: [id number]:  [id number]       Manual Differential [[id number]]  (Abnormal) Collected: 12/17/22 0948    Specimen: Blood Updated: 12/17/22 1103     Neutrophil % 77.0 %      Lymphocyte % 11.0 %      Monocyte % 4.0 %      Eosinophil % 1.0 %      Bands %  6.0 %      Myelocyte % 1.0 %      Neutrophils Absolute 19.92 10*3/mm3      Lymphocytes Absolute 2.64 10*3/mm3      Monocytes Absolute 0.96 10*3/mm3      Eosinophils Absolute 0.24 10*3/mm3      RBC Morphology Normal     Toxic Granulation Slight/1+     Platelet Morphology Normal    CBC & Differential [[id number]]  (Abnormal) Collected: 12/17/22 0948    Specimen: Blood Updated: 12/17/22 1103    Narrative:      The following orders were created for panel order CBC & Differential.  Procedure                               Abnormality         Status                     ---------                               -----------         ------                     CBC Auto Differential[[id number]]        Abnormal            Final result               Scan Slide[[id number]]                                       Final result                 Please view results for these tests on the individual orders.    Scan Slide [[id number]] Collected: 12/17/22 0948    Specimen: Blood Updated:  12/17/22 1103     Scan Slide --     Comment: See Manual Differential Results       CBC Auto Differential [[id number]]  (Abnormal) Collected: 12/17/22 0948    Specimen: Blood Updated: 12/17/22 1103     WBC 24.00 10*3/mm3      RBC 4.31 10*6/mm3      Hemoglobin 12.9 g/dL      Hematocrit 39.4 %      MCV 91.4 fL      MCH 29.9 pg      MCHC 32.7 g/dL      RDW 14.7 %      RDW-SD 46.8 fl      MPV 8.4 fL      Platelets 295 10*3/mm3     Narrative:      Modified report. Previous result was Hemogram on 12/17/2022 at 1024 EST.  The previously reported component NRBC is no longer being reported. Previous result was 0.0 /100 WBC (Reference Range: 0.0-0.2 /100 WBC) on 12/17/2022 at 1024 EST.    POC Glucose Once [[id number]]  (Abnormal) Collected: 12/17/22 0712    Specimen: Blood Updated: 12/17/22 0713     Glucose 131 mg/dL      Comment: Serial Number: [id number]:  [id number]       POC Glucose Once [[id number]]  (Abnormal) Collected: 12/16/22 1649    Specimen: Blood Updated: 12/16/22 1650     Glucose 174 mg/dL      Comment: Serial Number: [id number]:  [id number]              Imaging Results (Last 24 Hours)     ** No results found for the last 24 hours. **               I reviewed the patient's new clinical results.    Medication Review:   Scheduled Meds:arformoterol, 15 mcg, Nebulization, BID - RT  aspirin, 81 mg, Oral, Daily  bisoprolol-hydroCHLOROthiazide (ZIAC) 10-6.25 mg combo dose, , Oral, Q24H  budesonide, 0.5 mg, Nebulization, BID - RT  enoxaparin, 40 mg, Subcutaneous, Q24H  glipizide, 10 mg, Oral, BID AC  guaiFENesin, 600 mg, Oral, Q12H  insulin lispro, 2-9 Units, Subcutaneous, TID With Meals  lisinopril, 40 mg, Oral, Q PM  metFORMIN, 1,000 mg, Oral, BID With Meals  mirtazapine, 15 mg, Oral, Nightly  montelukast, 10 mg, Oral, Nightly  predniSONE, 10 mg, Oral, Daily  roflumilast, 500 mcg, Oral, Daily  sodium chloride, 3 mL, Intravenous, Q12H  theophylline, 300 mg, Oral, Q24H      Continuous Infusions:Pharmacy to Dose  enoxaparin (LOVENOX),       PRN Meds:.•  acetaminophen  •  dextrose  •  dextrose  •  glucagon (human recombinant)  •  ipratropium-albuterol  •  LORazepam  •  nitroglycerin  •  ondansetron  •  Pharmacy to Dose enoxaparin (LOVENOX)  •  [COMPLETED] Insert Peripheral IV **AND** sodium chloride  •  sodium chloride  •  sodium chloride     Assessment & Plan       COPD with acute exacerbation (HCC)    AECOPD- daliresp, theophylline, mucinex, Singulair, inhaled bronchodilators and steroids- tapering prednisone     Acute hypoxemia-improved currently at 6 L o2  DM-II- on glucophate and glipizide, glucose controlled    Essential hypertension- bp has been 90s systolic will dec lisinopril from 40 to 20 and monitor   Pneumonia- completed cefdinir   Flu A- completed tamiflu   Leukocytosis- could be steroid related- He is on a taper steroid.  Will monitor . He does not show signs of new infection      All conditions are improving.  Continue to wean oxygen.  Pt is agreeable to skilled rehab.         Plan for disposition: to facility when placement secure     Beatriz Jordan, CARA  12/17/22  13:08 EST

## 2022-12-18 LAB
ALBUMIN SERPL-MCNC: 3 G/DL (ref 3.5–5.2)
ALBUMIN/GLOB SERPL: 1.1 G/DL
ALP SERPL-CCNC: 50 U/L (ref 39–117)
ALT SERPL W P-5'-P-CCNC: 28 U/L (ref 1–41)
ANION GAP SERPL CALCULATED.3IONS-SCNC: 6 MMOL/L (ref 5–15)
ANISOCYTOSIS BLD QL: ABNORMAL
AST SERPL-CCNC: 17 U/L (ref 1–40)
BILIRUB SERPL-MCNC: 0.2 MG/DL (ref 0–1.2)
BUN SERPL-MCNC: 18 MG/DL (ref 8–23)
BUN/CREAT SERPL: 26.5 (ref 7–25)
CALCIUM SPEC-SCNC: 9 MG/DL (ref 8.6–10.5)
CHLORIDE SERPL-SCNC: 92 MMOL/L (ref 98–107)
CO2 SERPL-SCNC: 33 MMOL/L (ref 22–29)
CREAT SERPL-MCNC: 0.68 MG/DL (ref 0.76–1.27)
DEPRECATED RDW RBC AUTO: 49.4 FL (ref 37–54)
EGFRCR SERPLBLD CKD-EPI 2021: 104.4 ML/MIN/1.73
ERYTHROCYTE [DISTWIDTH] IN BLOOD BY AUTOMATED COUNT: 14.9 % (ref 12.3–15.4)
GLOBULIN UR ELPH-MCNC: 2.8 GM/DL
GLUCOSE BLDC GLUCOMTR-MCNC: 165 MG/DL (ref 70–105)
GLUCOSE BLDC GLUCOMTR-MCNC: 75 MG/DL (ref 70–105)
GLUCOSE BLDC GLUCOMTR-MCNC: 80 MG/DL (ref 70–105)
GLUCOSE SERPL-MCNC: 130 MG/DL (ref 65–99)
HCT VFR BLD AUTO: 35.5 % (ref 37.5–51)
HGB BLD-MCNC: 12 G/DL (ref 13–17.7)
LYMPHOCYTES # BLD MANUAL: 2.45 10*3/MM3 (ref 0.7–3.1)
LYMPHOCYTES NFR BLD MANUAL: 7 % (ref 5–12)
MCH RBC QN AUTO: 30.5 PG (ref 26.6–33)
MCHC RBC AUTO-ENTMCNC: 33.8 G/DL (ref 31.5–35.7)
MCV RBC AUTO: 90.2 FL (ref 79–97)
MONOCYTES # BLD: 1.07 10*3/MM3 (ref 0.1–0.9)
MYELOCYTES NFR BLD MANUAL: 3 % (ref 0–0)
NEUTROPHILS # BLD AUTO: 11.32 10*3/MM3 (ref 1.7–7)
NEUTROPHILS NFR BLD MANUAL: 65 % (ref 42.7–76)
NEUTS BAND NFR BLD MANUAL: 9 % (ref 0–5)
PLAT MORPH BLD: NORMAL
PLATELET # BLD AUTO: 247 10*3/MM3 (ref 140–450)
PMV BLD AUTO: 8.5 FL (ref 6–12)
POTASSIUM SERPL-SCNC: 5 MMOL/L (ref 3.5–5.2)
PROT SERPL-MCNC: 5.8 G/DL (ref 6–8.5)
RBC # BLD AUTO: 3.94 10*6/MM3 (ref 4.14–5.8)
SCAN SLIDE: NORMAL
SODIUM SERPL-SCNC: 131 MMOL/L (ref 136–145)
TOXIC GRANULATION: ABNORMAL
VARIANT LYMPHS NFR BLD MANUAL: 14 % (ref 19.6–45.3)
VARIANT LYMPHS NFR BLD MANUAL: 2 % (ref 0–5)
WBC NRBC COR # BLD: 15.3 10*3/MM3 (ref 3.4–10.8)

## 2022-12-18 PROCEDURE — 25010000002 ENOXAPARIN PER 10 MG: Performed by: NURSE PRACTITIONER

## 2022-12-18 PROCEDURE — 85025 COMPLETE CBC W/AUTO DIFF WBC: CPT | Performed by: NURSE PRACTITIONER

## 2022-12-18 PROCEDURE — 94799 UNLISTED PULMONARY SVC/PX: CPT

## 2022-12-18 PROCEDURE — 94761 N-INVAS EAR/PLS OXIMETRY MLT: CPT

## 2022-12-18 PROCEDURE — 63710000001 INSULIN LISPRO (HUMAN) PER 5 UNITS: Performed by: FAMILY MEDICINE

## 2022-12-18 PROCEDURE — 85007 BL SMEAR W/DIFF WBC COUNT: CPT | Performed by: NURSE PRACTITIONER

## 2022-12-18 PROCEDURE — 80053 COMPREHEN METABOLIC PANEL: CPT | Performed by: NURSE PRACTITIONER

## 2022-12-18 PROCEDURE — 63710000001 PREDNISONE PER 5 MG

## 2022-12-18 PROCEDURE — 94664 DEMO&/EVAL PT USE INHALER: CPT

## 2022-12-18 PROCEDURE — 82962 GLUCOSE BLOOD TEST: CPT

## 2022-12-18 PROCEDURE — 94660 CPAP INITIATION&MGMT: CPT

## 2022-12-18 RX ORDER — LISINOPRIL 5 MG/1
5 TABLET ORAL EVERY EVENING
Status: DISCONTINUED | OUTPATIENT
Start: 2022-12-18 | End: 2022-12-21 | Stop reason: HOSPADM

## 2022-12-18 RX ADMIN — BUDESONIDE 0.5 MG: 0.5 INHALANT RESPIRATORY (INHALATION) at 07:43

## 2022-12-18 RX ADMIN — PREDNISONE 10 MG: 10 TABLET ORAL at 08:18

## 2022-12-18 RX ADMIN — ROFLUMILAST 500 MCG: 500 TABLET ORAL at 08:18

## 2022-12-18 RX ADMIN — BISOPROLOL FUMARATE: 5 TABLET ORAL at 08:18

## 2022-12-18 RX ADMIN — MIRTAZAPINE 15 MG: 15 TABLET, FILM COATED ORAL at 20:13

## 2022-12-18 RX ADMIN — MONTELUKAST 10 MG: 10 TABLET, FILM COATED ORAL at 20:12

## 2022-12-18 RX ADMIN — BUDESONIDE 0.5 MG: 0.5 INHALANT RESPIRATORY (INHALATION) at 19:04

## 2022-12-18 RX ADMIN — GLIPIZIDE 10 MG: 5 TABLET ORAL at 08:18

## 2022-12-18 RX ADMIN — ARFORMOTEROL TARTRATE 15 MCG: 15 SOLUTION RESPIRATORY (INHALATION) at 07:38

## 2022-12-18 RX ADMIN — ASPIRIN 81 MG: 81 TABLET, COATED ORAL at 08:18

## 2022-12-18 RX ADMIN — INSULIN LISPRO 2 UNITS: 100 INJECTION, SOLUTION INTRAVENOUS; SUBCUTANEOUS at 08:18

## 2022-12-18 RX ADMIN — THEOPHYLLINE ANHYDROUS 300 MG: 300 CAPSULE, EXTENDED RELEASE ORAL at 08:18

## 2022-12-18 RX ADMIN — Medication 3 ML: at 08:19

## 2022-12-18 RX ADMIN — METFORMIN HYDROCHLORIDE 1000 MG: 500 TABLET ORAL at 08:18

## 2022-12-18 RX ADMIN — ENOXAPARIN SODIUM 40 MG: 100 INJECTION SUBCUTANEOUS at 17:16

## 2022-12-18 RX ADMIN — Medication 3 ML: at 20:13

## 2022-12-18 RX ADMIN — LISINOPRIL 5 MG: 5 TABLET ORAL at 17:16

## 2022-12-18 RX ADMIN — ARFORMOTEROL TARTRATE 15 MCG: 15 SOLUTION RESPIRATORY (INHALATION) at 19:04

## 2022-12-18 RX ADMIN — GUAIFENESIN 600 MG: 600 TABLET, EXTENDED RELEASE ORAL at 08:18

## 2022-12-18 RX ADMIN — GUAIFENESIN 600 MG: 600 TABLET, EXTENDED RELEASE ORAL at 20:12

## 2022-12-18 NOTE — PROGRESS NOTES
Daily Progress Note        COPD with acute exacerbation (HCC)    Influenza A    Diabetes mellitus with coincident hypertension (HCC)    Bacterial pneumonia    Leukocytosis    Acute hypoxemic respiratory failure (HCC)      Assessment    COPD with acute exacerbation (HCC)  Acute hypoxemic/hypercapnic respiratory failure  Pneumonia: Unidentified organism  Influenza A infection    CT chest 12/5/2022: mild bibasilar infection, severe emphysema    Hyperkalemia  Leukocytosis    DM  HTN  Tob smoking     Recommendations:       oxygen supplement and titration to maintain saturation 90 to 95%: Currently requiring 5 L high flow  -Patient does not use oxygen at home  -We will need 6-minute walk prior to discharge    Daliresp and theophylline  Mucinex twice daily  Singulair  Bronchodilatores\inhaled corticosteroid  ASA  BP control  prednisone 6-day taper: Complete  DVT/GI prophylaxis  Check daily labs and correct electrolytes as needed    Completed 5-day course of Tamiflu  Completed course of cefepime     Will need work-up for obstructive sleep apnea as outpatient          LOS: 14 days     Subjective     Patient reports mild shortness of breath and cough    Objective     Vital signs for last 24 hours:  Vitals:    12/18/22 0742 12/18/22 0743 12/18/22 0745 12/18/22 1015   BP:    100/60   BP Location:    Right arm   Patient Position:    Lying   Pulse: 87 88 88 84   Resp: 12 12 12 14   Temp:    97.6 °F (36.4 °C)   TempSrc:    Oral   SpO2: 95% 95% 96% 90%   Weight:       Height:           Intake/Output last 3 shifts:  I/O last 3 completed shifts:  In: 480 [P.O.:480]  Out: 2400 [Urine:2400]  Intake/Output this shift:  No intake/output data recorded.      Radiology  Imaging Results (Last 24 Hours)     ** No results found for the last 24 hours. **          Labs:  Results from last 7 days   Lab Units 12/18/22  0034   WBC 10*3/mm3 15.30*   HEMOGLOBIN g/dL 12.0*   HEMATOCRIT % 35.5*   PLATELETS 10*3/mm3 247     Results from last 7 days    Lab Units 12/18/22  0034   SODIUM mmol/L 131*   POTASSIUM mmol/L 5.0   CHLORIDE mmol/L 92*   CO2 mmol/L 33.0*   BUN mg/dL 18   CREATININE mg/dL 0.68*   CALCIUM mg/dL 9.0   BILIRUBIN mg/dL 0.2   ALK PHOS U/L 50   ALT (SGPT) U/L 28   AST (SGOT) U/L 17   GLUCOSE mg/dL 130*         Results from last 7 days   Lab Units 12/18/22  0034   ALBUMIN g/dL 3.00*                               Meds:   SCHEDULE  arformoterol, 15 mcg, Nebulization, BID - RT  aspirin, 81 mg, Oral, Daily  bisoprolol-hydroCHLOROthiazide (ZIAC) 10-6.25 mg combo dose, , Oral, Q24H  budesonide, 0.5 mg, Nebulization, BID - RT  enoxaparin, 40 mg, Subcutaneous, Q24H  glipizide, 10 mg, Oral, BID AC  guaiFENesin, 600 mg, Oral, Q12H  insulin lispro, 2-9 Units, Subcutaneous, TID With Meals  lisinopril, 20 mg, Oral, Q PM  metFORMIN, 1,000 mg, Oral, BID With Meals  mirtazapine, 15 mg, Oral, Nightly  montelukast, 10 mg, Oral, Nightly  roflumilast, 500 mcg, Oral, Daily  sodium chloride, 3 mL, Intravenous, Q12H  theophylline, 300 mg, Oral, Q24H      Infusions  Pharmacy to Dose enoxaparin (LOVENOX),       PRNs  •  acetaminophen  •  dextrose  •  dextrose  •  glucagon (human recombinant)  •  ipratropium-albuterol  •  LORazepam  •  nitroglycerin  •  ondansetron  •  Pharmacy to Dose enoxaparin (LOVENOX)  •  [COMPLETED] Insert Peripheral IV **AND** sodium chloride  •  sodium chloride  •  sodium chloride    Physical Exam:  General Appearance:  Alert   HEENT:  Normocephalic, without obvious abnormality, Conjunctiva/corneas clear,.   Nares normal, no drainage     Neck:  Supple, symmetrical, trachea midline. No JVD.  Lungs /Chest wall: Mild bilateral basal rhonchi, respirations unlabored, symmetrical wall movement.     Heart:  Regular rate and rhythm, S1 S2 normal  Abdomen: Soft, non-tender, no masses, no organomegaly.    Extremities: No edema, no clubbing or cyanosis    ROS  Constitutional: Negative for chills, fever and positive for malaise/fatigue.   HENT: Negative.     Eyes: Negative.    Cardiovascular: Negative.    Respiratory: Positive for cough and shortness of breath.    Skin: Negative.    Musculoskeletal: Negative.    Gastrointestinal: Negative.    Genitourinary: Negative.    Neurological: Negative.    Psychiatric/Behavioral: Negative.      I reviewed the recent clinical results    Much of this encounter note is an electronic transcription/translation of spoken language to printed text using Dragon Software which might include inadvertent errors in transcription.

## 2022-12-18 NOTE — PLAN OF CARE
Goal Outcome Evaluation:               Pt is resting at this time, no complaints of pain, vss, patient is awaiting precert to facility, no new orders at this time, plan of care continues at this time.

## 2022-12-18 NOTE — PROGRESS NOTES
LOS: 14 days   Patient Care Team:  Qi Hicks APRN as PCP - General (Nurse Practitioner)    Subjective     Interval History:    Patient Complaints:  Feels ok - Moving around the room more.  On 5  L HF oxygen.     History taken from: patient    Review of Systems   Constitutional: Positive for activity change and fatigue. Negative for appetite change and fever.   HENT: Negative for trouble swallowing.    Eyes: Negative for visual disturbance.   Respiratory: Positive for cough and shortness of breath. Negative for chest tightness.    Cardiovascular: Negative for chest pain, palpitations and leg swelling.   Gastrointestinal: Negative for abdominal pain, constipation, diarrhea and vomiting.   Genitourinary: Negative for difficulty urinating.   Neurological: Positive for weakness.   Psychiatric/Behavioral: Negative for confusion.           Objective     Vital Signs  Temp:  [97.3 °F (36.3 °C)-98.5 °F (36.9 °C)] 97.6 °F (36.4 °C)  Heart Rate:  [71-89] 84  Resp:  [12-32] 14  BP: ()/(55-69) 100/60    Physical Exam:     General Appearance:    Alert, cooperative, in no acute distress,   Head:    Normocephalic, without obvious abnormality, atraumatic   Eyes:            Lids and lashes normal, conjunctivae and sclerae normal, no   icterus, no pallor, corneas clear, PERRLA   Ears:    Ears appear intact with no abnormalities noted   Throat:   No oral lesions, no thrush, oral mucosa moist   Neck:   No adenopathy, supple, trachea midline, no thyromegaly, no   carotid bruit, no JVD   Lungs:     , scattered rhonchi respirations regular, even and                  unlabored    Heart:    Regular rhythm and normal rate, normal S1 and S2, no            murmur, no gallop, no rub, no click   Chest Wall:    No abnormalities observed   Abdomen:     Normal bowel sounds, no masses, no organomegaly, soft        Non-tender non-distended, no guarding,   Extremities:   Moves all extremities well, no edema, no cyanosis, no              Redness   Pulses:   Pulses palpable and equal bilaterally   Skin:   No bleeding, bruising or rash   Lymph nodes:   No palpable adenopathy   Neurologic:   Cranial nerves 2 - 12 grossly intact, sensation intact, DTR       present and equal bilaterally        Results Review:    Lab Results (last 24 hours)     Procedure Component Value Units Date/Time    POC Glucose Once [[id number]]  (Normal) Collected: 12/18/22 1137    Specimen: Blood Updated: 12/18/22 1138     Glucose 75 mg/dL      Comment: Serial Number: [id number]:  [id number]       POC Glucose Once [[id number]]  (Abnormal) Collected: 12/18/22 0712    Specimen: Blood Updated: 12/18/22 0713     Glucose 165 mg/dL      Comment: Serial Number: [id number]:  [id number]       Manual Differential [[id number]]  (Abnormal) Collected: 12/18/22 0034    Specimen: Blood Updated: 12/18/22 0226     Neutrophil % 65.0 %      Lymphocyte % 14.0 %      Monocyte % 7.0 %      Bands %  9.0 %      Myelocyte % 3.0 %      Atypical Lymphocyte % 2.0 %      Neutrophils Absolute 11.32 10*3/mm3      Lymphocytes Absolute 2.45 10*3/mm3      Monocytes Absolute 1.07 10*3/mm3      Anisocytosis Slight/1+     Toxic Granulation Slight/1+     Platelet Morphology Normal    CBC & Differential [[id number]]  (Abnormal) Collected: 12/18/22 0034    Specimen: Blood Updated: 12/18/22 0226    Narrative:      The following orders were created for panel order CBC & Differential.  Procedure                               Abnormality         Status                     ---------                               -----------         ------                     CBC Auto Differential[[id number]]        Abnormal            Final result               Scan Slide[[id number]]                                       Final result                 Please view results for these tests on the individual orders.    Scan Slide [[id number]] Collected: 12/18/22 0034    Specimen: Blood Updated: 12/18/22 0226     Scan Slide --     Comment:  See Manual Differential Results       CBC Auto Differential [[id number]]  (Abnormal) Collected: 12/18/22 0034    Specimen: Blood Updated: 12/18/22 0226     WBC 15.30 10*3/mm3      RBC 3.94 10*6/mm3      Hemoglobin 12.0 g/dL      Hematocrit 35.5 %      MCV 90.2 fL      MCH 30.5 pg      MCHC 33.8 g/dL      RDW 14.9 %      RDW-SD 49.4 fl      MPV 8.5 fL      Platelets 247 10*3/mm3     Narrative:      Modified report. Previous result was Hemogram on 12/18/2022 at 0125 EST.  The previously reported component NRBC is no longer being reported. Previous result was 0.0 /100 WBC (Reference Range: 0.0-0.2 /100 WBC) on 12/18/2022 at 0125 EST.    Comprehensive Metabolic Panel [[id number]]  (Abnormal) Collected: 12/18/22 0034    Specimen: Blood Updated: 12/18/22 0137     Glucose 130 mg/dL      BUN 18 mg/dL      Creatinine 0.68 mg/dL      Sodium 131 mmol/L      Potassium 5.0 mmol/L      Comment: Slight hemolysis detected by analyzer. Results may be affected.        Chloride 92 mmol/L      CO2 33.0 mmol/L      Calcium 9.0 mg/dL      Total Protein 5.8 g/dL      Albumin 3.00 g/dL      ALT (SGPT) 28 U/L      AST (SGOT) 17 U/L      Comment: Slight hemolysis detected by analyzer. Results may be affected.        Alkaline Phosphatase 50 U/L      Total Bilirubin 0.2 mg/dL      Globulin 2.8 gm/dL      A/G Ratio 1.1 g/dL      BUN/Creatinine Ratio 26.5     Anion Gap 6.0 mmol/L      eGFR 104.4 mL/min/1.73      Comment: National Kidney Foundation and American Society of Nephrology (ASN) Task Force recommended calculation based on the Chronic Kidney Disease Epidemiology Collaboration (CKD-EPI) equation refit without adjustment for race.       Narrative:      GFR Normal >60  Chronic Kidney Disease <60  Kidney Failure <15      POC Glucose Once [[id number]]  (Normal) Collected: 12/17/22 1722    Specimen: Blood Updated: 12/17/22 1723     Glucose 97 mg/dL      Comment: Serial Number: [id number]:  [id number]              Imaging Results (Last 24  Hours)     ** No results found for the last 24 hours. **               I reviewed the patient's new clinical results.    Medication Review:   Scheduled Meds:arformoterol, 15 mcg, Nebulization, BID - RT  aspirin, 81 mg, Oral, Daily  bisoprolol-hydroCHLOROthiazide (ZIAC) 10-6.25 mg combo dose, , Oral, Q24H  budesonide, 0.5 mg, Nebulization, BID - RT  enoxaparin, 40 mg, Subcutaneous, Q24H  glipizide, 10 mg, Oral, BID AC  guaiFENesin, 600 mg, Oral, Q12H  insulin lispro, 2-9 Units, Subcutaneous, TID With Meals  lisinopril, 20 mg, Oral, Q PM  metFORMIN, 1,000 mg, Oral, BID With Meals  mirtazapine, 15 mg, Oral, Nightly  montelukast, 10 mg, Oral, Nightly  roflumilast, 500 mcg, Oral, Daily  sodium chloride, 3 mL, Intravenous, Q12H  theophylline, 300 mg, Oral, Q24H      Continuous Infusions:Pharmacy to Dose enoxaparin (LOVENOX),       PRN Meds:.•  acetaminophen  •  dextrose  •  dextrose  •  glucagon (human recombinant)  •  ipratropium-albuterol  •  LORazepam  •  nitroglycerin  •  ondansetron  •  Pharmacy to Dose enoxaparin (LOVENOX)  •  [COMPLETED] Insert Peripheral IV **AND** sodium chloride  •  sodium chloride  •  sodium chloride     Assessment & Plan       COPD with acute exacerbation (HCC)    Influenza A    Diabetes mellitus with coincident hypertension (HCC)    Bacterial pneumonia    Leukocytosis    Acute hypoxemic respiratory failure (HCC)      AECOPD- daliresp, theophylline, mucinex, Singulair, inhaled bronchodilators and steroids- finished prednisone taper      Acute hypoxemia-improved currently at 6 L o2  DM-II- on glucophate and glipizide, glucose controlled      Essential hypertension- SBP -  decrease lisinopril to 5 mg qd-     Pneumonia- completed cefdinir   Flu A- completed tamiflu     Leukocytosis- could be steroid related- improved to 15 from 24 yesterday . He is now off steroids. Will monitor     Hyponatremia- Na 131 today - stable since admission.      All conditions are improving.  Continue to wean  oxygen.  Pt is agreeable to skilled rehab.    Plan for disposition: To Facility when placement secure - 12/16 CM note stated pending acceptance will req precert    Beatriz Jordan, CARA  12/18/22  13:22 EST

## 2022-12-19 LAB
ALBUMIN SERPL-MCNC: 3.1 G/DL (ref 3.5–5.2)
ALBUMIN/GLOB SERPL: 1 G/DL
ALP SERPL-CCNC: 57 U/L (ref 39–117)
ALT SERPL W P-5'-P-CCNC: 28 U/L (ref 1–41)
ANION GAP SERPL CALCULATED.3IONS-SCNC: 10 MMOL/L (ref 5–15)
AST SERPL-CCNC: 17 U/L (ref 1–40)
BILIRUB SERPL-MCNC: 0.2 MG/DL (ref 0–1.2)
BUN SERPL-MCNC: 16 MG/DL (ref 8–23)
BUN/CREAT SERPL: 25 (ref 7–25)
CALCIUM SPEC-SCNC: 9.1 MG/DL (ref 8.6–10.5)
CHLORIDE SERPL-SCNC: 95 MMOL/L (ref 98–107)
CO2 SERPL-SCNC: 31 MMOL/L (ref 22–29)
CREAT SERPL-MCNC: 0.64 MG/DL (ref 0.76–1.27)
DACRYOCYTES BLD QL SMEAR: ABNORMAL
DEPRECATED RDW RBC AUTO: 49 FL (ref 37–54)
EGFRCR SERPLBLD CKD-EPI 2021: 106.4 ML/MIN/1.73
ERYTHROCYTE [DISTWIDTH] IN BLOOD BY AUTOMATED COUNT: 14.9 % (ref 12.3–15.4)
GLOBULIN UR ELPH-MCNC: 3.1 GM/DL
GLUCOSE BLDC GLUCOMTR-MCNC: 134 MG/DL (ref 70–105)
GLUCOSE BLDC GLUCOMTR-MCNC: 144 MG/DL (ref 70–105)
GLUCOSE BLDC GLUCOMTR-MCNC: 160 MG/DL (ref 70–105)
GLUCOSE BLDC GLUCOMTR-MCNC: 81 MG/DL (ref 70–105)
GLUCOSE SERPL-MCNC: 126 MG/DL (ref 65–99)
HCT VFR BLD AUTO: 34.2 % (ref 37.5–51)
HGB BLD-MCNC: 11.7 G/DL (ref 13–17.7)
LARGE PLATELETS: ABNORMAL
LYMPHOCYTES # BLD MANUAL: 2.57 10*3/MM3 (ref 0.7–3.1)
LYMPHOCYTES NFR BLD MANUAL: 1 % (ref 5–12)
MCH RBC QN AUTO: 30.6 PG (ref 26.6–33)
MCHC RBC AUTO-ENTMCNC: 34.2 G/DL (ref 31.5–35.7)
MCV RBC AUTO: 89.3 FL (ref 79–97)
METAMYELOCYTES NFR BLD MANUAL: 1 % (ref 0–0)
MONOCYTES # BLD: 0.14 10*3/MM3 (ref 0.1–0.9)
NEUTROPHILS # BLD AUTO: 11.44 10*3/MM3 (ref 1.7–7)
NEUTROPHILS NFR BLD MANUAL: 74 % (ref 42.7–76)
NEUTS BAND NFR BLD MANUAL: 6 % (ref 0–5)
PLATELET # BLD AUTO: 242 10*3/MM3 (ref 140–450)
PMV BLD AUTO: 8.7 FL (ref 6–12)
POIKILOCYTOSIS BLD QL SMEAR: ABNORMAL
POLYCHROMASIA BLD QL SMEAR: ABNORMAL
POTASSIUM SERPL-SCNC: 4.4 MMOL/L (ref 3.5–5.2)
PROT SERPL-MCNC: 6.2 G/DL (ref 6–8.5)
RBC # BLD AUTO: 3.83 10*6/MM3 (ref 4.14–5.8)
SCAN SLIDE: NORMAL
SMALL PLATELETS BLD QL SMEAR: ADEQUATE
SODIUM SERPL-SCNC: 136 MMOL/L (ref 136–145)
VARIANT LYMPHS NFR BLD MANUAL: 18 % (ref 19.6–45.3)
WBC MORPH BLD: NORMAL
WBC NRBC COR # BLD: 14.3 10*3/MM3 (ref 3.4–10.8)

## 2022-12-19 PROCEDURE — 94664 DEMO&/EVAL PT USE INHALER: CPT

## 2022-12-19 PROCEDURE — 97110 THERAPEUTIC EXERCISES: CPT

## 2022-12-19 PROCEDURE — 85025 COMPLETE CBC W/AUTO DIFF WBC: CPT | Performed by: NURSE PRACTITIONER

## 2022-12-19 PROCEDURE — 94799 UNLISTED PULMONARY SVC/PX: CPT

## 2022-12-19 PROCEDURE — 25010000002 ENOXAPARIN PER 10 MG: Performed by: NURSE PRACTITIONER

## 2022-12-19 PROCEDURE — 85007 BL SMEAR W/DIFF WBC COUNT: CPT | Performed by: NURSE PRACTITIONER

## 2022-12-19 PROCEDURE — 82962 GLUCOSE BLOOD TEST: CPT

## 2022-12-19 PROCEDURE — 94761 N-INVAS EAR/PLS OXIMETRY MLT: CPT

## 2022-12-19 PROCEDURE — 94660 CPAP INITIATION&MGMT: CPT

## 2022-12-19 PROCEDURE — 80053 COMPREHEN METABOLIC PANEL: CPT | Performed by: NURSE PRACTITIONER

## 2022-12-19 RX ADMIN — METFORMIN HYDROCHLORIDE 1000 MG: 500 TABLET ORAL at 08:58

## 2022-12-19 RX ADMIN — ENOXAPARIN SODIUM 40 MG: 100 INJECTION SUBCUTANEOUS at 18:00

## 2022-12-19 RX ADMIN — BISOPROLOL FUMARATE: 5 TABLET ORAL at 08:58

## 2022-12-19 RX ADMIN — BUDESONIDE 0.5 MG: 0.5 INHALANT RESPIRATORY (INHALATION) at 19:44

## 2022-12-19 RX ADMIN — Medication 3 ML: at 20:24

## 2022-12-19 RX ADMIN — GUAIFENESIN 600 MG: 600 TABLET, EXTENDED RELEASE ORAL at 08:58

## 2022-12-19 RX ADMIN — GLIPIZIDE 10 MG: 5 TABLET ORAL at 08:58

## 2022-12-19 RX ADMIN — ARFORMOTEROL TARTRATE 15 MCG: 15 SOLUTION RESPIRATORY (INHALATION) at 19:39

## 2022-12-19 RX ADMIN — MONTELUKAST 10 MG: 10 TABLET, FILM COATED ORAL at 20:24

## 2022-12-19 RX ADMIN — LISINOPRIL 5 MG: 5 TABLET ORAL at 18:00

## 2022-12-19 RX ADMIN — GLIPIZIDE 10 MG: 5 TABLET ORAL at 18:00

## 2022-12-19 RX ADMIN — ROFLUMILAST 500 MCG: 500 TABLET ORAL at 08:59

## 2022-12-19 RX ADMIN — BUDESONIDE 0.5 MG: 0.5 INHALANT RESPIRATORY (INHALATION) at 07:21

## 2022-12-19 RX ADMIN — Medication 3 ML: at 08:59

## 2022-12-19 RX ADMIN — ARFORMOTEROL TARTRATE 15 MCG: 15 SOLUTION RESPIRATORY (INHALATION) at 07:16

## 2022-12-19 RX ADMIN — MIRTAZAPINE 15 MG: 15 TABLET, FILM COATED ORAL at 20:24

## 2022-12-19 RX ADMIN — THEOPHYLLINE ANHYDROUS 300 MG: 300 CAPSULE, EXTENDED RELEASE ORAL at 08:58

## 2022-12-19 RX ADMIN — ASPIRIN 81 MG: 81 TABLET, COATED ORAL at 08:58

## 2022-12-19 RX ADMIN — METFORMIN HYDROCHLORIDE 1000 MG: 500 TABLET ORAL at 18:00

## 2022-12-19 RX ADMIN — GUAIFENESIN 600 MG: 600 TABLET, EXTENDED RELEASE ORAL at 20:24

## 2022-12-19 NOTE — CASE MANAGEMENT/SOCIAL WORK
Social Work Assessment  [facility]     Patient Name: Maximino Bhatia  MRN: [id number]  Today's Date: 12/19/2022    Admit Date: 12/3/2022     Discharge Plan     Row Name 12/19/22 1428       Plan    Plan DC Plan: [facility] referral pending. PASRR per facility. Will require precert.    Patient/Family in Agreement with Plan yes    Plan Comments [facility] declined d/t inability to meet patient needs. Referral made to [facility] - no bed at [place] or [place], pending with [facility]. Updated therapy notes need to determine appropriate level of care at d/c. Message sent to OT assigned today via secure chat. Spoke with patient who remains agreeable to SNF in [facility] if appropriate.           Met with patient in room wearing PPE: mask.  Maintained distance greater than six feet and spent less than 15 minutes in the room.      MAYNOR Samuels    Phone: [phone]  Cell: [phone]  Fax: [phone]  [email or web address]

## 2022-12-19 NOTE — SIGNIFICANT NOTE
12/19/22 5440   OTHER   Discipline occupational therapist   Rehab Time/Intention   Session Not Performed patient/family declined treatment  (Patient requesting to hold off on therapy today. Possibly chagning rooms and state she has had a rough day, would like to rest and try therapy tomorrow.)   Therapy Assessment/Plan (PT)   Criteria for Skilled Interventions Met (PT) yes;meets criteria;skilled treatment is necessary

## 2022-12-19 NOTE — PLAN OF CARE
Assessment: Maximino Bhatia presents with ADL impairments below baseline abilities which indicate the need for continued skilled intervention while inpatient. Pt demo good activity tolerance this date, completing BUE exercises without rest breaks this date. Pt limited by increased need for O2. Pt noted to have deficits with RUE when completing BUE exercises however pt reports this is pt baseline d/t previous shoulder injury. Tolerating session today without incident. Will continue to follow and progress as tolerated.     Plan/Recommendations:   Moderate Intensity Therapy recommended post-acute care. This is recommended as therapy feels the patient would require 3-4 days per week and wouldn't tolerate \"3 hour daily\" rehab intensity. SNF would be the preferred choice. If the patient does not agree to SNF, arrange HH or OP depending on home bound status. If patient is medically complex, consider LTACH.. Pt requires no DME at discharge.     Pt desires Skilled Rehab placement at discharge. Pt cooperative; agreeable to therapeutic recommendations and plan of care.

## 2022-12-19 NOTE — CASE MANAGEMENT/SOCIAL WORK
Continued Stay Note  [facility]     Patient Name: Maximino Bhatia  MRN: [id number]  Today's Date: 12/19/2022    Admit Date: 12/3/2022    Plan: DC Plan: [facility] referral pending bed availability vs. [facility] referral pending. Will require precert, PASRR per facility.   Discharge Plan     Row Name 12/19/22 1626       Plan    Plan DC Plan: [facility] referral pending bed availability vs. [facility] referral pending. Will require precert, PASRR per facility.    Plan Comments See [facility] [place] note regarding new referrals sent. Per [facility] liajose guadalupe, may not have bed available. New referral sent to [facility] who reported that they were at [place] limit and unable to accept. New referral sent in [facility] and to [facility] liaTika shi.                 Phone communication or documentation only - no physical contact with patient or family.    Cecelia Bradford RN     Office Phone: [phone]  Office Cell: [phone]

## 2022-12-19 NOTE — DISCHARGE PLACEMENT REQUEST
Maximino Murillo (62 y.o. Male)     Date of Birth   1960    Social Security Number       Address   [address] [place] [address] [place] [address]    Home Phone   [phone]    MRN   [id number]       Mu-ism   None    Marital Status   Single                            Admission Date   12/3/22    Admission Type   Emergency    Admitting Provider   Jamse Craig MD    Attending Provider   Judit Singh MD    Department, Room/Bed   [facility], 2116/1       Discharge Date       Discharge Disposition       Discharge Destination                               Attending Provider: Judit Singh MD    Allergies: No Known Allergies    Isolation: Droplet   Infection: Influenza (12/03/22)   Code Status: CPR    Ht: 177.8 cm (70\")   Wt: 87.3 kg (192 lb 7.4 oz)    Admission Cmt: None   Principal Problem: COPD with acute exacerbation (HCC) [J44.1]                 Active Insurance as of 12/3/2022     Primary Coverage     Payor Plan Insurance Group Employer/Plan Group    [facility] MEDICAID [facility] [id number]     Payor Plan Address Payor Plan Phone Number Payor Plan Fax Number Effective Dates    MAIL STOP:   4/8/2020 - None Entered    [address] [address]       [facility] [id number]       Subscriber Name Subscriber Birth Date Member ID       MAXIMINO MURILLO 1960 [id number]                 Emergency Contacts      (Rel.) Home Phone Work Phone Mobile Phone    ERIN CERVANTES (Friend) [phone] -- --               History & Physical      James Craig MD at 12/04/22 1126          Patient Care Team:  Qi Hicks APRN as PCP - General (Nurse Practitioner)    Chief Conplaint  Subjective      The patient is a 62 y.o. male presents with progressive shortness of breath with evidence of acute exacerbation of panlobular COPD with emphysema and an acute influenza A lower respiratory infection    HPI  The patient was in his usual state of health until approximately 1 week  prior to presentation when he began experiencing insidious onset of some progressive weakness associated with a cough and some shortness of breath.  He decompensated and presented to the [facility] emergency room for evaluation where he was admitted with an acute exacerbation of COPD and an acute influenza A infection  Review of Systems  Review of Systems   Constitutional: Positive for activity change and appetite change.   Respiratory: Positive for shortness of breath. Negative for chest tightness.    Cardiovascular: Negative.    Gastrointestinal: Negative.    Neurological: Positive for weakness.       History  Past Medical History:   Diagnosis Date   • COPD (chronic obstructive pulmonary disease) (HCC)    • Diabetes mellitus (HCC)    • Hypertension      No past surgical history on file.  No family history on file.  Social History     Tobacco Use   • Smoking status: Every Day     Packs/day: 1.50     Years: 25.00     Pack years: 37.50     Types: Cigarettes     Passive exposure: Never   • Smokeless tobacco: Never   Vaping Use   • Vaping Use: Never used   • Passive vaping exposure: Yes   Substance Use Topics   • Alcohol use: Yes     Comment: RARE   • Drug use: Never     Allergies:  Patient has no known allergies.    Objective      Vital Signs  Temp:  [97.9 °F (36.6 °C)-98.4 °F (36.9 °C)] 97.9 °F (36.6 °C)  Heart Rate:  [80-89] 82  Resp:  [16-20] 16  BP: ()/(59-75) 131/75      Physical Exam:   Physical Exam  Vitals reviewed.   Constitutional:       General: He is not in acute distress.     Appearance: He is ill-appearing and diaphoretic. He is not toxic-appearing.   HENT:      Head: Normocephalic and atraumatic.   Cardiovascular:      Rate and Rhythm: Rhythm irregular.      Heart sounds: Normal heart sounds.   Pulmonary:      Effort: No respiratory distress.      Breath sounds: Wheezing present.   Skin:     General: Skin is warm.   Neurological:      General: No focal deficit present.      Mental  Status: He is alert.              Results Review:   CBC    Results from last 7 days   Lab Units 12/04/22  0655 12/03/22  1028   WBC 10*3/mm3 8.30 10.00   HEMOGLOBIN g/dL 14.3 15.4   PLATELETS 10*3/mm3 151 148     BMP   Results from last 7 days   Lab Units 12/04/22  0655 12/03/22  1028   SODIUM mmol/L 133* 130*   POTASSIUM mmol/L 5.1 4.5   CHLORIDE mmol/L 97* 91*   CO2 mmol/L 26.0 24.0   BUN mg/dL 18 20   CREATININE mg/dL 0.65* 0.73*   GLUCOSE mg/dL 185* 91   MAGNESIUM mg/dL  --  1.7     Cr Clearance Estimated Creatinine Clearance: 139 mL/min (A) (by C-G formula based on SCr of 0.65 mg/dL (L)).  Coag     HbA1C No results found for: HGBA1C  Blood Glucose No results found for: POCGLU  Infection     CMP   Results from last 7 days   Lab Units 12/04/22  0655 12/03/22  1028   SODIUM mmol/L 133* 130*   POTASSIUM mmol/L 5.1 4.5   CHLORIDE mmol/L 97* 91*   CO2 mmol/L 26.0 24.0   BUN mg/dL 18 20   CREATININE mg/dL 0.65* 0.73*   GLUCOSE mg/dL 185* 91   ALBUMIN g/dL  --  4.10   BILIRUBIN mg/dL  --  0.3   ALK PHOS U/L  --  53   AST (SGOT) U/L  --  20   ALT (SGPT) U/L  --  15     UA    Results from last 7 days   Lab Units 12/03/22  1100   NITRITE UA  Negative   WBC UA /HPF 0-2*   BACTERIA UA /HPF Trace*   SQUAM EPITHEL UA /HPF 0-2     Radiology(recent) XR Chest 1 View    Result Date: 12/3/2022   1. Chronic scarring in the right lung base. 2. Emphysema. 3. No active pulmonary disease.  Electronically Signed By-James Grey MD On:12/3/2022 11:39 AM This report was finalized on [id number] by  James Grey MD.       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     James Craig MD  12/04/22  11:26 EST          Electronically signed by James Craig MD at 12/04/22 6179

## 2022-12-19 NOTE — PLAN OF CARE
Goal Outcome Evaluation:  Plan of Care Reviewed With: patient        Progress: no change  Outcome Evaluation: Patient alert and oriented, continues on O2@5-7 liters, bipap @HS, patient up ad ezra, patient rested well this shift, VSS.

## 2022-12-19 NOTE — PROGRESS NOTES
LOS: 15 days   Patient Care Team:  Qi Hicks APRN as PCP - General (Nurse Practitioner)    Subjective     Interval History: Shortness of breath is slowly improving    Patient Complaints: No specific complaints    History taken from: patient    Review of Systems   Constitutional: Positive for activity change and fatigue. Negative for appetite change and diaphoresis.   HENT: Negative for facial swelling.    Eyes: Negative for visual disturbance.   Respiratory: Positive for cough and shortness of breath. Negative for wheezing and stridor.    Gastrointestinal: Negative for abdominal pain, constipation, diarrhea, nausea and vomiting.   Endocrine: Negative for polyuria.   Genitourinary: Negative for dysuria.   Musculoskeletal: Positive for arthralgias.   Skin: Negative for rash and wound.   Neurological: Negative for dizziness, tremors, weakness, light-headedness and headaches.   Psychiatric/Behavioral: Negative for confusion.           Objective     Vital Signs  Temp:  [97.3 °F (36.3 °C)-98.4 °F (36.9 °C)] 97.3 °F (36.3 °C)  Heart Rate:  [75-92] 88  Resp:  [15-32] 22  BP: (100-125)/(64-71) 125/67    Physical Exam:     General Appearance:    Alert, cooperative, in no acute distress,   Head:    Normocephalic, without obvious abnormality, atraumatic   Eyes:            Lids and lashes normal, conjunctivae and sclerae normal, no   icterus, no pallor, corneas clear, PERRLA   Ears:    Ears appear intact with no abnormalities noted   Throat:   No oral lesions, no thrush, oral mucosa moist   Neck:   No adenopathy, supple, trachea midline, no thyromegaly, no   carotid bruit, no JVD   Lungs:    Distant    Heart:    Regular rhythm and normal rate, normal S1 and S2, no            murmur, no gallop, no rub, no click   Chest Wall:    No abnormalities observed   Abdomen:     Normal bowel sounds, no masses, no organomegaly, soft        Non-tender non-distended, no guarding,   Extremities:   Moves all extremities well, no  edema, no cyanosis, no             Redness   Pulses:   Pulses palpable and equal bilaterally   Skin:   No bleeding, bruising or rash   Lymph nodes:   No palpable adenopathy   Neurologic:   Cranial nerves 2 - 12 grossly intact, sensation intact, DTR       present and equal bilaterally        Results Review:    Lab Results (last 24 hours)     Procedure Component Value Units Date/Time    POC Glucose Once [[id number]]  (Abnormal) Collected: 12/19/22 1057    Specimen: Blood Updated: 12/19/22 1058     Glucose 134 mg/dL      Comment: Serial Number: [id number]:  [id number]       POC Glucose Once [[id number]]  (Abnormal) Collected: 12/19/22 0708    Specimen: Blood Updated: 12/19/22 0709     Glucose 160 mg/dL      Comment: Serial Number: [id number]:  [id number]       Manual Differential [[id number]]  (Abnormal) Collected: 12/19/22 0232    Specimen: Blood Updated: 12/19/22 0334     Neutrophil % 74.0 %      Lymphocyte % 18.0 %      Monocyte % 1.0 %      Bands %  6.0 %      Metamyelocyte % 1.0 %      Neutrophils Absolute 11.44 10*3/mm3      Lymphocytes Absolute 2.57 10*3/mm3      Monocytes Absolute 0.14 10*3/mm3      Dacrocytes Slight/1+     Poikilocytes Slight/1+     Polychromasia Slight/1+     WBC Morphology Normal     Platelet Estimate Adequate     Large Platelets Slight/1+    CBC & Differential [[id number]]  (Abnormal) Collected: 12/19/22 0232    Specimen: Blood Updated: 12/19/22 0334    Narrative:      The following orders were created for panel order CBC & Differential.  Procedure                               Abnormality         Status                     ---------                               -----------         ------                     CBC Auto Differential[[id number]]        Abnormal            Final result               Scan Slide[[id number]]                                       Final result                 Please view results for these tests on the individual orders.    Scan Slide [[id number]] Collected:  12/19/22 0232    Specimen: Blood Updated: 12/19/22 0334     Scan Slide --     Comment: See Manual Differential Results       CBC Auto Differential [[id number]]  (Abnormal) Collected: 12/19/22 0232    Specimen: Blood Updated: 12/19/22 0334     WBC 14.30 10*3/mm3      RBC 3.83 10*6/mm3      Hemoglobin 11.7 g/dL      Hematocrit 34.2 %      MCV 89.3 fL      MCH 30.6 pg      MCHC 34.2 g/dL      RDW 14.9 %      RDW-SD 49.0 fl      MPV 8.7 fL      Platelets 242 10*3/mm3     Narrative:      Modified report. Previous result was Hemogram on 12/19/2022 at 0254 EST.  The previously reported component NRBC is no longer being reported. Previous result was 0.0 /100 WBC (Reference Range: 0.0-0.2 /100 WBC) on 12/19/2022 at 0254 EST.    Comprehensive Metabolic Panel [[id number]]  (Abnormal) Collected: 12/19/22 0232    Specimen: Blood Updated: 12/19/22 0311     Glucose 126 mg/dL      BUN 16 mg/dL      Creatinine 0.64 mg/dL      Sodium 136 mmol/L      Potassium 4.4 mmol/L      Chloride 95 mmol/L      CO2 31.0 mmol/L      Calcium 9.1 mg/dL      Total Protein 6.2 g/dL      Albumin 3.10 g/dL      ALT (SGPT) 28 U/L      AST (SGOT) 17 U/L      Alkaline Phosphatase 57 U/L      Total Bilirubin 0.2 mg/dL      Globulin 3.1 gm/dL      A/G Ratio 1.0 g/dL      BUN/Creatinine Ratio 25.0     Anion Gap 10.0 mmol/L      eGFR 106.4 mL/min/1.73      Comment: National Kidney Foundation and American Society of Nephrology (ASN) Task Force recommended calculation based on the Chronic Kidney Disease Epidemiology Collaboration (CKD-EPI) equation refit without adjustment for race.       Narrative:      GFR Normal >60  Chronic Kidney Disease <60  Kidney Failure <15      POC Glucose Once [[id number]]  (Abnormal) Collected: 12/19/22 0159    Specimen: Blood Updated: 12/19/22 0200     Glucose 144 mg/dL      Comment: Serial Number: [id number]:  [id number]       POC Glucose Once [[id number]]  (Normal) Collected: 12/18/22 1612    Specimen: Blood Updated: 12/18/22  1613     Glucose 80 mg/dL      Comment: Serial Number: [id number]:  [id number]              Imaging Results (Last 24 Hours)     ** No results found for the last 24 hours. **               I reviewed the patient's new clinical results.    Medication Review:   Scheduled Meds:arformoterol, 15 mcg, Nebulization, BID - RT  aspirin, 81 mg, Oral, Daily  bisoprolol-hydroCHLOROthiazide (ZIAC) 10-6.25 mg combo dose, , Oral, Q24H  budesonide, 0.5 mg, Nebulization, BID - RT  enoxaparin, 40 mg, Subcutaneous, Q24H  glipizide, 10 mg, Oral, BID AC  guaiFENesin, 600 mg, Oral, Q12H  insulin lispro, 2-9 Units, Subcutaneous, TID With Meals  lisinopril, 5 mg, Oral, Q PM  metFORMIN, 1,000 mg, Oral, BID With Meals  mirtazapine, 15 mg, Oral, Nightly  montelukast, 10 mg, Oral, Nightly  roflumilast, 500 mcg, Oral, Daily  sodium chloride, 3 mL, Intravenous, Q12H  theophylline, 300 mg, Oral, Q24H      Continuous Infusions:Pharmacy to Dose enoxaparin (LOVENOX),       PRN Meds:.•  acetaminophen  •  dextrose  •  dextrose  •  glucagon (human recombinant)  •  ipratropium-albuterol  •  LORazepam  •  nitroglycerin  •  ondansetron  •  Pharmacy to Dose enoxaparin (LOVENOX)  •  [COMPLETED] Insert Peripheral IV **AND** sodium chloride  •  sodium chloride  •  sodium chloride     Assessment & Plan       COPD with acute exacerbation (HCC)    Influenza A    Diabetes mellitus with coincident hypertension (HCC)    Bacterial pneumonia    Leukocytosis    Acute hypoxemic respiratory failure (HCC)    Oxygenation is improving.  Continue to wean supplemental oxygen as tolerated.  Anticipate transfer to skilled rehab when bed is available.      Plan for disposition: Skilled rehab    Judit Signh MD  12/19/22  13:53 EST

## 2022-12-19 NOTE — PROGRESS NOTES
PULMONARY CRITICAL CARE PROGRESS  NOTE      PATIENT IDENTIFICATION:  Name: Maximino Bhatia  MRN: [id number]  :  1960     Age: 62 y.o.  Sex: male    DATE OF Note:  2022   Referring Physician: James Craig MD                  Subjective:   Laying in bed, currently on 4.5 L O2, no SOB, no chest or abd pain, no bowel or bladder issues reported    Objective:  tMax 24 hrs: Temp (24hrs), Av.8 °F (36.6 °C), Min:97.3 °F (36.3 °C), Max:98.4 °F (36.9 °C)      Vitals Ranges:   Temp:  [97.3 °F (36.3 °C)-98.4 °F (36.9 °C)] 97.3 °F (36.3 °C)  Heart Rate:  [75-92] 88  Resp:  [15-32] 22  BP: (100-125)/(64-71) 125/67    Intake and Output Last 3 Shifts:   I/O last 3 completed shifts:  In: 1317 [P.O.:1317]  Out: 600 [Urine:600]    Exam:  /67 (BP Location: Left arm, Patient Position: Sitting)   Pulse 88   Temp 97.3 °F (36.3 °C) (Oral)   Resp 22   Ht 177.8 cm (70\")   Wt 87.3 kg (192 lb 7.4 oz)   SpO2 94%   BMI 27.62 kg/m²     General Appearance: Alert  HEENT:  Normocephalic, without obvious abnormality. Conjunctivae/corneas clear.  Normal external ear canals. Nares normal, no drainage     Neck:  Supple, symmetrical, trachea midline. No JVD.  Lungs /Chest wall:   Bilateral basal rhonchi, respirations unlabored, symmetrical wall movement.     Heart:  Regular rate and rhythm, systolic murmur PMI left sternal border  Abdomen: Soft, nontender, no masses, no organomegaly.    Extremities: Trace edema, no clubbing or cyanosis        Medications:  arformoterol, 15 mcg, Nebulization, BID - RT  aspirin, 81 mg, Oral, Daily  bisoprolol-hydroCHLOROthiazide (ZIAC) 10-6.25 mg combo dose, , Oral, Q24H  budesonide, 0.5 mg, Nebulization, BID - RT  enoxaparin, 40 mg, Subcutaneous, Q24H  glipizide, 10 mg, Oral, BID AC  guaiFENesin, 600 mg, Oral, Q12H  insulin lispro, 2-9 Units, Subcutaneous, TID With Meals  lisinopril, 5 mg, Oral, Q PM  metFORMIN, 1,000 mg, Oral, BID With Meals  mirtazapine, 15 mg, Oral,  Nightly  montelukast, 10 mg, Oral, Nightly  roflumilast, 500 mcg, Oral, Daily  sodium chloride, 3 mL, Intravenous, Q12H  theophylline, 300 mg, Oral, Q24H        Infusion:  Pharmacy to Dose enoxaparin (LOVENOX),          PRN:    acetaminophen    dextrose    dextrose    glucagon (human recombinant)    ipratropium-albuterol    LORazepam    nitroglycerin    ondansetron    Pharmacy to Dose enoxaparin (LOVENOX)    [COMPLETED] Insert Peripheral IV **AND** sodium chloride    sodium chloride    sodium chloride  Data Review:  All labs (24hrs):   Recent Results (from the past 24 hour(s))   POC Glucose Once    Collection Time: 12/18/22  4:12 PM    Specimen: Blood   Result Value Ref Range    Glucose 80 70 - 105 mg/dL   POC Glucose Once    Collection Time: 12/19/22  1:59 AM    Specimen: Blood   Result Value Ref Range    Glucose 144 (H) 70 - 105 mg/dL   Comprehensive Metabolic Panel    Collection Time: 12/19/22  2:32 AM    Specimen: Blood   Result Value Ref Range    Glucose 126 (H) 65 - 99 mg/dL    BUN 16 8 - 23 mg/dL    Creatinine 0.64 (L) 0.76 - 1.27 mg/dL    Sodium 136 136 - 145 mmol/L    Potassium 4.4 3.5 - 5.2 mmol/L    Chloride 95 (L) 98 - 107 mmol/L    CO2 31.0 (H) 22.0 - 29.0 mmol/L    Calcium 9.1 8.6 - 10.5 mg/dL    Total Protein 6.2 6.0 - 8.5 g/dL    Albumin 3.10 (L) 3.50 - 5.20 g/dL    ALT (SGPT) 28 1 - 41 U/L    AST (SGOT) 17 1 - 40 U/L    Alkaline Phosphatase 57 39 - 117 U/L    Total Bilirubin 0.2 0.0 - 1.2 mg/dL    Globulin 3.1 gm/dL    A/G Ratio 1.0 g/dL    BUN/Creatinine Ratio 25.0 7.0 - 25.0    Anion Gap 10.0 5.0 - 15.0 mmol/L    eGFR 106.4 >60.0 mL/min/1.73   CBC Auto Differential    Collection Time: 12/19/22  2:32 AM    Specimen: Blood   Result Value Ref Range    WBC 14.30 (H) 3.40 - 10.80 10*3/mm3    RBC 3.83 (L) 4.14 - 5.80 10*6/mm3    Hemoglobin 11.7 (L) 13.0 - 17.7 g/dL    Hematocrit 34.2 (L) 37.5 - 51.0 %    MCV 89.3 79.0 - 97.0 fL    MCH 30.6 26.6 - 33.0 pg    MCHC 34.2 31.5 - 35.7 g/dL    RDW 14.9 12.3 -  15.4 %    RDW-SD 49.0 37.0 - 54.0 fl    MPV 8.7 6.0 - 12.0 fL    Platelets 242 140 - 450 10*3/mm3   Scan Slide    Collection Time: 12/19/22  2:32 AM    Specimen: Blood   Result Value Ref Range    Scan Slide     Manual Differential    Collection Time: 12/19/22  2:32 AM    Specimen: Blood   Result Value Ref Range    Neutrophil % 74.0 42.7 - 76.0 %    Lymphocyte % 18.0 (L) 19.6 - 45.3 %    Monocyte % 1.0 (L) 5.0 - 12.0 %    Bands %  6.0 (H) 0.0 - 5.0 %    Metamyelocyte % 1.0 (H) 0.0 - 0.0 %    Neutrophils Absolute 11.44 (H) 1.70 - 7.00 10*3/mm3    Lymphocytes Absolute 2.57 0.70 - 3.10 10*3/mm3    Monocytes Absolute 0.14 0.10 - 0.90 10*3/mm3    Dacrocytes Slight/1+ None Seen    Poikilocytes Slight/1+ None Seen    Polychromasia Slight/1+ None Seen    WBC Morphology Normal Normal    Platelet Estimate Adequate Normal    Large Platelets Slight/1+ None Seen   POC Glucose Once    Collection Time: 12/19/22  7:08 AM    Specimen: Blood   Result Value Ref Range    Glucose 160 (H) 70 - 105 mg/dL   POC Glucose Once    Collection Time: 12/19/22 10:57 AM    Specimen: Blood   Result Value Ref Range    Glucose 134 (H) 70 - 105 mg/dL        Imaging:  Adult Transthoracic Echo Complete W/ Cont if Necessary Per Protocol    Left ventricular systolic function is low normal. Left ventricular   ejection fraction appears to be 46 - 50%.    The left ventricular cavity is moderate to severely dilated.    Left ventricular diastolic function is consistent with (grade I)   impaired relaxation.    The study is technically difficult for diagnosis.       ASSESSMENT:  Acute hypoxic/hypercapnic respiratory failure  Pneumonia  Influenza A  Acute exacerbation of COPD  Hyperkalemia  Leukocytosis  Diabetes mellitus type 2  Hypertension  Smoker       PLAN:  Decrease O2 to keep sats greater than 88%  Out of bed daily for longer periods of time  Bronchodilators  Inhaled corticosteroids  Incentive spirometer  Completed a tapering steroid dose  Completed Tamiflu  and a course of cefepime  Electrolytes/ glycemic control  DVT and GI prophylaxis-Lovenox    Discussed with Dr. Yolanda Colbert, [facility]   12/19/2022  12:26 EST    I personally have examined  and interviewed the patient. I have reviewed the history, data, problems, assessment and plan with our NP.  Total Critical care time in direct medical management (   ) minutes, This time specifically excludes time spent performing procedures.    Carmenza Guerrero MD   12/19/2022  19:25 EST

## 2022-12-19 NOTE — THERAPY TREATMENT NOTE
Subjective: Pt agreeable to therapeutic plan of care.   Cognition: oriented to Person, Place, Time and Situation    Objective:     Bed Mobility: Modified-Independent   Functional Transfers: Supervision  Functional Ambulation: Supervision    x10 STS with SBA without rest breaks    BUE exercises x10 reps, x1 set; unsupported standing, no rest breaks     Vitals: WNL and Desaturates   On 4.5L 88-92% O2   122/75mmHg  90-100HR with activity     Pain: 0 VAS  Location: N/A  Interventions for pain: N/A  Education: Provided education on the importance of mobility in the acute care setting    Assessment: Maximino Bhatia presents with ADL impairments below baseline abilities which indicate the need for continued skilled intervention while inpatient. Pt demo good activity tolerance this date, completing BUE exercises without rest breaks this date. Pt limited by increased need for O2. Pt noted to have deficits with RUE when completing BUE exercises however pt reports this is pt baseline d/t previous shoulder injury. Tolerating session today without incident. Will continue to follow and progress as tolerated.     Plan/Recommendations:   Moderate Intensity Therapy recommended post-acute care. This is recommended as therapy feels the patient would require 3-4 days per week and wouldn't tolerate \"3 hour daily\" rehab intensity. SNF would be the preferred choice. If the patient does not agree to SNF, arrange HH or OP depending on home bound status. If patient is medically complex, consider LTACH.. Pt requires no DME at discharge.     Pt desires Skilled Rehab placement at discharge. Pt cooperative; agreeable to therapeutic recommendations and plan of care.     Modified [place]: N/A = No pre-op stroke/TIA    Post-Tx Position: Up in Chair and Call light and personal items within reach  PPE: gloves and surgical mask

## 2022-12-19 NOTE — PAYOR COMM NOTE
Clinical update for case# [id number]    ==============  EXTENDED AUTHORIZATION PENDING:   PLEASE FAX OR CALL DETERMINATION TO CONTACT BELOW:       THANK YOU,    RAYMOND Sosa, RN  Utilization Review  [facility]  Phone: [phone]  Fax: [phone]      NPI: [id number]  Tax ID: [id number]        Maximino Bhatia (62 y.o. Male)     Date of Birth   1960    Social Security Number       Address   [address] [place] [address] [place] [address]    Home Phone   [phone]    MRN   [id number]       Holiness   None    Marital Status   Single                            Admission Date   12/3/22    Admission Type   Emergency    Admitting Provider   James Craig MD    Attending Provider   Judit Singh MD    Department, Room/Bed   [facility] PROGRESS [facility]ARE, 2116/1       Discharge Date       Discharge Disposition       Discharge Destination                               Attending Provider: Judit Singh MD    Allergies: No Known Allergies    Isolation: Droplet   Infection: Influenza (12/03/22)   Code Status: CPR    Ht: 177.8 cm (70\")   Wt: 87.3 kg (192 lb 7.4 oz)    Admission Cmt: None   Principal Problem: COPD with acute exacerbation (HCC) [J44.1]                 Active Insurance as of 12/3/2022     Primary Coverage     Payor Plan Insurance Group Employer/Plan Group    [id number]     Payor Plan Address Payor Plan Phone Number Payor Plan Fax Number Effective Dates    MAIL STOP:   4/8/2020 - None Entered    [address] [address]       [facility] [id number]       Subscriber Name Subscriber Birth Date Member ID       MAXIMINO BHATIA 1960 [id number]                 Emergency Contacts      (Rel.) Home Phone Work Phone Mobile Phone    ERIN CERVANTES (Friend) [phone] -- --            Vital Signs (last day)     Date/Time Temp Temp src Pulse Resp BP Patient Position SpO2    12/19/22 1425 97.2 (36.2) Axillary 78 30 115/73 Lying 100     12/19/22 0951 97.3 (36.3) Oral 88 22 125/67 Sitting 94    12/19/22 0722 -- -- 89 22 -- -- 99    12/19/22 0721 -- -- 88 22 -- -- 97    12/19/22 0720 -- -- 89 22 -- -- 94    12/19/22 0716 -- -- 88 22 -- -- 97    12/19/22 0529 97.9 (36.6) Axillary 92 26 124/71 Lying 90    12/19/22 0337 -- -- -- -- -- -- 97    12/19/22 0118 98.4 (36.9) Oral 83 15 103/68 Lying 98    12/18/22 2223 -- -- -- 18 -- -- 90    12/18/22 2122 97.9 (36.6) Axillary 75 32 100/64 Lying 99    12/18/22 1908 -- -- 88 16 -- -- 95    12/18/22 1907 -- -- 88 16 -- -- 95    12/18/22 1905 -- -- 88 16 -- -- 93    12/18/22 1904 -- -- 85 16 -- -- 91    12/18/22 1704 97.7 (36.5) Oral 84 16 110/70 -- 95    12/18/22 1404 97.5 (36.4) Oral 83 16 109/69 -- 95    12/18/22 1015 97.6 (36.4) Oral 84 14 100/60 Lying 90    12/18/22 0745 -- -- 88 12 -- -- 96    12/18/22 0743 -- -- 88 12 -- -- 95    12/18/22 0742 -- -- 87 12 -- -- 95    12/18/22 0738 -- -- 87 12 -- -- 94    12/18/22 0454 97.5 (36.4) Oral 83 19 92/60 Lying 100    12/18/22 0105 98.5 (36.9) Oral 80 25 90/55 Lying 95          Oxygen Therapy (last day)     Date/Time SpO2 Device (Oxygen Therapy) Flow (L/min) Oxygen Concentration (%) ETCO2 (mmHg)    12/19/22 1425 100 nasal cannula 4.5 -- --    12/19/22 1230 -- nasal cannula 4.5 -- --    12/19/22 0951 94 humidified;high-flow nasal cannula 4.5 -- --    12/19/22 0845 -- humidified;high-flow nasal cannula 4.5 -- --    12/19/22 0722 99 high-flow nasal cannula;humidified 5 -- --    12/19/22 0721 97 high-flow nasal cannula;humidified 5 -- --    12/19/22 0720 94 high-flow nasal cannula;humidified 5 -- --    12/19/22 0716 97 high-flow nasal cannula;humidified 5 -- --    12/19/22 0529 90 high-flow nasal cannula;humidified 6 -- --    12/19/22 0408 -- high-flow nasal cannula;humidified;NPPV/NIV 7 -- --    12/19/22 0337 97 NPPV/NIV -- 50 --    12/19/22 0118 98 high-flow nasal cannula 7 -- --    12/19/22 0026 -- high-flow nasal cannula;humidified 7 -- --    12/18/22 2223 90  high-flow nasal cannula;humidified 7 -- --    12/18/22 2122 99 NPPV/NIV -- 50 --    12/18/22 2000 -- high-flow nasal cannula;humidified 5 -- --    12/18/22 1908 95 high-flow nasal cannula;humidified 5 50 --    12/18/22 1907 95 high-flow nasal cannula;humidified 5 -- --    12/18/22 1905 93 high-flow nasal cannula;humidified 5 -- --    12/18/22 1904 91 humidified;high-flow nasal cannula 5 -- --    12/18/22 1704 95 humidified;high-flow nasal cannula 5 -- --    12/18/22 1404 95 humidified;high-flow nasal cannula 5 -- --    12/18/22 1015 90 humidified;high-flow nasal cannula 5 -- --    12/18/22 0745 96 humidified;high-flow nasal cannula 5 -- --    12/18/22 0743 95 humidified;high-flow nasal cannula 5 -- --    12/18/22 0742 95 humidified;high-flow nasal cannula 5 -- --    12/18/22 0738 94 humidified;high-flow nasal cannula 5 -- --    12/18/22 0615 -- high-flow nasal cannula 7 -- --    12/18/22 0454 100 NPPV/NIV -- -- --    12/18/22 0400 -- NPPV/NIV -- 55 --    12/18/22 0105 95 nasal cannula 5 -- --    12/18/22 0000 -- nasal cannula 5 -- --          Operative/Procedure Notes (last 7 days)  Notes from 12/12/22 1515 through 12/19/22 1515   No notes of this type exist for this encounter.            Physician Progress Notes (last 48 hours)      Judit Singh MD at 12/19/22 1353               LOS: 15 days   Patient Care Team:  Qi Hicks APRN as PCP - General (Nurse Practitioner)    Subjective     Interval History: Shortness of breath is slowly improving    Patient Complaints: No specific complaints    History taken from: patient    Review of Systems   Constitutional: Positive for activity change and fatigue. Negative for appetite change and diaphoresis.   HENT: Negative for facial swelling.    Eyes: Negative for visual disturbance.   Respiratory: Positive for cough and shortness of breath. Negative for wheezing and stridor.    Gastrointestinal: Negative for abdominal pain, constipation, diarrhea, nausea and vomiting.    Endocrine: Negative for polyuria.   Genitourinary: Negative for dysuria.   Musculoskeletal: Positive for arthralgias.   Skin: Negative for rash and wound.   Neurological: Negative for dizziness, tremors, weakness, light-headedness and headaches.   Psychiatric/Behavioral: Negative for confusion.           Objective     Vital Signs  Temp:  [97.3 °F (36.3 °C)-98.4 °F (36.9 °C)] 97.3 °F (36.3 °C)  Heart Rate:  [75-92] 88  Resp:  [15-32] 22  BP: (100-125)/(64-71) 125/67    Physical Exam:     General Appearance:    Alert, cooperative, in no acute distress,   Head:    Normocephalic, without obvious abnormality, atraumatic   Eyes:            Lids and lashes normal, conjunctivae and sclerae normal, no   icterus, no pallor, corneas clear, PERRLA   Ears:    Ears appear intact with no abnormalities noted   Throat:   No oral lesions, no thrush, oral mucosa moist   Neck:   No adenopathy, supple, trachea midline, no thyromegaly, no   carotid bruit, no JVD   Lungs:    Distant    Heart:    Regular rhythm and normal rate, normal S1 and S2, no            murmur, no gallop, no rub, no click   Chest Wall:    No abnormalities observed   Abdomen:     Normal bowel sounds, no masses, no organomegaly, soft        Non-tender non-distended, no guarding,   Extremities:   Moves all extremities well, no edema, no cyanosis, no             Redness   Pulses:   Pulses palpable and equal bilaterally   Skin:   No bleeding, bruising or rash   Lymph nodes:   No palpable adenopathy   Neurologic:   Cranial nerves 2 - 12 grossly intact, sensation intact, DTR       present and equal bilaterally        Results Review:    Lab Results (last 24 hours)     Procedure Component Value Units Date/Time    POC Glucose Once [[id number]]  (Abnormal) Collected: 12/19/22 1057    Specimen: Blood Updated: 12/19/22 1058     Glucose 134 mg/dL      Comment: Serial Number: [id number]:  [id number]       POC Glucose Once [[id number]]  (Abnormal) Collected: 12/19/22 0708     Specimen: Blood Updated: 12/19/22 0709     Glucose 160 mg/dL      Comment: Serial Number: [id number]:  [id number]       Manual Differential [[id number]]  (Abnormal) Collected: 12/19/22 0232    Specimen: Blood Updated: 12/19/22 0334     Neutrophil % 74.0 %      Lymphocyte % 18.0 %      Monocyte % 1.0 %      Bands %  6.0 %      Metamyelocyte % 1.0 %      Neutrophils Absolute 11.44 10*3/mm3      Lymphocytes Absolute 2.57 10*3/mm3      Monocytes Absolute 0.14 10*3/mm3      Dacrocytes Slight/1+     Poikilocytes Slight/1+     Polychromasia Slight/1+     WBC Morphology Normal     Platelet Estimate Adequate     Large Platelets Slight/1+    CBC & Differential [[id number]]  (Abnormal) Collected: 12/19/22 0232    Specimen: Blood Updated: 12/19/22 0334    Narrative:      The following orders were created for panel order CBC & Differential.  Procedure                               Abnormality         Status                     ---------                               -----------         ------                     CBC Auto Differential[[id number]]        Abnormal            Final result               Scan Slide[[id number]]                                       Final result                 Please view results for these tests on the individual orders.    Scan Slide [[id number]] Collected: 12/19/22 0232    Specimen: Blood Updated: 12/19/22 0334     Scan Slide --     Comment: See Manual Differential Results       CBC Auto Differential [[id number]]  (Abnormal) Collected: 12/19/22 0232    Specimen: Blood Updated: 12/19/22 0334     WBC 14.30 10*3/mm3      RBC 3.83 10*6/mm3      Hemoglobin 11.7 g/dL      Hematocrit 34.2 %      MCV 89.3 fL      MCH 30.6 pg      MCHC 34.2 g/dL      RDW 14.9 %      RDW-SD 49.0 fl      MPV 8.7 fL      Platelets 242 10*3/mm3     Narrative:      Modified report. Previous result was Hemogram on 12/19/2022 at 0254 EST.  The previously reported component NRBC is no longer being reported. Previous result was 0.0  /100 WBC (Reference Range: 0.0-0.2 /100 WBC) on 12/19/2022 at 0254 EST.    Comprehensive Metabolic Panel [[id number]]  (Abnormal) Collected: 12/19/22 0232    Specimen: Blood Updated: 12/19/22 0311     Glucose 126 mg/dL      BUN 16 mg/dL      Creatinine 0.64 mg/dL      Sodium 136 mmol/L      Potassium 4.4 mmol/L      Chloride 95 mmol/L      CO2 31.0 mmol/L      Calcium 9.1 mg/dL      Total Protein 6.2 g/dL      Albumin 3.10 g/dL      ALT (SGPT) 28 U/L      AST (SGOT) 17 U/L      Alkaline Phosphatase 57 U/L      Total Bilirubin 0.2 mg/dL      Globulin 3.1 gm/dL      A/G Ratio 1.0 g/dL      BUN/Creatinine Ratio 25.0     Anion Gap 10.0 mmol/L      eGFR 106.4 mL/min/1.73      Comment: National Kidney Foundation and American Society of Nephrology (ASN) Task Force recommended calculation based on the Chronic Kidney Disease Epidemiology Collaboration (CKD-EPI) equation refit without adjustment for race.       Narrative:      GFR Normal >60  Chronic Kidney Disease <60  Kidney Failure <15      POC Glucose Once [[id number]]  (Abnormal) Collected: 12/19/22 0159    Specimen: Blood Updated: 12/19/22 0200     Glucose 144 mg/dL      Comment: Serial Number: [id number]:  [id number]       POC Glucose Once [[id number]]  (Normal) Collected: 12/18/22 1612    Specimen: Blood Updated: 12/18/22 1613     Glucose 80 mg/dL      Comment: Serial Number: [id number]:  [id number]              Imaging Results (Last 24 Hours)     ** No results found for the last 24 hours. **               I reviewed the patient's new clinical results.    Medication Review:   Scheduled Meds:arformoterol, 15 mcg, Nebulization, BID - RT  aspirin, 81 mg, Oral, Daily  bisoprolol-hydroCHLOROthiazide (ZIAC) 10-6.25 mg combo dose, , Oral, Q24H  budesonide, 0.5 mg, Nebulization, BID - RT  enoxaparin, 40 mg, Subcutaneous, Q24H  glipizide, 10 mg, Oral, BID AC  guaiFENesin, 600 mg, Oral, Q12H  insulin lispro, 2-9 Units, Subcutaneous, TID With Meals  lisinopril, 5 mg,  Oral, Q PM  metFORMIN, 1,000 mg, Oral, BID With Meals  mirtazapine, 15 mg, Oral, Nightly  montelukast, 10 mg, Oral, Nightly  roflumilast, 500 mcg, Oral, Daily  sodium chloride, 3 mL, Intravenous, Q12H  theophylline, 300 mg, Oral, Q24H      Continuous Infusions:Pharmacy to Dose enoxaparin (LOVENOX),       PRN Meds:.•  acetaminophen  •  dextrose  •  dextrose  •  glucagon (human recombinant)  •  ipratropium-albuterol  •  LORazepam  •  nitroglycerin  •  ondansetron  •  Pharmacy to Dose enoxaparin (LOVENOX)  •  [COMPLETED] Insert Peripheral IV **AND** sodium chloride  •  sodium chloride  •  sodium chloride     Assessment & Plan       COPD with acute exacerbation (HCC)    Influenza A    Diabetes mellitus with coincident hypertension (HCC)    Bacterial pneumonia    Leukocytosis    Acute hypoxemic respiratory failure (HCC)    Oxygenation is improving.  Continue to wean supplemental oxygen as tolerated.  Anticipate transfer to skilled rehab when bed is available.      Plan for disposition: Skilled rehab    Judit Singh MD  22  13:53 EST          Electronically signed by Judit Singh MD at 22 1354     Linda Colbert APRN at 22 1226          PULMONARY CRITICAL CARE PROGRESS  NOTE      PATIENT IDENTIFICATION:  Name: Maximino Bhatia  MRN: [id number]  :  1960     Age: 62 y.o.  Sex: male    DATE OF Note:  2022   Referring Physician: James Craig MD                  Subjective:   Laying in bed, currently on 4.5 L O2, no SOB, no chest or abd pain, no bowel or bladder issues reported    Objective:  tMax 24 hrs: Temp (24hrs), Av.8 °F (36.6 °C), Min:97.3 °F (36.3 °C), Max:98.4 °F (36.9 °C)      Vitals Ranges:   Temp:  [97.3 °F (36.3 °C)-98.4 °F (36.9 °C)] 97.3 °F (36.3 °C)  Heart Rate:  [75-92] 88  Resp:  [15-32] 22  BP: (100-125)/(64-71) 125/67    Intake and Output Last 3 Shifts:   I/O last 3 completed shifts:  In: 1317 [P.O.:1317]  Out: 600 [Urine:600]    Exam:  /67 (BP Location: Left  arm, Patient Position: Sitting)   Pulse 88   Temp 97.3 °F (36.3 °C) (Oral)   Resp 22   Ht 177.8 cm (70\")   Wt 87.3 kg (192 lb 7.4 oz)   SpO2 94%   BMI 27.62 kg/m²     General Appearance: Alert  HEENT:  Normocephalic, without obvious abnormality. Conjunctivae/corneas clear.  Normal external ear canals. Nares normal, no drainage     Neck:  Supple, symmetrical, trachea midline. No JVD.  Lungs /Chest wall:   Bilateral basal rhonchi, respirations unlabored, symmetrical wall movement.     Heart:  Regular rate and rhythm, systolic murmur PMI left sternal border  Abdomen: Soft, nontender, no masses, no organomegaly.    Extremities: Trace edema, no clubbing or cyanosis        Medications:  arformoterol, 15 mcg, Nebulization, BID - RT  aspirin, 81 mg, Oral, Daily  bisoprolol-hydroCHLOROthiazide (ZIAC) 10-6.25 mg combo dose, , Oral, Q24H  budesonide, 0.5 mg, Nebulization, BID - RT  enoxaparin, 40 mg, Subcutaneous, Q24H  glipizide, 10 mg, Oral, BID AC  guaiFENesin, 600 mg, Oral, Q12H  insulin lispro, 2-9 Units, Subcutaneous, TID With Meals  lisinopril, 5 mg, Oral, Q PM  metFORMIN, 1,000 mg, Oral, BID With Meals  mirtazapine, 15 mg, Oral, Nightly  montelukast, 10 mg, Oral, Nightly  roflumilast, 500 mcg, Oral, Daily  sodium chloride, 3 mL, Intravenous, Q12H  theophylline, 300 mg, Oral, Q24H        Infusion:  Pharmacy to Dose enoxaparin (LOVENOX),          PRN:  •  acetaminophen  •  dextrose  •  dextrose  •  glucagon (human recombinant)  •  ipratropium-albuterol  •  LORazepam  •  nitroglycerin  •  ondansetron  •  Pharmacy to Dose enoxaparin (LOVENOX)  •  [COMPLETED] Insert Peripheral IV **AND** sodium chloride  •  sodium chloride  •  sodium chloride  Data Review:  All labs (24hrs):   Recent Results (from the past 24 hour(s))   POC Glucose Once    Collection Time: 12/18/22  4:12 PM    Specimen: Blood   Result Value Ref Range    Glucose 80 70 - 105 mg/dL   POC Glucose Once    Collection Time: 12/19/22  1:59 AM    Specimen:  Blood   Result Value Ref Range    Glucose 144 (H) 70 - 105 mg/dL   Comprehensive Metabolic Panel    Collection Time: 12/19/22  2:32 AM    Specimen: Blood   Result Value Ref Range    Glucose 126 (H) 65 - 99 mg/dL    BUN 16 8 - 23 mg/dL    Creatinine 0.64 (L) 0.76 - 1.27 mg/dL    Sodium 136 136 - 145 mmol/L    Potassium 4.4 3.5 - 5.2 mmol/L    Chloride 95 (L) 98 - 107 mmol/L    CO2 31.0 (H) 22.0 - 29.0 mmol/L    Calcium 9.1 8.6 - 10.5 mg/dL    Total Protein 6.2 6.0 - 8.5 g/dL    Albumin 3.10 (L) 3.50 - 5.20 g/dL    ALT (SGPT) 28 1 - 41 U/L    AST (SGOT) 17 1 - 40 U/L    Alkaline Phosphatase 57 39 - 117 U/L    Total Bilirubin 0.2 0.0 - 1.2 mg/dL    Globulin 3.1 gm/dL    A/G Ratio 1.0 g/dL    BUN/Creatinine Ratio 25.0 7.0 - 25.0    Anion Gap 10.0 5.0 - 15.0 mmol/L    eGFR 106.4 >60.0 mL/min/1.73   CBC Auto Differential    Collection Time: 12/19/22  2:32 AM    Specimen: Blood   Result Value Ref Range    WBC 14.30 (H) 3.40 - 10.80 10*3/mm3    RBC 3.83 (L) 4.14 - 5.80 10*6/mm3    Hemoglobin 11.7 (L) 13.0 - 17.7 g/dL    Hematocrit 34.2 (L) 37.5 - 51.0 %    MCV 89.3 79.0 - 97.0 fL    MCH 30.6 26.6 - 33.0 pg    MCHC 34.2 31.5 - 35.7 g/dL    RDW 14.9 12.3 - 15.4 %    RDW-SD 49.0 37.0 - 54.0 fl    MPV 8.7 6.0 - 12.0 fL    Platelets 242 140 - 450 10*3/mm3   Scan Slide    Collection Time: 12/19/22  2:32 AM    Specimen: Blood   Result Value Ref Range    Scan Slide     Manual Differential    Collection Time: 12/19/22  2:32 AM    Specimen: Blood   Result Value Ref Range    Neutrophil % 74.0 42.7 - 76.0 %    Lymphocyte % 18.0 (L) 19.6 - 45.3 %    Monocyte % 1.0 (L) 5.0 - 12.0 %    Bands %  6.0 (H) 0.0 - 5.0 %    Metamyelocyte % 1.0 (H) 0.0 - 0.0 %    Neutrophils Absolute 11.44 (H) 1.70 - 7.00 10*3/mm3    Lymphocytes Absolute 2.57 0.70 - 3.10 10*3/mm3    Monocytes Absolute 0.14 0.10 - 0.90 10*3/mm3    Dacrocytes Slight/1+ None Seen    Poikilocytes Slight/1+ None Seen    Polychromasia Slight/1+ None Seen    WBC Morphology Normal Normal     Platelet Estimate Adequate Normal    Large Platelets Slight/1+ None Seen   POC Glucose Once    Collection Time: 12/19/22  7:08 AM    Specimen: Blood   Result Value Ref Range    Glucose 160 (H) 70 - 105 mg/dL   POC Glucose Once    Collection Time: 12/19/22 10:57 AM    Specimen: Blood   Result Value Ref Range    Glucose 134 (H) 70 - 105 mg/dL        Imaging:  Adult Transthoracic Echo Complete W/ Cont if Necessary Per Protocol  •  Left ventricular systolic function is low normal. Left ventricular   ejection fraction appears to be 46 - 50%.  •  The left ventricular cavity is moderate to severely dilated.  •  Left ventricular diastolic function is consistent with (grade I)   impaired relaxation.  •  The study is technically difficult for diagnosis.       ASSESSMENT:  Acute hypoxic/hypercapnic respiratory failure  Pneumonia  Influenza A  Acute exacerbation of COPD  Hyperkalemia  Leukocytosis  Diabetes mellitus type 2  Hypertension  Smoker       PLAN:  Decrease O2 to keep sats greater than 88%  Out of bed daily for longer periods of time  Bronchodilators  Inhaled corticosteroids  Incentive spirometer  Completed a tapering steroid dose  Completed Tamiflu and a course of cefepime  Electrolytes/ glycemic control  DVT and GI prophylaxis-Lovenox    Discussed with CARA James   12/19/2022  12:26 EST      Electronically signed by Linda Colbert APRN at 12/19/22 1230     Meeta Ford MD at 12/18/22 1055          Daily Progress Note        COPD with acute exacerbation (HCC)    Influenza A    Diabetes mellitus with coincident hypertension (HCC)    Bacterial pneumonia    Leukocytosis    Acute hypoxemic respiratory failure (HCC)      Assessment    COPD with acute exacerbation (HCC)  Acute hypoxemic/hypercapnic respiratory failure  Pneumonia: Unidentified organism  Influenza A infection    CT chest 12/5/2022: mild bibasilar infection, severe emphysema    Hyperkalemia  Leukocytosis    DM  HTN  Tob  smoking     Recommendations:       oxygen supplement and titration to maintain saturation 90 to 95%: Currently requiring 5 L high flow  -Patient does not use oxygen at home  -We will need 6-minute walk prior to discharge    Daliresp and theophylline  Mucinex twice daily  Singulair  Bronchodilatores\inhaled corticosteroid  ASA  BP control  prednisone 6-day taper: Complete  DVT/GI prophylaxis  Check daily labs and correct electrolytes as needed    Completed 5-day course of Tamiflu  Completed course of cefepime     Will need work-up for obstructive sleep apnea as outpatient          LOS: 14 days     Subjective     Patient reports mild shortness of breath and cough    Objective     Vital signs for last 24 hours:  Vitals:    12/18/22 0742 12/18/22 0743 12/18/22 0745 12/18/22 1015   BP:    100/60   BP Location:    Right arm   Patient Position:    Lying   Pulse: 87 88 88 84   Resp: 12 12 12 14   Temp:    97.6 °F (36.4 °C)   TempSrc:    Oral   SpO2: 95% 95% 96% 90%   Weight:       Height:           Intake/Output last 3 shifts:  I/O last 3 completed shifts:  In: 480 [P.O.:480]  Out: 2400 [Urine:2400]  Intake/Output this shift:  No intake/output data recorded.      Radiology  Imaging Results (Last 24 Hours)     ** No results found for the last 24 hours. **          Labs:  Results from last 7 days   Lab Units 12/18/22  0034   WBC 10*3/mm3 15.30*   HEMOGLOBIN g/dL 12.0*   HEMATOCRIT % 35.5*   PLATELETS 10*3/mm3 247     Results from last 7 days   Lab Units 12/18/22  0034   SODIUM mmol/L 131*   POTASSIUM mmol/L 5.0   CHLORIDE mmol/L 92*   CO2 mmol/L 33.0*   BUN mg/dL 18   CREATININE mg/dL 0.68*   CALCIUM mg/dL 9.0   BILIRUBIN mg/dL 0.2   ALK PHOS U/L 50   ALT (SGPT) U/L 28   AST (SGOT) U/L 17   GLUCOSE mg/dL 130*         Results from last 7 days   Lab Units 12/18/22  0034   ALBUMIN g/dL 3.00*                               Meds:   SCHEDULE  arformoterol, 15 mcg, Nebulization, BID - RT  aspirin, 81 mg, Oral,  Daily  bisoprolol-hydroCHLOROthiazide (ZIAC) 10-6.25 mg combo dose, , Oral, Q24H  budesonide, 0.5 mg, Nebulization, BID - RT  enoxaparin, 40 mg, Subcutaneous, Q24H  glipizide, 10 mg, Oral, BID AC  guaiFENesin, 600 mg, Oral, Q12H  insulin lispro, 2-9 Units, Subcutaneous, TID With Meals  lisinopril, 20 mg, Oral, Q PM  metFORMIN, 1,000 mg, Oral, BID With Meals  mirtazapine, 15 mg, Oral, Nightly  montelukast, 10 mg, Oral, Nightly  roflumilast, 500 mcg, Oral, Daily  sodium chloride, 3 mL, Intravenous, Q12H  theophylline, 300 mg, Oral, Q24H      Infusions  Pharmacy to Dose enoxaparin (LOVENOX),       PRNs  •  acetaminophen  •  dextrose  •  dextrose  •  glucagon (human recombinant)  •  ipratropium-albuterol  •  LORazepam  •  nitroglycerin  •  ondansetron  •  Pharmacy to Dose enoxaparin (LOVENOX)  •  [COMPLETED] Insert Peripheral IV **AND** sodium chloride  •  sodium chloride  •  sodium chloride    Physical Exam:  General Appearance:  Alert   HEENT:  Normocephalic, without obvious abnormality, Conjunctiva/corneas clear,.   Nares normal, no drainage     Neck:  Supple, symmetrical, trachea midline. No JVD.  Lungs /Chest wall: Mild bilateral basal rhonchi, respirations unlabored, symmetrical wall movement.     Heart:  Regular rate and rhythm, S1 S2 normal  Abdomen: Soft, non-tender, no masses, no organomegaly.    Extremities: No edema, no clubbing or cyanosis    ROS  Constitutional: Negative for chills, fever and positive for malaise/fatigue.   HENT: Negative.    Eyes: Negative.    Cardiovascular: Negative.    Respiratory: Positive for cough and shortness of breath.    Skin: Negative.    Musculoskeletal: Negative.    Gastrointestinal: Negative.    Genitourinary: Negative.    Neurological: Negative.    Psychiatric/Behavioral: Negative.      I reviewed the recent clinical results    Much of this encounter note is an electronic transcription/translation of spoken language to printed text using Dragon Software which might include  inadvertent errors in transcription.    Electronically signed by Meeta Ford MD at 12/18/22 1057     Beatriz Jordan APRN at 12/18/22 1050     Attestation signed by Judit Singh MD at 12/18/22 1414    I have reviewed this documentation and agree.                   LOS: 14 days   Patient Care Team:  Qi Hicks APRN as PCP - General (Nurse Practitioner)    Subjective     Interval History:    Patient Complaints:  Feels ok - Moving around the room more.  On 5  L HF oxygen.     History taken from: patient    Review of Systems   Constitutional: Positive for activity change and fatigue. Negative for appetite change and fever.   HENT: Negative for trouble swallowing.    Eyes: Negative for visual disturbance.   Respiratory: Positive for cough and shortness of breath. Negative for chest tightness.    Cardiovascular: Negative for chest pain, palpitations and leg swelling.   Gastrointestinal: Negative for abdominal pain, constipation, diarrhea and vomiting.   Genitourinary: Negative for difficulty urinating.   Neurological: Positive for weakness.   Psychiatric/Behavioral: Negative for confusion.           Objective     Vital Signs  Temp:  [97.3 °F (36.3 °C)-98.5 °F (36.9 °C)] 97.6 °F (36.4 °C)  Heart Rate:  [71-89] 84  Resp:  [12-32] 14  BP: ()/(55-69) 100/60    Physical Exam:     General Appearance:    Alert, cooperative, in no acute distress,   Head:    Normocephalic, without obvious abnormality, atraumatic   Eyes:            Lids and lashes normal, conjunctivae and sclerae normal, no   icterus, no pallor, corneas clear, PERRLA   Ears:    Ears appear intact with no abnormalities noted   Throat:   No oral lesions, no thrush, oral mucosa moist   Neck:   No adenopathy, supple, trachea midline, no thyromegaly, no   carotid bruit, no JVD   Lungs:     , scattered rhonchi respirations regular, even and                  unlabored    Heart:    Regular rhythm and normal rate, normal S1 and S2, no            murmur,  no gallop, no rub, no click   Chest Wall:    No abnormalities observed   Abdomen:     Normal bowel sounds, no masses, no organomegaly, soft        Non-tender non-distended, no guarding,   Extremities:   Moves all extremities well, no edema, no cyanosis, no             Redness   Pulses:   Pulses palpable and equal bilaterally   Skin:   No bleeding, bruising or rash   Lymph nodes:   No palpable adenopathy   Neurologic:   Cranial nerves 2 - 12 grossly intact, sensation intact, DTR       present and equal bilaterally        Results Review:    Lab Results (last 24 hours)     Procedure Component Value Units Date/Time    POC Glucose Once [[id number]]  (Normal) Collected: 12/18/22 1137    Specimen: Blood Updated: 12/18/22 1138     Glucose 75 mg/dL      Comment: Serial Number: [id number]:  [id number]       POC Glucose Once [[id number]]  (Abnormal) Collected: 12/18/22 0712    Specimen: Blood Updated: 12/18/22 0713     Glucose 165 mg/dL      Comment: Serial Number: [id number]:  [id number]       Manual Differential [[id number]]  (Abnormal) Collected: 12/18/22 0034    Specimen: Blood Updated: 12/18/22 0226     Neutrophil % 65.0 %      Lymphocyte % 14.0 %      Monocyte % 7.0 %      Bands %  9.0 %      Myelocyte % 3.0 %      Atypical Lymphocyte % 2.0 %      Neutrophils Absolute 11.32 10*3/mm3      Lymphocytes Absolute 2.45 10*3/mm3      Monocytes Absolute 1.07 10*3/mm3      Anisocytosis Slight/1+     Toxic Granulation Slight/1+     Platelet Morphology Normal    CBC & Differential [[id number]]  (Abnormal) Collected: 12/18/22 0034    Specimen: Blood Updated: 12/18/22 0226    Narrative:      The following orders were created for panel order CBC & Differential.  Procedure                               Abnormality         Status                     ---------                               -----------         ------                     CBC Auto Differential[[id number]]        Abnormal            Final result               Scan  Slide[[id number]]                                       Final result                 Please view results for these tests on the individual orders.    Scan Slide [[id number]] Collected: 12/18/22 0034    Specimen: Blood Updated: 12/18/22 0226     Scan Slide --     Comment: See Manual Differential Results       CBC Auto Differential [[id number]]  (Abnormal) Collected: 12/18/22 0034    Specimen: Blood Updated: 12/18/22 0226     WBC 15.30 10*3/mm3      RBC 3.94 10*6/mm3      Hemoglobin 12.0 g/dL      Hematocrit 35.5 %      MCV 90.2 fL      MCH 30.5 pg      MCHC 33.8 g/dL      RDW 14.9 %      RDW-SD 49.4 fl      MPV 8.5 fL      Platelets 247 10*3/mm3     Narrative:      Modified report. Previous result was Hemogram on 12/18/2022 at 0125 EST.  The previously reported component NRBC is no longer being reported. Previous result was 0.0 /100 WBC (Reference Range: 0.0-0.2 /100 WBC) on 12/18/2022 at 0125 EST.    Comprehensive Metabolic Panel [[id number]]  (Abnormal) Collected: 12/18/22 0034    Specimen: Blood Updated: 12/18/22 0137     Glucose 130 mg/dL      BUN 18 mg/dL      Creatinine 0.68 mg/dL      Sodium 131 mmol/L      Potassium 5.0 mmol/L      Comment: Slight hemolysis detected by analyzer. Results may be affected.        Chloride 92 mmol/L      CO2 33.0 mmol/L      Calcium 9.0 mg/dL      Total Protein 5.8 g/dL      Albumin 3.00 g/dL      ALT (SGPT) 28 U/L      AST (SGOT) 17 U/L      Comment: Slight hemolysis detected by analyzer. Results may be affected.        Alkaline Phosphatase 50 U/L      Total Bilirubin 0.2 mg/dL      Globulin 2.8 gm/dL      A/G Ratio 1.1 g/dL      BUN/Creatinine Ratio 26.5     Anion Gap 6.0 mmol/L      eGFR 104.4 mL/min/1.73      Comment: National Kidney Foundation and American Society of Nephrology (ASN) Task Force recommended calculation based on the Chronic Kidney Disease Epidemiology Collaboration (CKD-EPI) equation refit without adjustment for race.       Narrative:      GFR Normal >60  Chronic  Kidney Disease <60  Kidney Failure <15      POC Glucose Once [[id number]]  (Normal) Collected: 12/17/22 1722    Specimen: Blood Updated: 12/17/22 1723     Glucose 97 mg/dL      Comment: Serial Number: [id number]:  [id number]              Imaging Results (Last 24 Hours)     ** No results found for the last 24 hours. **               I reviewed the patient's new clinical results.    Medication Review:   Scheduled Meds:arformoterol, 15 mcg, Nebulization, BID - RT  aspirin, 81 mg, Oral, Daily  bisoprolol-hydroCHLOROthiazide (ZIAC) 10-6.25 mg combo dose, , Oral, Q24H  budesonide, 0.5 mg, Nebulization, BID - RT  enoxaparin, 40 mg, Subcutaneous, Q24H  glipizide, 10 mg, Oral, BID AC  guaiFENesin, 600 mg, Oral, Q12H  insulin lispro, 2-9 Units, Subcutaneous, TID With Meals  lisinopril, 20 mg, Oral, Q PM  metFORMIN, 1,000 mg, Oral, BID With Meals  mirtazapine, 15 mg, Oral, Nightly  montelukast, 10 mg, Oral, Nightly  roflumilast, 500 mcg, Oral, Daily  sodium chloride, 3 mL, Intravenous, Q12H  theophylline, 300 mg, Oral, Q24H      Continuous Infusions:Pharmacy to Dose enoxaparin (LOVENOX),       PRN Meds:.•  acetaminophen  •  dextrose  •  dextrose  •  glucagon (human recombinant)  •  ipratropium-albuterol  •  LORazepam  •  nitroglycerin  •  ondansetron  •  Pharmacy to Dose enoxaparin (LOVENOX)  •  [COMPLETED] Insert Peripheral IV **AND** sodium chloride  •  sodium chloride  •  sodium chloride     Assessment & Plan       COPD with acute exacerbation (HCC)    Influenza A    Diabetes mellitus with coincident hypertension (HCC)    Bacterial pneumonia    Leukocytosis    Acute hypoxemic respiratory failure (HCC)      AECOPD- daliresp, theophylline, mucinex, Singulair, inhaled bronchodilators and steroids- finished prednisone taper      Acute hypoxemia-improved currently at 6 L o2  DM-II- on glucophate and glipizide, glucose controlled      Essential hypertension- SBP -  decrease lisinopril to 5 mg qd-     Pneumonia-  completed cefdinir   Flu A- completed tamiflu     Leukocytosis- could be steroid related- improved to 15 from 24 yesterday . He is now off steroids. Will monitor     Hyponatremia- Na 131 today - stable since admission.      All conditions are improving.  Continue to wean oxygen.  Pt is agreeable to skilled rehab.    Plan for disposition: To Facility when placement secure - 12/16 CM note stated pending acceptance will req precert    CARA Villarreal  12/18/22  13:22 EST          Electronically signed by Judit Singh MD at 12/18/22 8039

## 2022-12-19 NOTE — PLAN OF CARE
Problem: Adult Inpatient Plan of Care  Goal: Plan of Care Review  Outcome: Ongoing, Progressing  Flowsheets (Taken 12/19/2022 1324)  Outcome Evaluation: Patient has been 4.5L NC this shift and tolerating well awaiting precert for rehab placement. No new complaints at this time.  Goal: Patient-Specific Goal (Individualized)  Outcome: Ongoing, Progressing  Goal: Absence of Hospital-Acquired Illness or Injury  Outcome: Ongoing, Progressing  Intervention: Identify and Manage Fall Risk  Recent Flowsheet Documentation  Taken 12/19/2022 1230 by Maria Wong, RN  Safety Promotion/Fall Prevention: safety round/check completed  Taken 12/19/2022 1000 by Maria Wong, RN  Safety Promotion/Fall Prevention: safety round/check completed  Taken 12/19/2022 0845 by Maria Wong, RN  Safety Promotion/Fall Prevention:   nonskid shoes/slippers when out of bed   room organization consistent   safety round/check completed  Intervention: Prevent Infection  Recent Flowsheet Documentation  Taken 12/19/2022 1230 by Maria Wong, RN  Infection Prevention: rest/sleep promoted  Goal: Optimal Comfort and Wellbeing  Outcome: Ongoing, Progressing  Goal: Readiness for Transition of Care  Outcome: Ongoing, Progressing   Goal Outcome Evaluation:              Outcome Evaluation: Patient has been 4.5L NC this shift and tolerating well awaiting precert for rehab placement. No new complaints at this time.

## 2022-12-20 LAB
ALBUMIN SERPL-MCNC: 3.4 G/DL (ref 3.5–5.2)
ALBUMIN/GLOB SERPL: 1.2 G/DL
ALP SERPL-CCNC: 52 U/L (ref 39–117)
ALT SERPL W P-5'-P-CCNC: 28 U/L (ref 1–41)
ANION GAP SERPL CALCULATED.3IONS-SCNC: 8 MMOL/L (ref 5–15)
AST SERPL-CCNC: 16 U/L (ref 1–40)
BILIRUB SERPL-MCNC: 0.3 MG/DL (ref 0–1.2)
BUN SERPL-MCNC: 14 MG/DL (ref 8–23)
BUN/CREAT SERPL: 20.9 (ref 7–25)
CALCIUM SPEC-SCNC: 9.1 MG/DL (ref 8.6–10.5)
CHLORIDE SERPL-SCNC: 94 MMOL/L (ref 98–107)
CO2 SERPL-SCNC: 33 MMOL/L (ref 22–29)
CREAT SERPL-MCNC: 0.67 MG/DL (ref 0.76–1.27)
DEPRECATED RDW RBC AUTO: 49.9 FL (ref 37–54)
EGFRCR SERPLBLD CKD-EPI 2021: 104.9 ML/MIN/1.73
EOSINOPHIL # BLD MANUAL: 0.14 10*3/MM3 (ref 0–0.4)
EOSINOPHIL NFR BLD MANUAL: 1 % (ref 0.3–6.2)
ERYTHROCYTE [DISTWIDTH] IN BLOOD BY AUTOMATED COUNT: 14.9 % (ref 12.3–15.4)
GLOBULIN UR ELPH-MCNC: 2.9 GM/DL
GLUCOSE BLDC GLUCOMTR-MCNC: 133 MG/DL (ref 70–105)
GLUCOSE BLDC GLUCOMTR-MCNC: 199 MG/DL (ref 70–105)
GLUCOSE BLDC GLUCOMTR-MCNC: 81 MG/DL (ref 70–105)
GLUCOSE BLDC GLUCOMTR-MCNC: 82 MG/DL (ref 70–105)
GLUCOSE BLDC GLUCOMTR-MCNC: 82 MG/DL (ref 70–105)
GLUCOSE SERPL-MCNC: 57 MG/DL (ref 65–99)
HCT VFR BLD AUTO: 34.4 % (ref 37.5–51)
HGB BLD-MCNC: 11.8 G/DL (ref 13–17.7)
LARGE PLATELETS: ABNORMAL
LYMPHOCYTES # BLD MANUAL: 2.98 10*3/MM3 (ref 0.7–3.1)
LYMPHOCYTES NFR BLD MANUAL: 10 % (ref 5–12)
MCH RBC QN AUTO: 31 PG (ref 26.6–33)
MCHC RBC AUTO-ENTMCNC: 34.3 G/DL (ref 31.5–35.7)
MCV RBC AUTO: 90.5 FL (ref 79–97)
MONOCYTES # BLD: 1.42 10*3/MM3 (ref 0.1–0.9)
NEUTROPHILS # BLD AUTO: 9.66 10*3/MM3 (ref 1.7–7)
NEUTROPHILS NFR BLD MANUAL: 64 % (ref 42.7–76)
NEUTS BAND NFR BLD MANUAL: 4 % (ref 0–5)
PLATELET # BLD AUTO: 252 10*3/MM3 (ref 140–450)
PMV BLD AUTO: 9.1 FL (ref 6–12)
POTASSIUM SERPL-SCNC: 4.3 MMOL/L (ref 3.5–5.2)
PROT SERPL-MCNC: 6.3 G/DL (ref 6–8.5)
RBC # BLD AUTO: 3.8 10*6/MM3 (ref 4.14–5.8)
RBC MORPH BLD: NORMAL
SCAN SLIDE: NORMAL
SODIUM SERPL-SCNC: 135 MMOL/L (ref 136–145)
VARIANT LYMPHS NFR BLD MANUAL: 21 % (ref 19.6–45.3)
WBC MORPH BLD: NORMAL
WBC NRBC COR # BLD: 14.2 10*3/MM3 (ref 3.4–10.8)

## 2022-12-20 PROCEDURE — 82962 GLUCOSE BLOOD TEST: CPT

## 2022-12-20 PROCEDURE — 85007 BL SMEAR W/DIFF WBC COUNT: CPT | Performed by: NURSE PRACTITIONER

## 2022-12-20 PROCEDURE — 80053 COMPREHEN METABOLIC PANEL: CPT | Performed by: NURSE PRACTITIONER

## 2022-12-20 PROCEDURE — 25010000002 ENOXAPARIN PER 10 MG: Performed by: NURSE PRACTITIONER

## 2022-12-20 PROCEDURE — 97530 THERAPEUTIC ACTIVITIES: CPT

## 2022-12-20 PROCEDURE — 94660 CPAP INITIATION&MGMT: CPT

## 2022-12-20 PROCEDURE — 85025 COMPLETE CBC W/AUTO DIFF WBC: CPT | Performed by: NURSE PRACTITIONER

## 2022-12-20 PROCEDURE — 94762 N-INVAS EAR/PLS OXIMTRY CONT: CPT

## 2022-12-20 PROCEDURE — 63710000001 INSULIN LISPRO (HUMAN) PER 5 UNITS: Performed by: FAMILY MEDICINE

## 2022-12-20 PROCEDURE — 94799 UNLISTED PULMONARY SVC/PX: CPT

## 2022-12-20 PROCEDURE — 97535 SELF CARE MNGMENT TRAINING: CPT

## 2022-12-20 PROCEDURE — 97116 GAIT TRAINING THERAPY: CPT

## 2022-12-20 RX ORDER — POLYETHYLENE GLYCOL 3350 17 G/17G
17 POWDER, FOR SOLUTION ORAL DAILY
Status: DISCONTINUED | OUTPATIENT
Start: 2022-12-21 | End: 2022-12-21 | Stop reason: HOSPADM

## 2022-12-20 RX ORDER — AMOXICILLIN 250 MG
2 CAPSULE ORAL 2 TIMES DAILY
Status: DISCONTINUED | OUTPATIENT
Start: 2022-12-20 | End: 2022-12-21 | Stop reason: HOSPADM

## 2022-12-20 RX ORDER — POLYETHYLENE GLYCOL 3350 17 G/17G
17 POWDER, FOR SOLUTION ORAL DAILY
Status: DISCONTINUED | OUTPATIENT
Start: 2022-12-20 | End: 2022-12-20

## 2022-12-20 RX ADMIN — LISINOPRIL 5 MG: 5 TABLET ORAL at 17:18

## 2022-12-20 RX ADMIN — ARFORMOTEROL TARTRATE 15 MCG: 15 SOLUTION RESPIRATORY (INHALATION) at 07:29

## 2022-12-20 RX ADMIN — INSULIN LISPRO 2 UNITS: 100 INJECTION, SOLUTION INTRAVENOUS; SUBCUTANEOUS at 08:28

## 2022-12-20 RX ADMIN — METFORMIN HYDROCHLORIDE 1000 MG: 500 TABLET ORAL at 08:29

## 2022-12-20 RX ADMIN — ARFORMOTEROL TARTRATE 15 MCG: 15 SOLUTION RESPIRATORY (INHALATION) at 19:05

## 2022-12-20 RX ADMIN — MONTELUKAST 10 MG: 10 TABLET, FILM COATED ORAL at 21:06

## 2022-12-20 RX ADMIN — BUDESONIDE 0.5 MG: 0.5 INHALANT RESPIRATORY (INHALATION) at 07:35

## 2022-12-20 RX ADMIN — GLIPIZIDE 10 MG: 5 TABLET ORAL at 08:29

## 2022-12-20 RX ADMIN — THEOPHYLLINE ANHYDROUS 300 MG: 300 CAPSULE, EXTENDED RELEASE ORAL at 08:29

## 2022-12-20 RX ADMIN — ASPIRIN 81 MG: 81 TABLET, COATED ORAL at 08:29

## 2022-12-20 RX ADMIN — GUAIFENESIN 600 MG: 600 TABLET, EXTENDED RELEASE ORAL at 21:06

## 2022-12-20 RX ADMIN — GLIPIZIDE 10 MG: 5 TABLET ORAL at 17:18

## 2022-12-20 RX ADMIN — MIRTAZAPINE 15 MG: 15 TABLET, FILM COATED ORAL at 21:06

## 2022-12-20 RX ADMIN — Medication 3 ML: at 21:06

## 2022-12-20 RX ADMIN — ENOXAPARIN SODIUM 40 MG: 100 INJECTION SUBCUTANEOUS at 17:18

## 2022-12-20 RX ADMIN — GUAIFENESIN 600 MG: 600 TABLET, EXTENDED RELEASE ORAL at 08:29

## 2022-12-20 RX ADMIN — BISOPROLOL FUMARATE: 5 TABLET ORAL at 08:29

## 2022-12-20 RX ADMIN — METFORMIN HYDROCHLORIDE 1000 MG: 500 TABLET ORAL at 17:18

## 2022-12-20 RX ADMIN — ROFLUMILAST 500 MCG: 500 TABLET ORAL at 08:29

## 2022-12-20 RX ADMIN — Medication 3 ML: at 08:30

## 2022-12-20 RX ADMIN — BUDESONIDE 0.5 MG: 0.5 INHALANT RESPIRATORY (INHALATION) at 19:10

## 2022-12-20 NOTE — THERAPY TREATMENT NOTE
Subjective: Pt agreeable to therapeutic plan of care.  Cognition: oriented to Person, Place, Time and Situation    Objective:     Bed Mobility: Independent   Functional Transfers: Supervision  Functional Ambulation: Supervision    Lower Body Dressing: Supervision  ADL Position: supported sitting  ADL Comments: Patient able to don LB clothing sitting in recliner      Vitals: WNL 4L O2    Pain: 0 VAS  Location:   Interventions for pain: N/A  Education: Provided education on the importance of mobility in the acute care setting, ADL training and Energy conservation strategies. Patient educated on PLB and energy conservation techniques to maintain O2 saturations during daily routines.     Assessment: Maximino Bhatia presents with ADL impairments below baseline abilities which indicate the need for continued skilled intervention while inpatient. Patient donning LB clothing from recliner upon entrance into room. Patient able to ambulate around room with no AE ad ezra with distance supervision. Patient provided education and training on oxygen tube management as well as energy conservation techniques (activities in sitting vs standing, monitor O2 with home pulse ox and rest as needed, break large tasks into smaller steps with rest in between, etc). Patient has made significant process in functional abilities with decreased oxygen needs. Patient appropriate to return home with home health verse skilled rehab. Tolerating session today without incident. Will continue to follow and progress as tolerated.     Plan/Recommendations:   Low Intensity Therapy recommended post-acute care - This is recommended as therapy feels this patient would require 2-3 visits per week. OP or HH would be the best option depending on patient's home bound status. Consider, if the patient has other  \"skilled\" needs such as wounds, IV antibiotics, etc. Combined with \"low intensity\" could also equate to a SNF. If patient is medically complex, consider  LTAC.. Pt requires no DME at discharge.     Pt desires Home with Home Health at discharge. Pt cooperative; agreeable to therapeutic recommendations and plan of care.     Modified Koby: N/A = No pre-op stroke/TIA    Post-Tx Position: Up in Chair and Call light and personal items within reach  PPE: gloves, surgical mask, gown and eye protection

## 2022-12-20 NOTE — PROGRESS NOTES
LOS: 16 days   Patient Care Team:  Qi Hicks APRN as PCP - General (Nurse Practitioner)    Subjective     He denies any complaints     Review of Systems   Constitutional: Positive for activity change and fatigue. Negative for appetite change.   HENT: Negative.    Respiratory: Positive for shortness of breath. Negative for cough.    Cardiovascular: Negative.    Gastrointestinal: Negative.    Genitourinary: Negative.    Musculoskeletal: Negative.    Neurological: Positive for weakness.   Psychiatric/Behavioral: Negative.            Objective     Vital Signs  Temp:  [97.2 °F (36.2 °C)-98.3 °F (36.8 °C)] 97.9 °F (36.6 °C)  Heart Rate:  [78-94] 89  Resp:  [15-35] 15  BP: ()/(63-73) 108/68      Physical Exam  Vitals reviewed.   Constitutional:       Appearance: He is not ill-appearing.   HENT:      Head: Normocephalic and atraumatic.      Right Ear: External ear normal.      Left Ear: External ear normal.      Nose: Nose normal.      Mouth/Throat:      Mouth: Mucous membranes are dry.   Eyes:      General:         Right eye: No discharge.         Left eye: No discharge.   Cardiovascular:      Rate and Rhythm: Normal rate and regular rhythm.      Pulses: Normal pulses.      Heart sounds: Normal heart sounds.   Pulmonary:      Effort: Pulmonary effort is normal. Tachypnea present.      Breath sounds: No wheezing or rhonchi.   Abdominal:      General: Bowel sounds are normal.      Palpations: Abdomen is soft.   Musculoskeletal:         General: Normal range of motion.      Cervical back: Normal range of motion.   Skin:     General: Skin is warm and dry.   Neurological:      Mental Status: He is alert and oriented to person, place, and time.   Psychiatric:         Behavior: Behavior normal.              Results Review:    Lab Results (last 24 hours)     Procedure Component Value Units Date/Time    POC Glucose Once [[id number]]  (Abnormal) Collected: 12/20/22 0727    Specimen: Blood Updated: 12/20/22 0728      Glucose 199 mg/dL      Comment: Serial Number: [id number]:  [id number]       Manual Differential [[id number]]  (Abnormal) Collected: 12/19/22 2349    Specimen: Blood Updated: 12/20/22 0222     Neutrophil % 64.0 %      Lymphocyte % 21.0 %      Monocyte % 10.0 %      Eosinophil % 1.0 %      Bands %  4.0 %      Neutrophils Absolute 9.66 10*3/mm3      Lymphocytes Absolute 2.98 10*3/mm3      Monocytes Absolute 1.42 10*3/mm3      Eosinophils Absolute 0.14 10*3/mm3      RBC Morphology Normal     WBC Morphology Normal     Large Platelets Slight/1+    CBC & Differential [[id number]]  (Abnormal) Collected: 12/19/22 2349    Specimen: Blood Updated: 12/20/22 0222    Narrative:      The following orders were created for panel order CBC & Differential.  Procedure                               Abnormality         Status                     ---------                               -----------         ------                     CBC Auto Differential[[id number]]        Abnormal            Final result               Scan Slide[[id number]]                                       Final result                 Please view results for these tests on the individual orders.    Scan Slide [[id number]] Collected: 12/19/22 2349    Specimen: Blood Updated: 12/20/22 0222     Scan Slide --     Comment: See Manual Differential Results       CBC Auto Differential [[id number]]  (Abnormal) Collected: 12/19/22 2349    Specimen: Blood Updated: 12/20/22 0222     WBC 14.20 10*3/mm3      RBC 3.80 10*6/mm3      Hemoglobin 11.8 g/dL      Hematocrit 34.4 %      MCV 90.5 fL      MCH 31.0 pg      MCHC 34.3 g/dL      RDW 14.9 %      RDW-SD 49.9 fl      MPV 9.1 fL      Platelets 252 10*3/mm3     Narrative:      Modified report. Previous result was Hemogram on 12/20/2022 at 0051 EST.  The previously reported component NRBC is no longer being reported. Previous result was 0.0 /100 WBC (Reference Range: 0.0-0.2 /100 WBC) on 12/20/2022 at 0051 EST.    POC Glucose Once  [[id number]]  (Abnormal) Collected: 12/20/22 0129    Specimen: Blood Updated: 12/20/22 0130     Glucose 133 mg/dL      Comment: Serial Number: [id number]:  [id number]       POC Glucose Once [[id number]]  (Normal) Collected: 12/20/22 0115    Specimen: Blood Updated: 12/20/22 0117     Glucose 81 mg/dL      Comment: Serial Number: [id number]:  [id number]       Comprehensive Metabolic Panel [[id number]]  (Abnormal) Collected: 12/19/22 2349    Specimen: Blood Updated: 12/20/22 0110     Glucose 57 mg/dL      BUN 14 mg/dL      Creatinine 0.67 mg/dL      Sodium 135 mmol/L      Potassium 4.3 mmol/L      Chloride 94 mmol/L      CO2 33.0 mmol/L      Calcium 9.1 mg/dL      Total Protein 6.3 g/dL      Albumin 3.40 g/dL      ALT (SGPT) 28 U/L      AST (SGOT) 16 U/L      Alkaline Phosphatase 52 U/L      Total Bilirubin 0.3 mg/dL      Globulin 2.9 gm/dL      A/G Ratio 1.2 g/dL      BUN/Creatinine Ratio 20.9     Anion Gap 8.0 mmol/L      eGFR 104.9 mL/min/1.73      Comment: National Kidney Foundation and American Society of Nephrology (ASN) Task Force recommended calculation based on the Chronic Kidney Disease Epidemiology Collaboration (CKD-EPI) equation refit without adjustment for race.       Narrative:      GFR Normal >60  Chronic Kidney Disease <60  Kidney Failure <15      POC Glucose Once [[id number]]  (Normal) Collected: 12/19/22 1609    Specimen: Blood Updated: 12/19/22 1610     Glucose 81 mg/dL      Comment: Serial Number: [id number]:  [id number]       POC Glucose Once [[id number]]  (Abnormal) Collected: 12/19/22 1057    Specimen: Blood Updated: 12/19/22 1058     Glucose 134 mg/dL      Comment: Serial Number: [id number]:  [id number]              Imaging Results (Last 24 Hours)     ** No results found for the last 24 hours. **               I reviewed the patient's new clinical results.    Medication Review:   Scheduled Meds:arformoterol, 15 mcg, Nebulization, BID - RT  aspirin, 81 mg, Oral,  Daily  bisoprolol-hydroCHLOROthiazide (ZIAC) 10-6.25 mg combo dose, , Oral, Q24H  budesonide, 0.5 mg, Nebulization, BID - RT  enoxaparin, 40 mg, Subcutaneous, Q24H  glipizide, 10 mg, Oral, BID AC  guaiFENesin, 600 mg, Oral, Q12H  insulin lispro, 2-9 Units, Subcutaneous, TID With Meals  lisinopril, 5 mg, Oral, Q PM  metFORMIN, 1,000 mg, Oral, BID With Meals  mirtazapine, 15 mg, Oral, Nightly  montelukast, 10 mg, Oral, Nightly  roflumilast, 500 mcg, Oral, Daily  sodium chloride, 3 mL, Intravenous, Q12H  theophylline, 300 mg, Oral, Q24H      Continuous Infusions:Pharmacy to Dose enoxaparin (LOVENOX),       PRN Meds:.•  acetaminophen  •  dextrose  •  dextrose  •  glucagon (human recombinant)  •  ipratropium-albuterol  •  LORazepam  •  nitroglycerin  •  ondansetron  •  Pharmacy to Dose enoxaparin (LOVENOX)  •  [COMPLETED] Insert Peripheral IV **AND** sodium chloride  •  sodium chloride  •  sodium chloride     Interval History:    12/5 patient continues with shortness of breath; will add corticosteroid nebs; CT chest; continue supportive care may need oxygen at home temporarily; leukocytosis most likely secondary to steroids     12/6 worsening respiratory status today with distress will add pulm consult and abg; CT scan yesterday with severe emphysema and will increase steroids    12/9 patient is most likely at his new baseline and anticipate he will need oxygen at home and sleep study as outpt; bipap at night as needed    12/10 cxr in am and labs    12/11 had respiratory distress yesterday with hypercapnia and hypoxia; he is having difficulty wearing bipap which was explained in detail regarding CO2 and oxygen requirements; also appetite is down will add remeron and supplements; additional antibiotics for leukocytosis and pna    12/13 continues to require avap and high levels of oxygen; will obtain ECHO; cefipime added for poss bacteria infection with mild fever and leukocytosis; lokelma for hyperkalemia will add IV  fluids for poor intake and hyponatremia    12/14 continue to monitor labs and electrolytes; await echo results; afebrile continue current plan and oxygen titration    12/15 overall improvement needs to be up and moving around    12/20 patient most likely at new baseline; will need rehab; stable and ready for transfer    Assessment & Plan     Acute COPD exacerbation most likely related to flu A  Diabetes-stable  Hyperkalemia  Hyponatremia-resolved  Hypertension  Tobacco abuse  Malnutrition     Plan for disposition: Rehab    CARA Clark  12/20/22  09:17 EST

## 2022-12-20 NOTE — THERAPY TREATMENT NOTE
Subjective: Pt agreeable to therapeutic plan of care.    Objective:     Bed mobility - Independent  Transfers - Independent  Ambulation - 90 feet Supervision  Pt made 5 laps around the room without AD.  No loss of balance. Pt able to manage extended 02 tube independently    Vitals: WNL    Pain: 0 VAS      Education: Transfer Training and Gait Training    Assessment: Maximino Bhatia presents with functional mobility impairments which indicate the need for skilled intervention. Tolerating session today without incident.  Pt is much more independent with his mobility.  Pt's sats stable on 4L nc. Will change plan of care to home with home health.  Will continue to follow and progress as tolerated.     Plan/Recommendations:   Low Intensity Therapy recommended post-acute care - This is recommended as therapy feels this patient would require 2-3 visits per week. OP or HH would be the best option depending on patient's home bound status.  Pt requires no DME at discharge.     Pt desires Home with Home Health at discharge. Pt cooperative; agreeable to therapeutic recommendations and plan of care.     Basic Mobility 6-click:  Rollin = Total, A lot = 2, A little = 3; 4 = None  Supine>Sit:   1 = Total, A lot = 2, A little = 3; 4 = None   Sit>Stand with arms:  1 = Total, A lot = 2, A little = 3; 4 = None  Bed>Chair:   1 = Total, A lot = 2, A little = 3; 4 = None  Ambulate in room:  1 = Total, A lot = 2, A little = 3; 4 = None  3-5 Steps with railin = Total, A lot = 2, A little = 3; 4 = None  Score: 22    Post-Tx Position: Up in Chair and Call light and personal items within reach  PPE: gloves, surgical mask, gown and eye protection

## 2022-12-20 NOTE — PLAN OF CARE
Problem: Adult Inpatient Plan of Care  Goal: Plan of Care Review  Outcome: Ongoing, Progressing  Flowsheets (Taken 12/20/2022 1504)  Outcome Evaluation: Patient on 4L nc overnight pulse oximetry ordered for patient to go home tomorrow hopefully.  Goal: Patient-Specific Goal (Individualized)  Outcome: Ongoing, Progressing  Goal: Absence of Hospital-Acquired Illness or Injury  Outcome: Ongoing, Progressing  Intervention: Identify and Manage Fall Risk  Recent Flowsheet Documentation  Taken 12/20/2022 1400 by Maria Wong, RN  Safety Promotion/Fall Prevention:   nonskid shoes/slippers when out of bed   room organization consistent   safety round/check completed  Taken 12/20/2022 1215 by Maria Wong, RN  Safety Promotion/Fall Prevention:   nonskid shoes/slippers when out of bed   room organization consistent   safety round/check completed  Taken 12/20/2022 1014 by Maria Wong, RN  Safety Promotion/Fall Prevention:   nonskid shoes/slippers when out of bed   room organization consistent   safety round/check completed  Taken 12/20/2022 0825 by Maria Wong, RN  Safety Promotion/Fall Prevention:   nonskid shoes/slippers when out of bed   room organization consistent   safety round/check completed   fall prevention program maintained  Goal: Optimal Comfort and Wellbeing  Outcome: Ongoing, Progressing  Goal: Readiness for Transition of Care  Outcome: Ongoing, Progressing   Goal Outcome Evaluation:              Outcome Evaluation: Patient on 4L nc overnight pulse oximetry ordered for patient to go home tomorrow hopefully.

## 2022-12-20 NOTE — PLAN OF CARE
Assessment: Maximino Bhatia presents with ADL impairments below baseline abilities which indicate the need for continued skilled intervention while inpatient. Patient donning LB clothing from recliner upon entrance into room. Patient able to ambulate around room with no AE ad ezra with distance supervision. Patient provided education and training on oxygen tube management as well as energy conservation techniques (activities in sitting vs standing, monitor O2 with home pulse ox and rest as needed, break large tasks into smaller steps with rest in between, etc). Patient has made significant process in functional abilities with decreased oxygen needs. Patient appropriate to return home with home health verse skilled rehab. Tolerating session today without incident. Will continue to follow and progress as tolerated.     Plan/Recommendations:   Low Intensity Therapy recommended post-acute care - This is recommended as therapy feels this patient would require 2-3 visits per week. OP or HH would be the best option depending on patient's home bound status. Consider, if the patient has other  \"skilled\" needs such as wounds, IV antibiotics, etc. Combined with \"low intensity\" could also equate to a SNF. If patient is medically complex, consider LTAC.. Pt requires no DME at discharge.

## 2022-12-20 NOTE — PROGRESS NOTES
PULMONARY CRITICAL CARE PROGRESS  NOTE      PATIENT IDENTIFICATION:  Name: Maximino Bhatia  MRN: [id number]  :  1960     Age: 62 y.o.  Sex: male    DATE OF Note:  2022   Referring Physician: James Craig MD                  Subjective:   Laying in bed, currently still on 4.5 L O2,  Sitting in chair at bedside, states he coughs more in the mornings when he wakes up, no SOB, no chest or abd pain, no bowel or bladder issues reported    Objective:  tMax 24 hrs: Temp (24hrs), Av.8 °F (36.6 °C), Min:97.2 °F (36.2 °C), Max:98.3 °F (36.8 °C)      Vitals Ranges:   Temp:  [97.2 °F (36.2 °C)-98.3 °F (36.8 °C)] 97.9 °F (36.6 °C)  Heart Rate:  [78-94] 89  Resp:  [15-35] 15  BP: ()/(63-73) 108/68    Intake and Output Last 3 Shifts:   I/O last 3 completed shifts:  In:  [P.O.:]  Out: 600 [Urine:600]    Exam:  /68 (BP Location: Left arm, Patient Position: Sitting)   Pulse 89   Temp 97.9 °F (36.6 °C) (Oral)   Resp 15   Ht 177.8 cm (70\")   Wt 86.7 kg (191 lb 2.2 oz)   SpO2 93%   BMI 27.43 kg/m²     General Appearance: Alert  HEENT:  Normocephalic, without obvious abnormality. Conjunctivae/corneas clear.  Normal external ear canals. Nares normal, no drainage     Neck:  Supple, symmetrical, trachea midline. No JVD.  Lungs /Chest wall:   Bilateral basal rhonchi, respirations unlabored, symmetrical wall movement.     Heart:  Regular rate and rhythm, systolic murmur PMI left sternal border  Abdomen: Soft, nontender, no masses, no organomegaly.    Extremities: Trace edema, no clubbing or cyanosis        Medications:  arformoterol, 15 mcg, Nebulization, BID - RT  aspirin, 81 mg, Oral, Daily  bisoprolol-hydroCHLOROthiazide (ZIAC) 10-6.25 mg combo dose, , Oral, Q24H  budesonide, 0.5 mg, Nebulization, BID - RT  enoxaparin, 40 mg, Subcutaneous, Q24H  glipizide, 10 mg, Oral, BID AC  guaiFENesin, 600 mg, Oral, Q12H  insulin lispro, 2-9 Units, Subcutaneous, TID With Meals  lisinopril, 5 mg, Oral, Q  PM  metFORMIN, 1,000 mg, Oral, BID With Meals  mirtazapine, 15 mg, Oral, Nightly  montelukast, 10 mg, Oral, Nightly  roflumilast, 500 mcg, Oral, Daily  sodium chloride, 3 mL, Intravenous, Q12H  theophylline, 300 mg, Oral, Q24H        Infusion:  Pharmacy to Dose enoxaparin (LOVENOX),          PRN:    acetaminophen    dextrose    dextrose    glucagon (human recombinant)    ipratropium-albuterol    LORazepam    nitroglycerin    ondansetron    Pharmacy to Dose enoxaparin (LOVENOX)    [COMPLETED] Insert Peripheral IV **AND** sodium chloride    sodium chloride    sodium chloride  Data Review:  All labs (24hrs):   Recent Results (from the past 24 hour(s))   POC Glucose Once    Collection Time: 12/19/22 10:57 AM    Specimen: Blood   Result Value Ref Range    Glucose 134 (H) 70 - 105 mg/dL   POC Glucose Once    Collection Time: 12/19/22  4:09 PM    Specimen: Blood   Result Value Ref Range    Glucose 81 70 - 105 mg/dL   Comprehensive Metabolic Panel    Collection Time: 12/19/22 11:49 PM    Specimen: Blood   Result Value Ref Range    Glucose 57 (L) 65 - 99 mg/dL    BUN 14 8 - 23 mg/dL    Creatinine 0.67 (L) 0.76 - 1.27 mg/dL    Sodium 135 (L) 136 - 145 mmol/L    Potassium 4.3 3.5 - 5.2 mmol/L    Chloride 94 (L) 98 - 107 mmol/L    CO2 33.0 (H) 22.0 - 29.0 mmol/L    Calcium 9.1 8.6 - 10.5 mg/dL    Total Protein 6.3 6.0 - 8.5 g/dL    Albumin 3.40 (L) 3.50 - 5.20 g/dL    ALT (SGPT) 28 1 - 41 U/L    AST (SGOT) 16 1 - 40 U/L    Alkaline Phosphatase 52 39 - 117 U/L    Total Bilirubin 0.3 0.0 - 1.2 mg/dL    Globulin 2.9 gm/dL    A/G Ratio 1.2 g/dL    BUN/Creatinine Ratio 20.9 7.0 - 25.0    Anion Gap 8.0 5.0 - 15.0 mmol/L    eGFR 104.9 >60.0 mL/min/1.73   CBC Auto Differential    Collection Time: 12/19/22 11:49 PM    Specimen: Blood   Result Value Ref Range    WBC 14.20 (H) 3.40 - 10.80 10*3/mm3    RBC 3.80 (L) 4.14 - 5.80 10*6/mm3    Hemoglobin 11.8 (L) 13.0 - 17.7 g/dL    Hematocrit 34.4 (L) 37.5 - 51.0 %    MCV 90.5 79.0 - 97.0 fL     MCH 31.0 26.6 - 33.0 pg    MCHC 34.3 31.5 - 35.7 g/dL    RDW 14.9 12.3 - 15.4 %    RDW-SD 49.9 37.0 - 54.0 fl    MPV 9.1 6.0 - 12.0 fL    Platelets 252 140 - 450 10*3/mm3   Scan Slide    Collection Time: 12/19/22 11:49 PM    Specimen: Blood   Result Value Ref Range    Scan Slide     Manual Differential    Collection Time: 12/19/22 11:49 PM    Specimen: Blood   Result Value Ref Range    Neutrophil % 64.0 42.7 - 76.0 %    Lymphocyte % 21.0 19.6 - 45.3 %    Monocyte % 10.0 5.0 - 12.0 %    Eosinophil % 1.0 0.3 - 6.2 %    Bands %  4.0 0.0 - 5.0 %    Neutrophils Absolute 9.66 (H) 1.70 - 7.00 10*3/mm3    Lymphocytes Absolute 2.98 0.70 - 3.10 10*3/mm3    Monocytes Absolute 1.42 (H) 0.10 - 0.90 10*3/mm3    Eosinophils Absolute 0.14 0.00 - 0.40 10*3/mm3    RBC Morphology Normal Normal    WBC Morphology Normal Normal    Large Platelets Slight/1+ None Seen   POC Glucose Once    Collection Time: 12/20/22  1:15 AM    Specimen: Blood   Result Value Ref Range    Glucose 81 70 - 105 mg/dL   POC Glucose Once    Collection Time: 12/20/22  1:29 AM    Specimen: Blood   Result Value Ref Range    Glucose 133 (H) 70 - 105 mg/dL   POC Glucose Once    Collection Time: 12/20/22  7:27 AM    Specimen: Blood   Result Value Ref Range    Glucose 199 (H) 70 - 105 mg/dL        Imaging:  Adult Transthoracic Echo Complete W/ Cont if Necessary Per Protocol    Left ventricular systolic function is low normal. Left ventricular   ejection fraction appears to be 46 - 50%.    The left ventricular cavity is moderate to severely dilated.    Left ventricular diastolic function is consistent with (grade I)   impaired relaxation.    The study is technically difficult for diagnosis.       ASSESSMENT:  Acute hypoxic/hypercapnic respiratory failure  Pneumonia  Influenza A  Acute exacerbation of COPD  Hyperkalemia  Leukocytosis  Diabetes mellitus type 2  Hypertension  Smoker       PLAN:  Continue current plan and continue to   Decrease O2 to keep sats greater  than 88%  Out of bed daily   Bronchodilators  Inhaled corticosteroids  Incentive spirometer  Completed a tapering steroid dose  Completed Tamiflu and a course of cefepime  Electrolytes/ glycemic control  DVT and GI prophylaxis-Lovenox    Discussed with Dr. Yolanda Colbert, APRN   12/19/2022  10:35 EST    I personally have examined  and interviewed the patient. I have reviewed the history, data, problems, assessment and plan with our NP.  Critical care time in direct medical management (   ) minutes  Electronically signed by Carmenza Guerrero MD. D, ABSM.

## 2022-12-20 NOTE — PLAN OF CARE
Goal Outcome Evaluation:     Bed mobility - Independent  Transfers - Independent  Ambulation - 90 feet Supervision  Pt made 5 laps around the room without AD.  No loss of balance. Pt able to manage extended 02 tube independently    Low Intensity Therapy recommended post-acute care - This is recommended as therapy feels this patient would require 2-3 visits per week. OP or HH would be the best option depending on patient's home bound status.  Pt requires no DME at discharge.     Pt desires Home with Home Health at discharge. Pt cooperative; agreeable to therapeutic recommendations and plan of care.

## 2022-12-20 NOTE — PLAN OF CARE
Goal Outcome Evaluation:      Pt has been calm and cooperative this shift. Pt has been wearing BIPAP most of the shift. Pt has been sleeping between care. Pts VSS so far this shift. Pt did have one low blood sugar in which orange juice was given for and then came up in 15 minutes. Pt has had no complaints of pain so far this shift.      Problem: Adult Inpatient Plan of Care  Goal: Plan of Care Review  Outcome: Ongoing, Progressing  Goal: Patient-Specific Goal (Individualized)  Outcome: Ongoing, Progressing  Goal: Absence of Hospital-Acquired Illness or Injury  Outcome: Ongoing, Progressing  Intervention: Identify and Manage Fall Risk  Recent Flowsheet Documentation  Taken 12/20/2022 0210 by Christelle Barron RN  Safety Promotion/Fall Prevention:   activity supervised   assistive device/personal items within reach   clutter free environment maintained   nonskid shoes/slippers when out of bed   room organization consistent   safety round/check completed  Taken 12/20/2022 0024 by Christelle Barron RN  Safety Promotion/Fall Prevention:   activity supervised   assistive device/personal items within reach   clutter free environment maintained   nonskid shoes/slippers when out of bed   room organization consistent   safety round/check completed  Taken 12/19/2022 2217 by Christelle Barron RN  Safety Promotion/Fall Prevention:   activity supervised   assistive device/personal items within reach   clutter free environment maintained   nonskid shoes/slippers when out of bed   room organization consistent   safety round/check completed  Taken 12/19/2022 2025 by Christelle Barron RN  Safety Promotion/Fall Prevention:   activity supervised   assistive device/personal items within reach   clutter free environment maintained  Intervention: Prevent Skin Injury  Recent Flowsheet Documentation  Taken 12/20/2022 0210 by Christelle Barron RN  Body Position: position changed independently  Taken 12/20/2022 0024 by Christelle Barron RN  Body  Position: position changed independently  Skin Protection:   adhesive use limited   incontinence pads utilized  Taken 12/19/2022 2217 by Christelle Barron RN  Body Position: position changed independently  Taken 12/19/2022 2025 by Christelle Barron RN  Body Position: position changed independently  Skin Protection:   adhesive use limited   incontinence pads utilized  Intervention: Prevent and Manage VTE (Venous Thromboembolism) Risk  Recent Flowsheet Documentation  Taken 12/20/2022 0210 by Christelle Barron RN  Activity Management:   activity adjusted per tolerance   activity encouraged  Taken 12/20/2022 0024 by Christelle Barron RN  Activity Management:   activity adjusted per tolerance   activity encouraged  VTE Prevention/Management: patient refused intervention  Range of Motion: active ROM (range of motion) encouraged  Taken 12/19/2022 2025 by Christelle Barron RN  Activity Management:   activity adjusted per tolerance   activity encouraged  VTE Prevention/Management: patient refused intervention  Range of Motion: active ROM (range of motion) encouraged  Intervention: Prevent Infection  Recent Flowsheet Documentation  Taken 12/20/2022 0210 by Christelle Barron RN  Infection Prevention:   hand hygiene promoted   personal protective equipment utilized   rest/sleep promoted   single patient room provided   environmental surveillance performed  Taken 12/20/2022 0024 by Christelle Barron RN  Infection Prevention:   hand hygiene promoted   personal protective equipment utilized   rest/sleep promoted   single patient room provided   environmental surveillance performed  Taken 12/19/2022 2217 by Christelle Barron RN  Infection Prevention:   hand hygiene promoted   rest/sleep promoted   personal protective equipment utilized   single patient room provided   environmental surveillance performed  Taken 12/19/2022 2025 by Christelle Barron RN  Infection Prevention:   hand hygiene promoted   personal protective equipment utilized    rest/sleep promoted   single patient room provided   environmental surveillance performed  Goal: Optimal Comfort and Wellbeing  Outcome: Ongoing, Progressing  Intervention: Provide Person-Centered Care  Recent Flowsheet Documentation  Taken 12/20/2022 0024 by Christelle Barron RN  Trust Relationship/Rapport:   care explained   questions answered   questions encouraged  Taken 12/19/2022 2025 by Christelle Barron RN  Trust Relationship/Rapport:   care explained   questions answered   questions encouraged  Goal: Readiness for Transition of Care  Outcome: Ongoing, Progressing     Problem: Breathing Pattern Ineffective  Goal: Effective Breathing Pattern  Outcome: Ongoing, Progressing  Intervention: Promote Improved Breathing Pattern  Recent Flowsheet Documentation  Taken 12/20/2022 0210 by Christelle Barron RN  Head of Bed (HOB) Positioning: HOB at 20-30 degrees  Taken 12/20/2022 0024 by Christelle Barron RN  Supportive Measures: active listening utilized  Head of Bed (HOB) Positioning: HOB at 20-30 degrees  Taken 12/19/2022 2217 by Christelle Barron RN  Head of Bed (HOB) Positioning: HOB at 20-30 degrees  Taken 12/19/2022 2025 by Christelle Barron RN  Supportive Measures:   active listening utilized   relaxation techniques promoted  Head of Bed (HOB) Positioning: HOB at 30 degrees     Problem: Fall Injury Risk  Goal: Absence of Fall and Fall-Related Injury  Outcome: Ongoing, Progressing  Intervention: Identify and Manage Contributors  Recent Flowsheet Documentation  Taken 12/20/2022 0210 by Christelle Barron RN  Medication Review/Management: medications reviewed  Taken 12/20/2022 0024 by Christelle Barron RN  Medication Review/Management: medications reviewed  Self-Care Promotion: independence encouraged  Taken 12/19/2022 2217 by Christelle Barron RN  Medication Review/Management: medications reviewed  Taken 12/19/2022 2025 by Christelle Barron RN  Medication Review/Management: medications reviewed  Self-Care Promotion:  independence encouraged  Intervention: Promote Injury-Free Environment  Recent Flowsheet Documentation  Taken 12/20/2022 0210 by Christelle Barron RN  Safety Promotion/Fall Prevention:   activity supervised   assistive device/personal items within reach   clutter free environment maintained   nonskid shoes/slippers when out of bed   room organization consistent   safety round/check completed  Taken 12/20/2022 0024 by Christelle Barron RN  Safety Promotion/Fall Prevention:   activity supervised   assistive device/personal items within reach   clutter free environment maintained   nonskid shoes/slippers when out of bed   room organization consistent   safety round/check completed  Taken 12/19/2022 2217 by Christelle Barron RN  Safety Promotion/Fall Prevention:   activity supervised   assistive device/personal items within reach   clutter free environment maintained   nonskid shoes/slippers when out of bed   room organization consistent   safety round/check completed  Taken 12/19/2022 2025 by Christelle Barron RN  Safety Promotion/Fall Prevention:   activity supervised   assistive device/personal items within reach   clutter free environment maintained     Problem: Skin Injury Risk Increased  Goal: Skin Health and Integrity  Outcome: Ongoing, Progressing  Intervention: Optimize Skin Protection  Recent Flowsheet Documentation  Taken 12/20/2022 0210 by Christelle Barron RN  Head of Bed (HOB) Positioning: HOB at 20-30 degrees  Taken 12/20/2022 0024 by Christelle Barron RN  Pressure Reduction Techniques: frequent weight shift encouraged  Head of Bed (HOB) Positioning: HOB at 20-30 degrees  Pressure Reduction Devices: pressure-redistributing mattress utilized  Skin Protection:   adhesive use limited   incontinence pads utilized  Taken 12/19/2022 2217 by Christelle Barron RN  Head of Bed (HOB) Positioning: HOB at 20-30 degrees  Taken 12/19/2022 2025 by Christelle Barron RN  Pressure Reduction Techniques: frequent weight shift  encouraged  Head of Bed (HOB) Positioning: HOB at 30 degrees  Pressure Reduction Devices: pressure-redistributing mattress utilized  Skin Protection:   adhesive use limited   incontinence pads utilized

## 2022-12-20 NOTE — CASE MANAGEMENT/SOCIAL WORK
Continued Stay Note   [place]     Patient Name: Maximino Bhatia  MRN: [id number]  Today's Date: 12/20/2022    Admit Date: 12/3/2022    Plan: DC Plan: Anticipate routine home, unable to have HH. Watch O2 needs, may require walking oximetry to be performed 24-48 hrs prior to dc.   Discharge Plan     Row Name 12/20/22 1612       Plan    Plan DC Plan: Anticipate routine home, unable to have HH. Watch O2 needs, may require walking oximetry to be performed 24-48 hrs prior to dc.    Patient/Family in Agreement with Plan yes    Plan Comments CM met with patient at bedside to discuss dc planning and therapy recommendations of HH. [facility] CM sent new referral in basket and to liaison, Alisa SPENCER who later declined due to staffing. Patient reported that he does not have home O2. CM updated [place] liaison and [place] liaison that patient anticipated to go home at discharge. CM will f/u with patient at later time to discuss possible OP PT.          Met with patient in room wearing PPE: mask.      Maintained distance greater than six feet and spent less than 15 minutes in the room.        Cecelia Bradford RN     Office Phone: [phone]  Office Cell: [phone]

## 2022-12-21 ENCOUNTER — READMISSION MANAGEMENT (OUTPATIENT)
Dept: CALL CENTER | Facility: HOSPITAL | Age: 62
End: 2022-12-21

## 2022-12-21 VITALS
WEIGHT: 189.82 LBS | BODY MASS INDEX: 27.17 KG/M2 | DIASTOLIC BLOOD PRESSURE: 76 MMHG | RESPIRATION RATE: 12 BRPM | HEART RATE: 102 BPM | HEIGHT: 70 IN | TEMPERATURE: 98.1 F | SYSTOLIC BLOOD PRESSURE: 111 MMHG | OXYGEN SATURATION: 95 %

## 2022-12-21 LAB
ALBUMIN SERPL-MCNC: 3.2 G/DL (ref 3.5–5.2)
ALBUMIN/GLOB SERPL: 1.1 G/DL
ALP SERPL-CCNC: 49 U/L (ref 39–117)
ALT SERPL W P-5'-P-CCNC: 21 U/L (ref 1–41)
ANION GAP SERPL CALCULATED.3IONS-SCNC: 9 MMOL/L (ref 5–15)
AST SERPL-CCNC: 11 U/L (ref 1–40)
BILIRUB SERPL-MCNC: 0.2 MG/DL (ref 0–1.2)
BUN SERPL-MCNC: 12 MG/DL (ref 8–23)
BUN/CREAT SERPL: 21.1 (ref 7–25)
CALCIUM SPEC-SCNC: 9 MG/DL (ref 8.6–10.5)
CHLORIDE SERPL-SCNC: 93 MMOL/L (ref 98–107)
CO2 SERPL-SCNC: 30 MMOL/L (ref 22–29)
CREAT SERPL-MCNC: 0.57 MG/DL (ref 0.76–1.27)
DACRYOCYTES BLD QL SMEAR: ABNORMAL
DEPRECATED RDW RBC AUTO: 45.9 FL (ref 37–54)
EGFRCR SERPLBLD CKD-EPI 2021: 110.2 ML/MIN/1.73
ERYTHROCYTE [DISTWIDTH] IN BLOOD BY AUTOMATED COUNT: 14.6 % (ref 12.3–15.4)
GLOBULIN UR ELPH-MCNC: 3 GM/DL
GLUCOSE BLDC GLUCOMTR-MCNC: 105 MG/DL (ref 70–105)
GLUCOSE BLDC GLUCOMTR-MCNC: 160 MG/DL (ref 70–105)
GLUCOSE SERPL-MCNC: 98 MG/DL (ref 65–99)
HCT VFR BLD AUTO: 33.5 % (ref 37.5–51)
HGB BLD-MCNC: 10.9 G/DL (ref 13–17.7)
LARGE PLATELETS: ABNORMAL
LYMPHOCYTES # BLD MANUAL: 1.68 10*3/MM3 (ref 0.7–3.1)
LYMPHOCYTES NFR BLD MANUAL: 13 % (ref 5–12)
MCH RBC QN AUTO: 29.7 PG (ref 26.6–33)
MCHC RBC AUTO-ENTMCNC: 32.6 G/DL (ref 31.5–35.7)
MCV RBC AUTO: 91 FL (ref 79–97)
MONOCYTES # BLD: 1.46 10*3/MM3 (ref 0.1–0.9)
NEUTROPHILS # BLD AUTO: 8.06 10*3/MM3 (ref 1.7–7)
NEUTROPHILS NFR BLD MANUAL: 69 % (ref 42.7–76)
NEUTS BAND NFR BLD MANUAL: 3 % (ref 0–5)
PLATELET # BLD AUTO: 252 10*3/MM3 (ref 140–450)
PMV BLD AUTO: 8.7 FL (ref 6–12)
POIKILOCYTOSIS BLD QL SMEAR: ABNORMAL
POTASSIUM SERPL-SCNC: 4.1 MMOL/L (ref 3.5–5.2)
PROT SERPL-MCNC: 6.2 G/DL (ref 6–8.5)
RBC # BLD AUTO: 3.68 10*6/MM3 (ref 4.14–5.8)
SCAN SLIDE: NORMAL
SMALL PLATELETS BLD QL SMEAR: ADEQUATE
SODIUM SERPL-SCNC: 132 MMOL/L (ref 136–145)
VARIANT LYMPHS NFR BLD MANUAL: 1 % (ref 0–5)
VARIANT LYMPHS NFR BLD MANUAL: 14 % (ref 19.6–45.3)
WBC MORPH BLD: NORMAL
WBC NRBC COR # BLD: 11.2 10*3/MM3 (ref 3.4–10.8)

## 2022-12-21 PROCEDURE — 82962 GLUCOSE BLOOD TEST: CPT

## 2022-12-21 PROCEDURE — 97535 SELF CARE MNGMENT TRAINING: CPT

## 2022-12-21 PROCEDURE — 94664 DEMO&/EVAL PT USE INHALER: CPT

## 2022-12-21 PROCEDURE — 94618 PULMONARY STRESS TESTING: CPT

## 2022-12-21 PROCEDURE — 94660 CPAP INITIATION&MGMT: CPT

## 2022-12-21 PROCEDURE — 94799 UNLISTED PULMONARY SVC/PX: CPT

## 2022-12-21 PROCEDURE — 94761 N-INVAS EAR/PLS OXIMETRY MLT: CPT

## 2022-12-21 RX ORDER — ALBUTEROL SULFATE 2.5 MG/3ML
2.5 SOLUTION RESPIRATORY (INHALATION) EVERY 4 HOURS PRN
Qty: 90 ML | Refills: 12 | Status: SHIPPED | OUTPATIENT
Start: 2022-12-21

## 2022-12-21 RX ORDER — ROFLUMILAST 500 UG/1
500 TABLET ORAL DAILY
Qty: 30 TABLET | Refills: 2 | Status: SHIPPED | OUTPATIENT
Start: 2022-12-22

## 2022-12-21 RX ORDER — THEOPHYLLINE 300 MG/1
150 TABLET, EXTENDED RELEASE ORAL EVERY 12 HOURS
Qty: 30 TABLET | Refills: 2 | Status: SHIPPED | OUTPATIENT
Start: 2022-12-22

## 2022-12-21 RX ORDER — MIRTAZAPINE 15 MG/1
15 TABLET, FILM COATED ORAL NIGHTLY
Qty: 30 TABLET | Refills: 2 | Status: SHIPPED | OUTPATIENT
Start: 2022-12-21

## 2022-12-21 RX ADMIN — BUDESONIDE 0.5 MG: 0.5 INHALANT RESPIRATORY (INHALATION) at 07:53

## 2022-12-21 RX ADMIN — ASPIRIN 81 MG: 81 TABLET, COATED ORAL at 09:13

## 2022-12-21 RX ADMIN — ARFORMOTEROL TARTRATE 15 MCG: 15 SOLUTION RESPIRATORY (INHALATION) at 07:53

## 2022-12-21 RX ADMIN — Medication 3 ML: at 09:14

## 2022-12-21 RX ADMIN — THEOPHYLLINE ANHYDROUS 300 MG: 300 CAPSULE, EXTENDED RELEASE ORAL at 09:14

## 2022-12-21 RX ADMIN — METFORMIN HYDROCHLORIDE 1000 MG: 500 TABLET ORAL at 09:13

## 2022-12-21 RX ADMIN — GLIPIZIDE 10 MG: 5 TABLET ORAL at 09:14

## 2022-12-21 RX ADMIN — GUAIFENESIN 600 MG: 600 TABLET, EXTENDED RELEASE ORAL at 09:14

## 2022-12-21 RX ADMIN — BISOPROLOL FUMARATE: 5 TABLET ORAL at 09:13

## 2022-12-21 RX ADMIN — ROFLUMILAST 500 MCG: 500 TABLET ORAL at 09:14

## 2022-12-21 NOTE — THERAPY TREATMENT NOTE
Subjective: Pt agreeable to therapeutic plan of care. Patient reports he plans to quit smoking cold turkey. Eager to d/c home.     Objective:     Bed mobility - Independent  Transfers - Independent  Ambulation - 50 feet Independent    Vitals: WNL 3L    Pain: 0 VAS   Location: n/a  Intervention for pain: N/A    Education: O2 monitoring, gradual return to PLOF    Assessment: Maximino Bhatia is a 61 y/o M who presented with progressive SOA with evidence of AE COPD. Diagnosed with influenza A. He is independent and semi-retired. He lives alone in a one story home with no SYLVIA. He is not on supp o2 at baseline. Was able to wean to 3L and is now d/cing home. Discussed smoking cessation, mobility and exercise with O2 and return to PLOF. Pt safe to d/c home from PT standpoint.Tolerating session today without incident. Will continue to follow and progress as tolerated.     Plan/Recommendations:   Low Intensity Therapy recommended post-acute care - This is recommended as therapy feels this patient would require 2-3 visits per week. OP or HH would be the best option depending on patient's home bound status. Consider, if the patient has other  \"skilled\" needs such as wounds, IV antibiotics, etc. Combined with \"low intensity\" could also equate to a SNF. If patient is medically complex, consider LTAC.. Pt requires no DME at discharge.     Pt desires Home at discharge. Pt cooperative; agreeable to therapeutic recommendations and plan of care.         Post-Tx Position: Up in Chair and Call light and personal items within reach  PPE: gloves and surgical mask

## 2022-12-21 NOTE — PLAN OF CARE
Goal Outcome Evaluation:  Plan of Care Reviewed With: patient          Maximino Bhatia is a 61 y/o M who presented with progressive SOA with evidence of AE COPD. Diagnosed with influenza A. He is independent and semi-retired. He lives alone in a one story home with no SYLVIA. He is not on supp o2 at baseline. Was able to wean to 3L and is now d/cing home. Discussed smoking cessation, mobility and exercise with O2 and return to PLOF. Pt safe to d/c home from PT standpoint.Tolerating session today without incident. Will continue to follow and progress as tolerated.

## 2022-12-21 NOTE — DISCHARGE SUMMARY
Date of Discharge:  12/21/2022    Discharge Diagnosis:   COPD with acute exacerbation  Acute hypoxic respiratory failure  Influenza A infection  Hyperkalemia  Hyponatremia  Severe emphysema  Diabetes  Hypertension  Tobacco abuse    Presenting Problem/History of Present Illness  Active Hospital Problems    Diagnosis  POA   • **COPD with acute exacerbation (HCC) [J44.1]  Yes   • Influenza A [J10.1]  Unknown   • Diabetes mellitus with coincident hypertension (HCC) [E11.9, I10]  Unknown   • Bacterial pneumonia [J15.9]  Unknown   • Leukocytosis [D72.829]  Unknown   • Acute hypoxemic respiratory failure (HCC) [J96.01]  Yes      Resolved Hospital Problems   No resolved problems to display.          Hospital Course    Patient is a 62 y.o. male presented with COPD, diabetes and hypertension who presented to the emergency department on 12/3/2020 with complaints of cough, shortness of air, weakness and upper respiratory symptoms.  He was admitted with an acute exacerbation of COPD and acute influenza A infection.  During this admission patient required high amounts of oxygen and at one point AVAPS assistance. It was a slow process to wean the patient down to his current 4 liters of oxygen. Pulmonary followed the patient. He received antibiotics, bronchodilators, tamiflu, and other supportive care. His CT scan reviewed severe emphysema. Influenza A with COPD exacerbation most likely contributed to this lengthy hospitalization and most likely new baseline with oxygen. He was started on remeron to increase appetite and assist with situational depression. He will require oxygen and need a home sleep study. He has follow up with PCP and will need to follow up with pulmonology. He is agreeable to this plan and discharge. I emphasized resuming home activities slowly, no smoking, and avoid crowds.     Procedures Performed         Consults:   Consults     Date and Time Order Name Status Description    12/6/2022  9:40 AM Inpatient  Pulmonology Consult Completed     12/3/2022  1:05 PM Family Medicine Consult            Pertinent Test Results:    Lab Results (most recent)     Procedure Component Value Units Date/Time    POC Glucose Once [[id number]]  (Abnormal) Collected: 12/21/22 0732    Specimen: Blood Updated: 12/21/22 0733     Glucose 160 mg/dL      Comment: Serial Number: [id number]:  [id number]       Manual Differential [[id number]]  (Abnormal) Collected: 12/20/22 2319    Specimen: Blood Updated: 12/21/22 0152     Neutrophil % 69.0 %      Lymphocyte % 14.0 %      Monocyte % 13.0 %      Bands %  3.0 %      Atypical Lymphocyte % 1.0 %      Neutrophils Absolute 8.06 10*3/mm3      Lymphocytes Absolute 1.68 10*3/mm3      Monocytes Absolute 1.46 10*3/mm3      Dacrocytes Slight/1+     Poikilocytes Slight/1+     WBC Morphology Normal     Platelet Estimate Adequate     Large Platelets Slight/1+    CBC & Differential [[id number]]  (Abnormal) Collected: 12/20/22 2319    Specimen: Blood Updated: 12/21/22 0152    Narrative:      The following orders were created for panel order CBC & Differential.  Procedure                               Abnormality         Status                     ---------                               -----------         ------                     CBC Auto Differential[[id number]]        Abnormal            Final result               Scan Slide[[id number]]                                       Final result                 Please view results for these tests on the individual orders.    Scan Slide [[id number]] Collected: 12/20/22 2319    Specimen: Blood Updated: 12/21/22 0152     Scan Slide --     Comment: See Manual Differential Results       CBC Auto Differential [[id number]]  (Abnormal) Collected: 12/20/22 2319    Specimen: Blood Updated: 12/21/22 0152     WBC 11.20 10*3/mm3      RBC 3.68 10*6/mm3      Hemoglobin 10.9 g/dL      Hematocrit 33.5 %      MCV 91.0 fL      MCH 29.7 pg      MCHC 32.6 g/dL      RDW 14.6 %      RDW-SD  45.9 fl      MPV 8.7 fL      Platelets 252 10*3/mm3     Narrative:      Modified report. Previous result was Hemogram on 12/21/2022 at 0034 EST.  The previously reported component NRBC is no longer being reported. Previous result was 0.1 /100 WBC (Reference Range: 0.0-0.2 /100 WBC) on 12/21/2022 at 0034 EST.    Comprehensive Metabolic Panel [[id number]]  (Abnormal) Collected: 12/20/22 2319    Specimen: Blood Updated: 12/21/22 0045     Glucose 98 mg/dL      BUN 12 mg/dL      Creatinine 0.57 mg/dL      Sodium 132 mmol/L      Potassium 4.1 mmol/L      Chloride 93 mmol/L      CO2 30.0 mmol/L      Calcium 9.0 mg/dL      Total Protein 6.2 g/dL      Albumin 3.20 g/dL      ALT (SGPT) 21 U/L      AST (SGOT) 11 U/L      Alkaline Phosphatase 49 U/L      Total Bilirubin 0.2 mg/dL      Globulin 3.0 gm/dL      A/G Ratio 1.1 g/dL      BUN/Creatinine Ratio 21.1     Anion Gap 9.0 mmol/L      eGFR 110.2 mL/min/1.73      Comment: National Kidney Foundation and American Society of Nephrology (ASN) Task Force recommended calculation based on the Chronic Kidney Disease Epidemiology Collaboration (CKD-EPI) equation refit without adjustment for race.       Narrative:      GFR Normal >60  Chronic Kidney Disease <60  Kidney Failure <15      POC Glucose Once [[id number]]  (Normal) Collected: 12/20/22 1705    Specimen: Blood Updated: 12/20/22 1710     Glucose 82 mg/dL      Comment: Serial Number: [id number]:  [id number]       Manual Differential [[id number]]  (Abnormal) Collected: 12/19/22 2349    Specimen: Blood Updated: 12/20/22 0222     Neutrophil % 64.0 %      Lymphocyte % 21.0 %      Monocyte % 10.0 %      Eosinophil % 1.0 %      Bands %  4.0 %      Neutrophils Absolute 9.66 10*3/mm3      Lymphocytes Absolute 2.98 10*3/mm3      Monocytes Absolute 1.42 10*3/mm3      Eosinophils Absolute 0.14 10*3/mm3      RBC Morphology Normal     WBC Morphology Normal     Large Platelets Slight/1+    CBC & Differential [[id number]]  (Abnormal)  Collected: 12/19/22 2349    Specimen: Blood Updated: 12/20/22 0222    Narrative:      The following orders were created for panel order CBC & Differential.  Procedure                               Abnormality         Status                     ---------                               -----------         ------                     CBC Auto Differential[[id number]]        Abnormal            Final result               Scan Slide[[id number]]                                       Final result                 Please view results for these tests on the individual orders.    Scan Slide [[id number]] Collected: 12/19/22 2349    Specimen: Blood Updated: 12/20/22 0222     Scan Slide --     Comment: See Manual Differential Results       CBC Auto Differential [[id number]]  (Abnormal) Collected: 12/19/22 2349    Specimen: Blood Updated: 12/20/22 0222     WBC 14.20 10*3/mm3      RBC 3.80 10*6/mm3      Hemoglobin 11.8 g/dL      Hematocrit 34.4 %      MCV 90.5 fL      MCH 31.0 pg      MCHC 34.3 g/dL      RDW 14.9 %      RDW-SD 49.9 fl      MPV 9.1 fL      Platelets 252 10*3/mm3     Narrative:      Modified report. Previous result was Hemogram on 12/20/2022 at 0051 EST.  The previously reported component NRBC is no longer being reported. Previous result was 0.0 /100 WBC (Reference Range: 0.0-0.2 /100 WBC) on 12/20/2022 at 0051 EST.    Comprehensive Metabolic Panel [[id number]]  (Abnormal) Collected: 12/19/22 2349    Specimen: Blood Updated: 12/20/22 0110     Glucose 57 mg/dL      BUN 14 mg/dL      Creatinine 0.67 mg/dL      Sodium 135 mmol/L      Potassium 4.3 mmol/L      Chloride 94 mmol/L      CO2 33.0 mmol/L      Calcium 9.1 mg/dL      Total Protein 6.3 g/dL      Albumin 3.40 g/dL      ALT (SGPT) 28 U/L      AST (SGOT) 16 U/L      Alkaline Phosphatase 52 U/L      Total Bilirubin 0.3 mg/dL      Globulin 2.9 gm/dL      A/G Ratio 1.2 g/dL      BUN/Creatinine Ratio 20.9     Anion Gap 8.0 mmol/L      eGFR 104.9 mL/min/1.73      Comment:  National Kidney Foundation and American Society of Nephrology (ASN) Task Force recommended calculation based on the Chronic Kidney Disease Epidemiology Collaboration (CKD-EPI) equation refit without adjustment for race.       Narrative:      GFR Normal >60  Chronic Kidney Disease <60  Kidney Failure <15      Blood Culture - Blood, Arm, Left [[id number]]  (Normal) Collected: 12/11/22 0847    Specimen: Blood from Arm, Left Updated: 12/16/22 0900     Blood Culture No growth at 5 days    Narrative:      Less than seven (7) mL's of blood was collected.  Insufficient quantity may yield false negative results.    Basic Metabolic Panel [[id number]]  (Abnormal) Collected: 12/16/22 0022    Specimen: Blood Updated: 12/16/22 0144     Glucose 95 mg/dL      BUN 15 mg/dL      Creatinine 0.54 mg/dL      Sodium 135 mmol/L      Potassium 4.6 mmol/L      Chloride 93 mmol/L      CO2 34.0 mmol/L      Calcium 9.2 mg/dL      BUN/Creatinine Ratio 27.8     Anion Gap 8.0 mmol/L      eGFR 112.0 mL/min/1.73      Comment: National Kidney Foundation and American Society of Nephrology (ASN) Task Force recommended calculation based on the Chronic Kidney Disease Epidemiology Collaboration (CKD-EPI) equation refit without adjustment for race.       Narrative:      GFR Normal >60  Chronic Kidney Disease <60  Kidney Failure <15      Basic Metabolic Panel [[id number]]  (Abnormal) Collected: 12/15/22 0033    Specimen: Blood Updated: 12/15/22 0148     Glucose 59 mg/dL      BUN 18 mg/dL      Creatinine 0.45 mg/dL      Sodium 136 mmol/L      Potassium 4.8 mmol/L      Chloride 94 mmol/L      CO2 33.0 mmol/L      Calcium 8.9 mg/dL      BUN/Creatinine Ratio 40.0     Anion Gap 9.0 mmol/L      eGFR 118.3 mL/min/1.73      Comment: National Kidney Foundation and American Society of Nephrology (ASN) Task Force recommended calculation based on the Chronic Kidney Disease Epidemiology Collaboration (CKD-EPI) equation refit without adjustment for race.       Narrative:       GFR Normal >60  Chronic Kidney Disease <60  Kidney Failure <15      Extra Tubes [[id number]] Collected: 12/12/22 1235    Specimen: Blood, Venous Line Updated: 12/12/22 1345    Narrative:      The following orders were created for panel order Extra Tubes.  Procedure                               Abnormality         Status                     ---------                               -----------         ------                     Lavender Top[[id number]]                                     Final result                 Please view results for these tests on the individual orders.    Lavender Top [[id number]] Collected: 12/12/22 1235    Specimen: Blood Updated: 12/12/22 1345     Extra Tube hold for add-on     Comment: Auto resulted       MRSA Screen, PCR (Inpatient) - Swab, Nares [[id number]]  (Normal) Collected: 12/11/22 1252    Specimen: Swab from Nares Updated: 12/11/22 1409     MRSA PCR No MRSA Detected    Narrative:      The negative predictive value of this diagnostic test is high and should only be used to consider de-escalating anti-MRSA therapy. A positive result may indicate colonization with MRSA and must be correlated clinically.    Ammonia [[id number]]  (Normal) Collected: 12/10/22 2307    Specimen: Blood Updated: 12/10/22 2335     Ammonia 56 umol/L     Blood Gas, Arterial - [[id number]]  (Abnormal) Collected: 12/10/22 2316    Specimen: Arterial Blood Updated: 12/10/22 2325     Site Left Radial     Felix's Test Positive     pH, Arterial 7.294 pH units      pCO2, Arterial 84.1 mm Hg      pO2, Arterial 76.8 mm Hg      HCO3, Arterial 40.8 mmol/L      Base Excess, Arterial 9.6 mmol/L      Comment: Serial Number: [id number]:  [id number]        O2 Saturation, Arterial 92.4 %      CO2 Content 43.3 mmol/L      Barometric Pressure for Blood Gas --     Comment: N/A        Modality HFNC     FIO2 60 %      Hemodilution No    Blood Gas, Arterial - [[id number]]  (Abnormal) Collected: 12/07/22 1900    Specimen: Arterial Blood  Updated: 12/07/22 1904     Site Right Radial     Felix's Test Positive     pH, Arterial 7.357 pH units      pCO2, Arterial 63.8 mm Hg      pO2, Arterial 75.3 mm Hg      HCO3, Arterial 35.8 mmol/L      Base Excess, Arterial 7.7 mmol/L      Comment: Serial Number: [id number]:  [id number]        O2 Saturation, Arterial 93.7 %      CO2 Content 37.8 mmol/L      Barometric Pressure for Blood Gas --     Comment: N/A        Modality HFNC     FIO2 44 %      Hemodilution No    Troponin [[id number]]  (Normal) Collected: 12/04/22 2348    Specimen: Blood Updated: 12/05/22 0022     Troponin T <0.010 ng/mL     Narrative:      Troponin T Reference Range:  <= 0.03 ng/mL-   Negative for AMI  >0.03 ng/mL-     Abnormal for myocardial necrosis.  Clinicians would have to utilize clinical acumen, EKG, Troponin and serial changes to determine if it is an Acute Myocardial Infarction or myocardial injury due to an underlying chronic condition.       Results may be falsely decreased if patient taking Biotin.      Urine Culture - Urine, Urine, Clean Catch [[id number]]  (Normal) Collected: 12/03/22 1100    Specimen: Urine, Clean Catch Updated: 12/04/22 1459     Urine Culture No growth    Urinalysis, Microscopic Only - Urine, Clean Catch [[id number]]  (Abnormal) Collected: 12/03/22 1100    Specimen: Urine, Clean Catch Updated: 12/03/22 1130     RBC, UA 0-2 /HPF      WBC, UA 0-2 /HPF      Bacteria, UA Trace /HPF      Squamous Epithelial Cells, UA 0-2 /HPF      Hyaline Casts, UA 3-6 /LPF      Granular Casts, UA 3-6 /LPF      Amorphous Crystals, UA Small/1+ /HPF      Mucus, UA Small/1+ /HPF      Methodology Manual Light Microscopy    Urinalysis With Microscopic If Indicated (No Culture) - Urine, Clean Catch [[id number]]  (Abnormal) Collected: 12/03/22 1100    Specimen: Urine, Clean Catch Updated: 12/03/22 1111     Color, UA Yellow     Appearance, UA Clear     pH, UA 5.5     Specific Gravity, UA 1.022     Glucose, UA Negative     Ketones, UA Negative      Bilirubin, UA Negative     Blood, UA Negative     Protein, UA 30 mg/dL (1+)     Leuk Esterase, UA Negative     Nitrite, UA Negative     Urobilinogen, UA 1.0 E.U./dL    Troponin [[id number]]  (Normal) Collected: 12/03/22 1028    Specimen: Blood Updated: 12/03/22 1102     Troponin T <0.010 ng/mL     Narrative:      Troponin T Reference Range:  <= 0.03 ng/mL-   Negative for AMI  >0.03 ng/mL-     Abnormal for myocardial necrosis.  Clinicians would have to utilize clinical acumen, EKG, Troponin and serial changes to determine if it is an Acute Myocardial Infarction or myocardial injury due to an underlying chronic condition.       Results may be falsely decreased if patient taking Biotin.      Magnesium [[id number]]  (Normal) Collected: 12/03/22 1028    Specimen: Blood Updated: 12/03/22 1102     Magnesium 1.7 mg/dL     BNP [[id number]]  (Normal) Collected: 12/03/22 1028    Specimen: Blood Updated: 12/03/22 1100     proBNP 109.5 pg/mL     Narrative:      Among patients with dyspnea, NT-proBNP is highly sensitive for the detection of acute congestive heart failure. In addition NT-proBNP of <300 pg/ml effectively rules out acute congestive heart failure with 99% negative predictive value.      Influenza Antigen, Rapid - Swab, Nasopharynx [[id number]]  (Abnormal) Collected: 12/03/22 1025    Specimen: Swab from Nasopharynx Updated: 12/03/22 1052     Influenza A PCR Detected     Influenza B PCR Not Detected    COVID PRE-OP / PRE-PROCEDURE SCREENING ORDER (NO ISOLATION) - Swab, Nasopharynx [[id number]]  (Normal) Collected: 12/03/22 1025    Specimen: Swab from Nasopharynx Updated: 12/03/22 1052    Narrative:      The following orders were created for panel order COVID PRE-OP / PRE-PROCEDURE SCREENING ORDER (NO ISOLATION) - Swab, Nasopharynx.  Procedure                               Abnormality         Status                     ---------                               -----------         ------                      COVID-19,CEPHEID/LEONEL,CO...[[id number]]  Normal              Final result                 Please view results for these tests on the individual orders.    COVID-19,CEPHEID/LEONEL,COR/TORY/PAD/JUWAN/MAD IN-HOUSE(OR EMERGENT/ADD-ON),NP SWAB IN TRANSPORT MEDIA 3-4 HR TAT, RT-PCR - Swab, Nasopharynx [[id number]]  (Normal) Collected: 12/03/22 1025    Specimen: Swab from Nasopharynx Updated: 12/03/22 1052     COVID19 Not Detected    Narrative:      Fact sheet for providers: https://www.fda.gov/media/555933/download     Fact sheet for patients: https://www.fda.gov/media/258321/download  Fact sheet for providers: https://www.fda.gov/media/258165/download    Fact sheet for patients: https://www.[employer].gov/media/772010/download    Test performed by PCR.           Results for orders placed during the hospital encounter of 12/03/22    Adult Transthoracic Echo Complete W/ Cont if Necessary Per Protocol    Interpretation Summary  •  Left ventricular systolic function is low normal. Left ventricular ejection fraction appears to be 46 - 50%.  •  The left ventricular cavity is moderate to severely dilated.  •  Left ventricular diastolic function is consistent with (grade I) impaired relaxation.  •  The study is technically difficult for diagnosis.         Condition on Discharge: Stable    Vital Signs  Temp:  [97.6 °F (36.4 °C)-98.5 °F (36.9 °C)] 98.1 °F (36.7 °C)  Heart Rate:  [] 102  Resp:  [10-26] 12  BP: (101-119)/(66-76) 111/76      Physical Exam  Vitals reviewed.   Constitutional:       Appearance: He is not ill-appearing.   HENT:      Head: Normocephalic and atraumatic.      Right Ear: External ear normal.      Left Ear: External ear normal.      Nose: Nose normal.      Mouth/Throat:      Mouth: Mucous membranes are moist.   Eyes:      General:         Right eye: No discharge.         Left eye: No discharge.   Cardiovascular:      Rate and Rhythm: Normal rate and regular rhythm.      Pulses: Normal pulses.      Heart sounds:  Normal heart sounds.   Pulmonary:      Effort: Pulmonary effort is normal.      Breath sounds: Normal breath sounds.   Abdominal:      General: Bowel sounds are normal.      Palpations: Abdomen is soft.   Musculoskeletal:         General: Normal range of motion.      Cervical back: Normal range of motion.   Skin:     General: Skin is warm.      Coloration: Skin is not pale.   Neurological:      Mental Status: He is alert and oriented to person, place, and time.   Psychiatric:         Behavior: Behavior normal.              Discharge Disposition      Discharge Medications     Discharge Medications      New Medications      Instructions Start Date   mirtazapine 15 MG tablet  Commonly known as: REMERON   15 mg, Oral, Nightly      roflumilast 500 MCG tablet tablet  Commonly known as: DALIRESP   500 mcg, Oral, Daily   Start Date: December 22, 2022     theophylline 300 MG 24 hr capsule  Commonly known as: YULIANA-24   300 mg, Oral, Every 24 Hours Scheduled   Start Date: December 22, 2022        Changes to Medications      Instructions Start Date   albuterol sulfate  (90 Base) MCG/ACT inhaler  Commonly known as: PROVENTIL HFA;VENTOLIN HFA;PROAIR HFA  What changed: Another medication with the same name was added. Make sure you understand how and when to take each.   2 puffs, Inhalation, Every 4 Hours PRN      albuterol (2.5 MG/3ML) 0.083% nebulizer solution  Commonly known as: PROVENTIL  What changed: You were already taking a medication with the same name, and this prescription was added. Make sure you understand how and when to take each.   2.5 mg, Nebulization, Every 4 Hours PRN         Continue These Medications      Instructions Start Date   Anoro Ellipta 62.5-25 MCG/ACT aerosol powder  inhaler  Generic drug: Umeclidinium-Vilanterol   1 puff, Inhalation, Daily - RT      aspirin 81 MG EC tablet   81 mg, Oral, Daily      bisoprolol-hydrochlorothiazide 10-6.25 MG per tablet  Commonly known as: ZIAC   1 tablet, Oral,  Daily      glipizide 10 MG tablet  Commonly known as: GLUCOTROL   10 mg, Oral, 2 Times Daily Before Meals      lisinopril 40 MG tablet  Commonly known as: PRINIVIL,ZESTRIL   40 mg, Oral, Every Evening      metFORMIN 1000 MG tablet  Commonly known as: GLUCOPHAGE   1,000 mg, Oral, 2 Times Daily With Meals      montelukast 10 MG tablet  Commonly known as: SINGULAIR   10 mg, Oral, Nightly      multivitamin capsule   1 capsule, Oral, Daily             Discharge Diet:   Diet Instructions     Diet: Consistent Carbohydrate      Discharge Diet: Consistent Carbohydrate          Activity at Discharge:   Activity Instructions     Gradually Increase Activity Until at Pre-Hospitalization Level            Follow-up Appointments  No future appointments.  Additional Instructions for the Follow-ups that You Need to Schedule     Discharge Follow-up with PCP   As directed       Currently Documented PCP:    Qi Hicks APRN    PCP Phone Number:    [phone]     Follow Up Details: 1/4 at 1140         Discharge Follow-up with Specified Provider: Dr. Ford; 2 Weeks   As directed      To: Dr. Ford    Follow Up: 2 Weeks               Test Results Pending at Discharge       CARA Clark  12/21/22  11:15 EST    Time: Discharge 35 minutes spent in coordination of care, chart review and face to face

## 2022-12-21 NOTE — CASE MANAGEMENT/SOCIAL WORK
Continued Stay Note   [place]     Patient Name: Maximino Bhatia  MRN: [id number]  Today's Date: 12/21/2022    Admit Date: 12/3/2022    Plan: DC Plan: Routine home, unable to have HH, declined OP PT. New home O2 and nebulizer delivered by [facility]. New pulse ox delivered.   Discharge Plan     Row Name 12/21/22 1324       Plan    Plan DC Plan: Routine home, unable to have HH, declined OP PT. New home O2 and nebulizer delivered by [facility]. New pulse ox delivered.    Patient/Family in Agreement with Plan yes    Plan Comments Per walking oximetry results, patient qualified for home O2 5L. CM sent new referral in [facility] basket and to liaisonMena. Also notified her of MD request for pulse ox and nebulizer. Liaison reported orders were processing. CM met with patient at bedside to discuss therapy recommendations. [facility] declined OP PT at this time. Patient reported that his nephew would be on his way to provide his discharge transportation. Pulse oximeter provided to patient due to patient’s respiratory diagnosis of respiratory failure, emphysema, influenza, and COPD. Discussed with RN and NP that pulse ox was needed in order to prevent readmissions to hospital. Patient signed consent form and form faxed to Care Coordination Department ([phone]).    Final Discharge Disposition Code 01 - home or self-care    Final Note Home              Expected Discharge Date and Time     Expected Discharge Date Expected Discharge Time    Dec 21, 2022         Case Management Discharge Note    Final Note: Home    Selected Continued Care - Admitted Since 12/3/2022             Transportation Services  Private: Car    Final Discharge Disposition Code: 01 - home or self-care     Met with patient in room wearing PPE: mask.      Maintained distance greater than six feet and spent less than 15 minutes in the room.      Cecelia Bradford RN     Office Phone: [phone]  Office Cell: [phone]]

## 2022-12-21 NOTE — PROGRESS NOTES
PULMONARY CRITICAL CARE PROGRESS  NOTE      PATIENT IDENTIFICATION:  Name: Maximino Bhatia  MRN: [id number]  :  1960     Age: 62 y.o.  Sex: male    DATE OF Note:  2022   Referring Physician: James Craig MD                  Subjective:   Laying in bed, currently still on 3 L O2,  Sitting in chair at bedside,  no SOB, no chest or abd pain, no bowel or bladder issues reported    Objective:  tMax 24 hrs: Temp (24hrs), Av.1 °F (36.7 °C), Min:97.6 °F (36.4 °C), Max:98.5 °F (36.9 °C)      Vitals Ranges:   Temp:  [97.6 °F (36.4 °C)-98.5 °F (36.9 °C)] 98.1 °F (36.7 °C)  Heart Rate:  [] 102  Resp:  [10-24] 12  BP: (101-111)/(69-76) 111/76    Intake and Output Last 3 Shifts:   I/O last 3 completed shifts:  In: 1060 [P.O.:1060]  Out: -     Exam:  /76 (BP Location: Left arm, Patient Position: Sitting)   Pulse 102   Temp 98.1 °F (36.7 °C) (Oral)   Resp 12   Ht 177.8 cm (70\")   Wt 86.1 kg (189 lb 13.1 oz)   SpO2 95%   BMI 27.24 kg/m²     General Appearance: Alert  HEENT:  Normocephalic, without obvious abnormality. Conjunctivae/corneas clear.  Normal external ear canals. Nares normal, no drainage     Neck:  Supple, symmetrical, trachea midline. No JVD.  Lungs /Chest wall:   Bilateral basal rhonchi, respirations unlabored, symmetrical wall movement.     Heart:  Regular rate and rhythm, systolic murmur PMI left sternal border  Abdomen: Soft, nontender, no masses, no organomegaly.    Extremities: Trace edema, no clubbing or cyanosis        Medications:      Infusion:  No current facility-administered medications for this encounter.       PRN:    Data Review:  All labs (24hrs):   Recent Results (from the past 24 hour(s))   Comprehensive Metabolic Panel    Collection Time: 22 11:19 PM    Specimen: Blood   Result Value Ref Range    Glucose 98 65 - 99 mg/dL    BUN 12 8 - 23 mg/dL    Creatinine 0.57 (L) 0.76 - 1.27 mg/dL    Sodium 132 (L) 136 - 145 mmol/L    Potassium 4.1 3.5 - 5.2 mmol/L     Chloride 93 (L) 98 - 107 mmol/L    CO2 30.0 (H) 22.0 - 29.0 mmol/L    Calcium 9.0 8.6 - 10.5 mg/dL    Total Protein 6.2 6.0 - 8.5 g/dL    Albumin 3.20 (L) 3.50 - 5.20 g/dL    ALT (SGPT) 21 1 - 41 U/L    AST (SGOT) 11 1 - 40 U/L    Alkaline Phosphatase 49 39 - 117 U/L    Total Bilirubin 0.2 0.0 - 1.2 mg/dL    Globulin 3.0 gm/dL    A/G Ratio 1.1 g/dL    BUN/Creatinine Ratio 21.1 7.0 - 25.0    Anion Gap 9.0 5.0 - 15.0 mmol/L    eGFR 110.2 >60.0 mL/min/1.73   CBC Auto Differential    Collection Time: 12/20/22 11:19 PM    Specimen: Blood   Result Value Ref Range    WBC 11.20 (H) 3.40 - 10.80 10*3/mm3    RBC 3.68 (L) 4.14 - 5.80 10*6/mm3    Hemoglobin 10.9 (L) 13.0 - 17.7 g/dL    Hematocrit 33.5 (L) 37.5 - 51.0 %    MCV 91.0 79.0 - 97.0 fL    MCH 29.7 26.6 - 33.0 pg    MCHC 32.6 31.5 - 35.7 g/dL    RDW 14.6 12.3 - 15.4 %    RDW-SD 45.9 37.0 - 54.0 fl    MPV 8.7 6.0 - 12.0 fL    Platelets 252 140 - 450 10*3/mm3   Scan Slide    Collection Time: 12/20/22 11:19 PM    Specimen: Blood   Result Value Ref Range    Scan Slide     Manual Differential    Collection Time: 12/20/22 11:19 PM    Specimen: Blood   Result Value Ref Range    Neutrophil % 69.0 42.7 - 76.0 %    Lymphocyte % 14.0 (L) 19.6 - 45.3 %    Monocyte % 13.0 (H) 5.0 - 12.0 %    Bands %  3.0 0.0 - 5.0 %    Atypical Lymphocyte % 1.0 0.0 - 5.0 %    Neutrophils Absolute 8.06 (H) 1.70 - 7.00 10*3/mm3    Lymphocytes Absolute 1.68 0.70 - 3.10 10*3/mm3    Monocytes Absolute 1.46 (H) 0.10 - 0.90 10*3/mm3    Dacrocytes Slight/1+ None Seen    Poikilocytes Slight/1+ None Seen    WBC Morphology Normal Normal    Platelet Estimate Adequate Normal    Large Platelets Slight/1+ None Seen   POC Glucose Once    Collection Time: 12/21/22  7:32 AM    Specimen: Blood   Result Value Ref Range    Glucose 160 (H) 70 - 105 mg/dL   POC Glucose Once    Collection Time: 12/21/22 11:27 AM    Specimen: Blood   Result Value Ref Range    Glucose 105 70 - 105 mg/dL        Imaging:  Adult  Transthoracic Echo Complete W/ Cont if Necessary Per Protocol  •  Left ventricular systolic function is low normal. Left ventricular   ejection fraction appears to be 46 - 50%.  •  The left ventricular cavity is moderate to severely dilated.  •  Left ventricular diastolic function is consistent with (grade I)   impaired relaxation.  •  The study is technically difficult for diagnosis.       ASSESSMENT:  Acute hypoxic/hypercapnic respiratory failure  Pneumonia  Influenza A  Acute exacerbation of COPD  Hyperkalemia  Leukocytosis  Diabetes mellitus type 2  Hypertension  Smoker       PLAN:  May discharge once O2 set up and follow up with our service in 2 weeks   Decrease O2 to keep sats greater than 88%  Out of bed daily   Bronchodilators  Inhaled corticosteroids  Incentive spirometer  Completed a tapering steroid dose  Completed Tamiflu and a course of cefepime  Electrolytes/ glycemic control  DVT and GI prophylaxis-Lovenox    Discussed with Dr. Liliana Colbert, APRN   12/19/2022  18:32 EST    The NP scribed the note for me, I have examined the patient and reviewed and verified the above findings and and I formulated the assessment and plan as documented

## 2022-12-21 NOTE — PROGRESS NOTES
Overnight Oximetry on, patient has 4LPM on during study and Bipap is on standby per Benita Sears APRN. Checked with RN and she also received the same instructions in report from the dayshift RN.

## 2022-12-21 NOTE — PROGRESS NOTES
Exercise Oximetry    Patient Name:Maximino Bhatia   MRN: [id number]   Date: 12/21/22             ROOM AIR BASELINE   SpO2% 87   Heart Rate 93   Blood Pressure      EXERCISE ON ROOM AIR SpO2% EXERCISE ON O2 @ 5 LPM SpO2%   1 MINUTE  81 1 MINUTE  90   2 MINUTES  2 MINUTES  88   3 MINUTES  3 MINUTES  90   4 MINUTES  4 MINUTES  90   5 MINUTES  5 MINUTES  89   6 MINUTES  6 MINUTES  90              Distance Walked   Distance Walked   Dyspnea (Dona Scale)   Dyspnea (Dona Scale)   Fatigue (Dona Scale)   Fatigue (Dona Scale)   SpO2% Post Exercise  94 SpO2% Post Exercise   HR Post Exercise  94 HR Post Exercise   Time to Recovery  15 minutes Time to Recovery     Comments: PT sat 87% while on room air and at rest, pt sat dropped to 81% during 6 minutes walk and needed 5 L to maintained above 90%.    PT qualifies for 5 L home oxygen.

## 2022-12-21 NOTE — PLAN OF CARE
Problem: Adult Inpatient Plan of Care  Goal: Plan of Care Review  Outcome: Ongoing, Progressing  Goal: Patient-Specific Goal (Individualized)  Outcome: Ongoing, Progressing  Goal: Absence of Hospital-Acquired Illness or Injury  Outcome: Ongoing, Progressing  Intervention: Identify and Manage Fall Risk  Recent Flowsheet Documentation  Taken 12/21/2022 0845 by Maria Wong RN  Safety Promotion/Fall Prevention:   nonskid shoes/slippers when out of bed   room organization consistent   safety round/check completed  Goal: Optimal Comfort and Wellbeing  Outcome: Ongoing, Progressing  Goal: Readiness for Transition of Care  Outcome: Ongoing, Progressing   Goal Outcome Evaluation:              Outcome Evaluation: Patient on 4L nc overnight pulse oximetry ordered for patient to go home tomorrow hopefully.

## 2022-12-21 NOTE — PLAN OF CARE
Goal Outcome Evaluation:      Pt had overnight sleep study on 4L nasal cannula. Rested well. VSS  Problem: Adult Inpatient Plan of Care  Goal: Absence of Hospital-Acquired Illness or Injury  Intervention: Identify and Manage Fall Risk  Recent Flowsheet Documentation  Taken 12/21/2022 0405 by India Wyman RN  Safety Promotion/Fall Prevention: safety round/check completed  Taken 12/21/2022 0205 by India Wyman RN  Safety Promotion/Fall Prevention: safety round/check completed  Taken 12/21/2022 0035 by India Wyman RN  Safety Promotion/Fall Prevention: safety round/check completed  Taken 12/20/2022 2215 by India Wyman RN  Safety Promotion/Fall Prevention: safety round/check completed  Taken 12/20/2022 2015 by India Wyman RN  Safety Promotion/Fall Prevention: safety round/check completed  Intervention: Prevent Skin Injury  Recent Flowsheet Documentation  Taken 12/20/2022 2015 by India Wyman RN  Skin Protection: adhesive use limited  Intervention: Prevent and Manage VTE (Venous Thromboembolism) Risk  Recent Flowsheet Documentation  Taken 12/20/2022 2015 by India Wyman RN  Activity Management: up ad ezra  Intervention: Prevent Infection  Recent Flowsheet Documentation  Taken 12/21/2022 0405 by India Wyman RN  Infection Prevention: rest/sleep promoted  Taken 12/21/2022 0205 by India Wyman RN  Infection Prevention: rest/sleep promoted  Taken 12/21/2022 0035 by India Wyman RN  Infection Prevention: rest/sleep promoted  Taken 12/20/2022 2215 by India Wyman RN  Infection Prevention: rest/sleep promoted  Taken 12/20/2022 2015 by India Wyman RN  Infection Prevention: rest/sleep promoted  Goal: Optimal Comfort and Wellbeing  Intervention: Provide Person-Centered Care  Recent Flowsheet Documentation  Taken 12/20/2022 2015 by India Wyman RN  Trust Relationship/Rapport: questions answered     Problem: Breathing Pattern  Ineffective  Goal: Effective Breathing Pattern  Intervention: Promote Improved Breathing Pattern  Recent Flowsheet Documentation  Taken 12/21/2022 0035 by India Wyman RN  Supportive Measures: active listening utilized  Taken 12/20/2022 2015 by India Wyman RN  Supportive Measures: active listening utilized     Problem: Fall Injury Risk  Goal: Absence of Fall and Fall-Related Injury  Intervention: Identify and Manage Contributors  Recent Flowsheet Documentation  Taken 12/20/2022 2015 by India Wyman RN  Medication Review/Management: medications reviewed  Intervention: Promote Injury-Free Environment  Recent Flowsheet Documentation  Taken 12/21/2022 0405 by India Wyman RN  Safety Promotion/Fall Prevention: safety round/check completed  Taken 12/21/2022 0205 by India Wyman RN  Safety Promotion/Fall Prevention: safety round/check completed  Taken 12/21/2022 0035 by India Wyman RN  Safety Promotion/Fall Prevention: safety round/check completed  Taken 12/20/2022 2215 by India Wyman RN  Safety Promotion/Fall Prevention: safety round/check completed  Taken 12/20/2022 2015 by India Wyman RN  Safety Promotion/Fall Prevention: safety round/check completed     Problem: Skin Injury Risk Increased  Goal: Skin Health and Integrity  Intervention: Optimize Skin Protection  Recent Flowsheet Documentation  Taken 12/20/2022 2015 by India Wyman RN  Pressure Reduction Techniques: frequent weight shift encouraged  Pressure Reduction Devices: pressure-redistributing mattress utilized  Skin Protection: adhesive use limited

## 2022-12-21 NOTE — PAYOR COMM NOTE
NOTIFICATION OF DISCHARGE:          Maxiimno Bhatia (62 y.o. Male) 1960  AUTH # [id number]      DISCHARGED TO HOME ON 12/21/2022.      AUTHORIZATION PENDING:   PLEASE CALL OR FAX DETERMINATION TO CONTACT BELOW. THANK YOU.        Rosie Briceño RN MSN  /UR  [facility]  [phone] office  [phone] fax  [email or web address]    [facility]  NPI: [id number]  Tax: [phone]            Maximino Bhatia (62 y.o. Male)     Date of Birth   1960    Social Security Number       Address   [address] [place] [address] [place] [facility]    Home Phone   [phone]    MRN   [id number]       Gnosticism   None    Marital Status   Single                            Admission Date   12/3/22    Admission Type   Emergency    Admitting Provider   James Craig MD    Attending Provider       Department, Room/Bed   [facility] PROGRESS CARE, 2116/1       Discharge Date   12/21/2022    Discharge Disposition   Home or Self Care    Discharge Destination                               Attending Provider: (none)   Allergies: No Known Allergies    Isolation: None   Infection: None   Code Status: CPR    Ht: 177.8 cm (70\")   Wt: 86.1 kg (189 lb 13.1 oz)    Admission Cmt: None   Principal Problem: COPD with acute exacerbation (HCC) [J44.1]                 Active Insurance as of 12/3/2022     Primary Coverage     Payor Plan Insurance Group Employer/Plan Group    [id number]     Payor Plan Address Payor Plan Phone Number Payor Plan Fax Number Effective Dates    MAIL STOP:   4/8/2020 - None Entered    [address] [address]       [facility] [id number]       Subscriber Name Subscriber Birth Date Member ID       MAXIMINO BHATIA 1960 [id number]                 Emergency Contacts      (Rel.) Home Phone Work Phone Mobile Phone    ENIDERIN DUNLAP (Friend) [phone] -- --               Physician Progress Notes (last 72 hours)       Linda Colbert, APRN at 22 1146          PULMONARY CRITICAL CARE PROGRESS  NOTE      PATIENT IDENTIFICATION:  Name: Maximino Bhatia  MRN: [id number]  :  1960     Age: 62 y.o.  Sex: male    DATE OF Note:  2022   Referring Physician: James Craig MD                  Subjective:   Laying in bed, currently still on 3 L O2,  Sitting in chair at bedside,  no SOB, no chest or abd pain, no bowel or bladder issues reported    Objective:  tMax 24 hrs: Temp (24hrs), Av.1 °F (36.7 °C), Min:97.6 °F (36.4 °C), Max:98.5 °F (36.9 °C)      Vitals Ranges:   Temp:  [97.6 °F (36.4 °C)-98.5 °F (36.9 °C)] 98.1 °F (36.7 °C)  Heart Rate:  [] 102  Resp:  [10-26] 12  BP: (101-119)/(66-76) 111/76    Intake and Output Last 3 Shifts:   I/O last 3 completed shifts:  In: 1060 [P.O.:1060]  Out: -     Exam:  /76 (BP Location: Left arm, Patient Position: Sitting)   Pulse 102   Temp 98.1 °F (36.7 °C) (Oral)   Resp 12   Ht 177.8 cm (70\")   Wt 86.1 kg (189 lb 13.1 oz)   SpO2 95%   BMI 27.24 kg/m²     General Appearance: Alert  HEENT:  Normocephalic, without obvious abnormality. Conjunctivae/corneas clear.  Normal external ear canals. Nares normal, no drainage     Neck:  Supple, symmetrical, trachea midline. No JVD.  Lungs /Chest wall:   Bilateral basal rhonchi, respirations unlabored, symmetrical wall movement.     Heart:  Regular rate and rhythm, systolic murmur PMI left sternal border  Abdomen: Soft, nontender, no masses, no organomegaly.    Extremities: Trace edema, no clubbing or cyanosis        Medications:  arformoterol, 15 mcg, Nebulization, BID - RT  aspirin, 81 mg, Oral, Daily  bisoprolol-hydroCHLOROthiazide (ZIAC) 10-6.25 mg combo dose, , Oral, Q24H  budesonide, 0.5 mg, Nebulization, BID - RT  enoxaparin, 40 mg, Subcutaneous, Q24H  glipizide, 10 mg, Oral, BID AC  guaiFENesin, 600 mg, Oral, Q12H  insulin lispro, 2-9 Units, Subcutaneous, TID With Meals  lisinopril, 5 mg, Oral, Q PM  metFORMIN,  1,000 mg, Oral, BID With Meals  mirtazapine, 15 mg, Oral, Nightly  montelukast, 10 mg, Oral, Nightly  polyethylene glycol, 17 g, Oral, Daily  roflumilast, 500 mcg, Oral, Daily  senna-docusate sodium, 2 tablet, Oral, BID  sodium chloride, 3 mL, Intravenous, Q12H  theophylline, 300 mg, Oral, Q24H        Infusion:  Pharmacy to Dose enoxaparin (LOVENOX),          PRN:  •  acetaminophen  •  dextrose  •  dextrose  •  glucagon (human recombinant)  •  ipratropium-albuterol  •  LORazepam  •  nitroglycerin  •  ondansetron  •  Pharmacy to Dose enoxaparin (LOVENOX)  •  [COMPLETED] Insert Peripheral IV **AND** sodium chloride  •  sodium chloride  •  sodium chloride  Data Review:  All labs (24hrs):   Recent Results (from the past 24 hour(s))   POC Glucose Once    Collection Time: 12/20/22 11:52 AM    Specimen: Blood   Result Value Ref Range    Glucose 82 70 - 105 mg/dL   POC Glucose Once    Collection Time: 12/20/22  5:05 PM    Specimen: Blood   Result Value Ref Range    Glucose 82 70 - 105 mg/dL   Comprehensive Metabolic Panel    Collection Time: 12/20/22 11:19 PM    Specimen: Blood   Result Value Ref Range    Glucose 98 65 - 99 mg/dL    BUN 12 8 - 23 mg/dL    Creatinine 0.57 (L) 0.76 - 1.27 mg/dL    Sodium 132 (L) 136 - 145 mmol/L    Potassium 4.1 3.5 - 5.2 mmol/L    Chloride 93 (L) 98 - 107 mmol/L    CO2 30.0 (H) 22.0 - 29.0 mmol/L    Calcium 9.0 8.6 - 10.5 mg/dL    Total Protein 6.2 6.0 - 8.5 g/dL    Albumin 3.20 (L) 3.50 - 5.20 g/dL    ALT (SGPT) 21 1 - 41 U/L    AST (SGOT) 11 1 - 40 U/L    Alkaline Phosphatase 49 39 - 117 U/L    Total Bilirubin 0.2 0.0 - 1.2 mg/dL    Globulin 3.0 gm/dL    A/G Ratio 1.1 g/dL    BUN/Creatinine Ratio 21.1 7.0 - 25.0    Anion Gap 9.0 5.0 - 15.0 mmol/L    eGFR 110.2 >60.0 mL/min/1.73   CBC Auto Differential    Collection Time: 12/20/22 11:19 PM    Specimen: Blood   Result Value Ref Range    WBC 11.20 (H) 3.40 - 10.80 10*3/mm3    RBC 3.68 (L) 4.14 - 5.80 10*6/mm3    Hemoglobin 10.9 (L) 13.0 -  17.7 g/dL    Hematocrit 33.5 (L) 37.5 - 51.0 %    MCV 91.0 79.0 - 97.0 fL    MCH 29.7 26.6 - 33.0 pg    MCHC 32.6 31.5 - 35.7 g/dL    RDW 14.6 12.3 - 15.4 %    RDW-SD 45.9 37.0 - 54.0 fl    MPV 8.7 6.0 - 12.0 fL    Platelets 252 140 - 450 10*3/mm3   Scan Slide    Collection Time: 12/20/22 11:19 PM    Specimen: Blood   Result Value Ref Range    Scan Slide     Manual Differential    Collection Time: 12/20/22 11:19 PM    Specimen: Blood   Result Value Ref Range    Neutrophil % 69.0 42.7 - 76.0 %    Lymphocyte % 14.0 (L) 19.6 - 45.3 %    Monocyte % 13.0 (H) 5.0 - 12.0 %    Bands %  3.0 0.0 - 5.0 %    Atypical Lymphocyte % 1.0 0.0 - 5.0 %    Neutrophils Absolute 8.06 (H) 1.70 - 7.00 10*3/mm3    Lymphocytes Absolute 1.68 0.70 - 3.10 10*3/mm3    Monocytes Absolute 1.46 (H) 0.10 - 0.90 10*3/mm3    Dacrocytes Slight/1+ None Seen    Poikilocytes Slight/1+ None Seen    WBC Morphology Normal Normal    Platelet Estimate Adequate Normal    Large Platelets Slight/1+ None Seen   POC Glucose Once    Collection Time: 12/21/22  7:32 AM    Specimen: Blood   Result Value Ref Range    Glucose 160 (H) 70 - 105 mg/dL   POC Glucose Once    Collection Time: 12/21/22 11:27 AM    Specimen: Blood   Result Value Ref Range    Glucose 105 70 - 105 mg/dL        Imaging:  Adult Transthoracic Echo Complete W/ Cont if Necessary Per Protocol  •  Left ventricular systolic function is low normal. Left ventricular   ejection fraction appears to be 46 - 50%.  •  The left ventricular cavity is moderate to severely dilated.  •  Left ventricular diastolic function is consistent with (grade I)   impaired relaxation.  •  The study is technically difficult for diagnosis.       ASSESSMENT:  Acute hypoxic/hypercapnic respiratory failure  Pneumonia  Influenza A  Acute exacerbation of COPD  Hyperkalemia  Leukocytosis  Diabetes mellitus type 2  Hypertension  Smoker       PLAN:  May discharge once O2 set up and follow up with our service in 2 weeks   Decrease O2 to  keep sats greater than 88%  Out of bed daily   Bronchodilators  Inhaled corticosteroids  Incentive spirometer  Completed a tapering steroid dose  Completed Tamiflu and a course of cefepime  Electrolytes/ glycemic control  DVT and GI prophylaxis-Lovenox    Discussed with CARA Mathew   2022  11:46 EST        Electronically signed by Linda Colbert APRN at 22 1147     Carmenza Guerrero MD at 22 1035          PULMONARY CRITICAL CARE PROGRESS  NOTE      PATIENT IDENTIFICATION:  Name: Maximino Bhatia  MRN: [id number]  :  1960     Age: 62 y.o.  Sex: male    DATE OF Note:  2022   Referring Physician: James Craig MD                  Subjective:   Laying in bed, currently still on 4.5 L O2,  Sitting in chair at bedside, states he coughs more in the mornings when he wakes up, no SOB, no chest or abd pain, no bowel or bladder issues reported    Objective:  tMax 24 hrs: Temp (24hrs), Av.8 °F (36.6 °C), Min:97.2 °F (36.2 °C), Max:98.3 °F (36.8 °C)      Vitals Ranges:   Temp:  [97.2 °F (36.2 °C)-98.3 °F (36.8 °C)] 97.9 °F (36.6 °C)  Heart Rate:  [78-94] 89  Resp:  [15-35] 15  BP: ()/(63-73) 108/68    Intake and Output Last 3 Shifts:   I/O last 3 completed shifts:  In:  [P.O.:]  Out: 600 [Urine:600]    Exam:  /68 (BP Location: Left arm, Patient Position: Sitting)   Pulse 89   Temp 97.9 °F (36.6 °C) (Oral)   Resp 15   Ht 177.8 cm (70\")   Wt 86.7 kg (191 lb 2.2 oz)   SpO2 93%   BMI 27.43 kg/m²     General Appearance: Alert  HEENT:  Normocephalic, without obvious abnormality. Conjunctivae/corneas clear.  Normal external ear canals. Nares normal, no drainage     Neck:  Supple, symmetrical, trachea midline. No JVD.  Lungs /Chest wall:   Bilateral basal rhonchi, respirations unlabored, symmetrical wall movement.     Heart:  Regular rate and rhythm, systolic murmur PMI left sternal border  Abdomen: Soft, nontender, no masses, no organomegaly.     Extremities: Trace edema, no clubbing or cyanosis        Medications:  arformoterol, 15 mcg, Nebulization, BID - RT  aspirin, 81 mg, Oral, Daily  bisoprolol-hydroCHLOROthiazide (ZIAC) 10-6.25 mg combo dose, , Oral, Q24H  budesonide, 0.5 mg, Nebulization, BID - RT  enoxaparin, 40 mg, Subcutaneous, Q24H  glipizide, 10 mg, Oral, BID AC  guaiFENesin, 600 mg, Oral, Q12H  insulin lispro, 2-9 Units, Subcutaneous, TID With Meals  lisinopril, 5 mg, Oral, Q PM  metFORMIN, 1,000 mg, Oral, BID With Meals  mirtazapine, 15 mg, Oral, Nightly  montelukast, 10 mg, Oral, Nightly  roflumilast, 500 mcg, Oral, Daily  sodium chloride, 3 mL, Intravenous, Q12H  theophylline, 300 mg, Oral, Q24H        Infusion:  Pharmacy to Dose enoxaparin (LOVENOX),          PRN:  •  acetaminophen  •  dextrose  •  dextrose  •  glucagon (human recombinant)  •  ipratropium-albuterol  •  LORazepam  •  nitroglycerin  •  ondansetron  •  Pharmacy to Dose enoxaparin (LOVENOX)  •  [COMPLETED] Insert Peripheral IV **AND** sodium chloride  •  sodium chloride  •  sodium chloride  Data Review:  All labs (24hrs):   Recent Results (from the past 24 hour(s))   POC Glucose Once    Collection Time: 12/19/22 10:57 AM    Specimen: Blood   Result Value Ref Range    Glucose 134 (H) 70 - 105 mg/dL   POC Glucose Once    Collection Time: 12/19/22  4:09 PM    Specimen: Blood   Result Value Ref Range    Glucose 81 70 - 105 mg/dL   Comprehensive Metabolic Panel    Collection Time: 12/19/22 11:49 PM    Specimen: Blood   Result Value Ref Range    Glucose 57 (L) 65 - 99 mg/dL    BUN 14 8 - 23 mg/dL    Creatinine 0.67 (L) 0.76 - 1.27 mg/dL    Sodium 135 (L) 136 - 145 mmol/L    Potassium 4.3 3.5 - 5.2 mmol/L    Chloride 94 (L) 98 - 107 mmol/L    CO2 33.0 (H) 22.0 - 29.0 mmol/L    Calcium 9.1 8.6 - 10.5 mg/dL    Total Protein 6.3 6.0 - 8.5 g/dL    Albumin 3.40 (L) 3.50 - 5.20 g/dL    ALT (SGPT) 28 1 - 41 U/L    AST (SGOT) 16 1 - 40 U/L    Alkaline Phosphatase 52 39 - 117 U/L    Total  Bilirubin 0.3 0.0 - 1.2 mg/dL    Globulin 2.9 gm/dL    A/G Ratio 1.2 g/dL    BUN/Creatinine Ratio 20.9 7.0 - 25.0    Anion Gap 8.0 5.0 - 15.0 mmol/L    eGFR 104.9 >60.0 mL/min/1.73   CBC Auto Differential    Collection Time: 12/19/22 11:49 PM    Specimen: Blood   Result Value Ref Range    WBC 14.20 (H) 3.40 - 10.80 10*3/mm3    RBC 3.80 (L) 4.14 - 5.80 10*6/mm3    Hemoglobin 11.8 (L) 13.0 - 17.7 g/dL    Hematocrit 34.4 (L) 37.5 - 51.0 %    MCV 90.5 79.0 - 97.0 fL    MCH 31.0 26.6 - 33.0 pg    MCHC 34.3 31.5 - 35.7 g/dL    RDW 14.9 12.3 - 15.4 %    RDW-SD 49.9 37.0 - 54.0 fl    MPV 9.1 6.0 - 12.0 fL    Platelets 252 140 - 450 10*3/mm3   Scan Slide    Collection Time: 12/19/22 11:49 PM    Specimen: Blood   Result Value Ref Range    Scan Slide     Manual Differential    Collection Time: 12/19/22 11:49 PM    Specimen: Blood   Result Value Ref Range    Neutrophil % 64.0 42.7 - 76.0 %    Lymphocyte % 21.0 19.6 - 45.3 %    Monocyte % 10.0 5.0 - 12.0 %    Eosinophil % 1.0 0.3 - 6.2 %    Bands %  4.0 0.0 - 5.0 %    Neutrophils Absolute 9.66 (H) 1.70 - 7.00 10*3/mm3    Lymphocytes Absolute 2.98 0.70 - 3.10 10*3/mm3    Monocytes Absolute 1.42 (H) 0.10 - 0.90 10*3/mm3    Eosinophils Absolute 0.14 0.00 - 0.40 10*3/mm3    RBC Morphology Normal Normal    WBC Morphology Normal Normal    Large Platelets Slight/1+ None Seen   POC Glucose Once    Collection Time: 12/20/22  1:15 AM    Specimen: Blood   Result Value Ref Range    Glucose 81 70 - 105 mg/dL   POC Glucose Once    Collection Time: 12/20/22  1:29 AM    Specimen: Blood   Result Value Ref Range    Glucose 133 (H) 70 - 105 mg/dL   POC Glucose Once    Collection Time: 12/20/22  7:27 AM    Specimen: Blood   Result Value Ref Range    Glucose 199 (H) 70 - 105 mg/dL        Imaging:  Adult Transthoracic Echo Complete W/ Cont if Necessary Per Protocol  •  Left ventricular systolic function is low normal. Left ventricular   ejection fraction appears to be 46 - 50%.  •  The left  ventricular cavity is moderate to severely dilated.  •  Left ventricular diastolic function is consistent with (grade I)   impaired relaxation.  •  The study is technically difficult for diagnosis.       ASSESSMENT:  Acute hypoxic/hypercapnic respiratory failure  Pneumonia  Influenza A  Acute exacerbation of COPD  Hyperkalemia  Leukocytosis  Diabetes mellitus type 2  Hypertension  Smoker       PLAN:  Continue current plan and continue to   Decrease O2 to keep sats greater than 88%  Out of bed daily   Bronchodilators  Inhaled corticosteroids  Incentive spirometer  Completed a tapering steroid dose  Completed Tamiflu and a course of cefepime  Electrolytes/ glycemic control  DVT and GI prophylaxis-Lovenox    Discussed with CARA James   12/19/2022  10:35 EST    I personally have examined  and interviewed the patient. I have reviewed the history, data, problems, assessment and plan with our NP.  Critical care time in direct medical management (   ) minutes  Electronically signed by Carmenza Guerrero MD. D, ABSM.    Electronically signed by Carmenza Guerrero MD at 12/20/22 1300     Benita Sears APRN at 12/20/22 0917     Attestation signed by Judit Singh MD at 12/20/22 1553    I have reviewed this documentation and agree.                       LOS: 16 days   Patient Care Team:  Qi Hicks APRN as PCP - General (Nurse Practitioner)    Subjective     He denies any complaints     Review of Systems   Constitutional: Positive for activity change and fatigue. Negative for appetite change.   HENT: Negative.    Respiratory: Positive for shortness of breath. Negative for cough.    Cardiovascular: Negative.    Gastrointestinal: Negative.    Genitourinary: Negative.    Musculoskeletal: Negative.    Neurological: Positive for weakness.   Psychiatric/Behavioral: Negative.            Objective     Vital Signs  Temp:  [97.2 °F (36.2 °C)-98.3 °F (36.8 °C)] 97.9 °F (36.6 °C)  Heart Rate:  [78-94] 89  Resp:   [15-35] 15  BP: ()/(63-73) 108/68      Physical Exam  Vitals reviewed.   Constitutional:       Appearance: He is not ill-appearing.   HENT:      Head: Normocephalic and atraumatic.      Right Ear: External ear normal.      Left Ear: External ear normal.      Nose: Nose normal.      Mouth/Throat:      Mouth: Mucous membranes are dry.   Eyes:      General:         Right eye: No discharge.         Left eye: No discharge.   Cardiovascular:      Rate and Rhythm: Normal rate and regular rhythm.      Pulses: Normal pulses.      Heart sounds: Normal heart sounds.   Pulmonary:      Effort: Pulmonary effort is normal. Tachypnea present.      Breath sounds: No wheezing or rhonchi.   Abdominal:      General: Bowel sounds are normal.      Palpations: Abdomen is soft.   Musculoskeletal:         General: Normal range of motion.      Cervical back: Normal range of motion.   Skin:     General: Skin is warm and dry.   Neurological:      Mental Status: He is alert and oriented to person, place, and time.   Psychiatric:         Behavior: Behavior normal.              Results Review:    Lab Results (last 24 hours)     Procedure Component Value Units Date/Time    POC Glucose Once [[id number]]  (Abnormal) Collected: 12/20/22 0727    Specimen: Blood Updated: 12/20/22 0728     Glucose 199 mg/dL      Comment: Serial Number: [id number]:  [id number]       Manual Differential [[id number]]  (Abnormal) Collected: 12/19/22 2349    Specimen: Blood Updated: 12/20/22 0222     Neutrophil % 64.0 %      Lymphocyte % 21.0 %      Monocyte % 10.0 %      Eosinophil % 1.0 %      Bands %  4.0 %      Neutrophils Absolute 9.66 10*3/mm3      Lymphocytes Absolute 2.98 10*3/mm3      Monocytes Absolute 1.42 10*3/mm3      Eosinophils Absolute 0.14 10*3/mm3      RBC Morphology Normal     WBC Morphology Normal     Large Platelets Slight/1+    CBC & Differential [[id number]]  (Abnormal) Collected: 12/19/22 2349    Specimen: Blood Updated: 12/20/22 0222     Narrative:      The following orders were created for panel order CBC & Differential.  Procedure                               Abnormality         Status                     ---------                               -----------         ------                     CBC Auto Differential[[id number]]        Abnormal            Final result               Scan Slide[[id number]]                                       Final result                 Please view results for these tests on the individual orders.    Scan Slide [[id number]] Collected: 12/19/22 2349    Specimen: Blood Updated: 12/20/22 0222     Scan Slide --     Comment: See Manual Differential Results       CBC Auto Differential [[id number]]  (Abnormal) Collected: 12/19/22 2349    Specimen: Blood Updated: 12/20/22 0222     WBC 14.20 10*3/mm3      RBC 3.80 10*6/mm3      Hemoglobin 11.8 g/dL      Hematocrit 34.4 %      MCV 90.5 fL      MCH 31.0 pg      MCHC 34.3 g/dL      RDW 14.9 %      RDW-SD 49.9 fl      MPV 9.1 fL      Platelets 252 10*3/mm3     Narrative:      Modified report. Previous result was Hemogram on 12/20/2022 at 0051 EST.  The previously reported component NRBC is no longer being reported. Previous result was 0.0 /100 WBC (Reference Range: 0.0-0.2 /100 WBC) on 12/20/2022 at 0051 EST.    POC Glucose Once [[id number]]  (Abnormal) Collected: 12/20/22 0129    Specimen: Blood Updated: 12/20/22 0130     Glucose 133 mg/dL      Comment: Serial Number: [id number]:  [id number]       POC Glucose Once [[id number]]  (Normal) Collected: 12/20/22 0115    Specimen: Blood Updated: 12/20/22 0117     Glucose 81 mg/dL      Comment: Serial Number: [id number]:  [id number]       Comprehensive Metabolic Panel [[id number]]  (Abnormal) Collected: 12/19/22 2349    Specimen: Blood Updated: 12/20/22 0110     Glucose 57 mg/dL      BUN 14 mg/dL      Creatinine 0.67 mg/dL      Sodium 135 mmol/L      Potassium 4.3 mmol/L      Chloride 94 mmol/L      CO2 33.0 mmol/L      Calcium 9.1  mg/dL      Total Protein 6.3 g/dL      Albumin 3.40 g/dL      ALT (SGPT) 28 U/L      AST (SGOT) 16 U/L      Alkaline Phosphatase 52 U/L      Total Bilirubin 0.3 mg/dL      Globulin 2.9 gm/dL      A/G Ratio 1.2 g/dL      BUN/Creatinine Ratio 20.9     Anion Gap 8.0 mmol/L      eGFR 104.9 mL/min/1.73      Comment: National Kidney Foundation and American Society of Nephrology (ASN) Task Force recommended calculation based on the Chronic Kidney Disease Epidemiology Collaboration (CKD-EPI) equation refit without adjustment for race.       Narrative:      GFR Normal >60  Chronic Kidney Disease <60  Kidney Failure <15      POC Glucose Once [[id number]]  (Normal) Collected: 12/19/22 1609    Specimen: Blood Updated: 12/19/22 1610     Glucose 81 mg/dL      Comment: Serial Number: [id number]:  [id number]       POC Glucose Once [[id number]]  (Abnormal) Collected: 12/19/22 1057    Specimen: Blood Updated: 12/19/22 1058     Glucose 134 mg/dL      Comment: Serial Number: [id number]:  [id number]              Imaging Results (Last 24 Hours)     ** No results found for the last 24 hours. **               I reviewed the patient's new clinical results.    Medication Review:   Scheduled Meds:arformoterol, 15 mcg, Nebulization, BID - RT  aspirin, 81 mg, Oral, Daily  bisoprolol-hydroCHLOROthiazide (ZIAC) 10-6.25 mg combo dose, , Oral, Q24H  budesonide, 0.5 mg, Nebulization, BID - RT  enoxaparin, 40 mg, Subcutaneous, Q24H  glipizide, 10 mg, Oral, BID AC  guaiFENesin, 600 mg, Oral, Q12H  insulin lispro, 2-9 Units, Subcutaneous, TID With Meals  lisinopril, 5 mg, Oral, Q PM  metFORMIN, 1,000 mg, Oral, BID With Meals  mirtazapine, 15 mg, Oral, Nightly  montelukast, 10 mg, Oral, Nightly  roflumilast, 500 mcg, Oral, Daily  sodium chloride, 3 mL, Intravenous, Q12H  theophylline, 300 mg, Oral, Q24H      Continuous Infusions:Pharmacy to Dose enoxaparin (LOVENOX),       PRN Meds:.•  acetaminophen  •  dextrose  •  dextrose  •  glucagon  (human recombinant)  •  ipratropium-albuterol  •  LORazepam  •  nitroglycerin  •  ondansetron  •  Pharmacy to Dose enoxaparin (LOVENOX)  •  [COMPLETED] Insert Peripheral IV **AND** sodium chloride  •  sodium chloride  •  sodium chloride     Interval History:    12/5 patient continues with shortness of breath; will add corticosteroid nebs; CT chest; continue supportive care may need oxygen at home temporarily; leukocytosis most likely secondary to steroids     12/6 worsening respiratory status today with distress will add pulm consult and abg; CT scan yesterday with severe emphysema and will increase steroids    12/9 patient is most likely at his new baseline and anticipate he will need oxygen at home and sleep study as outpt; bipap at night as needed    12/10 cxr in am and labs    12/11 had respiratory distress yesterday with hypercapnia and hypoxia; he is having difficulty wearing bipap which was explained in detail regarding CO2 and oxygen requirements; also appetite is down will add remeron and supplements; additional antibiotics for leukocytosis and pna    12/13 continues to require avap and high levels of oxygen; will obtain ECHO; cefipime added for poss bacteria infection with mild fever and leukocytosis; lokelma for hyperkalemia will add IV fluids for poor intake and hyponatremia    12/14 continue to monitor labs and electrolytes; await echo results; afebrile continue current plan and oxygen titration    12/15 overall improvement needs to be up and moving around    12/20 patient most likely at new baseline; will need rehab; stable and ready for transfer    Assessment & Plan     Acute COPD exacerbation most likely related to flu A  Diabetes-stable  Hyperkalemia  Hyponatremia-resolved  Hypertension  Tobacco abuse  Malnutrition     Plan for disposition: Rehab    CARA Clark  12/20/22  09:17 EST          Electronically signed by Judit Singh MD at 12/20/22 0867     Judit Singh MD at 12/19/22 1376                LOS: 15 days   Patient Care Team:  Qi Hicks APRN as PCP - General (Nurse Practitioner)    Subjective     Interval History: Shortness of breath is slowly improving    Patient Complaints: No specific complaints    History taken from: patient    Review of Systems   Constitutional: Positive for activity change and fatigue. Negative for appetite change and diaphoresis.   HENT: Negative for facial swelling.    Eyes: Negative for visual disturbance.   Respiratory: Positive for cough and shortness of breath. Negative for wheezing and stridor.    Gastrointestinal: Negative for abdominal pain, constipation, diarrhea, nausea and vomiting.   Endocrine: Negative for polyuria.   Genitourinary: Negative for dysuria.   Musculoskeletal: Positive for arthralgias.   Skin: Negative for rash and wound.   Neurological: Negative for dizziness, tremors, weakness, light-headedness and headaches.   Psychiatric/Behavioral: Negative for confusion.           Objective     Vital Signs  Temp:  [97.3 °F (36.3 °C)-98.4 °F (36.9 °C)] 97.3 °F (36.3 °C)  Heart Rate:  [75-92] 88  Resp:  [15-32] 22  BP: (100-125)/(64-71) 125/67    Physical Exam:     General Appearance:    Alert, cooperative, in no acute distress,   Head:    Normocephalic, without obvious abnormality, atraumatic   Eyes:            Lids and lashes normal, conjunctivae and sclerae normal, no   icterus, no pallor, corneas clear, PERRLA   Ears:    Ears appear intact with no abnormalities noted   Throat:   No oral lesions, no thrush, oral mucosa moist   Neck:   No adenopathy, supple, trachea midline, no thyromegaly, no   carotid bruit, no JVD   Lungs:    Distant    Heart:    Regular rhythm and normal rate, normal S1 and S2, no            murmur, no gallop, no rub, no click   Chest Wall:    No abnormalities observed   Abdomen:     Normal bowel sounds, no masses, no organomegaly, soft        Non-tender non-distended, no guarding,   Extremities:   Moves all extremities well,  no edema, no cyanosis, no             Redness   Pulses:   Pulses palpable and equal bilaterally   Skin:   No bleeding, bruising or rash   Lymph nodes:   No palpable adenopathy   Neurologic:   Cranial nerves 2 - 12 grossly intact, sensation intact, DTR       present and equal bilaterally        Results Review:    Lab Results (last 24 hours)     Procedure Component Value Units Date/Time    POC Glucose Once [[id number]]  (Abnormal) Collected: 12/19/22 1057    Specimen: Blood Updated: 12/19/22 1058     Glucose 134 mg/dL      Comment: Serial Number: [id number]:  [id number]       POC Glucose Once [[id number]]  (Abnormal) Collected: 12/19/22 0708    Specimen: Blood Updated: 12/19/22 0709     Glucose 160 mg/dL      Comment: Serial Number: [id number]:  [id number]       Manual Differential [[id number]]  (Abnormal) Collected: 12/19/22 0232    Specimen: Blood Updated: 12/19/22 0334     Neutrophil % 74.0 %      Lymphocyte % 18.0 %      Monocyte % 1.0 %      Bands %  6.0 %      Metamyelocyte % 1.0 %      Neutrophils Absolute 11.44 10*3/mm3      Lymphocytes Absolute 2.57 10*3/mm3      Monocytes Absolute 0.14 10*3/mm3      Dacrocytes Slight/1+     Poikilocytes Slight/1+     Polychromasia Slight/1+     WBC Morphology Normal     Platelet Estimate Adequate     Large Platelets Slight/1+    CBC & Differential [[id number]]  (Abnormal) Collected: 12/19/22 0232    Specimen: Blood Updated: 12/19/22 0334    Narrative:      The following orders were created for panel order CBC & Differential.  Procedure                               Abnormality         Status                     ---------                               -----------         ------                     CBC Auto Differential[[id number]]        Abnormal            Final result               Scan Slide[[id number]]                                       Final result                 Please view results for these tests on the individual orders.    Scan Slide [[id number]] Collected:  12/19/22 0232    Specimen: Blood Updated: 12/19/22 0334     Scan Slide --     Comment: See Manual Differential Results       CBC Auto Differential [[id number]]  (Abnormal) Collected: 12/19/22 0232    Specimen: Blood Updated: 12/19/22 0334     WBC 14.30 10*3/mm3      RBC 3.83 10*6/mm3      Hemoglobin 11.7 g/dL      Hematocrit 34.2 %      MCV 89.3 fL      MCH 30.6 pg      MCHC 34.2 g/dL      RDW 14.9 %      RDW-SD 49.0 fl      MPV 8.7 fL      Platelets 242 10*3/mm3     Narrative:      Modified report. Previous result was Hemogram on 12/19/2022 at 0254 EST.  The previously reported component NRBC is no longer being reported. Previous result was 0.0 /100 WBC (Reference Range: 0.0-0.2 /100 WBC) on 12/19/2022 at 0254 EST.    Comprehensive Metabolic Panel [[id number]]  (Abnormal) Collected: 12/19/22 0232    Specimen: Blood Updated: 12/19/22 0311     Glucose 126 mg/dL      BUN 16 mg/dL      Creatinine 0.64 mg/dL      Sodium 136 mmol/L      Potassium 4.4 mmol/L      Chloride 95 mmol/L      CO2 31.0 mmol/L      Calcium 9.1 mg/dL      Total Protein 6.2 g/dL      Albumin 3.10 g/dL      ALT (SGPT) 28 U/L      AST (SGOT) 17 U/L      Alkaline Phosphatase 57 U/L      Total Bilirubin 0.2 mg/dL      Globulin 3.1 gm/dL      A/G Ratio 1.0 g/dL      BUN/Creatinine Ratio 25.0     Anion Gap 10.0 mmol/L      eGFR 106.4 mL/min/1.73      Comment: National Kidney Foundation and American Society of Nephrology (ASN) Task Force recommended calculation based on the Chronic Kidney Disease Epidemiology Collaboration (CKD-EPI) equation refit without adjustment for race.       Narrative:      GFR Normal >60  Chronic Kidney Disease <60  Kidney Failure <15      POC Glucose Once [[id number]]  (Abnormal) Collected: 12/19/22 0159    Specimen: Blood Updated: 12/19/22 0200     Glucose 144 mg/dL      Comment: Serial Number: [id number]:  [id number]       POC Glucose Once [[id number]]  (Normal) Collected: 12/18/22 1612    Specimen: Blood Updated: 12/18/22  1613     Glucose 80 mg/dL      Comment: Serial Number: [id number]:  [id number]              Imaging Results (Last 24 Hours)     ** No results found for the last 24 hours. **               I reviewed the patient's new clinical results.    Medication Review:   Scheduled Meds:arformoterol, 15 mcg, Nebulization, BID - RT  aspirin, 81 mg, Oral, Daily  bisoprolol-hydroCHLOROthiazide (ZIAC) 10-6.25 mg combo dose, , Oral, Q24H  budesonide, 0.5 mg, Nebulization, BID - RT  enoxaparin, 40 mg, Subcutaneous, Q24H  glipizide, 10 mg, Oral, BID AC  guaiFENesin, 600 mg, Oral, Q12H  insulin lispro, 2-9 Units, Subcutaneous, TID With Meals  lisinopril, 5 mg, Oral, Q PM  metFORMIN, 1,000 mg, Oral, BID With Meals  mirtazapine, 15 mg, Oral, Nightly  montelukast, 10 mg, Oral, Nightly  roflumilast, 500 mcg, Oral, Daily  sodium chloride, 3 mL, Intravenous, Q12H  theophylline, 300 mg, Oral, Q24H      Continuous Infusions:Pharmacy to Dose enoxaparin (LOVENOX),       PRN Meds:.•  acetaminophen  •  dextrose  •  dextrose  •  glucagon (human recombinant)  •  ipratropium-albuterol  •  LORazepam  •  nitroglycerin  •  ondansetron  •  Pharmacy to Dose enoxaparin (LOVENOX)  •  [COMPLETED] Insert Peripheral IV **AND** sodium chloride  •  sodium chloride  •  sodium chloride     Assessment & Plan       COPD with acute exacerbation (HCC)    Influenza A    Diabetes mellitus with coincident hypertension (HCC)    Bacterial pneumonia    Leukocytosis    Acute hypoxemic respiratory failure (HCC)    Oxygenation is improving.  Continue to wean supplemental oxygen as tolerated.  Anticipate transfer to skilled rehab when bed is available.      Plan for disposition: Skilled rehab    Judit Singh MD  22  13:53 EST          Electronically signed by Judit Singh MD at 22 [id number]     Carmenza Guerrero MD at 22 1226          PULMONARY CRITICAL CARE PROGRESS  NOTE      PATIENT IDENTIFICATION:  Name: Maximino Bhatia  MRN: [id number]  :  1960      Age: 62 y.o.  Sex: male    DATE OF Note:  2022   Referring Physician: James Craig MD                  Subjective:   Laying in bed, currently on 4.5 L O2, no SOB, no chest or abd pain, no bowel or bladder issues reported    Objective:  tMax 24 hrs: Temp (24hrs), Av.8 °F (36.6 °C), Min:97.3 °F (36.3 °C), Max:98.4 °F (36.9 °C)      Vitals Ranges:   Temp:  [97.3 °F (36.3 °C)-98.4 °F (36.9 °C)] 97.3 °F (36.3 °C)  Heart Rate:  [75-92] 88  Resp:  [15-32] 22  BP: (100-125)/(64-71) 125/67    Intake and Output Last 3 Shifts:   I/O last 3 completed shifts:  In: 1317 [P.O.:1317]  Out: 600 [Urine:600]    Exam:  /67 (BP Location: Left arm, Patient Position: Sitting)   Pulse 88   Temp 97.3 °F (36.3 °C) (Oral)   Resp 22   Ht 177.8 cm (70\")   Wt 87.3 kg (192 lb 7.4 oz)   SpO2 94%   BMI 27.62 kg/m²     General Appearance: Alert  HEENT:  Normocephalic, without obvious abnormality. Conjunctivae/corneas clear.  Normal external ear canals. Nares normal, no drainage     Neck:  Supple, symmetrical, trachea midline. No JVD.  Lungs /Chest wall:   Bilateral basal rhonchi, respirations unlabored, symmetrical wall movement.     Heart:  Regular rate and rhythm, systolic murmur PMI left sternal border  Abdomen: Soft, nontender, no masses, no organomegaly.    Extremities: Trace edema, no clubbing or cyanosis        Medications:  arformoterol, 15 mcg, Nebulization, BID - RT  aspirin, 81 mg, Oral, Daily  bisoprolol-hydroCHLOROthiazide (ZIAC) 10-6.25 mg combo dose, , Oral, Q24H  budesonide, 0.5 mg, Nebulization, BID - RT  enoxaparin, 40 mg, Subcutaneous, Q24H  glipizide, 10 mg, Oral, BID AC  guaiFENesin, 600 mg, Oral, Q12H  insulin lispro, 2-9 Units, Subcutaneous, TID With Meals  lisinopril, 5 mg, Oral, Q PM  metFORMIN, 1,000 mg, Oral, BID With Meals  mirtazapine, 15 mg, Oral, Nightly  montelukast, 10 mg, Oral, Nightly  roflumilast, 500 mcg, Oral, Daily  sodium chloride, 3 mL, Intravenous, Q12H  theophylline, 300 mg,  Oral, Q24H        Infusion:  Pharmacy to Dose enoxaparin (LOVENOX),          PRN:  •  acetaminophen  •  dextrose  •  dextrose  •  glucagon (human recombinant)  •  ipratropium-albuterol  •  LORazepam  •  nitroglycerin  •  ondansetron  •  Pharmacy to Dose enoxaparin (LOVENOX)  •  [COMPLETED] Insert Peripheral IV **AND** sodium chloride  •  sodium chloride  •  sodium chloride  Data Review:  All labs (24hrs):   Recent Results (from the past 24 hour(s))   POC Glucose Once    Collection Time: 12/18/22  4:12 PM    Specimen: Blood   Result Value Ref Range    Glucose 80 70 - 105 mg/dL   POC Glucose Once    Collection Time: 12/19/22  1:59 AM    Specimen: Blood   Result Value Ref Range    Glucose 144 (H) 70 - 105 mg/dL   Comprehensive Metabolic Panel    Collection Time: 12/19/22  2:32 AM    Specimen: Blood   Result Value Ref Range    Glucose 126 (H) 65 - 99 mg/dL    BUN 16 8 - 23 mg/dL    Creatinine 0.64 (L) 0.76 - 1.27 mg/dL    Sodium 136 136 - 145 mmol/L    Potassium 4.4 3.5 - 5.2 mmol/L    Chloride 95 (L) 98 - 107 mmol/L    CO2 31.0 (H) 22.0 - 29.0 mmol/L    Calcium 9.1 8.6 - 10.5 mg/dL    Total Protein 6.2 6.0 - 8.5 g/dL    Albumin 3.10 (L) 3.50 - 5.20 g/dL    ALT (SGPT) 28 1 - 41 U/L    AST (SGOT) 17 1 - 40 U/L    Alkaline Phosphatase 57 39 - 117 U/L    Total Bilirubin 0.2 0.0 - 1.2 mg/dL    Globulin 3.1 gm/dL    A/G Ratio 1.0 g/dL    BUN/Creatinine Ratio 25.0 7.0 - 25.0    Anion Gap 10.0 5.0 - 15.0 mmol/L    eGFR 106.4 >60.0 mL/min/1.73   CBC Auto Differential    Collection Time: 12/19/22  2:32 AM    Specimen: Blood   Result Value Ref Range    WBC 14.30 (H) 3.40 - 10.80 10*3/mm3    RBC 3.83 (L) 4.14 - 5.80 10*6/mm3    Hemoglobin 11.7 (L) 13.0 - 17.7 g/dL    Hematocrit 34.2 (L) 37.5 - 51.0 %    MCV 89.3 79.0 - 97.0 fL    MCH 30.6 26.6 - 33.0 pg    MCHC 34.2 31.5 - 35.7 g/dL    RDW 14.9 12.3 - 15.4 %    RDW-SD 49.0 37.0 - 54.0 fl    MPV 8.7 6.0 - 12.0 fL    Platelets 242 140 - 450 10*3/mm3   Scan Slide    Collection Time:  12/19/22  2:32 AM    Specimen: Blood   Result Value Ref Range    Scan Slide     Manual Differential    Collection Time: 12/19/22  2:32 AM    Specimen: Blood   Result Value Ref Range    Neutrophil % 74.0 42.7 - 76.0 %    Lymphocyte % 18.0 (L) 19.6 - 45.3 %    Monocyte % 1.0 (L) 5.0 - 12.0 %    Bands %  6.0 (H) 0.0 - 5.0 %    Metamyelocyte % 1.0 (H) 0.0 - 0.0 %    Neutrophils Absolute 11.44 (H) 1.70 - 7.00 10*3/mm3    Lymphocytes Absolute 2.57 0.70 - 3.10 10*3/mm3    Monocytes Absolute 0.14 0.10 - 0.90 10*3/mm3    Dacrocytes Slight/1+ None Seen    Poikilocytes Slight/1+ None Seen    Polychromasia Slight/1+ None Seen    WBC Morphology Normal Normal    Platelet Estimate Adequate Normal    Large Platelets Slight/1+ None Seen   POC Glucose Once    Collection Time: 12/19/22  7:08 AM    Specimen: Blood   Result Value Ref Range    Glucose 160 (H) 70 - 105 mg/dL   POC Glucose Once    Collection Time: 12/19/22 10:57 AM    Specimen: Blood   Result Value Ref Range    Glucose 134 (H) 70 - 105 mg/dL        Imaging:  Adult Transthoracic Echo Complete W/ Cont if Necessary Per Protocol  •  Left ventricular systolic function is low normal. Left ventricular   ejection fraction appears to be 46 - 50%.  •  The left ventricular cavity is moderate to severely dilated.  •  Left ventricular diastolic function is consistent with (grade I)   impaired relaxation.  •  The study is technically difficult for diagnosis.       ASSESSMENT:  Acute hypoxic/hypercapnic respiratory failure  Pneumonia  Influenza A  Acute exacerbation of COPD  Hyperkalemia  Leukocytosis  Diabetes mellitus type 2  Hypertension  Smoker       PLAN:  Decrease O2 to keep sats greater than 88%  Out of bed daily for longer periods of time  Bronchodilators  Inhaled corticosteroids  Incentive spirometer  Completed a tapering steroid dose  Completed Tamiflu and a course of cefepime  Electrolytes/ glycemic control  DVT and GI prophylaxis-Lovenox    Discussed with Dr. Yolanda Mckeon  CARA Colbert   12/19/2022  12:26 EST    I personally have examined  and interviewed the patient. I have reviewed the history, data, problems, assessment and plan with our NP.  Total Critical care time in direct medical management (   ) minutes, This time specifically excludes time spent performing procedures.    Carmenza Guerrero MD   12/19/2022  19:25 EST         Electronically signed by Carmenza Guerrero MD at 12/19/22 1925       Consult Notes (last 72 hours)  Notes from 12/18/22 1434 through 12/21/22 1434   No notes of this type exist for this encounter.            Discharge Summary      Benita Sears, APRN at 12/21/22 0958     Attestation signed by Judit Singh MD at 12/21/22 1222    I have reviewed this documentation and agree.                    Date of Discharge:  12/21/2022    Discharge Diagnosis:   COPD with acute exacerbation  Acute hypoxic respiratory failure  Influenza A infection  Hyperkalemia  Hyponatremia  Severe emphysema  Diabetes  Hypertension  Tobacco abuse    Presenting Problem/History of Present Illness  Active Hospital Problems    Diagnosis  POA   • **COPD with acute exacerbation (HCC) [J44.1]  Yes   • Influenza A [J10.1]  Unknown   • Diabetes mellitus with coincident hypertension (HCC) [E11.9, I10]  Unknown   • Bacterial pneumonia [J15.9]  Unknown   • Leukocytosis [D72.829]  Unknown   • Acute hypoxemic respiratory failure (HCC) [J96.01]  Yes      Resolved Hospital Problems   No resolved problems to display.          Hospital Course    Patient is a 62 y.o. male presented with COPD, diabetes and hypertension who presented to the emergency department on 12/3/2020 with complaints of cough, shortness of air, weakness and upper respiratory symptoms.  He was admitted with an acute exacerbation of COPD and acute influenza A infection.  During this admission patient required high amounts of oxygen and at one point AVAPS assistance. It was a slow process to wean the patient down to his current 4 liters of  oxygen. Pulmonary followed the patient. He received antibiotics, bronchodilators, tamiflu, and other supportive care. His CT scan reviewed severe emphysema. Influenza A with COPD exacerbation most likely contributed to this lengthy hospitalization and most likely new baseline with oxygen. He was started on remeron to increase appetite and assist with situational depression. He will require oxygen and need a home sleep study. He has follow up with PCP and will need to follow up with pulmonology. He is agreeable to this plan and discharge. I emphasized resuming home activities slowly, no smoking, and avoid crowds.     Procedures Performed         Consults:   Consults     Date and Time Order Name Status Description    12/6/2022  9:40 AM Inpatient Pulmonology Consult Completed     12/3/2022  1:05 PM Family Medicine Consult            Pertinent Test Results:    Lab Results (most recent)     Procedure Component Value Units Date/Time    POC Glucose Once [[id number]]  (Abnormal) Collected: 12/21/22 0732    Specimen: Blood Updated: 12/21/22 0733     Glucose 160 mg/dL      Comment: Serial Number: [id number]:  [id number]       Manual Differential [[id number]]  (Abnormal) Collected: 12/20/22 2319    Specimen: Blood Updated: 12/21/22 0152     Neutrophil % 69.0 %      Lymphocyte % 14.0 %      Monocyte % 13.0 %      Bands %  3.0 %      Atypical Lymphocyte % 1.0 %      Neutrophils Absolute 8.06 10*3/mm3      Lymphocytes Absolute 1.68 10*3/mm3      Monocytes Absolute 1.46 10*3/mm3      Dacrocytes Slight/1+     Poikilocytes Slight/1+     WBC Morphology Normal     Platelet Estimate Adequate     Large Platelets Slight/1+    CBC & Differential [[id number]]  (Abnormal) Collected: 12/20/22 2319    Specimen: Blood Updated: 12/21/22 0152    Narrative:      The following orders were created for panel order CBC & Differential.  Procedure                               Abnormality         Status                     ---------                                -----------         ------                     CBC Auto Differential[[id number]]        Abnormal            Final result               Scan Slide[[id number]]                                       Final result                 Please view results for these tests on the individual orders.    Scan Slide [[id number]] Collected: 12/20/22 2319    Specimen: Blood Updated: 12/21/22 0152     Scan Slide --     Comment: See Manual Differential Results       CBC Auto Differential [[id number]]  (Abnormal) Collected: 12/20/22 2319    Specimen: Blood Updated: 12/21/22 0152     WBC 11.20 10*3/mm3      RBC 3.68 10*6/mm3      Hemoglobin 10.9 g/dL      Hematocrit 33.5 %      MCV 91.0 fL      MCH 29.7 pg      MCHC 32.6 g/dL      RDW 14.6 %      RDW-SD 45.9 fl      MPV 8.7 fL      Platelets 252 10*3/mm3     Narrative:      Modified report. Previous result was Hemogram on 12/21/2022 at 0034 EST.  The previously reported component NRBC is no longer being reported. Previous result was 0.1 /100 WBC (Reference Range: 0.0-0.2 /100 WBC) on 12/21/2022 at 0034 EST.    Comprehensive Metabolic Panel [[id number]]  (Abnormal) Collected: 12/20/22 2319    Specimen: Blood Updated: 12/21/22 0045     Glucose 98 mg/dL      BUN 12 mg/dL      Creatinine 0.57 mg/dL      Sodium 132 mmol/L      Potassium 4.1 mmol/L      Chloride 93 mmol/L      CO2 30.0 mmol/L      Calcium 9.0 mg/dL      Total Protein 6.2 g/dL      Albumin 3.20 g/dL      ALT (SGPT) 21 U/L      AST (SGOT) 11 U/L      Alkaline Phosphatase 49 U/L      Total Bilirubin 0.2 mg/dL      Globulin 3.0 gm/dL      A/G Ratio 1.1 g/dL      BUN/Creatinine Ratio 21.1     Anion Gap 9.0 mmol/L      eGFR 110.2 mL/min/1.73      Comment: National Kidney Foundation and American Society of Nephrology (ASN) Task Force recommended calculation based on the Chronic Kidney Disease Epidemiology Collaboration (CKD-EPI) equation refit without adjustment for race.       Narrative:      GFR Normal >60  Chronic Kidney  Disease <60  Kidney Failure <15      POC Glucose Once [[id number]]  (Normal) Collected: 12/20/22 1705    Specimen: Blood Updated: 12/20/22 1710     Glucose 82 mg/dL      Comment: Serial Number: [id number]:  [id number]       Manual Differential [[id number]]  (Abnormal) Collected: 12/19/22 2349    Specimen: Blood Updated: 12/20/22 0222     Neutrophil % 64.0 %      Lymphocyte % 21.0 %      Monocyte % 10.0 %      Eosinophil % 1.0 %      Bands %  4.0 %      Neutrophils Absolute 9.66 10*3/mm3      Lymphocytes Absolute 2.98 10*3/mm3      Monocytes Absolute 1.42 10*3/mm3      Eosinophils Absolute 0.14 10*3/mm3      RBC Morphology Normal     WBC Morphology Normal     Large Platelets Slight/1+    CBC & Differential [[id number]]  (Abnormal) Collected: 12/19/22 2349    Specimen: Blood Updated: 12/20/22 0222    Narrative:      The following orders were created for panel order CBC & Differential.  Procedure                               Abnormality         Status                     ---------                               -----------         ------                     CBC Auto Differential[[id number]]        Abnormal            Final result               Scan Slide[[id number]]                                       Final result                 Please view results for these tests on the individual orders.    Scan Slide [[id number]] Collected: 12/19/22 2349    Specimen: Blood Updated: 12/20/22 0222     Scan Slide --     Comment: See Manual Differential Results       CBC Auto Differential [[id number]]  (Abnormal) Collected: 12/19/22 2349    Specimen: Blood Updated: 12/20/22 0222     WBC 14.20 10*3/mm3      RBC 3.80 10*6/mm3      Hemoglobin 11.8 g/dL      Hematocrit 34.4 %      MCV 90.5 fL      MCH 31.0 pg      MCHC 34.3 g/dL      RDW 14.9 %      RDW-SD 49.9 fl      MPV 9.1 fL      Platelets 252 10*3/mm3     Narrative:      Modified report. Previous result was Hemogram on 12/20/2022 at 0051 EST.  The previously reported component NRBC  is no longer being reported. Previous result was 0.0 /100 WBC (Reference Range: 0.0-0.2 /100 WBC) on 12/20/2022 at 0051 EST.    Comprehensive Metabolic Panel [[id number]]  (Abnormal) Collected: 12/19/22 2349    Specimen: Blood Updated: 12/20/22 0110     Glucose 57 mg/dL      BUN 14 mg/dL      Creatinine 0.67 mg/dL      Sodium 135 mmol/L      Potassium 4.3 mmol/L      Chloride 94 mmol/L      CO2 33.0 mmol/L      Calcium 9.1 mg/dL      Total Protein 6.3 g/dL      Albumin 3.40 g/dL      ALT (SGPT) 28 U/L      AST (SGOT) 16 U/L      Alkaline Phosphatase 52 U/L      Total Bilirubin 0.3 mg/dL      Globulin 2.9 gm/dL      A/G Ratio 1.2 g/dL      BUN/Creatinine Ratio 20.9     Anion Gap 8.0 mmol/L      eGFR 104.9 mL/min/1.73      Comment: National Kidney Foundation and American Society of Nephrology (ASN) Task Force recommended calculation based on the Chronic Kidney Disease Epidemiology Collaboration (CKD-EPI) equation refit without adjustment for race.       Narrative:      GFR Normal >60  Chronic Kidney Disease <60  Kidney Failure <15      Blood Culture - Blood, Arm, Left [[id number]]  (Normal) Collected: 12/11/22 0847    Specimen: Blood from Arm, Left Updated: 12/16/22 0900     Blood Culture No growth at 5 days    Narrative:      Less than seven (7) mL's of blood was collected.  Insufficient quantity may yield false negative results.    Basic Metabolic Panel [[id number]]  (Abnormal) Collected: 12/16/22 0022    Specimen: Blood Updated: 12/16/22 0144     Glucose 95 mg/dL      BUN 15 mg/dL      Creatinine 0.54 mg/dL      Sodium 135 mmol/L      Potassium 4.6 mmol/L      Chloride 93 mmol/L      CO2 34.0 mmol/L      Calcium 9.2 mg/dL      BUN/Creatinine Ratio 27.8     Anion Gap 8.0 mmol/L      eGFR 112.0 mL/min/1.73      Comment: National Kidney Foundation and American Society of Nephrology (ASN) Task Force recommended calculation based on the Chronic Kidney Disease Epidemiology Collaboration (CKD-EPI) equation refit without  adjustment for race.       Narrative:      GFR Normal >60  Chronic Kidney Disease <60  Kidney Failure <15      Basic Metabolic Panel [[id number]]  (Abnormal) Collected: 12/15/22 0033    Specimen: Blood Updated: 12/15/22 0148     Glucose 59 mg/dL      BUN 18 mg/dL      Creatinine 0.45 mg/dL      Sodium 136 mmol/L      Potassium 4.8 mmol/L      Chloride 94 mmol/L      CO2 33.0 mmol/L      Calcium 8.9 mg/dL      BUN/Creatinine Ratio 40.0     Anion Gap 9.0 mmol/L      eGFR 118.3 mL/min/1.73      Comment: National Kidney Foundation and American Society of Nephrology (ASN) Task Force recommended calculation based on the Chronic Kidney Disease Epidemiology Collaboration (CKD-EPI) equation refit without adjustment for race.       Narrative:      GFR Normal >60  Chronic Kidney Disease <60  Kidney Failure <15      Extra Tubes [[id number]] Collected: 12/12/22 1235    Specimen: Blood, Venous Line Updated: 12/12/22 1345    Narrative:      The following orders were created for panel order Extra Tubes.  Procedure                               Abnormality         Status                     ---------                               -----------         ------                     Lavender Top[[id number]]                                     Final result                 Please view results for these tests on the individual orders.    Lavender Top [[id number]] Collected: 12/12/22 1235    Specimen: Blood Updated: 12/12/22 1345     Extra Tube hold for add-on     Comment: Auto resulted       MRSA Screen, PCR (Inpatient) - Swab, Nares [[id number]]  (Normal) Collected: 12/11/22 1252    Specimen: Swab from Nares Updated: 12/11/22 1409     MRSA PCR No MRSA Detected    Narrative:      The negative predictive value of this diagnostic test is high and should only be used to consider de-escalating anti-MRSA therapy. A positive result may indicate colonization with MRSA and must be correlated clinically.    Ammonia [[id number]]  (Normal) Collected: 12/10/22  2307    Specimen: Blood Updated: 12/10/22 2335     Ammonia 56 umol/L     Blood Gas, Arterial - [[id number]]  (Abnormal) Collected: 12/10/22 2316    Specimen: Arterial Blood Updated: 12/10/22 2325     Site Left Radial     Felix's Test Positive     pH, Arterial 7.294 pH units      pCO2, Arterial 84.1 mm Hg      pO2, Arterial 76.8 mm Hg      HCO3, Arterial 40.8 mmol/L      Base Excess, Arterial 9.6 mmol/L      Comment: Serial Number: [id number]:  [id number]        O2 Saturation, Arterial 92.4 %      CO2 Content 43.3 mmol/L      Barometric Pressure for Blood Gas --     Comment: N/A        Modality HFNC     FIO2 60 %      Hemodilution No    Blood Gas, Arterial - [[id number]]  (Abnormal) Collected: 12/07/22 1900    Specimen: Arterial Blood Updated: 12/07/22 1904     Site Right Radial     Felix's Test Positive     pH, Arterial 7.357 pH units      pCO2, Arterial 63.8 mm Hg      pO2, Arterial 75.3 mm Hg      HCO3, Arterial 35.8 mmol/L      Base Excess, Arterial 7.7 mmol/L      Comment: Serial Number: [id number]:  [id number]        O2 Saturation, Arterial 93.7 %      CO2 Content 37.8 mmol/L      Barometric Pressure for Blood Gas --     Comment: N/A        Modality HFNC     FIO2 44 %      Hemodilution No    Troponin [[id number]]  (Normal) Collected: 12/04/22 2348    Specimen: Blood Updated: 12/05/22 0022     Troponin T <0.010 ng/mL     Narrative:      Troponin T Reference Range:  <= 0.03 ng/mL-   Negative for AMI  >0.03 ng/mL-     Abnormal for myocardial necrosis.  Clinicians would have to utilize clinical acumen, EKG, Troponin and serial changes to determine if it is an Acute Myocardial Infarction or myocardial injury due to an underlying chronic condition.       Results may be falsely decreased if patient taking Biotin.      Urine Culture - Urine, Urine, Clean Catch [[id number]]  (Normal) Collected: 12/03/22 1100    Specimen: Urine, Clean Catch Updated: 12/04/22 1459     Urine Culture No growth    Urinalysis, Microscopic Only -  Urine, Clean Catch [[id number]]  (Abnormal) Collected: 12/03/22 1100    Specimen: Urine, Clean Catch Updated: 12/03/22 1130     RBC, UA 0-2 /HPF      WBC, UA 0-2 /HPF      Bacteria, UA Trace /HPF      Squamous Epithelial Cells, UA 0-2 /HPF      Hyaline Casts, UA 3-6 /LPF      Granular Casts, UA 3-6 /LPF      Amorphous Crystals, UA Small/1+ /HPF      Mucus, UA Small/1+ /HPF      Methodology Manual Light Microscopy    Urinalysis With Microscopic If Indicated (No Culture) - Urine, Clean Catch [[id number]]  (Abnormal) Collected: 12/03/22 1100    Specimen: Urine, Clean Catch Updated: 12/03/22 1111     Color, UA Yellow     Appearance, UA Clear     pH, UA 5.5     Specific Gravity, UA 1.022     Glucose, UA Negative     Ketones, UA Negative     Bilirubin, UA Negative     Blood, UA Negative     Protein, UA 30 mg/dL (1+)     Leuk Esterase, UA Negative     Nitrite, UA Negative     Urobilinogen, UA 1.0 E.U./dL    Troponin [[id number]]  (Normal) Collected: 12/03/22 1028    Specimen: Blood Updated: 12/03/22 1102     Troponin T <0.010 ng/mL     Narrative:      Troponin T Reference Range:  <= 0.03 ng/mL-   Negative for AMI  >0.03 ng/mL-     Abnormal for myocardial necrosis.  Clinicians would have to utilize clinical acumen, EKG, Troponin and serial changes to determine if it is an Acute Myocardial Infarction or myocardial injury due to an underlying chronic condition.       Results may be falsely decreased if patient taking Biotin.      Magnesium [[id number]]  (Normal) Collected: 12/03/22 1028    Specimen: Blood Updated: 12/03/22 1102     Magnesium 1.7 mg/dL     BNP [[id number]]  (Normal) Collected: 12/03/22 1028    Specimen: Blood Updated: 12/03/22 1100     proBNP 109.5 pg/mL     Narrative:      Among patients with dyspnea, NT-proBNP is highly sensitive for the detection of acute congestive heart failure. In addition NT-proBNP of <300 pg/ml effectively rules out acute congestive heart failure with 99% negative predictive value.       Influenza Antigen, Rapid - Swab, Nasopharynx [[id number]]  (Abnormal) Collected: 12/03/22 1025    Specimen: Swab from Nasopharynx Updated: 12/03/22 1052     Influenza A PCR Detected     Influenza B PCR Not Detected    COVID PRE-OP / PRE-PROCEDURE SCREENING ORDER (NO ISOLATION) - Swab, Nasopharynx [[id number]]  (Normal) Collected: 12/03/22 1025    Specimen: Swab from Nasopharynx Updated: 12/03/22 1052    Narrative:      The following orders were created for panel order COVID PRE-OP / PRE-PROCEDURE SCREENING ORDER (NO ISOLATION) - Swab, Nasopharynx.  Procedure                               Abnormality         Status                     ---------                               -----------         ------                     COVID-19,CEPHEID/LEONEL,CO...[[id number]]  Normal              Final result                 Please view results for these tests on the individual orders.    COVID-19,CEPHEID/LEONEL,COR/TORY/PAD/JUWAN/MAD IN-HOUSE(OR EMERGENT/ADD-ON),NP SWAB IN TRANSPORT MEDIA 3-4 HR TAT, RT-PCR - Swab, Nasopharynx [[id number]]  (Normal) Collected: 12/03/22 1025    Specimen: Swab from Nasopharynx Updated: 12/03/22 1052     COVID19 Not Detected    Narrative:      Fact sheet for providers: https://www.fda.gov/media/518252/download     Fact sheet for patients: https://www.fda.gov/media/745990/download  Fact sheet for providers: https://www.fda.gov/media/647900/download    Fact sheet for patients: https://www.fda.gov/media/617592/download    Test performed by PCR.           Results for orders placed during the hospital encounter of 12/03/22    Adult Transthoracic Echo Complete W/ Cont if Necessary Per Protocol    Interpretation Summary  •  Left ventricular systolic function is low normal. Left ventricular ejection fraction appears to be 46 - 50%.  •  The left ventricular cavity is moderate to severely dilated.  •  Left ventricular diastolic function is consistent with (grade I) impaired relaxation.  •  The study is technically  difficult for diagnosis.         Condition on Discharge: Stable    Vital Signs  Temp:  [97.6 °F (36.4 °C)-98.5 °F (36.9 °C)] 98.1 °F (36.7 °C)  Heart Rate:  [] 102  Resp:  [10-26] 12  BP: (101-119)/(66-76) 111/76      Physical Exam  Vitals reviewed.   Constitutional:       Appearance: He is not ill-appearing.   HENT:      Head: Normocephalic and atraumatic.      Right Ear: External ear normal.      Left Ear: External ear normal.      Nose: Nose normal.      Mouth/Throat:      Mouth: Mucous membranes are moist.   Eyes:      General:         Right eye: No discharge.         Left eye: No discharge.   Cardiovascular:      Rate and Rhythm: Normal rate and regular rhythm.      Pulses: Normal pulses.      Heart sounds: Normal heart sounds.   Pulmonary:      Effort: Pulmonary effort is normal.      Breath sounds: Normal breath sounds.   Abdominal:      General: Bowel sounds are normal.      Palpations: Abdomen is soft.   Musculoskeletal:         General: Normal range of motion.      Cervical back: Normal range of motion.   Skin:     General: Skin is warm.      Coloration: Skin is not pale.   Neurological:      Mental Status: He is alert and oriented to person, place, and time.   Psychiatric:         Behavior: Behavior normal.              Discharge Disposition      Discharge Medications     Discharge Medications      New Medications      Instructions Start Date   mirtazapine 15 MG tablet  Commonly known as: REMERON   15 mg, Oral, Nightly      roflumilast 500 MCG tablet tablet  Commonly known as: DALIRESP   500 mcg, Oral, Daily   Start Date: December 22, 2022     theophylline 300 MG 24 hr capsule  Commonly known as: YULIANA-24   300 mg, Oral, Every 24 Hours Scheduled   Start Date: December 22, 2022        Changes to Medications      Instructions Start Date   albuterol sulfate  (90 Base) MCG/ACT inhaler  Commonly known as: PROVENTIL HFA;VENTOLIN HFA;PROAIR HFA  What changed: Another medication with the same name was  added. Make sure you understand how and when to take each.   2 puffs, Inhalation, Every 4 Hours PRN      albuterol (2.5 MG/3ML) 0.083% nebulizer solution  Commonly known as: PROVENTIL  What changed: You were already taking a medication with the same name, and this prescription was added. Make sure you understand how and when to take each.   2.5 mg, Nebulization, Every 4 Hours PRN         Continue These Medications      Instructions Start Date   Anoro Ellipta 62.5-25 MCG/ACT aerosol powder  inhaler  Generic drug: Umeclidinium-Vilanterol   1 puff, Inhalation, Daily - RT      aspirin 81 MG EC tablet   81 mg, Oral, Daily      bisoprolol-hydrochlorothiazide 10-6.25 MG per tablet  Commonly known as: ZIAC   1 tablet, Oral, Daily      glipizide 10 MG tablet  Commonly known as: GLUCOTROL   10 mg, Oral, 2 Times Daily Before Meals      lisinopril 40 MG tablet  Commonly known as: PRINIVIL,ZESTRIL   40 mg, Oral, Every Evening      metFORMIN 1000 MG tablet  Commonly known as: GLUCOPHAGE   1,000 mg, Oral, 2 Times Daily With Meals      montelukast 10 MG tablet  Commonly known as: SINGULAIR   10 mg, Oral, Nightly      multivitamin capsule   1 capsule, Oral, Daily             Discharge Diet:   Diet Instructions     Diet: Consistent Carbohydrate      Discharge Diet: Consistent Carbohydrate          Activity at Discharge:   Activity Instructions     Gradually Increase Activity Until at Pre-Hospitalization Level            Follow-up Appointments  No future appointments.  Additional Instructions for the Follow-ups that You Need to Schedule     Discharge Follow-up with PCP   As directed       Currently Documented PCP:    Qi Hicks APRN    PCP Phone Number:    [phone]     Follow Up Details: 1/4 at 1140         Discharge Follow-up with Specified Provider: Dr. Ford; 2 Weeks   As directed      To: Dr. Ford    Follow Up: 2 Weeks               Test Results Pending at Discharge       CARA Clark  12/21/22  11:15  EST    Time: Discharge 35 minutes spent in coordination of care, chart review and face to face          Electronically signed by Ngoc Ortiz MD at 12/21/22 1222       Discharge Order (From admission, onward)     Start     Ordered    12/21/22 1113  Discharge patient  Once        Expected Discharge Date: 12/21/22    Expected Discharge Time: Afternoon    Discharge Disposition: Home or Self Care    Physician of Record for Attribution - Please select from Treatment Team: NGOC ORTIZ [[id number]]    Review needed by CMO to determine Physician of Record: No    Please choose which facility the patient is currently admitted if they are being discharged to another facility or unit.:  [facility]    Mode: Family       Question Answer Comment   Physician of Record for Attribution - Please select from Treatment Team NGOC ORTIZ    Review needed by CMO to determine Physician of Record No    Please choose which facility the patient is currently admitted if they are being discharged to another facility or unit. [facility]    Mode: Family        12/21/22 1116

## 2022-12-21 NOTE — PROGRESS NOTES
PULMONARY CRITICAL CARE PROGRESS  NOTE      PATIENT IDENTIFICATION:  Name: Maximino Bhatia  MRN: [id number]  :  1960     Age: 62 y.o.  Sex: male    DATE OF Note:  2022   Referring Physician: James Craig MD                  Subjective:   Laying in bed, currently still on 3 L O2,  Sitting in chair at bedside,  no SOB, no chest or abd pain, no bowel or bladder issues reported    Objective:  tMax 24 hrs: Temp (24hrs), Av.1 °F (36.7 °C), Min:97.6 °F (36.4 °C), Max:98.5 °F (36.9 °C)      Vitals Ranges:   Temp:  [97.6 °F (36.4 °C)-98.5 °F (36.9 °C)] 98.1 °F (36.7 °C)  Heart Rate:  [] 102  Resp:  [10-26] 12  BP: (101-119)/(66-76) 111/76    Intake and Output Last 3 Shifts:   I/O last 3 completed shifts:  In: 1060 [P.O.:1060]  Out: -     Exam:  /76 (BP Location: Left arm, Patient Position: Sitting)   Pulse 102   Temp 98.1 °F (36.7 °C) (Oral)   Resp 12   Ht 177.8 cm (70\")   Wt 86.1 kg (189 lb 13.1 oz)   SpO2 95%   BMI 27.24 kg/m²     General Appearance: Alert  HEENT:  Normocephalic, without obvious abnormality. Conjunctivae/corneas clear.  Normal external ear canals. Nares normal, no drainage     Neck:  Supple, symmetrical, trachea midline. No JVD.  Lungs /Chest wall:   Bilateral basal rhonchi, respirations unlabored, symmetrical wall movement.     Heart:  Regular rate and rhythm, systolic murmur PMI left sternal border  Abdomen: Soft, nontender, no masses, no organomegaly.    Extremities: Trace edema, no clubbing or cyanosis        Medications:  arformoterol, 15 mcg, Nebulization, BID - RT  aspirin, 81 mg, Oral, Daily  bisoprolol-hydroCHLOROthiazide (ZIAC) 10-6.25 mg combo dose, , Oral, Q24H  budesonide, 0.5 mg, Nebulization, BID - RT  enoxaparin, 40 mg, Subcutaneous, Q24H  glipizide, 10 mg, Oral, BID AC  guaiFENesin, 600 mg, Oral, Q12H  insulin lispro, 2-9 Units, Subcutaneous, TID With Meals  lisinopril, 5 mg, Oral, Q PM  metFORMIN, 1,000 mg, Oral, BID With Meals  mirtazapine, 15  mg, Oral, Nightly  montelukast, 10 mg, Oral, Nightly  polyethylene glycol, 17 g, Oral, Daily  roflumilast, 500 mcg, Oral, Daily  senna-docusate sodium, 2 tablet, Oral, BID  sodium chloride, 3 mL, Intravenous, Q12H  theophylline, 300 mg, Oral, Q24H        Infusion:  Pharmacy to Dose enoxaparin (LOVENOX),          PRN:  •  acetaminophen  •  dextrose  •  dextrose  •  glucagon (human recombinant)  •  ipratropium-albuterol  •  LORazepam  •  nitroglycerin  •  ondansetron  •  Pharmacy to Dose enoxaparin (LOVENOX)  •  [COMPLETED] Insert Peripheral IV **AND** sodium chloride  •  sodium chloride  •  sodium chloride  Data Review:  All labs (24hrs):   Recent Results (from the past 24 hour(s))   POC Glucose Once    Collection Time: 12/20/22 11:52 AM    Specimen: Blood   Result Value Ref Range    Glucose 82 70 - 105 mg/dL   POC Glucose Once    Collection Time: 12/20/22  5:05 PM    Specimen: Blood   Result Value Ref Range    Glucose 82 70 - 105 mg/dL   Comprehensive Metabolic Panel    Collection Time: 12/20/22 11:19 PM    Specimen: Blood   Result Value Ref Range    Glucose 98 65 - 99 mg/dL    BUN 12 8 - 23 mg/dL    Creatinine 0.57 (L) 0.76 - 1.27 mg/dL    Sodium 132 (L) 136 - 145 mmol/L    Potassium 4.1 3.5 - 5.2 mmol/L    Chloride 93 (L) 98 - 107 mmol/L    CO2 30.0 (H) 22.0 - 29.0 mmol/L    Calcium 9.0 8.6 - 10.5 mg/dL    Total Protein 6.2 6.0 - 8.5 g/dL    Albumin 3.20 (L) 3.50 - 5.20 g/dL    ALT (SGPT) 21 1 - 41 U/L    AST (SGOT) 11 1 - 40 U/L    Alkaline Phosphatase 49 39 - 117 U/L    Total Bilirubin 0.2 0.0 - 1.2 mg/dL    Globulin 3.0 gm/dL    A/G Ratio 1.1 g/dL    BUN/Creatinine Ratio 21.1 7.0 - 25.0    Anion Gap 9.0 5.0 - 15.0 mmol/L    eGFR 110.2 >60.0 mL/min/1.73   CBC Auto Differential    Collection Time: 12/20/22 11:19 PM    Specimen: Blood   Result Value Ref Range    WBC 11.20 (H) 3.40 - 10.80 10*3/mm3    RBC 3.68 (L) 4.14 - 5.80 10*6/mm3    Hemoglobin 10.9 (L) 13.0 - 17.7 g/dL    Hematocrit 33.5 (L) 37.5 - 51.0 %     MCV 91.0 79.0 - 97.0 fL    MCH 29.7 26.6 - 33.0 pg    MCHC 32.6 31.5 - 35.7 g/dL    RDW 14.6 12.3 - 15.4 %    RDW-SD 45.9 37.0 - 54.0 fl    MPV 8.7 6.0 - 12.0 fL    Platelets 252 140 - 450 10*3/mm3   Scan Slide    Collection Time: 12/20/22 11:19 PM    Specimen: Blood   Result Value Ref Range    Scan Slide     Manual Differential    Collection Time: 12/20/22 11:19 PM    Specimen: Blood   Result Value Ref Range    Neutrophil % 69.0 42.7 - 76.0 %    Lymphocyte % 14.0 (L) 19.6 - 45.3 %    Monocyte % 13.0 (H) 5.0 - 12.0 %    Bands %  3.0 0.0 - 5.0 %    Atypical Lymphocyte % 1.0 0.0 - 5.0 %    Neutrophils Absolute 8.06 (H) 1.70 - 7.00 10*3/mm3    Lymphocytes Absolute 1.68 0.70 - 3.10 10*3/mm3    Monocytes Absolute 1.46 (H) 0.10 - 0.90 10*3/mm3    Dacrocytes Slight/1+ None Seen    Poikilocytes Slight/1+ None Seen    WBC Morphology Normal Normal    Platelet Estimate Adequate Normal    Large Platelets Slight/1+ None Seen   POC Glucose Once    Collection Time: 12/21/22  7:32 AM    Specimen: Blood   Result Value Ref Range    Glucose 160 (H) 70 - 105 mg/dL   POC Glucose Once    Collection Time: 12/21/22 11:27 AM    Specimen: Blood   Result Value Ref Range    Glucose 105 70 - 105 mg/dL        Imaging:  Adult Transthoracic Echo Complete W/ Cont if Necessary Per Protocol  •  Left ventricular systolic function is low normal. Left ventricular   ejection fraction appears to be 46 - 50%.  •  The left ventricular cavity is moderate to severely dilated.  •  Left ventricular diastolic function is consistent with (grade I)   impaired relaxation.  •  The study is technically difficult for diagnosis.       ASSESSMENT:  Acute hypoxic/hypercapnic respiratory failure  Pneumonia  Influenza A  Acute exacerbation of COPD  Hyperkalemia  Leukocytosis  Diabetes mellitus type 2  Hypertension  Smoker       PLAN:  May discharge once O2 set up and follow up with our service in 2 weeks   Decrease O2 to keep sats greater than 88%  Out of bed daily    Bronchodilators  Inhaled corticosteroids  Incentive spirometer  Completed a tapering steroid dose  Completed Tamiflu and a course of cefepime  Electrolytes/ glycemic control  DVT and GI prophylaxis-Lovenox    Discussed with Dr. Liliana Colbert, APRN   12/19/2022  11:46 EST

## 2022-12-22 NOTE — OUTREACH NOTE
Prep Survey    Flowsheet Row Responses   [facility] facility patient discharged from? [place]   Is LACE score < 7 ? No   Eligibility Readm Mgmt   Discharge diagnosis COPD with acute exacerbation   Does the patient have one of the following disease processes/diagnoses(primary or secondary)? COPD   Does the patient have Home health ordered? No   Is there a DME ordered? Yes   What DME was ordered?  home O2 and nebulizer  pulse ox  by [facility].   Prep survey completed? Yes          MILANA SONG - Registered Nurse

## 2022-12-22 NOTE — PROGRESS NOTES
Enter Query Response Below      Query Response:     Hyperglycemia was expected.         If applicable, please update the problem list.           Patient: Maximino Bhatia        : 1960  Account: [id number]           Admit Date:         How to Respond to this query:       a. Click New Note     b. Answer query within the yellow box.                c. Update the Problem List, if applicable.    ,    62 year old with type 2 DM, HTN and COPD is admitted for Influenza A, COPD exacerbation.  Hospitalist  \"Use sliding scale insulin for steroid-induced hyperglycemia\"  Patient was treated with Deltasone, Solumedrol, Sliding scale insulin, Glucotrol.    Please clarify the following:    Hyperglycemia expected  Hyperglycemia not expected  Other- specify_______  Unable to determine    By submitting this query, we are merely seeking further clarification of documentation to accurately reflect all conditions that you are monitoring, evaluating, treating or that extend the hospitalization or utilize additional resources of care. Please utilize your independent clinical judgment when addressing the question(s) above.     This query and your response, once completed, will be entered into the legal medical record.    Sincerely,  Mann Ivory RN CDI  Clinical Documentation Integrity Program   [email or web address]

## 2022-12-27 ENCOUNTER — READMISSION MANAGEMENT (OUTPATIENT)
Dept: CALL CENTER | Facility: HOSPITAL | Age: 62
End: 2022-12-27

## 2022-12-27 NOTE — OUTREACH NOTE
COPD/PN Week 1 Survey    Flowsheet Row Responses   [facility] patient discharged from? [place]   Does the patient have one of the following disease processes/diagnoses(primary or secondary)? COPD   Week 1 attempt successful? Yes   Call start time 1500   Call end time 1509   Discharge diagnosis COPD with acute exacerbation   Meds reviewed with patient/caregiver? Yes   Is the patient having any side effects they believe may be caused by any medication additions or changes? No   Does the patient have all medications ordered at discharge? Yes   Is the patient taking all medications as directed (includes completed medication regime)? No   What is preventing the patient from taking all medications as directed? Other  [Pharmacy states roflumilast is out of stock. Should be in today after 2:00 PM. ]   Nursing Interventions Nurse provided patient education   Comments regarding appointments Appt with pulm is on 1/4/23   Does the patient have a primary care provider?  Yes   Does the patient have an appointment with their PCP or specialist within 7 days of discharge? No   What is preventing the patient from scheduling follow up appointments within 7 days of discharge? Haven't had time   Nursing Interventions Advised patient to make appointment   Has the patient kept scheduled appointments due by today? N/A   Has all DME been delivered? Yes   Pulse Ox monitoring Intermittent   O2 Sat comments 97-98% RA   Psychosocial issues? No   Did the patient receive a copy of their discharge instructions? Yes   Nursing interventions Reviewed instructions with patient   What is the patient's perception of their health status since discharge? Improving   Are the patient's immunizations up to date?  Yes   If the patient is a current smoker, are they able to teach back resources for cessation? Not a smoker  [Pt has not smoked since hospital d/c on 12/21/22]   Is the patient/caregiver able to teach back the hierarchy of who to call/visit for  symptoms/problems? PCP, Specialist, Home health nurse, Urgent Care, ED, 911 Yes   Is the patient able to teach back COPD zones? No   Nursing interventions Education provided on various zones   Patient reports what zone on this call? Green Zone   Green Zone Reports doing well, Breathing without shortness of breath, Appetite is good, Slept well last night, Usual activity and exercise level, Usual amounts of cough and phlegm/mucous   Green Zone interventions: Take daily medications, Use oxygen as prescribed, Continue regular exercise/diet plan, At all times avoid cigarette smoking, vaping and inhaled irritants   Week 1 call completed? Yes   Is the patient interested in additional calls from an ambulatory ?  NOTE:  applies to high risk patients requiring additional follow-up. No          JEAN CARLOS WATT - Registered Nurse

## 2023-03-23 ENCOUNTER — OFFICE VISIT (OUTPATIENT)
Dept: ORTHOPEDIC SURGERY | Facility: CLINIC | Age: 63
End: 2023-03-23
Payer: MEDICAID

## 2023-03-23 VITALS — BODY MASS INDEX: 29.79 KG/M2 | WEIGHT: 207.6 LBS

## 2023-03-23 DIAGNOSIS — M25.9 SHOULDER JOINT DYSFUNCTION: ICD-10-CM

## 2023-03-23 DIAGNOSIS — M62.519 ATROPHY OF DELTOID MUSCLE: Primary | ICD-10-CM

## 2023-03-23 NOTE — PROGRESS NOTES
Maximino is a 63 y.o. year old male presents to [facility]    Chief Complaint   Patient presents with   • Right Shoulder - Initial Evaluation     Pain scale 0/10       History of Present Illness  Maximino Bhatia is a 63 y.o. male presents to clinic for evaluation of right shoulder pain that was exacerbated by being hospitalized, for 3 weeks, in 12/2022 for influenza. During that stay he believes his shoulder was injured from lack of use or how he was laying in bed. After the stay he noticed swelling and a dent in the right shoulder. States he has to use his left to move the right arm over his head. Denies any real pain in the shoulder as long as he avoids repetitive overhead activities.  Reports pain at night.  Of note, historically, reports falling onto the lateral aspect of the right shoulder 10 years ago with pain and decreased ROM but rehabbed on his own at the gym. 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   Denies any prior surgeries to the right shoulder.       I have reviewed the patient's medical, family, and social history in detail and updated the computerized patient record.    Objective:  Wt 94.2 kg (207 lb 9.6 oz)   BMI 29.79 kg/m²      Physical Exam    Mask worn throughout the encounter  Vital signs reviewed.   General: No acute distress.  Eyes: conjunctiva clear  ENT: external ears atraumatic  CV: no peripheral edema  Resp: normal respiratory effort  Skin: no rashes or wounds; normal turgor  Psych: mood and affect appropriate; recent and remote memory intact  Neuro: sensation to light touch intact    MSK Exam  Right shoulder:  Intact skin.   significant atrophy deformities, most notably the deltoid    Active fwd flexion 25, abduction 20  Passive fwd flexion 130, abduction 110  Belly press 3/5; lift off 2/5     Imaging:  XR Outside Films (03/23/2023  12:43)      Assessment:  Diagnoses and all orders for this visit:    Atrophy of deltoid muscle    Shoulder joint dysfunction      Plan: Recommend MRI to the right shoulder and EMG to the RUE focusing on axillary nerve due to deltoid atrophy. Follow up for review of imaging.         Follow Up   Return for MRI.  Patient was given instructions and counseling regarding his condition or for health maintenance advice. Please see specific information pulled into the AVS if appropriate.     EMR Dragon/Transcription disclaimer:    Much of this encounter note is an electronic transcription/translation of spoken language to printed text.  The electronic translation of spoken language may permit erroneous, or at times, nonsensical words or phrases to be inadvertently transcribed.  Although I have reviewed the note for such errors some may still exist.

## 2023-04-25 ENCOUNTER — HOSPITAL ENCOUNTER (OUTPATIENT)
Dept: MRI IMAGING | Facility: HOSPITAL | Age: 63
Discharge: HOME | End: 2023-04-25
Admitting: PHYSICIAN ASSISTANT
Payer: MEDICAID

## 2023-04-25 PROCEDURE — 73221 MRI JOINT UPR EXTREM W/O DYE: CPT

## 2023-05-01 ENCOUNTER — OFFICE VISIT (OUTPATIENT)
Dept: ORTHOPEDIC SURGERY | Facility: CLINIC | Age: 63
End: 2023-05-01
Payer: MEDICAID

## 2023-05-01 VITALS — BODY MASS INDEX: 29.63 KG/M2 | HEIGHT: 70 IN | WEIGHT: 207 LBS | HEART RATE: 92 BPM

## 2023-05-01 DIAGNOSIS — R20.2 NUMBNESS AND TINGLING OF RIGHT UPPER EXTREMITY: ICD-10-CM

## 2023-05-01 DIAGNOSIS — R20.0 NUMBNESS AND TINGLING OF RIGHT UPPER EXTREMITY: ICD-10-CM

## 2023-05-01 DIAGNOSIS — M62.519 ATROPHY OF DELTOID MUSCLE: Primary | ICD-10-CM

## 2023-05-01 RX ORDER — IPRATROPIUM BROMIDE AND ALBUTEROL SULFATE 2.5; .5 MG/3ML; MG/3ML
SOLUTION RESPIRATORY (INHALATION)
COMMUNITY
Start: 2023-04-21

## 2023-05-01 RX ORDER — THEOPHYLLINE ANHYDROUS 300 MG/1
1 CAPSULE, EXTENDED RELEASE ORAL DAILY
COMMUNITY
Start: 2023-04-18

## 2023-05-01 RX ORDER — FLUTICASONE PROPIONATE AND SALMETEROL 250; 50 UG/1; UG/1
POWDER RESPIRATORY (INHALATION)
COMMUNITY
Start: 2023-04-27

## 2023-05-01 NOTE — PROGRESS NOTES
"   Patient ID: Maximino Bhatia is a 63 y.o. male presents for follow up on Right shoulder MRI results and RUE EMG study results.  Reports doing well overall but continues to have limited function of the right shoulder due to weakness and pain that has been present since 12/2/2022.  No issues with the wrist or elbow.      Objective:  Pulse 92   Ht 177.8 cm (70\")   Wt 93.9 kg (207 lb)   BMI 29.70 kg/m²     Physical Examination:   RUE and shoulder:  Intact skin.  No ecchymosis nor effusion  Similar to appearance at his last visit over the lateral shoulder appearing atrophic over the deltoid area.  Tenderness to palpation over the anterior shoulder near bicep groove.  Active forward flexion 30, abduction 25  Belly press 3/5; lift off 2/5 with pain    Full range of motion at the elbow and wrist  Sensation to light touch equal to the contralateral side at all digits  Full  strength    Imaging:   MRI Shoulder Right Without Contrast (04/25/2023 07:08)  Findings:  RC/Biceps Tendons:    The deltoid muscle appears unremarkable. There is atrophy of the supraspinatus and infraspinatus muscles. There is a complete retracted tear of the supraspinatus tendon with the defect measuring up to 4.1 cm in transverse dimension. There is a complete   retracted tear of the infraspinatus tendon with the defect measuring up to 4.4 cm in transverse dimension. The teres minor tendon appears intact. There is a complete retracted tear of the subscapularis tendon with the defect measuring up to 4.5 cm in   transverse dimension. Cystic changes are seen along the lateral aspect of the humeral head along the supraspinatus and infraspinatus insertions.  The biceps tendon is not visualized and is likely completely torn and retracted.      AC Joints:  The acromioclavicular joint appears anatomically aligned. Moderate degenerative changes are present within the acromioclavicular joint. There is subacromial bursitis likely related to the rotator cuff " tears.     GH Joint and Labrum:  The glenohumeral joint appears anatomically aligned.   Moderate to severe degenerative changes are present. Diffuse cartilage loss is present. No significant joint effusion identified. Subchondral cystic changes identified. Pan labral degenerative   tearing is present with a macerated appearance of the labrum. No focal displaced tear identified.     Bones:  Bone marrow signal demonstrates mild heterogeneity. No focal lesions identified.       Soft Tissue:    There is no obvious soft tissue swelling.     IMPRESSION:     1. Complete retracted tears of the supraspinatus, infraspinatus and subscapularis tendons. There is atrophy of the musculature likely related to chronic tears. The deltoid muscle appears unremarkable.  2. Moderate acromioclavicular and moderate to severe glenohumeral osteoarthritis.  3. Macerated appearance of the labrum with no evidence of a focal displaced tear.  4. Complete retracted tear of the biceps tendon.  5. Subacromial bursitis.    Assessment:    Diagnoses and all orders for this visit:    1. Atrophy of deltoid muscle (Primary)      Plan: Recommend proceeding with EMG of RUE and following up with Dr. Solis for review of imaging for evaluation of rTSA candidacy.       Disclaimer: Part of this note may be an electronic transcription/translation of spoken language to printed text using the Dragon Dictation System

## 2023-11-29 ENCOUNTER — APPOINTMENT (OUTPATIENT)
Dept: GENERAL RADIOLOGY | Facility: HOSPITAL | Age: 63
End: 2023-11-29
Payer: MEDICAID

## 2023-11-29 ENCOUNTER — HOSPITAL ENCOUNTER (INPATIENT)
Facility: HOSPITAL | Age: 63
LOS: 6 days | Discharge: HOME | End: 2023-12-05
Attending: EMERGENCY MEDICINE | Admitting: INTERNAL MEDICINE
Payer: MEDICAID

## 2023-11-29 DIAGNOSIS — J18.9 PNEUMONIA OF RIGHT LOWER LOBE DUE TO INFECTIOUS ORGANISM: ICD-10-CM

## 2023-11-29 DIAGNOSIS — J96.01 ACUTE RESPIRATORY FAILURE WITH HYPOXIA: Primary | ICD-10-CM

## 2023-11-29 LAB
ALBUMIN SERPL-MCNC: 3.7 G/DL (ref 3.5–5.2)
ALBUMIN/GLOB SERPL: 1.1 G/DL
ALP SERPL-CCNC: 61 U/L (ref 39–117)
ALT SERPL W P-5'-P-CCNC: 13 U/L (ref 1–41)
ANION GAP SERPL CALCULATED.3IONS-SCNC: 12 MMOL/L (ref 5–15)
APTT PPP: 30.1 SECONDS (ref 61–76.5)
ARTERIAL PATENCY WRIST A: ABNORMAL
AST SERPL-CCNC: 23 U/L (ref 1–40)
ATMOSPHERIC PRESS: ABNORMAL MM[HG]
B PARAPERT DNA SPEC QL NAA+PROBE: NOT DETECTED
B PERT DNA SPEC QL NAA+PROBE: NOT DETECTED
BACTERIA SPEC RESP CULT: NORMAL
BASE EXCESS BLDA CALC-SCNC: -0.3 MMOL/L (ref 0–3)
BDY SITE: ABNORMAL
BILIRUB SERPL-MCNC: 0.4 MG/DL (ref 0–1.2)
BUN SERPL-MCNC: 25 MG/DL (ref 8–23)
BUN/CREAT SERPL: 32.5 (ref 7–25)
C PNEUM DNA NPH QL NAA+NON-PROBE: NOT DETECTED
CALCIUM SPEC-SCNC: 9.3 MG/DL (ref 8.6–10.5)
CHLORIDE SERPL-SCNC: 94 MMOL/L (ref 98–107)
CO2 BLDA-SCNC: 27.3 MMOL/L (ref 22–29)
CO2 SERPL-SCNC: 26 MMOL/L (ref 22–29)
CREAT SERPL-MCNC: 0.77 MG/DL (ref 0.76–1.27)
D-LACTATE SERPL-SCNC: 0.7 MMOL/L (ref 0.3–2)
DEPRECATED RDW RBC AUTO: 49.9 FL (ref 37–54)
EGFRCR SERPLBLD CKD-EPI 2021: 100 ML/MIN/1.73
ERYTHROCYTE [DISTWIDTH] IN BLOOD BY AUTOMATED COUNT: 15.3 % (ref 12.3–15.4)
FLUAV SUBTYP SPEC NAA+PROBE: NOT DETECTED
FLUBV RNA ISLT QL NAA+PROBE: NOT DETECTED
GEN 5 2HR TROPONIN T REFLEX: 16 NG/L
GLOBULIN UR ELPH-MCNC: 3.3 GM/DL
GLUCOSE SERPL-MCNC: 155 MG/DL (ref 65–99)
GRAM STN SPEC: NORMAL
HADV DNA SPEC NAA+PROBE: NOT DETECTED
HCO3 BLDA-SCNC: 25.9 MMOL/L (ref 21–28)
HCOV 229E RNA SPEC QL NAA+PROBE: NOT DETECTED
HCOV HKU1 RNA SPEC QL NAA+PROBE: NOT DETECTED
HCOV NL63 RNA SPEC QL NAA+PROBE: NOT DETECTED
HCOV OC43 RNA SPEC QL NAA+PROBE: NOT DETECTED
HCT VFR BLD AUTO: 39.9 % (ref 37.5–51)
HEMODILUTION: NO
HGB BLD-MCNC: 13.1 G/DL (ref 13–17.7)
HMPV RNA NPH QL NAA+NON-PROBE: NOT DETECTED
HOLD SPECIMEN: NORMAL
HOLD SPECIMEN: NORMAL
HPIV1 RNA ISLT QL NAA+PROBE: NOT DETECTED
HPIV2 RNA SPEC QL NAA+PROBE: NOT DETECTED
HPIV3 RNA NPH QL NAA+PROBE: NOT DETECTED
HPIV4 P GENE NPH QL NAA+PROBE: NOT DETECTED
INHALED O2 CONCENTRATION: 40 %
INR PPP: 1.05 (ref 0.93–1.1)
LYMPHOCYTES # BLD MANUAL: 1.18 10*3/MM3 (ref 0.7–3.1)
LYMPHOCYTES NFR BLD MANUAL: 13 % (ref 5–12)
M PNEUMO IGG SER IA-ACNC: NOT DETECTED
MCH RBC QN AUTO: 31 PG (ref 26.6–33)
MCHC RBC AUTO-ENTMCNC: 32.9 G/DL (ref 31.5–35.7)
MCV RBC AUTO: 94.3 FL (ref 79–97)
METAMYELOCYTES NFR BLD MANUAL: 9 % (ref 0–0)
MODALITY: ABNORMAL
MONOCYTES # BLD: 2.55 10*3/MM3 (ref 0.1–0.9)
NEUTROPHILS # BLD AUTO: 14.11 10*3/MM3 (ref 1.7–7)
NEUTROPHILS NFR BLD MANUAL: 58 % (ref 42.7–76)
NEUTS BAND NFR BLD MANUAL: 14 % (ref 0–5)
NEUTS VAC BLD QL SMEAR: ABNORMAL
NT-PROBNP SERPL-MCNC: 190.4 PG/ML (ref 0–900)
PCO2 BLDA: 46.9 MM HG (ref 35–48)
PH BLDA: 7.35 PH UNITS (ref 7.35–7.45)
PLAT MORPH BLD: NORMAL
PLATELET # BLD AUTO: 202 10*3/MM3 (ref 140–450)
PMV BLD AUTO: 9.6 FL (ref 6–12)
PO2 BLDA: 53.6 MM HG (ref 83–108)
POTASSIUM SERPL-SCNC: 4.5 MMOL/L (ref 3.5–5.2)
PROT SERPL-MCNC: 7 G/DL (ref 6–8.5)
PROTHROMBIN TIME: 11.4 SECONDS (ref 9.6–11.7)
RBC # BLD AUTO: 4.23 10*6/MM3 (ref 4.14–5.8)
RBC MORPH BLD: NORMAL
RHINOVIRUS RNA SPEC NAA+PROBE: NOT DETECTED
RSV RNA NPH QL NAA+NON-PROBE: DETECTED
SAO2 % BLDCOA: 85.4 % (ref 94–98)
SARS-COV-2 RNA NPH QL NAA+NON-PROBE: NOT DETECTED
SARS-COV-2 RNA RESP QL NAA+PROBE: NOT DETECTED
SCAN SLIDE: NORMAL
SODIUM SERPL-SCNC: 132 MMOL/L (ref 136–145)
TROPONIN T DELTA: 1 NG/L
TROPONIN T SERPL HS-MCNC: 15 NG/L
VARIANT LYMPHS NFR BLD MANUAL: 3 % (ref 0–5)
VARIANT LYMPHS NFR BLD MANUAL: 3 % (ref 19.6–45.3)
WBC NRBC COR # BLD AUTO: 19.6 10*3/MM3 (ref 3.4–10.8)
WHOLE BLOOD HOLD COAG: NORMAL

## 2023-11-29 PROCEDURE — 94761 N-INVAS EAR/PLS OXIMETRY MLT: CPT

## 2023-11-29 PROCEDURE — 99285 EMERGENCY DEPT VISIT HI MDM: CPT

## 2023-11-29 PROCEDURE — 85730 THROMBOPLASTIN TIME PARTIAL: CPT | Performed by: EMERGENCY MEDICINE

## 2023-11-29 PROCEDURE — 84484 ASSAY OF TROPONIN QUANT: CPT | Performed by: EMERGENCY MEDICINE

## 2023-11-29 PROCEDURE — 87635 SARS-COV-2 COVID-19 AMP PRB: CPT | Performed by: EMERGENCY MEDICINE

## 2023-11-29 PROCEDURE — 94640 AIRWAY INHALATION TREATMENT: CPT

## 2023-11-29 PROCEDURE — 82803 BLOOD GASES ANY COMBINATION: CPT

## 2023-11-29 PROCEDURE — 36415 COLL VENOUS BLD VENIPUNCTURE: CPT

## 2023-11-29 PROCEDURE — 71045 X-RAY EXAM CHEST 1 VIEW: CPT

## 2023-11-29 PROCEDURE — 94799 UNLISTED PULMONARY SVC/PX: CPT

## 2023-11-29 PROCEDURE — G0378 HOSPITAL OBSERVATION PER HR: HCPCS

## 2023-11-29 PROCEDURE — 83880 ASSAY OF NATRIURETIC PEPTIDE: CPT | Performed by: EMERGENCY MEDICINE

## 2023-11-29 PROCEDURE — 80053 COMPREHEN METABOLIC PANEL: CPT | Performed by: EMERGENCY MEDICINE

## 2023-11-29 PROCEDURE — 93005 ELECTROCARDIOGRAM TRACING: CPT

## 2023-11-29 PROCEDURE — 83605 ASSAY OF LACTIC ACID: CPT

## 2023-11-29 PROCEDURE — 25010000002 METHYLPREDNISOLONE PER 125 MG: Performed by: EMERGENCY MEDICINE

## 2023-11-29 PROCEDURE — 87205 SMEAR GRAM STAIN: CPT | Performed by: INTERNAL MEDICINE

## 2023-11-29 PROCEDURE — 25010000002 CEFTRIAXONE PER 250 MG: Performed by: EMERGENCY MEDICINE

## 2023-11-29 PROCEDURE — 0202U NFCT DS 22 TRGT SARS-COV-2: CPT | Performed by: NURSE PRACTITIONER

## 2023-11-29 PROCEDURE — 87040 BLOOD CULTURE FOR BACTERIA: CPT | Performed by: EMERGENCY MEDICINE

## 2023-11-29 PROCEDURE — 25810000003 SODIUM CHLORIDE 0.9 % SOLUTION: Performed by: EMERGENCY MEDICINE

## 2023-11-29 PROCEDURE — 85007 BL SMEAR W/DIFF WBC COUNT: CPT | Performed by: EMERGENCY MEDICINE

## 2023-11-29 PROCEDURE — 85610 PROTHROMBIN TIME: CPT | Performed by: EMERGENCY MEDICINE

## 2023-11-29 PROCEDURE — 36600 WITHDRAWAL OF ARTERIAL BLOOD: CPT

## 2023-11-29 PROCEDURE — 85025 COMPLETE CBC W/AUTO DIFF WBC: CPT | Performed by: EMERGENCY MEDICINE

## 2023-11-29 RX ORDER — SODIUM CHLORIDE 0.9 % (FLUSH) 0.9 %
10 SYRINGE (ML) INJECTION EVERY 12 HOURS SCHEDULED
Status: DISCONTINUED | OUTPATIENT
Start: 2023-11-29 | End: 2023-12-05 | Stop reason: HOSPADM

## 2023-11-29 RX ORDER — SODIUM CHLORIDE 9 MG/ML
40 INJECTION, SOLUTION INTRAVENOUS AS NEEDED
Status: DISCONTINUED | OUTPATIENT
Start: 2023-11-29 | End: 2023-12-05 | Stop reason: HOSPADM

## 2023-11-29 RX ORDER — METHYLPREDNISOLONE SODIUM SUCCINATE 125 MG/2ML
125 INJECTION, POWDER, LYOPHILIZED, FOR SOLUTION INTRAMUSCULAR; INTRAVENOUS ONCE
Status: COMPLETED | OUTPATIENT
Start: 2023-11-29 | End: 2023-11-29

## 2023-11-29 RX ORDER — BISACODYL 10 MG
10 SUPPOSITORY, RECTAL RECTAL DAILY PRN
Status: DISCONTINUED | OUTPATIENT
Start: 2023-11-29 | End: 2023-12-05 | Stop reason: HOSPADM

## 2023-11-29 RX ORDER — BISOPROLOL FUMARATE AND HYDROCHLOROTHIAZIDE 10; 6.25 MG/1; MG/1
1 TABLET ORAL DAILY
Status: DISCONTINUED | OUTPATIENT
Start: 2023-11-30 | End: 2023-12-02

## 2023-11-29 RX ORDER — IPRATROPIUM BROMIDE AND ALBUTEROL SULFATE 2.5; .5 MG/3ML; MG/3ML
3 SOLUTION RESPIRATORY (INHALATION) ONCE
Status: COMPLETED | OUTPATIENT
Start: 2023-11-29 | End: 2023-11-29

## 2023-11-29 RX ORDER — BISACODYL 5 MG/1
5 TABLET, DELAYED RELEASE ORAL DAILY PRN
Status: DISCONTINUED | OUTPATIENT
Start: 2023-11-29 | End: 2023-12-05 | Stop reason: HOSPADM

## 2023-11-29 RX ORDER — SODIUM CHLORIDE 0.9 % (FLUSH) 0.9 %
10 SYRINGE (ML) INJECTION AS NEEDED
Status: DISCONTINUED | OUTPATIENT
Start: 2023-11-29 | End: 2023-12-05 | Stop reason: HOSPADM

## 2023-11-29 RX ORDER — GLIPIZIDE 5 MG/1
5 TABLET ORAL EVERY MORNING
COMMUNITY

## 2023-11-29 RX ORDER — MIRTAZAPINE 15 MG/1
15 TABLET, FILM COATED ORAL NIGHTLY
Status: DISCONTINUED | OUTPATIENT
Start: 2023-11-29 | End: 2023-12-05 | Stop reason: HOSPADM

## 2023-11-29 RX ORDER — THEOPHYLLINE 300 MG/1
300 TABLET, EXTENDED RELEASE ORAL DAILY
COMMUNITY

## 2023-11-29 RX ORDER — ACETAMINOPHEN 325 MG/1
650 TABLET ORAL EVERY 4 HOURS PRN
Status: DISCONTINUED | OUTPATIENT
Start: 2023-11-29 | End: 2023-12-05 | Stop reason: HOSPADM

## 2023-11-29 RX ORDER — ONDANSETRON 2 MG/ML
4 INJECTION INTRAMUSCULAR; INTRAVENOUS EVERY 6 HOURS PRN
Status: DISCONTINUED | OUTPATIENT
Start: 2023-11-29 | End: 2023-12-05 | Stop reason: HOSPADM

## 2023-11-29 RX ORDER — ASPIRIN 81 MG/1
81 TABLET ORAL DAILY
Status: DISCONTINUED | OUTPATIENT
Start: 2023-11-30 | End: 2023-12-05 | Stop reason: HOSPADM

## 2023-11-29 RX ORDER — FAMOTIDINE 20 MG/1
40 TABLET, FILM COATED ORAL DAILY
Status: DISCONTINUED | OUTPATIENT
Start: 2023-11-30 | End: 2023-12-05 | Stop reason: HOSPADM

## 2023-11-29 RX ORDER — IPRATROPIUM BROMIDE AND ALBUTEROL SULFATE 2.5; .5 MG/3ML; MG/3ML
3 SOLUTION RESPIRATORY (INHALATION)
Status: DISCONTINUED | OUTPATIENT
Start: 2023-11-29 | End: 2023-12-04

## 2023-11-29 RX ORDER — BUDESONIDE AND FORMOTEROL FUMARATE DIHYDRATE 160; 4.5 UG/1; UG/1
2 AEROSOL RESPIRATORY (INHALATION)
Status: DISCONTINUED | OUTPATIENT
Start: 2023-11-29 | End: 2023-12-05 | Stop reason: HOSPADM

## 2023-11-29 RX ORDER — ENOXAPARIN SODIUM 100 MG/ML
40 INJECTION SUBCUTANEOUS DAILY
Status: DISCONTINUED | OUTPATIENT
Start: 2023-11-30 | End: 2023-12-05 | Stop reason: HOSPADM

## 2023-11-29 RX ORDER — THEOPHYLLINE 300 MG/1
300 TABLET, EXTENDED RELEASE ORAL DAILY
COMMUNITY
End: 2023-11-29

## 2023-11-29 RX ORDER — GLIPIZIDE 5 MG/1
10 TABLET ORAL
Status: DISCONTINUED | OUTPATIENT
Start: 2023-11-30 | End: 2023-11-29

## 2023-11-29 RX ORDER — HYDRALAZINE HYDROCHLORIDE 20 MG/ML
10 INJECTION INTRAMUSCULAR; INTRAVENOUS EVERY 6 HOURS PRN
Status: DISCONTINUED | OUTPATIENT
Start: 2023-11-29 | End: 2023-12-05 | Stop reason: HOSPADM

## 2023-11-29 RX ORDER — GLIPIZIDE 5 MG/1
10 TABLET ORAL
Status: DISCONTINUED | OUTPATIENT
Start: 2023-11-29 | End: 2023-12-05 | Stop reason: HOSPADM

## 2023-11-29 RX ORDER — ROFLUMILAST 500 UG/1
500 TABLET ORAL DAILY
Status: DISCONTINUED | OUTPATIENT
Start: 2023-11-30 | End: 2023-12-05 | Stop reason: HOSPADM

## 2023-11-29 RX ORDER — IPRATROPIUM BROMIDE AND ALBUTEROL SULFATE 2.5; .5 MG/3ML; MG/3ML
3 SOLUTION RESPIRATORY (INHALATION) EVERY 4 HOURS PRN
COMMUNITY

## 2023-11-29 RX ORDER — METHYLPREDNISOLONE SODIUM SUCCINATE 125 MG/2ML
60 INJECTION, POWDER, LYOPHILIZED, FOR SOLUTION INTRAMUSCULAR; INTRAVENOUS EVERY 12 HOURS
Status: DISCONTINUED | OUTPATIENT
Start: 2023-11-30 | End: 2023-11-30

## 2023-11-29 RX ORDER — NITROGLYCERIN 0.4 MG/1
0.4 TABLET SUBLINGUAL
Status: DISCONTINUED | OUTPATIENT
Start: 2023-11-29 | End: 2023-12-05 | Stop reason: HOSPADM

## 2023-11-29 RX ORDER — MONTELUKAST SODIUM 10 MG/1
10 TABLET ORAL NIGHTLY
Status: DISCONTINUED | OUTPATIENT
Start: 2023-11-29 | End: 2023-12-05 | Stop reason: HOSPADM

## 2023-11-29 RX ORDER — LISINOPRIL 20 MG/1
40 TABLET ORAL EVERY EVENING
Status: DISCONTINUED | OUTPATIENT
Start: 2023-11-30 | End: 2023-12-05 | Stop reason: HOSPADM

## 2023-11-29 RX ORDER — AMOXICILLIN 250 MG
2 CAPSULE ORAL 2 TIMES DAILY
Status: DISCONTINUED | OUTPATIENT
Start: 2023-11-29 | End: 2023-12-05 | Stop reason: HOSPADM

## 2023-11-29 RX ORDER — POLYETHYLENE GLYCOL 3350 17 G/17G
17 POWDER, FOR SOLUTION ORAL DAILY PRN
Status: DISCONTINUED | OUTPATIENT
Start: 2023-11-29 | End: 2023-12-05 | Stop reason: HOSPADM

## 2023-11-29 RX ADMIN — IPRATROPIUM BROMIDE AND ALBUTEROL SULFATE 3 ML: .5; 3 SOLUTION RESPIRATORY (INHALATION) at 09:50

## 2023-11-29 RX ADMIN — MONTELUKAST 10 MG: 10 TABLET, FILM COATED ORAL at 20:28

## 2023-11-29 RX ADMIN — IPRATROPIUM BROMIDE AND ALBUTEROL SULFATE 3 ML: .5; 3 SOLUTION RESPIRATORY (INHALATION) at 19:02

## 2023-11-29 RX ADMIN — SODIUM CHLORIDE 500 ML: 9 INJECTION, SOLUTION INTRAVENOUS at 15:38

## 2023-11-29 RX ADMIN — DOXYCYCLINE 100 MG: 100 INJECTION, POWDER, LYOPHILIZED, FOR SOLUTION INTRAVENOUS at 11:23

## 2023-11-29 RX ADMIN — GLIPIZIDE 10 MG: 5 TABLET ORAL at 22:07

## 2023-11-29 RX ADMIN — BUDESONIDE AND FORMOTEROL FUMARATE DIHYDRATE 2 PUFF: 160; 4.5 AEROSOL RESPIRATORY (INHALATION) at 19:07

## 2023-11-29 RX ADMIN — Medication 10 ML: at 20:28

## 2023-11-29 RX ADMIN — CEFTRIAXONE 2000 MG: 2 INJECTION, POWDER, FOR SOLUTION INTRAMUSCULAR; INTRAVENOUS at 10:56

## 2023-11-29 RX ADMIN — METFORMIN HYDROCHLORIDE 1000 MG: 500 TABLET ORAL at 22:07

## 2023-11-29 RX ADMIN — METHYLPREDNISOLONE SODIUM SUCCINATE 125 MG: 125 INJECTION, POWDER, FOR SOLUTION INTRAMUSCULAR; INTRAVENOUS at 09:25

## 2023-11-29 RX ADMIN — MIRTAZAPINE 15 MG: 15 TABLET, FILM COATED ORAL at 20:28

## 2023-11-29 RX ADMIN — DOCUSATE SODIUM 50 MG AND SENNOSIDES 8.6 MG 2 TABLET: 8.6; 5 TABLET, FILM COATED ORAL at 20:28

## 2023-11-29 NOTE — H&P
Patient Care Team:  Jareth Perea PA-C as PCP - General (Physician Assistant)    Chief complaint shortness of breath    Subjective     Patient is a 63 y.o. male with advanced COPD who presents with worsening shortness of breath for several days.  Initial symptoms were runny nose and sore throat.  He then began coughing and becoming more short of breath.  Cough is intermittently productive of clear mucus.  He denies any fever or chills.  He called EMS this morning because he was so short of breath and sats were noted to be in the mid 80s.  He is currently on 10 L of oxygen in the emergency room.  He has tested positive for RSV.  X-ray is concerning for right lower lobe pneumonia.  He is currently smoking.    Review of Systems       History  Past Medical History:   Diagnosis Date    COPD (chronic obstructive pulmonary disease)     Diabetes mellitus     Hypertension      No past surgical history on file.  No family history on file.  Social History     Tobacco Use    Smoking status: Every Day     Packs/day: 1.50     Years: 25.00     Additional pack years: 0.00     Total pack years: 37.50     Types: Cigarettes     Passive exposure: Never    Smokeless tobacco: Never   Vaping Use    Vaping Use: Never used    Passive vaping exposure: Yes   Substance Use Topics    Alcohol use: Yes     Comment: RARE    Drug use: Never     (Not in a hospital admission)    Allergies:  Patient has no known allergies.    Objective     Vital Signs  Temp:  [98.2 °F (36.8 °C)] 98.2 °F (36.8 °C)  Heart Rate:  [88-93] 90  Resp:  [22-24] 22  BP: (109)/(64-70) 109/64     Physical Exam:      General Appearance:    Alert, cooperative, in no acute distress    Head:    Normocephalic, without obvious abnormality, atraumatic   Eyes:            Lids and lashes normal, conjunctivae and sclerae normal, no   icterus, no pallor, corneas clear, PERRLA   Ears:    Ears appear intact with no abnormalities noted   Throat:   No oral lesions, no thrush, oral  mucosa moist   Neck:   No adenopathy, supple, trachea midline, no thyromegaly, no   carotid bruit, no JVD   Lungs:   Diffuse rhonchi    Heart:    Regular rhythm and normal rate, normal S1 and S2, no            murmur, no gallop, no rub, no click   Chest Wall:    No abnormalities observed   Abdomen:     Normal bowel sounds, no masses, no organomegaly, soft        non-tender, non-distended, no guarding, no rebound                tenderness   Extremities:   Moves all extremities well, no edema, no cyanosis, no             redness   Pulses:   Pulses palpable and equal bilaterally   Skin:   No bleeding, bruising or rash   Lymph nodes:   No palpable adenopathy   Neurologic:   Cranial nerves 2 - 12 grossly intact, sensation intact, DTR       present and equal bilaterally       Results Review:     Imaging Results (Last 24 Hours)       Procedure Component Value Units Date/Time    XR Chest 1 View [[id number]] Collected: 11/29/23 0930     Updated: 11/29/23 0932    Narrative:      XR CHEST 1 VW    Date of Exam: 11/29/2023 9:08 AM EST    Indication: soa    Comparison: 12/12/2022    Findings:  Heart size top normal, stable. There is new right basilar airspace disease which may relate to pneumonia. No pneumothorax. No pleural effusion. Mild scarring in the left lung base unchanged. Osseous structures grossly intact.      Impression:      Impression:  New right basilar airspace disease which may relate to pneumonia.        Electronically Signed: Boone Rasheed MD    11/29/2023 9:30 AM EST    Workstation ID: [id number]             Lab Results (last 24 hours)       Procedure Component Value Units Date/Time    Respiratory Panel PCR w/COVID-19(SARS-CoV-2) LESLIE/GALO/TORY/PAD/COR/SARAH In-House, NP Swab in UTM/VTM, 2 HR TAT - Swab, Nasopharynx [[id number]]  (Abnormal) Collected: 11/29/23 1109    Specimen: Swab from Nasopharynx Updated: 11/29/23 1213     ADENOVIRUS, PCR Not Detected     Coronavirus 229E Not Detected     Coronavirus HKU1 Not Detected      Coronavirus NL63 Not Detected     Coronavirus OC43 Not Detected     COVID19 Not Detected     Human Metapneumovirus Not Detected     Human Rhinovirus/Enterovirus Not Detected     Influenza A PCR Not Detected     Influenza B PCR Not Detected     Parainfluenza Virus 1 Not Detected     Parainfluenza Virus 2 Not Detected     Parainfluenza Virus 3 Not Detected     Parainfluenza Virus 4 Not Detected     RSV, PCR Detected     Bordetella pertussis pcr Not Detected     Bordetella parapertussis PCR Not Detected     Chlamydophila pneumoniae PCR Not Detected     Mycoplasma pneumo by PCR Not Detected    Narrative:      In the setting of a positive respiratory panel with a viral infection PLUS a negative procalcitonin without other underlying concern for bacterial infection, consider observing off antibiotics or discontinuation of antibiotics and continue supportive care. If the respiratory panel is positive for atypical bacterial infection (Bordetella pertussis, Chlamydophila pneumoniae, or Mycoplasma pneumoniae), consider antibiotic de-escalation to target atypical bacterial infection.    High Sensitivity Troponin T 2Hr [[id number]]  (Normal) Collected: 11/29/23 1114    Specimen: Blood Updated: 11/29/23 1140     HS Troponin T 16 ng/L      Troponin T Delta 1 ng/L     Narrative:      High Sensitive Troponin T Reference Range:  <14.0 ng/L- Negative Female for AMI  <22.0 ng/L- Negative Male for AMI  >=14 - Abnormal Female indicating possible myocardial injury.  >=22 - Abnormal Male indicating possible myocardial injury.   Clinicians would have to utilize clinical acumen, EKG, Troponin, and serial changes to determine if it is an Acute Myocardial Infarction or myocardial injury due to an underlying chronic condition.         POC Lactate [[id number]]  (Normal) Collected: 11/29/23 1016    Specimen: Blood Updated: 11/29/23 1019     Lactate 0.7 mmol/L      Comment: Serial Number: [id number]:  [id number]       Blood Culture -  Blood, Arm, Right [[id number]] Collected: 11/29/23 1013    Specimen: Blood from Arm, Right Updated: 11/29/23 1015    [place] Draw [[id number]] Collected: 11/29/23 0850    Specimen: Blood Updated: 11/29/23 1000    Narrative:      The following orders were created for panel order [place] Draw.  Procedure                               Abnormality         Status                     ---------                               -----------         ------                     Green Top (Gel)[[id number]]                                  Final result               Lavender Top[[id number]]                                                                Gold Top - SST[[id number]]                                   Final result               Light Blue Top[[id number]]                                   Final result                 Please view results for these tests on the individual orders.    Green Top (Gel) [[id number]] Collected: 11/29/23 0850    Specimen: Blood Updated: 11/29/23 1000     Extra Tube Hold for add-ons.     Comment: Auto resulted.       Gold Top - SST [[id number]] Collected: 11/29/23 0850    Specimen: Blood Updated: 11/29/23 1000     Extra Tube Hold for add-ons.     Comment: Auto resulted.       Light Blue Top [[id number]] Collected: 11/29/23 0850    Specimen: Blood Updated: 11/29/23 1000     Extra Tube Hold for add-ons.     Comment: Auto resulted       CBC & Differential [[id number]]  (Abnormal) Collected: 11/29/23 0850    Specimen: Blood Updated: 11/29/23 0959    Narrative:      The following orders were created for panel order CBC & Differential.  Procedure                               Abnormality         Status                     ---------                               -----------         ------                     CBC Auto Differential[[id number]]        Abnormal            Final result               Scan Slide[[id number]]                                       Final result                 Please view results for these tests  on the individual orders.    CBC Auto Differential [[id number]]  (Abnormal) Collected: 11/29/23 0850    Specimen: Blood Updated: 11/29/23 0959     WBC 19.60 10*3/mm3      RBC 4.23 10*6/mm3      Hemoglobin 13.1 g/dL      Hematocrit 39.9 %      MCV 94.3 fL      MCH 31.0 pg      MCHC 32.9 g/dL      RDW 15.3 %      RDW-SD 49.9 fl      MPV 9.6 fL      Platelets 202 10*3/mm3     Narrative:      The previously reported component NRBC is no longer being reported. Previous result was 0.1 /100 WBC (Reference Range: 0.0-0.2 /100 WBC) on 11/29/2023 at 0941 EST.    Scan Slide [[id number]] Collected: 11/29/23 0850    Specimen: Blood Updated: 11/29/23 0959     Scan Slide --     Comment: See Manual Differential Results       Manual Differential [[id number]]  (Abnormal) Collected: 11/29/23 0850    Specimen: Blood Updated: 11/29/23 0959     Neutrophil % 58.0 %      Lymphocyte % 3.0 %      Monocyte % 13.0 %      Bands %  14.0 %      Metamyelocyte % 9.0 %      Atypical Lymphocyte % 3.0 %      Neutrophils Absolute 14.11 10*3/mm3      Lymphocytes Absolute 1.18 10*3/mm3      Monocytes Absolute 2.55 10*3/mm3      RBC Morphology Normal     Vacuolated Neutrophils Slight/1+     Platelet Morphology Normal    Blood Gas, Arterial - [[id number]]  (Abnormal) Collected: 11/29/23 0954    Specimen: Arterial Blood Updated: 11/29/23 0958     Site Right Brachial     Felix's Test N/A     pH, Arterial 7.349 pH units      pCO2, Arterial 46.9 mm Hg      pO2, Arterial 53.6 mm Hg      HCO3, Arterial 25.9 mmol/L      Base Excess, Arterial -0.3 mmol/L      Comment: Serial Number: [id number]:  [id number]        O2 Saturation, Arterial 85.4 %      CO2 Content 27.3 mmol/L      Barometric Pressure for Blood Gas --     Comment: N/A        Modality Cannula     FIO2 40 %      Hemodilution No    Protime-INR [[id number]]  (Normal) Collected: 11/29/23 0850    Specimen: Blood Updated: 11/29/23 0944     Protime 11.4 Seconds      INR 1.05    aPTT [[id number]]  (Abnormal)  Collected: 11/29/23 0850    Specimen: Blood Updated: 11/29/23 0944     PTT 30.1 seconds     COVID-19,CEPHEID/LEONEL,COR/TORY/PAD/JUWAN/[facility]AG IN-HOUSE,NP SWAB IN TRANSPORT MEDIA 1 HR TAT, RT-PCR - Swab, Nasopharynx [[id number]]  (Normal) Collected: 11/29/23 0911    Specimen: Swab from Nasopharynx Updated: 11/29/23 0942     COVID19 Not Detected    Narrative:      Fact sheet for providers: https://www.fda.gov/media/260798/download     Fact sheet for patients: https://www.fda.gov/media/889989/download  Fact sheet for providers: https://www.fda.gov/media/361163/download    Fact sheet for patients: https://www.[employer].gov/media/847004/download    Test performed by PCR.    Comprehensive Metabolic Panel [[id number]]  (Abnormal) Collected: 11/29/23 0850    Specimen: Blood Updated: 11/29/23 0927     Glucose 155 mg/dL      BUN 25 mg/dL      Creatinine 0.77 mg/dL      Sodium 132 mmol/L      Potassium 4.5 mmol/L      Chloride 94 mmol/L      CO2 26.0 mmol/L      Calcium 9.3 mg/dL      Total Protein 7.0 g/dL      Albumin 3.7 g/dL      ALT (SGPT) 13 U/L      AST (SGOT) 23 U/L      Alkaline Phosphatase 61 U/L      Total Bilirubin 0.4 mg/dL      Globulin 3.3 gm/dL      A/G Ratio 1.1 g/dL      BUN/Creatinine Ratio 32.5     Anion Gap 12.0 mmol/L      eGFR 100.0 mL/min/1.73     Narrative:      GFR Normal >60  Chronic Kidney Disease <60  Kidney Failure <15      BNP [[id number]]  (Normal) Collected: 11/29/23 0850    Specimen: Blood Updated: 11/29/23 0927     proBNP 190.4 pg/mL     Narrative:      This assay is used as an aid in the diagnosis of individuals suspected of having heart failure. It can be used as an aid in the diagnosis of acute decompensated heart failure (ADHF) in patients presenting with signs and symptoms of ADHF to the emergency department (ED). In addition, NT-proBNP of <300 pg/mL indicates ADHF is not likely.    Age Range Result Interpretation  NT-proBNP Concentration (pg/mL:      <50             Positive            >450                    Gray                 300-450                    Negative             <300    50-75           Positive            >900                  Gray                300-900                  Negative            <300      >75             Positive            >1800                  Gray                300-1800                  Negative            <300    High Sensitivity Troponin T [[id number]]  (Normal) Collected: 11/29/23 0850    Specimen: Blood Updated: 11/29/23 0927     HS Troponin T 15 ng/L     Narrative:      High Sensitive Troponin T Reference Range:  <14.0 ng/L- Negative Female for AMI  <22.0 ng/L- Negative Male for AMI  >=14 - Abnormal Female indicating possible myocardial injury.  >=22 - Abnormal Male indicating possible myocardial injury.   Clinicians would have to utilize clinical acumen, EKG, Troponin, and serial changes to determine if it is an Acute Myocardial Infarction or myocardial injury due to an underlying chronic condition.         Blood Culture - Blood, Arm, Right [[id number]] Collected: 11/29/23 0920    Specimen: Blood from Arm, Right Updated: 11/29/23 0925             I reviewed the patient's new clinical results.    Assessment & Plan       Pneumonia  -Patient has RSV but also has evidence of secondary bacterial pneumonia.  Will continue Rocephin.  Attempt to obtain sputum.  Hypoxemia  COPD-bronchodilators, inhaled steroids, systemic steroids, Mucinex, Daliresp, theophylline  Tobacco abuse-ongoing counseling  Leukocytosis-related to pneumonia  Type 2 diabetes-sliding scale insulin while on steroids; continue glipizide, metformin  Essential hypertension-bisoprolol/hydrochlorothiazide, lisinopril    Lovenox for DVT prophylaxis  Famotidine for stress ulcer prophylaxis    I discussed the patient's findings and my recommendations with patient.     Judit Singh MD  11/29/23  16:33 EST

## 2023-11-29 NOTE — CASE MANAGEMENT/SOCIAL WORK
Discharge Planning Assessment   [place]     Patient Name: Maximino Bhatia  MRN: [id number]  Today's Date: 11/29/2023    Admit Date: 11/29/2023    Plan: Home, Current O2 with [facility]   Discharge Needs Assessment       Row Name 11/29/23 1800       Living Environment    People in Home alone    Current Living Arrangements home    Potentially Unsafe Housing Conditions none    In the past 12 months has the electric, gas, oil, or water company threatened to shut off services in your home? No    Primary Care Provided by self    Provides Primary Care For no one       Resource/Environmental Concerns    Resource/Environmental Concerns none    Transportation Concerns none       Food Insecurity    Within the past 12 months, you worried that your food would run out before you got the money to buy more. Never true    Within the past 12 months, the food you bought just didn't last and you didn't have money to get more. Never true       Transition Planning    Patient/Family Anticipates Transition to home    Patient/Family Anticipated Services at Transition none    Transportation Anticipated family or friend will provide       Discharge Needs Assessment    Readmission Within the Last 30 Days no previous admission in last 30 days    Equipment Currently Used at Home oxygen    Concerns to be Addressed denies needs/concerns at this time    Anticipated Changes Related to Illness none    Equipment Needed After Discharge none                   Discharge Plan       Row Name 11/29/23 1801       Plan    Plan Home, Current O2 with [facility]    Plan Comments Met with Patient at bedside Lives at home alone. IADL's PCP and PHarmacy verified, able to afford medications. No financial or transportation concerns. DC barriers: WEan O2, IV ATB                  Continued Care and Services - Admitted Since 11/29/2023    Coordination has not been started for this encounter.       Expected Discharge Date and Time       Expected Discharge Date Expected Discharge  Time    Nov 30, 2023            Demographic Summary       Row Name 11/29/23 1800       General Information    Admission Type observation    Arrived From emergency department    Referral Source admission list    Reason for Consult discharge planning    Preferred Language English                   Functional Status       Row Name 11/29/23 1800       Functional Status    Usual Activity Tolerance moderate    Current Activity Tolerance moderate       Functional Status, IADL    Medications independent    Meal Preparation independent    Housekeeping independent    Laundry independent    Shopping independent       Mental Status    General Appearance WDL WDL       Mental Status Summary    Recent Changes in Mental Status/Cognitive Functioning no changes                           Social Determinants of Health     Tobacco Use: High Risk (5/1/2023)    Patient History     Smoking Tobacco Use: Every Day     Smokeless Tobacco Use: Never     Passive Exposure: Never   Alcohol Use: Not At Risk (11/29/2023)    AUDIT-C     Frequency of Alcohol Consumption: Never     Average Number of Drinks: Patient does not drink     Frequency of Binge Drinking: Never   Financial Resource Strain: Low Risk  (11/29/2023)    Overall Financial Resource Strain (CARDIA)     Difficulty of Paying Living Expenses: Not very hard   Food Insecurity: No Food Insecurity (11/29/2023)    Hunger Vital Sign     Worried About Running Out of Food in the Last Year: Never true     Ran Out of Food in the Last Year: Never true   Transportation Needs: No Transportation Needs (11/29/2023)    PRAPARE - Transportation     Lack of Transportation (Medical): No     Lack of Transportation (Non-Medical): No   Physical Activity: Inactive (11/29/2023)    Exercise Vital Sign     Days of Exercise per Week: 0 days     Minutes of Exercise per Session: 0 min   Stress: Not on file   Social Connections: Not At Risk (11/29/2023)    Family and Community Support     Help with Day-to-Day  Activities: I get all the help I need     Lonely or Isolated: Rarely   Interpersonal Safety: Not At Risk (11/29/2023)    Abuse Screen     Unsafe at Home or Work/School: no     Feels Threatened by Someone?: no     Does Anyone Keep You from Contacting Others or Doint Things Outside the Home?: no     Physical Sign of Abuse Present: no   Depression: Not at risk (11/29/2023)    PHQ-2     PHQ-2 Score: 0   Housing Stability: Not At Risk (11/29/2023)    Housing Stability     Current Living Arrangements: home     Potentially Unsafe Housing Conditions: none   Utilities: Not At Risk (11/29/2023)    [facility] Utilities     Threatened with loss of utilities: No   Health Literacy: Not At Risk (11/29/2023)    Education     Help with school or training?: No     Preferred Language: English   Employment: Not At Risk (11/29/2023)    Employment     Do you want help finding or keeping work or a job?: I do not need or want help   Disabilities: Not At Risk (11/29/2023)    Disabilities     Concentrating, Remembering, or Making Decisions Difficulty: no     Doing Errands Independently Difficulty: no             Estelle Martínez, RN

## 2023-11-29 NOTE — ED PROVIDER NOTES
"Subjective   History of Present Illness  Chief complaint: Patient is a 63-year-old with shortness of breath.  He called the EMS and has been actually short of breath for the last couple of days.  He was discharged from the hospital years ago on home oxygen but he was able to wean himself off completely last few months.  He does have COPD.  He does not have CHF.  He has not had any swelling.  He has had cough.  He feels \"tight and there\".  He is coughing up mucus but no blood.  He has not had fever that he knows of.  His oxygen saturations were in the mid 80s.    Context:    Duration:    Timing:    Severity: Severe    Associated Symptoms:        PCP:  LMP:      Review of Systems   Constitutional:  Positive for fatigue. Negative for fever.   HENT:  Positive for congestion.    Respiratory:  Positive for cough, chest tightness and shortness of breath.    Cardiovascular:  Negative for leg swelling.   Gastrointestinal: Negative.    Genitourinary: Negative.        Past Medical History:   Diagnosis Date    COPD (chronic obstructive pulmonary disease)     Diabetes mellitus     Hypertension        No Known Allergies    No past surgical history on file.    No family history on file.    Social History     Socioeconomic History    Marital status: Single   Tobacco Use    Smoking status: Every Day     Packs/day: 1.50     Years: 25.00     Additional pack years: 0.00     Total pack years: 37.50     Types: Cigarettes     Passive exposure: Never    Smokeless tobacco: Never   Vaping Use    Vaping Use: Never used    Passive vaping exposure: Yes   Substance and Sexual Activity    Alcohol use: Yes     Comment: RARE    Drug use: Never    Sexual activity: Defer           Objective   Physical Exam  Vitals and nursing note reviewed.   Constitutional:       General: He is in acute distress.      Appearance: He is ill-appearing.   HENT:      Head: Normocephalic and atraumatic.   Cardiovascular:      Rate and Rhythm: Normal rate.   Pulmonary:    "   Effort: Tachypnea present.      Breath sounds: Decreased breath sounds present.   Abdominal:      Palpations: Abdomen is soft.      Tenderness: There is no abdominal tenderness.   Musculoskeletal:      Right lower leg: No tenderness. No edema.      Left lower leg: No tenderness. No edema.   Skin:     General: Skin is warm and dry.   Neurological:      Mental Status: He is alert and oriented to person, place, and time.   Psychiatric:         Mood and Affect: Mood normal.         Procedures           ED Course      Results for orders placed or performed during the hospital encounter of 11/29/23   COVID-19,CEPHEID/LEONEL,COR/TORY/PAD/JUWAN/LAG IN-HOUSE,NP SWAB IN TRANSPORT MEDIA 1 HR TAT, RT-PCR - Swab, Nasopharynx    Specimen: Nasopharynx; Swab   Result Value Ref Range    COVID19 Not Detected Not Detected - Ref. Range   Comprehensive Metabolic Panel    Specimen: Blood   Result Value Ref Range    Glucose 155 (H) 65 - 99 mg/dL    BUN 25 (H) 8 - 23 mg/dL    Creatinine 0.77 0.76 - 1.27 mg/dL    Sodium 132 (L) 136 - 145 mmol/L    Potassium 4.5 3.5 - 5.2 mmol/L    Chloride 94 (L) 98 - 107 mmol/L    CO2 26.0 22.0 - 29.0 mmol/L    Calcium 9.3 8.6 - 10.5 mg/dL    Total Protein 7.0 6.0 - 8.5 g/dL    Albumin 3.7 3.5 - 5.2 g/dL    ALT (SGPT) 13 1 - 41 U/L    AST (SGOT) 23 1 - 40 U/L    Alkaline Phosphatase 61 39 - 117 U/L    Total Bilirubin 0.4 0.0 - 1.2 mg/dL    Globulin 3.3 gm/dL    A/G Ratio 1.1 g/dL    BUN/Creatinine Ratio 32.5 (H) 7.0 - 25.0    Anion Gap 12.0 5.0 - 15.0 mmol/L    eGFR 100.0 >60.0 mL/min/1.73   Protime-INR    Specimen: Blood   Result Value Ref Range    Protime 11.4 9.6 - 11.7 Seconds    INR 1.05 0.93 - 1.10   aPTT    Specimen: Blood   Result Value Ref Range    PTT 30.1 (L) 61.0 - 76.5 seconds   BNP    Specimen: Blood   Result Value Ref Range    proBNP 190.4 0.0 - 900.0 pg/mL   High Sensitivity Troponin T    Specimen: Blood   Result Value Ref Range    HS Troponin T 15 <22 ng/L   CBC Auto Differential    Specimen:  Blood   Result Value Ref Range    WBC 19.60 (H) 3.40 - 10.80 10*3/mm3    RBC 4.23 4.14 - 5.80 10*6/mm3    Hemoglobin 13.1 13.0 - 17.7 g/dL    Hematocrit 39.9 37.5 - 51.0 %    MCV 94.3 79.0 - 97.0 fL    MCH 31.0 26.6 - 33.0 pg    MCHC 32.9 31.5 - 35.7 g/dL    RDW 15.3 12.3 - 15.4 %    RDW-SD 49.9 37.0 - 54.0 fl    MPV 9.6 6.0 - 12.0 fL    Platelets 202 140 - 450 10*3/mm3   Scan Slide    Specimen: Blood   Result Value Ref Range    Scan Slide     Manual Differential    Specimen: Blood   Result Value Ref Range    Neutrophil % 58.0 42.7 - 76.0 %    Lymphocyte % 3.0 (L) 19.6 - 45.3 %    Monocyte % 13.0 (H) 5.0 - 12.0 %    Bands %  14.0 (H) 0.0 - 5.0 %    Metamyelocyte % 9.0 (H) 0.0 - 0.0 %    Atypical Lymphocyte % 3.0 0.0 - 5.0 %    Neutrophils Absolute 14.11 (H) 1.70 - 7.00 10*3/mm3    Lymphocytes Absolute 1.18 0.70 - 3.10 10*3/mm3    Monocytes Absolute 2.55 (H) 0.10 - 0.90 10*3/mm3    RBC Morphology Normal Normal    Vacuolated Neutrophils Slight/1+ None Seen    Platelet Morphology Normal Normal   Blood Gas, Arterial -    Specimen: Arterial Blood   Result Value Ref Range    Site Right Brachial     Felix's Test N/A     pH, Arterial 7.349 (L) 7.350 - 7.450 pH units    pCO2, Arterial 46.9 35.0 - 48.0 mm Hg    pO2, Arterial 53.6 (L) 83.0 - 108.0 mm Hg    HCO3, Arterial 25.9 21.0 - 28.0 mmol/L    Base Excess, Arterial -0.3 (L) 0.0 - 3.0 mmol/L    O2 Saturation, Arterial 85.4 (L) 94.0 - 98.0 %    CO2 Content 27.3 22 - 29 mmol/L    Barometric Pressure for Blood Gas      Modality Cannula     FIO2 40 %    Hemodilution No    POC Lactate    Specimen: Blood   Result Value Ref Range    Lactate 0.7 0.3 - 2.0 mmol/L   ECG 12 Lead Dyspnea   Result Value Ref Range    QT Interval 339 ms    QTC Interval 416 ms   Green Top (Gel)   Result Value Ref Range    Extra Tube Hold for add-ons.    Gold Top - SST   Result Value Ref Range    Extra Tube Hold for add-ons.    Light Blue Top   Result Value Ref Range    Extra Tube Hold for add-ons.                                XR Chest 1 View    Result Date: 11/29/2023  Impression: New right basilar airspace disease which may relate to pneumonia. Electronically Signed: Boone Rasheed MD  11/29/2023 9:30 AM EST  Workstation ID: [id number]                 Medical Decision Making  Patient was seen and evaluated for the above problem    Differential diagnosis includes but is not limited to pneumonia, COPD exacerbation, pneumothorax, PE    Patient with elevated white count and right lower lobe infiltrate present on x-ray.  Most likely and symptoms consistent with pneumonia.  There is no pneumothorax.  I think less likely to be PE at this point in time with infectious treatment on top of COPD.  Discussed with Dr. Singh who agrees to admit the patient.  As the patient is now significantly oxygen requiring with pneumonia will admit to the hospital.  Critical care time is 42 minutes    Problems Addressed:  Acute respiratory failure with hypoxia: complicated acute illness or injury  Pneumonia of right lower lobe due to infectious organism: complicated acute illness or injury    Amount and/or Complexity of Data Reviewed  Labs: ordered. Decision-making details documented in ED Course.     Details: Labs reviewed by myself  Radiology: ordered and independent interpretation performed.     Details: Chest x-ray reviewed by myself as above  ECG/medicine tests: ordered and independent interpretation performed.     Details: EKG interpreted by myself sinus rhythm rate of 90 left atrial enlargement repolarization abnormality.    Risk  Prescription drug management.  Decision regarding hospitalization.    Critical Care  Total time providing critical care: 42 minutes        Final diagnoses:   None     Pneumonia  Acute hypoxemic respiratory failure  ED Disposition  ED Disposition       None            No follow-up provider specified.       Medication List      No changes were made to your prescriptions during this visit.            Ruiz Sandy  DO [place]  11/29/23 1107

## 2023-11-29 NOTE — Clinical Note
Level of Care: Med/Surg [1]   Diagnosis: Pneumonia [668238]   Admitting Physician: NGOC ORTIZ [[id number]   Attending Physician: NGOC ORTIZ [[id number]]

## 2023-11-30 LAB
ANION GAP SERPL CALCULATED.3IONS-SCNC: 8 MMOL/L (ref 5–15)
BUN SERPL-MCNC: 33 MG/DL (ref 8–23)
BUN/CREAT SERPL: 42.9 (ref 7–25)
CALCIUM SPEC-SCNC: 9.6 MG/DL (ref 8.6–10.5)
CHLORIDE SERPL-SCNC: 101 MMOL/L (ref 98–107)
CO2 SERPL-SCNC: 30 MMOL/L (ref 22–29)
CREAT SERPL-MCNC: 0.77 MG/DL (ref 0.76–1.27)
DACRYOCYTES BLD QL SMEAR: ABNORMAL
DEPRECATED RDW RBC AUTO: 52.5 FL (ref 37–54)
EGFRCR SERPLBLD CKD-EPI 2021: 100 ML/MIN/1.73
ERYTHROCYTE [DISTWIDTH] IN BLOOD BY AUTOMATED COUNT: 15.4 % (ref 12.3–15.4)
GLUCOSE BLDC GLUCOMTR-MCNC: 171 MG/DL (ref 70–105)
GLUCOSE SERPL-MCNC: 121 MG/DL (ref 65–99)
HCT VFR BLD AUTO: 38.4 % (ref 37.5–51)
HGB BLD-MCNC: 13 G/DL (ref 13–17.7)
LARGE PLATELETS: ABNORMAL
LYMPHOCYTES # BLD MANUAL: 0.78 10*3/MM3 (ref 0.7–3.1)
LYMPHOCYTES NFR BLD MANUAL: 7 % (ref 5–12)
MCH RBC QN AUTO: 31.2 PG (ref 26.6–33)
MCHC RBC AUTO-ENTMCNC: 33.8 G/DL (ref 31.5–35.7)
MCV RBC AUTO: 92.2 FL (ref 79–97)
METAMYELOCYTES NFR BLD MANUAL: 4 % (ref 0–0)
MONOCYTES # BLD: 1.09 10*3/MM3 (ref 0.1–0.9)
NEUTROPHILS # BLD AUTO: 13.1 10*3/MM3 (ref 1.7–7)
NEUTROPHILS NFR BLD MANUAL: 76 % (ref 42.7–76)
NEUTS BAND NFR BLD MANUAL: 8 % (ref 0–5)
NRBC SPEC MANUAL: 1 /100 WBC (ref 0–0.2)
PLATELET # BLD AUTO: 186 10*3/MM3 (ref 140–450)
PMV BLD AUTO: 8.7 FL (ref 6–12)
POIKILOCYTOSIS BLD QL SMEAR: ABNORMAL
POTASSIUM SERPL-SCNC: 4.7 MMOL/L (ref 3.5–5.2)
QT INTERVAL: 339 MS
QTC INTERVAL: 416 MS
RBC # BLD AUTO: 4.17 10*6/MM3 (ref 4.14–5.8)
SCAN SLIDE: NORMAL
SMALL PLATELETS BLD QL SMEAR: ADEQUATE
SODIUM SERPL-SCNC: 139 MMOL/L (ref 136–145)
VARIANT LYMPHS NFR BLD MANUAL: 1 % (ref 0–5)
VARIANT LYMPHS NFR BLD MANUAL: 4 % (ref 19.6–45.3)
WBC MORPH BLD: NORMAL
WBC NRBC COR # BLD AUTO: 15.6 10*3/MM3 (ref 3.4–10.8)

## 2023-11-30 PROCEDURE — 80048 BASIC METABOLIC PNL TOTAL CA: CPT | Performed by: INTERNAL MEDICINE

## 2023-11-30 PROCEDURE — 85025 COMPLETE CBC W/AUTO DIFF WBC: CPT | Performed by: INTERNAL MEDICINE

## 2023-11-30 PROCEDURE — 85007 BL SMEAR W/DIFF WBC COUNT: CPT | Performed by: INTERNAL MEDICINE

## 2023-11-30 PROCEDURE — 94664 DEMO&/EVAL PT USE INHALER: CPT

## 2023-11-30 PROCEDURE — 82948 REAGENT STRIP/BLOOD GLUCOSE: CPT

## 2023-11-30 PROCEDURE — 25010000002 METHYLPREDNISOLONE PER 40 MG: Performed by: INTERNAL MEDICINE

## 2023-11-30 PROCEDURE — 94761 N-INVAS EAR/PLS OXIMETRY MLT: CPT

## 2023-11-30 PROCEDURE — 25010000002 CEFTRIAXONE PER 250 MG: Performed by: INTERNAL MEDICINE

## 2023-11-30 PROCEDURE — 25010000002 METHYLPREDNISOLONE PER 125 MG: Performed by: INTERNAL MEDICINE

## 2023-11-30 PROCEDURE — 94799 UNLISTED PULMONARY SVC/PX: CPT

## 2023-11-30 PROCEDURE — 25010000002 ENOXAPARIN PER 10 MG: Performed by: INTERNAL MEDICINE

## 2023-11-30 PROCEDURE — 36415 COLL VENOUS BLD VENIPUNCTURE: CPT | Performed by: INTERNAL MEDICINE

## 2023-11-30 RX ORDER — METHYLPREDNISOLONE SODIUM SUCCINATE 40 MG/ML
40 INJECTION, POWDER, LYOPHILIZED, FOR SOLUTION INTRAMUSCULAR; INTRAVENOUS EVERY 12 HOURS
Status: DISCONTINUED | OUTPATIENT
Start: 2023-11-30 | End: 2023-12-01

## 2023-11-30 RX ORDER — GUAIFENESIN 600 MG/1
1200 TABLET, EXTENDED RELEASE ORAL EVERY 12 HOURS SCHEDULED
Status: DISCONTINUED | OUTPATIENT
Start: 2023-11-30 | End: 2023-12-05 | Stop reason: HOSPADM

## 2023-11-30 RX ADMIN — IPRATROPIUM BROMIDE AND ALBUTEROL SULFATE 3 ML: .5; 3 SOLUTION RESPIRATORY (INHALATION) at 15:10

## 2023-11-30 RX ADMIN — GUAIFENESIN 1200 MG: 600 TABLET, MULTILAYER, EXTENDED RELEASE ORAL at 20:11

## 2023-11-30 RX ADMIN — LISINOPRIL 40 MG: 20 TABLET ORAL at 17:36

## 2023-11-30 RX ADMIN — METFORMIN HYDROCHLORIDE 1000 MG: 500 TABLET ORAL at 17:36

## 2023-11-30 RX ADMIN — IPRATROPIUM BROMIDE AND ALBUTEROL SULFATE 3 ML: .5; 3 SOLUTION RESPIRATORY (INHALATION) at 18:52

## 2023-11-30 RX ADMIN — IPRATROPIUM BROMIDE AND ALBUTEROL SULFATE 3 ML: .5; 3 SOLUTION RESPIRATORY (INHALATION) at 06:35

## 2023-11-30 RX ADMIN — FAMOTIDINE 40 MG: 20 TABLET ORAL at 09:42

## 2023-11-30 RX ADMIN — METHYLPREDNISOLONE SODIUM SUCCINATE 60 MG: 125 INJECTION, POWDER, FOR SOLUTION INTRAMUSCULAR; INTRAVENOUS at 01:12

## 2023-11-30 RX ADMIN — THEOPHYLLINE ANHYDROUS 300 MG: 300 CAPSULE, EXTENDED RELEASE ORAL at 09:42

## 2023-11-30 RX ADMIN — ROFLUMILAST 500 MCG: 500 TABLET ORAL at 09:42

## 2023-11-30 RX ADMIN — ENOXAPARIN SODIUM 40 MG: 100 INJECTION SUBCUTANEOUS at 17:07

## 2023-11-30 RX ADMIN — GLIPIZIDE 10 MG: 5 TABLET ORAL at 17:36

## 2023-11-30 RX ADMIN — GLIPIZIDE 5 MG: 5 TABLET ORAL at 08:26

## 2023-11-30 RX ADMIN — BUDESONIDE AND FORMOTEROL FUMARATE DIHYDRATE 2 PUFF: 160; 4.5 AEROSOL RESPIRATORY (INHALATION) at 18:57

## 2023-11-30 RX ADMIN — BISOPROLOL FUMARATE AND HYDROCHLOROTHIAZIDE 1 TABLET: 10; 6.25 TABLET, FILM COATED ORAL at 09:42

## 2023-11-30 RX ADMIN — MONTELUKAST 10 MG: 10 TABLET, FILM COATED ORAL at 20:11

## 2023-11-30 RX ADMIN — CEFTRIAXONE SODIUM 1000 MG: 1 INJECTION, POWDER, FOR SOLUTION INTRAMUSCULAR; INTRAVENOUS at 11:54

## 2023-11-30 RX ADMIN — Medication 10 ML: at 20:13

## 2023-11-30 RX ADMIN — Medication 10 ML: at 09:42

## 2023-11-30 RX ADMIN — MIRTAZAPINE 15 MG: 15 TABLET, FILM COATED ORAL at 20:11

## 2023-11-30 RX ADMIN — IPRATROPIUM BROMIDE AND ALBUTEROL SULFATE 3 ML: .5; 3 SOLUTION RESPIRATORY (INHALATION) at 11:36

## 2023-11-30 RX ADMIN — METHYLPREDNISOLONE SODIUM SUCCINATE 40 MG: 40 INJECTION, POWDER, FOR SOLUTION INTRAMUSCULAR; INTRAVENOUS at 13:42

## 2023-11-30 RX ADMIN — BUDESONIDE AND FORMOTEROL FUMARATE DIHYDRATE 2 PUFF: 160; 4.5 AEROSOL RESPIRATORY (INHALATION) at 06:40

## 2023-11-30 RX ADMIN — METFORMIN HYDROCHLORIDE 1000 MG: 500 TABLET ORAL at 08:26

## 2023-11-30 RX ADMIN — GUAIFENESIN 1200 MG: 600 TABLET, MULTILAYER, EXTENDED RELEASE ORAL at 11:54

## 2023-11-30 RX ADMIN — ASPIRIN 81 MG: 81 TABLET, COATED ORAL at 09:42

## 2023-11-30 NOTE — CONSULTS
[facility]: [facility]         CONSULT NOTE    Patient Identification:  Maximino Bhatai  63 y.o.  male  1960  [id number]            Requesting physician: Attending physician    Reason for Consultation: Pneumonia      History of Present Illness:  63-year-old male with history of COPD, HTN and DM who presented 11/29/2023 with complaints of several days of progressive shortness of breath, symptoms started with URI symptoms with runny nose and sore throat then started to have productive cough and increased shortness of breath.  He is afebrile and hemodynamically stable but requiring 10 L of oxygen to maintain saturation in the 90s.  Leukocytosis with WBCs 19.6, respiratory panel is positive for RSV    Assessment:  Pneumonia  Hypoxemic respiratory failure: ABG 7.34/46/53  Leukocytosis  RSV infection  COPD with acute exacerbation  HTN  DM  Active tobacco smoker    Recommendations:  Antibiotics: Rocephin  Oxygen supplement and titration to maintain saturation 90 to 95%: Currently requiring 10 L per nasal cannula  Bronchodilators  Inhaled corticosteroids  IV steroids  Daliresp  Theophylline  Smoking cessation counseling  Glucose control  BP controlled  DVT/GI prophylaxis  Check daily labs and correct electrolytes as needed  I personally reviewed the radiological studies      Review of Sytems:  Constitutional: Negative for chills, and fever and positive for malaise/fatigue.   HENT: Nasal congestion and sore throat  Eyes: Negative.    Cardiovascular: Negative.    Respiratory: Positive for cough and shortness of breath.    Skin: Negative.    Musculoskeletal: Negative.    Gastrointestinal: Negative.    Genitourinary: Negative.    Neurological: Negative.    Psychiatric/Behavioral: Negative.    Past Medical History:  Past Medical History:   Diagnosis Date    COPD (chronic obstructive pulmonary disease)     Diabetes mellitus     Hypertension        Past Surgical History:  History reviewed. No pertinent surgical  history.     Home Meds:  Medications Prior to Admission   Medication Sig Dispense Refill Last Dose    albuterol sulfate  (90 Base) MCG/ACT inhaler Inhale 2 puffs Every 4 (Four) Hours As Needed for Wheezing. 18 g 0     aspirin 81 MG EC tablet Take 1 tablet by mouth Daily.       bisoprolol-hydrochlorothiazide (ZIAC) 10-6.25 MG per tablet Take 1 tablet by mouth Daily.       glipizide (GLUCOTROL) 10 MG tablet Take 1 tablet by mouth Every Night.       glipizide (GLUCOTROL) 5 MG tablet Take 1 tablet by mouth Every Morning.       ipratropium-albuterol (DUO-NEB) 0.5-2.5 mg/3 ml nebulizer Take 3 mL by nebulization Every 4 (Four) Hours As Needed for Wheezing.       lisinopril (PRINIVIL,ZESTRIL) 40 MG tablet Take 1 tablet by mouth Every Evening.       metFORMIN (GLUCOPHAGE) 1000 MG tablet Take 1 tablet by mouth Every Night.       metFORMIN (GLUCOPHAGE) 500 MG tablet Take 1 tablet by mouth Every Morning.       mirtazapine (REMERON) 15 MG tablet Take 1 tablet by mouth Every Night. 30 tablet 2     montelukast (SINGULAIR) 10 MG tablet Take 1 tablet by mouth Every Night.       Multiple Vitamin (multivitamin) capsule Take 1 capsule by mouth Daily.       roflumilast (DALIRESP) 500 MCG tablet tablet Take 1 tablet by mouth Daily. 30 tablet 2     theophylline (THEODUR) 300 MG 12 hr tablet Take 1 tablet by mouth Daily.          Allergies:  No Known Allergies    Social History:   Social History     Socioeconomic History    Marital status: Single   Tobacco Use    Smoking status: Every Day     Packs/day: 1.50     Years: 25.00     Additional pack years: 0.00     Total pack years: 37.50     Types: Cigarettes     Passive exposure: Never    Smokeless tobacco: Never   Vaping Use    Vaping Use: Never used    Passive vaping exposure: Yes   Substance and Sexual Activity    Alcohol use: Yes     Comment: RARE    Drug use: Never    Sexual activity: Defer       Family History:  History reviewed. No pertinent family history.    Physical Exam:  BP  "125/69 (BP Location: Right arm, Patient Position: Lying)   Pulse 92   Temp 98.8 °F (37.1 °C) (Oral)   Resp 26   Ht 180.3 cm (71\")   Wt 89.1 kg (196 lb 6.9 oz)   SpO2 92%   BMI 27.40 kg/m²  Body mass index is 27.4 kg/m². 92% 89.1 kg (196 lb 6.9 oz)  General Appearance:  Alert   HEENT:  Normocephalic, without obvious abnormality, Conjunctiva/corneas clear,.   Nares normal, no drainage     Neck:  Supple, symmetrical, trachea midline. No JVD.  Lungs /Chest wall: Wheezing and right-sided  rhonchi, respirations unlabored, symmetrical wall movement.     Heart:  Regular rate and rhythm, S1 S2 normal  Abdomen: Soft, non-tender, no masses, no organomegaly.    Extremities: No edema, no clubbing or cyanosis    LABS:  Lab Results   Component Value Date    CALCIUM 9.6 11/30/2023     Results from last 7 days   Lab Units 11/30/23  0212 11/29/23  0850   SODIUM mmol/L 139 132*   POTASSIUM mmol/L 4.7 4.5   CHLORIDE mmol/L 101 94*   CO2 mmol/L 30.0* 26.0   BUN mg/dL 33* 25*   CREATININE mg/dL 0.77 0.77   GLUCOSE mg/dL 121* 155*   CALCIUM mg/dL 9.6 9.3   WBC 10*3/mm3 15.60* 19.60*   HEMOGLOBIN g/dL 13.0 13.1   PLATELETS 10*3/mm3 186 202   ALT (SGPT) U/L  --  13   AST (SGOT) U/L  --  23   PROBNP pg/mL  --  190.4     Lab Results   Component Value Date    TROPONINT 16 11/29/2023     Results from last 7 days   Lab Units 11/29/23  1114 11/29/23  0850   HSTROP T ng/L 16 15     Results from last 7 days   Lab Units 11/29/23  1654 11/29/23  1013 11/29/23  0920   BLOODCX   --  No growth at 24 hours No growth at 24 hours   RESPCX  Rejected  --   --      Results from last 7 days   Lab Units 11/29/23  1016   LACTATE mmol/L 0.7     Results from last 7 days   Lab Units 11/29/23  0954   PH, ARTERIAL pH units 7.349*   PCO2, ARTERIAL mm Hg 46.9   PO2 ART mm Hg 53.6*   O2 SATURATION ART % 85.4*   MODALITY  Cannula     Results from last 7 days   Lab Units 11/29/23  1109   ADENOVIRUS DETECTION BY PCR  Not Detected   CORONAVIRUS 229E  Not Detected " "  CORONAVIRUS HKU1  Not Detected   CORONAVIRUS NL63  Not Detected   CORONAVIRUS OC43  Not Detected   HUMAN METAPNEUMOVIRUS  Not Detected   HUMAN RHINOVIRUS/ENTEROVIRUS  Not Detected   INFLUENZA B PCR  Not Detected   PARAINFLUENZA 1  Not Detected   PARAINFLUENZA VIRUS 2  Not Detected   PARAINFLUENZA VIRUS 3  Not Detected   PARAINFLUENZA VIRUS 4  Not Detected   BORDETELLA PERTUSSIS PCR  Not Detected   CHLAMYDOPHILA PNEUMONIAE PCR  Not Detected   MYCOPLAMA PNEUMO PCR  Not Detected   INFLUENZA A PCR  Not Detected   RSV, PCR  Detected*     Results from last 7 days   Lab Units 11/29/23  0850   INR  1.05     Results from last 7 days   Lab Units 11/29/23  1654 11/29/23  1013 11/29/23  0920   BLOODCX   --  No growth at 24 hours No growth at 24 hours   RESPCX  Rejected  --   --      No results found for: \"TSH\"  Estimated Creatinine Clearance: 122.1 mL/min (by C-G formula based on SCr of 0.77 mg/dL).         Imaging:  Imaging Results (Last 24 Hours)       ** No results found for the last 24 hours. **              Current Meds:   SCHEDULE  aspirin, 81 mg, Oral, Daily  bisoprolol-hydrochlorothiazide, 1 tablet, Oral, Daily  budesonide-formoterol, 2 puff, Inhalation, BID - RT  cefTRIAXone, 1,000 mg, Intravenous, Q24H  enoxaparin, 40 mg, Subcutaneous, Daily  famotidine, 40 mg, Oral, Daily  glipizide, 10 mg, Oral, BID AC  ipratropium-albuterol, 3 mL, Nebulization, 4x Daily - RT  lisinopril, 40 mg, Oral, Q PM  metFORMIN, 1,000 mg, Oral, BID With Meals  methylPREDNISolone sodium succinate, 60 mg, Intravenous, Q12H  mirtazapine, 15 mg, Oral, Nightly  montelukast, 10 mg, Oral, Nightly  roflumilast, 500 mcg, Oral, Daily  senna-docusate sodium, 2 tablet, Oral, BID  sodium chloride, 10 mL, Intravenous, Q12H  theophylline, 300 mg, Oral, Daily      Infusions     PRNs    acetaminophen    senna-docusate sodium **AND** polyethylene glycol **AND** bisacodyl **AND** bisacodyl    Calcium Replacement - Follow Nurse / BPA Driven Protocol    " hydrALAZINE    Magnesium Standard Dose Replacement - Follow Nurse / BPA Driven Protocol    nitroglycerin    ondansetron    Phosphorus Replacement - Follow Nurse / BPA Driven Protocol    Potassium Replacement - Follow Nurse / BPA Driven Protocol    [COMPLETED] Insert Peripheral IV **AND** sodium chloride    sodium chloride    sodium chloride        Meeta Ford MD  11/30/2023  10:49 EST      Much of this encounter note is an electronic transcription/translation of spoken language to printed text using Dragon Software.

## 2023-11-30 NOTE — PROGRESS NOTES
LOS: 1 day   Patient Care Team:  Jareth Perea PA-C as PCP - General (Physician Assistant)    Subjective     Interval History: Stable overnight    Patient Complaints: Shortness of breath is improving a little.  He continues on 10 L of oxygen through nasal cannula.  Cough is occasionally productive.  No new problems    History taken from: patient    Review of Systems   Constitutional:  Positive for activity change, appetite change and fatigue. Negative for chills and diaphoresis.   HENT:  Negative for facial swelling.    Eyes:  Negative for visual disturbance.   Respiratory:  Positive for cough and shortness of breath. Negative for wheezing and stridor.    Cardiovascular:  Negative for chest pain, palpitations and leg swelling.   Gastrointestinal:  Negative for abdominal distention, constipation, diarrhea, nausea and vomiting.   Endocrine: Negative for polyuria.   Genitourinary:  Negative for dysuria.   Musculoskeletal:  Negative for gait problem.   Skin:  Negative for rash and wound.   Neurological:  Negative for dizziness and numbness.   Psychiatric/Behavioral:  Negative for confusion.            Objective     Vital Signs  Temp:  [97.8 °F (36.6 °C)-98.8 °F (37.1 °C)] 98.8 °F (37.1 °C)  Heart Rate:  [] 92  Resp:  [12-30] 26  BP: ()/(38-71) 125/69    Physical Exam:     General Appearance:    Alert, cooperative, in no acute distress,   Head:    Normocephalic, without obvious abnormality, atraumatic   Eyes:            Lids and lashes normal, conjunctivae and sclerae normal, no   icterus, no pallor, corneas clear, PERRLA   Ears:    Ears appear intact with no abnormalities noted   Throat:   No oral lesions, no thrush, oral mucosa moist   Neck:   No adenopathy, supple, trachea midline, no thyromegaly, no   carotid bruit, no JVD   Lungs:   Diffuse rhonchi and wheezing    Heart:    Regular rhythm and normal rate, normal S1 and S2, no            murmur, no gallop, no rub, no click   Chest Wall:    No  abnormalities observed   Abdomen:     Normal bowel sounds, no masses, no organomegaly, soft        Non-tender non-distended, no guarding,   Extremities:   Moves all extremities well, no edema, no cyanosis, no             Redness   Pulses:   Pulses palpable and equal bilaterally   Skin:   No bleeding, bruising or rash   Lymph nodes:   No palpable adenopathy   Neurologic:   Cranial nerves 2 - 12 grossly intact, sensation intact, DTR       present and equal bilaterally        Results Review:    Lab Results (last 24 hours)       Procedure Component Value Units Date/Time    Blood Culture - Blood, Arm, Right [[id number]]  (Normal) Collected: 11/29/23 1013    Specimen: Blood from Arm, Right Updated: 11/30/23 1030     Blood Culture No growth at 24 hours    Blood Culture - Blood, Arm, Right [[id number]]  (Normal) Collected: 11/29/23 0920    Specimen: Blood from Arm, Right Updated: 11/30/23 0930     Blood Culture No growth at 24 hours    CBC & Differential [[id number]]  (Abnormal) Collected: 11/30/23 0212    Specimen: Blood Updated: 11/30/23 0346    Narrative:      The following orders were created for panel order CBC & Differential.  Procedure                               Abnormality         Status                     ---------                               -----------         ------                     CBC Auto Differential[[id number]]        Abnormal            Final result               Scan Slide[[id number]]                                       Final result                 Please view results for these tests on the individual orders.    CBC Auto Differential [[id number]]  (Abnormal) Collected: 11/30/23 0212    Specimen: Blood Updated: 11/30/23 0346     WBC 15.60 10*3/mm3      RBC 4.17 10*6/mm3      Hemoglobin 13.0 g/dL      Hematocrit 38.4 %      MCV 92.2 fL      MCH 31.2 pg      MCHC 33.8 g/dL      RDW 15.4 %      RDW-SD 52.5 fl      MPV 8.7 fL      Platelets 186 10*3/mm3     Narrative:      The previously reported  component NRBC is no longer being reported. Previous result was 0.0 /100 WBC (Reference Range: 0.0-0.2 /100 WBC) on 11/30/2023 at 0232 EST.    Scan Slide [[id number]] Collected: 11/30/23 0212    Specimen: Blood Updated: 11/30/23 0346     Scan Slide --     Comment: See Manual Differential Results       Manual Differential [[id number]]  (Abnormal) Collected: 11/30/23 0212    Specimen: Blood Updated: 11/30/23 0346     Neutrophil % 76.0 %      Lymphocyte % 4.0 %      Monocyte % 7.0 %      Bands %  8.0 %      Metamyelocyte % 4.0 %      Atypical Lymphocyte % 1.0 %      Neutrophils Absolute 13.10 10*3/mm3      Lymphocytes Absolute 0.78 10*3/mm3      Monocytes Absolute 1.09 10*3/mm3      nRBC 1.0 /100 WBC      Dacrocytes Slight/1+     Poikilocytes Slight/1+     WBC Morphology Normal     Platelet Estimate Adequate     Large Platelets Slight/1+    Basic Metabolic Panel [[id number]]  (Abnormal) Collected: 11/30/23 0212    Specimen: Blood Updated: 11/30/23 0254     Glucose 121 mg/dL      BUN 33 mg/dL      Creatinine 0.77 mg/dL      Sodium 139 mmol/L      Potassium 4.7 mmol/L      Chloride 101 mmol/L      CO2 30.0 mmol/L      Calcium 9.6 mg/dL      BUN/Creatinine Ratio 42.9     Anion Gap 8.0 mmol/L      eGFR 100.0 mL/min/1.73     Narrative:      GFR Normal >60  Chronic Kidney Disease <60  Kidney Failure <15      POC Glucose Once [[id number]]  (Abnormal) Collected: 11/30/23 0143    Specimen: Blood Updated: 11/30/23 0146     Glucose 171 mg/dL      Comment: Serial Number: [id number]:  [id number]       Respiratory Culture - Sputum, Cough [[id number]] Collected: 11/29/23 1654    Specimen: Sputum from Cough Updated: 11/29/23 1852     Respiratory Culture Rejected     Gram Stain Few (2+) WBCs seen      Rare (1+) Epithelial cells seen      Rare (1+) Gram positive cocci      Few (2+) Gram negative bacilli    Narrative:      Specimen rejected due to oropharyngeal contamination. Please reorder and recollect specimen if clinically  necessary.    Respiratory Panel PCR w/COVID-19(SARS-CoV-2) [facility] In-House, NP Swab in UTM/VTM, 2 HR TAT - Swab, Nasopharynx [[id number]]  (Abnormal) Collected: 11/29/23 1109    Specimen: Swab from Nasopharynx Updated: 11/29/23 1213     ADENOVIRUS, PCR Not Detected     Coronavirus 229E Not Detected     Coronavirus HKU1 Not Detected     Coronavirus NL63 Not Detected     Coronavirus OC43 Not Detected     COVID19 Not Detected     Human Metapneumovirus Not Detected     Human Rhinovirus/Enterovirus Not Detected     Influenza A PCR Not Detected     Influenza B PCR Not Detected     Parainfluenza Virus 1 Not Detected     Parainfluenza Virus 2 Not Detected     Parainfluenza Virus 3 Not Detected     Parainfluenza Virus 4 Not Detected     RSV, PCR Detected     Bordetella pertussis pcr Not Detected     Bordetella parapertussis PCR Not Detected     Chlamydophila pneumoniae PCR Not Detected     Mycoplasma pneumo by PCR Not Detected    Narrative:      In the setting of a positive respiratory panel with a viral infection PLUS a negative procalcitonin without other underlying concern for bacterial infection, consider observing off antibiotics or discontinuation of antibiotics and continue supportive care. If the respiratory panel is positive for atypical bacterial infection (Bordetella pertussis, Chlamydophila pneumoniae, or Mycoplasma pneumoniae), consider antibiotic de-escalation to target atypical bacterial infection.    High Sensitivity Troponin T 2Hr [[id number]]  (Normal) Collected: 11/29/23 1114    Specimen: Blood Updated: 11/29/23 1140     HS Troponin T 16 ng/L      Troponin T Delta 1 ng/L     Narrative:      High Sensitive Troponin T Reference Range:  <14.0 ng/L- Negative Female for AMI  <22.0 ng/L- Negative Male for AMI  >=14 - Abnormal Female indicating possible myocardial injury.  >=22 - Abnormal Male indicating possible myocardial injury.   Clinicians would have to utilize clinical acumen, EKG, Troponin,  and serial changes to determine if it is an Acute Myocardial Infarction or myocardial injury due to an underlying chronic condition.                  Imaging Results (Last 24 Hours)       ** No results found for the last 24 hours. **                 I reviewed the patient's new clinical results.    Medication Review:   Scheduled Meds:aspirin, 81 mg, Oral, Daily  bisoprolol-hydrochlorothiazide, 1 tablet, Oral, Daily  budesonide-formoterol, 2 puff, Inhalation, BID - RT  cefTRIAXone, 1,000 mg, Intravenous, Q24H  enoxaparin, 40 mg, Subcutaneous, Daily  famotidine, 40 mg, Oral, Daily  glipizide, 10 mg, Oral, BID AC  guaiFENesin, 1,200 mg, Oral, Q12H  ipratropium-albuterol, 3 mL, Nebulization, 4x Daily - RT  lisinopril, 40 mg, Oral, Q PM  metFORMIN, 1,000 mg, Oral, BID With Meals  methylPREDNISolone sodium succinate, 40 mg, Intravenous, Q12H  mirtazapine, 15 mg, Oral, Nightly  montelukast, 10 mg, Oral, Nightly  roflumilast, 500 mcg, Oral, Daily  senna-docusate sodium, 2 tablet, Oral, BID  sodium chloride, 10 mL, Intravenous, Q12H  theophylline, 300 mg, Oral, Daily      Continuous Infusions:   PRN Meds:.  acetaminophen    senna-docusate sodium **AND** polyethylene glycol **AND** bisacodyl **AND** bisacodyl    Calcium Replacement - Follow Nurse / BPA Driven Protocol    hydrALAZINE    Magnesium Standard Dose Replacement - Follow Nurse / BPA Driven Protocol    nitroglycerin    ondansetron    Phosphorus Replacement - Follow Nurse / BPA Driven Protocol    Potassium Replacement - Follow Nurse / BPA Driven Protocol    [COMPLETED] Insert Peripheral IV **AND** sodium chloride    sodium chloride    sodium chloride     Assessment & Plan       Pneumonia    PNA (pneumonia)  - Rocephin  Leukocytosis - improving  COPD - steroids, bronchodilators, guaifenesin, Singulair, Daliresp, theophylline  Type 2 diabetes-metformin, glipizide  Essential hypertension-lisinopril, bisoprolol  Nicotine abuse-patient declines nicotine patch  Acute  hypoxemic respiratory failure-supplemental oxygen    Will work on weaning oxygen today as tolerated.    Plan for disposition: Home at discharge    Judit Singh MD  11/30/23  11:24 EST

## 2023-11-30 NOTE — CASE MANAGEMENT/SOCIAL WORK
Continued Stay Note  [facility]     Patient Name: Maximino Bhatia  MRN: [id number]  Today's Date: 11/30/2023    Admit Date: 11/29/2023    Plan: Anticipate routine home. Current home O2 through [facility].   Discharge Plan       Row Name 11/30/23 1318       Plan    Plan Anticipate routine home. Current home O2 through [facility].    Plan Comments Barrier to D/C: 10L O2, IV abx, IV steroids.                      Expected Discharge Date and Time       Expected Discharge Date Expected Discharge Time    Dec 2, 2023           Phone communication or documentation only - no physical contact with patient or family.     AJAY MartinezN, RN    [address] [address]    Office: [phone]  Fax: [phone]

## 2023-11-30 NOTE — PLAN OF CARE
Goal Outcome Evaluation:      Pleasant and cooperative with care. Stable on 10L O2. Contact and droplet precautions maintained. VSS. Continuing to monitor.

## 2023-11-30 NOTE — PAYOR COMM NOTE
"Inpatient order 11/29/23    Er admit - hypoxia, treatment for PNA  Patient requiring 10L high flow oxygen with continued tachypnea.   IV Rocephin, IV steroid BID, nebulizer  MD notes and clinical attached.   =======  AUTHORIZATION PENDING:   PLEASE FAX OR CALL DETERMINATION TO CONTACT BELOW:       THANK YOU,    RAYMOND Sosa, RN  Utilization Review  [facility]  Phone: [phone]  Fax: [phone]      NPI: [id number]  Tax ID: [id number]        Maximino Bhatia (63 y.o. Male)       Date of Birth   1960    Social Security Number       Address   [address] [place] [address] [place] [facility]    Home Phone   [phone]    MRN   [id number]       Adventism   None    Marital Status   Single                            Admission Date   11/29/23    Admission Type   Emergency    Admitting Provider   Judit Singh MD    Attending Provider   Judit Singh MD    Department, Room/Bed   [facility] PROGRESS CARE, 2115/1       Discharge Date       Discharge Disposition       Discharge Destination                                 Attending Provider: Judit Singh MD    Allergies: No Known Allergies    Isolation: Contact Drop   Infection: RSV (11/29/23)   Code Status: CPR    Ht: 180.3 cm (71\")   Wt: 89.1 kg (196 lb 6.9 oz)    Admission Cmt: None   Principal Problem: Pneumonia [J18.9]                   Active Insurance as of 11/29/2023       Primary Coverage       Payor Plan Insurance Group Employer/Plan Group    [id number]       Payor Plan Address Payor Plan Phone Number Payor Plan Fax Number Effective Dates    MAIL STOP:   4/8/2020 - None Entered    [address] [address]       [facility] [address]         Subscriber Name Subscriber Birth Date Member ID       MAXIMINO BHATIA 1960 [id number]                     Emergency Contacts        (Rel.) Home Phone Work Phone Mobile Phone    HELENLONNYHA (Friend) [phone] -- --                 History " & Physical        Judit Singh MD at 11/29/23 2805              Patient Care Team:  Jareth Perea PA-C as PCP - General (Physician Assistant)    Chief complaint shortness of breath    Subjective    Patient is a 63 y.o. male with advanced COPD who presents with worsening shortness of breath for several days.  Initial symptoms were runny nose and sore throat.  He then began coughing and becoming more short of breath.  Cough is intermittently productive of clear mucus.  He denies any fever or chills.  He called EMS this morning because he was so short of breath and sats were noted to be in the mid 80s.  He is currently on 10 L of oxygen in the emergency room.  He has tested positive for RSV.  X-ray is concerning for right lower lobe pneumonia.  He is currently smoking.    Review of Systems       History  Past Medical History:   Diagnosis Date    COPD (chronic obstructive pulmonary disease)     Diabetes mellitus     Hypertension      No past surgical history on file.  No family history on file.  Social History     Tobacco Use    Smoking status: Every Day     Packs/day: 1.50     Years: 25.00     Additional pack years: 0.00     Total pack years: 37.50     Types: Cigarettes     Passive exposure: Never    Smokeless tobacco: Never   Vaping Use    Vaping Use: Never used    Passive vaping exposure: Yes   Substance Use Topics    Alcohol use: Yes     Comment: RARE    Drug use: Never     (Not in a hospital admission)    Allergies:  Patient has no known allergies.    Objective    Vital Signs  Temp:  [98.2 °F (36.8 °C)] 98.2 °F (36.8 °C)  Heart Rate:  [88-93] 90  Resp:  [22-24] 22  BP: (109)/(64-70) 109/64     Physical Exam:      General Appearance:    Alert, cooperative, in no acute distress    Head:    Normocephalic, without obvious abnormality, atraumatic   Eyes:            Lids and lashes normal, conjunctivae and sclerae normal, no   icterus, no pallor, corneas clear, PERRLA   Ears:    Ears appear intact with no  abnormalities noted   Throat:   No oral lesions, no thrush, oral mucosa moist   Neck:   No adenopathy, supple, trachea midline, no thyromegaly, no   carotid bruit, no JVD   Lungs:   Diffuse rhonchi    Heart:    Regular rhythm and normal rate, normal S1 and S2, no            murmur, no gallop, no rub, no click   Chest Wall:    No abnormalities observed   Abdomen:     Normal bowel sounds, no masses, no organomegaly, soft        non-tender, non-distended, no guarding, no rebound                tenderness   Extremities:   Moves all extremities well, no edema, no cyanosis, no             redness   Pulses:   Pulses palpable and equal bilaterally   Skin:   No bleeding, bruising or rash   Lymph nodes:   No palpable adenopathy   Neurologic:   Cranial nerves 2 - 12 grossly intact, sensation intact, DTR       present and equal bilaterally       Results Review:     Imaging Results (Last 24 Hours)       Procedure Component Value Units Date/Time    XR Chest 1 View [[id number]] Collected: 11/29/23 0930     Updated: 11/29/23 0932    Narrative:      XR CHEST 1 VW    Date of Exam: 11/29/2023 9:08 AM EST    Indication: soa    Comparison: 12/12/2022    Findings:  Heart size top normal, stable. There is new right basilar airspace disease which may relate to pneumonia. No pneumothorax. No pleural effusion. Mild scarring in the left lung base unchanged. Osseous structures grossly intact.      Impression:      Impression:  New right basilar airspace disease which may relate to pneumonia.        Electronically Signed: Boone Rasheed MD    11/29/2023 9:30 AM EST    Workstation ID: [id number]             Lab Results (last 24 hours)       Procedure Component Value Units Date/Time    Respiratory Panel PCR w/COVID-19(SARS-CoV-2) LESLIE/GALO/TORY/PAD/COR/SARAH In-House, NP Swab in UTM/VTM, 2 HR TAT - Swab, Nasopharynx [[id number]]  (Abnormal) Collected: 11/29/23 1109    Specimen: Swab from Nasopharynx Updated: 11/29/23 1213     ADENOVIRUS, PCR Not Detected      Coronavirus 229E Not Detected     Coronavirus HKU1 Not Detected     Coronavirus NL63 Not Detected     Coronavirus OC43 Not Detected     COVID19 Not Detected     Human Metapneumovirus Not Detected     Human Rhinovirus/Enterovirus Not Detected     Influenza A PCR Not Detected     Influenza B PCR Not Detected     Parainfluenza Virus 1 Not Detected     Parainfluenza Virus 2 Not Detected     Parainfluenza Virus 3 Not Detected     Parainfluenza Virus 4 Not Detected     RSV, PCR Detected     Bordetella pertussis pcr Not Detected     Bordetella parapertussis PCR Not Detected     Chlamydophila pneumoniae PCR Not Detected     Mycoplasma pneumo by PCR Not Detected    Narrative:      In the setting of a positive respiratory panel with a viral infection PLUS a negative procalcitonin without other underlying concern for bacterial infection, consider observing off antibiotics or discontinuation of antibiotics and continue supportive care. If the respiratory panel is positive for atypical bacterial infection (Bordetella pertussis, Chlamydophila pneumoniae, or Mycoplasma pneumoniae), consider antibiotic de-escalation to target atypical bacterial infection.    High Sensitivity Troponin T 2Hr [[id number]]  (Normal) Collected: 11/29/23 1114    Specimen: Blood Updated: 11/29/23 1140     HS Troponin T 16 ng/L      Troponin T Delta 1 ng/L     Narrative:      High Sensitive Troponin T Reference Range:  <14.0 ng/L- Negative Female for AMI  <22.0 ng/L- Negative Male for AMI  >=14 - Abnormal Female indicating possible myocardial injury.  >=22 - Abnormal Male indicating possible myocardial injury.   Clinicians would have to utilize clinical acumen, EKG, Troponin, and serial changes to determine if it is an Acute Myocardial Infarction or myocardial injury due to an underlying chronic condition.         POC Lactate [[id number]]  (Normal) Collected: 11/29/23 1016    Specimen: Blood Updated: 11/29/23 1019     Lactate 0.7 mmol/L      Comment: Serial  Number: [id number]:  [id number]       Blood Culture - Blood, Arm, Right [[id number]] Collected: 11/29/23 1013    Specimen: Blood from Arm, Right Updated: 11/29/23 1015    [place] Draw [[id number]] Collected: 11/29/23 0850    Specimen: Blood Updated: 11/29/23 1000    Narrative:      The following orders were created for panel order [place] Draw.  Procedure                               Abnormality         Status                     ---------                               -----------         ------                     Green Top (Gel)[[id number]]                                  Final result               Lavender Top[[id number]]                                                                Gold Top - SST[[id number]]                                   Final result               Light Blue Top[[id number]]                                   Final result                 Please view results for these tests on the individual orders.    Green Top (Gel) [[id number]] Collected: 11/29/23 0850    Specimen: Blood Updated: 11/29/23 1000     Extra Tube Hold for add-ons.     Comment: Auto resulted.       Gold Top - SST [[id number]] Collected: 11/29/23 0850    Specimen: Blood Updated: 11/29/23 1000     Extra Tube Hold for add-ons.     Comment: Auto resulted.       Light Blue Top [[id number]] Collected: 11/29/23 0850    Specimen: Blood Updated: 11/29/23 1000     Extra Tube Hold for add-ons.     Comment: Auto resulted       CBC & Differential [[id number]]  (Abnormal) Collected: 11/29/23 0850    Specimen: Blood Updated: 11/29/23 0959    Narrative:      The following orders were created for panel order CBC & Differential.  Procedure                               Abnormality         Status                     ---------                               -----------         ------                     CBC Auto Differential[[id number]]        Abnormal            Final result               Scan Slide[[id number]]                                        Final result                 Please view results for these tests on the individual orders.    CBC Auto Differential [[id number]]  (Abnormal) Collected: 11/29/23 0850    Specimen: Blood Updated: 11/29/23 0959     WBC 19.60 10*3/mm3      RBC 4.23 10*6/mm3      Hemoglobin 13.1 g/dL      Hematocrit 39.9 %      MCV 94.3 fL      MCH 31.0 pg      MCHC 32.9 g/dL      RDW 15.3 %      RDW-SD 49.9 fl      MPV 9.6 fL      Platelets 202 10*3/mm3     Narrative:      The previously reported component NRBC is no longer being reported. Previous result was 0.1 /100 WBC (Reference Range: 0.0-0.2 /100 WBC) on 11/29/2023 at 0941 EST.    Scan Slide [[id number]] Collected: 11/29/23 0850    Specimen: Blood Updated: 11/29/23 0959     Scan Slide --     Comment: See Manual Differential Results       Manual Differential [[id number]]  (Abnormal) Collected: 11/29/23 0850    Specimen: Blood Updated: 11/29/23 0959     Neutrophil % 58.0 %      Lymphocyte % 3.0 %      Monocyte % 13.0 %      Bands %  14.0 %      Metamyelocyte % 9.0 %      Atypical Lymphocyte % 3.0 %      Neutrophils Absolute 14.11 10*3/mm3      Lymphocytes Absolute 1.18 10*3/mm3      Monocytes Absolute 2.55 10*3/mm3      RBC Morphology Normal     Vacuolated Neutrophils Slight/1+     Platelet Morphology Normal    Blood Gas, Arterial - [[id number]]  (Abnormal) Collected: 11/29/23 0954    Specimen: Arterial Blood Updated: 11/29/23 0958     Site Right Brachial     Felix's Test N/A     pH, Arterial 7.349 pH units      pCO2, Arterial 46.9 mm Hg      pO2, Arterial 53.6 mm Hg      HCO3, Arterial 25.9 mmol/L      Base Excess, Arterial -0.3 mmol/L      Comment: Serial Number: [id number]:  [id number]        O2 Saturation, Arterial 85.4 %      CO2 Content 27.3 mmol/L      Barometric Pressure for Blood Gas --     Comment: N/A        Modality Cannula     FIO2 40 %      Hemodilution No    Protime-INR [[id number]]  (Normal) Collected: 11/29/23 0850    Specimen: Blood Updated: 11/29/23 0944     Protime  11.4 Seconds      INR 1.05    aPTT [[id number]]  (Abnormal) Collected: 11/29/23 0850    Specimen: Blood Updated: 11/29/23 0944     PTT 30.1 seconds     COVID-19,CEPHEID/LEONEL,COR/TORY/PAD/JUWAN/LAG IN-HOUSE,NP SWAB IN TRANSPORT MEDIA 1 HR TAT, RT-PCR - Swab, Nasopharynx [[id number]]  (Normal) Collected: 11/29/23 0911    Specimen: Swab from Nasopharynx Updated: 11/29/23 0942     COVID19 Not Detected    Narrative:      Fact sheet for providers: https://www.fda.gov/media/090262/download     Fact sheet for patients: https://www.fda.gov/media/433468/download  Fact sheet for providers: https://www.fda.gov/media/099222/download    Fact sheet for patients: https://www.fda.gov/media/823459/download    Test performed by PCR.    Comprehensive Metabolic Panel [[id number]]  (Abnormal) Collected: 11/29/23 0850    Specimen: Blood Updated: 11/29/23 0927     Glucose 155 mg/dL      BUN 25 mg/dL      Creatinine 0.77 mg/dL      Sodium 132 mmol/L      Potassium 4.5 mmol/L      Chloride 94 mmol/L      CO2 26.0 mmol/L      Calcium 9.3 mg/dL      Total Protein 7.0 g/dL      Albumin 3.7 g/dL      ALT (SGPT) 13 U/L      AST (SGOT) 23 U/L      Alkaline Phosphatase 61 U/L      Total Bilirubin 0.4 mg/dL      Globulin 3.3 gm/dL      A/G Ratio 1.1 g/dL      BUN/Creatinine Ratio 32.5     Anion Gap 12.0 mmol/L      eGFR 100.0 mL/min/1.73     Narrative:      GFR Normal >60  Chronic Kidney Disease <60  Kidney Failure <15      BNP [[id number]]  (Normal) Collected: 11/29/23 0850    Specimen: Blood Updated: 11/29/23 0927     proBNP 190.4 pg/mL     Narrative:      This assay is used as an aid in the diagnosis of individuals suspected of having heart failure. It can be used as an aid in the diagnosis of acute decompensated heart failure (ADHF) in patients presenting with signs and symptoms of ADHF to the emergency department (ED). In addition, NT-proBNP of <300 pg/mL indicates ADHF is not likely.    Age Range Result Interpretation  NT-proBNP Concentration  (pg/mL:      <50             Positive            >450                   Gray                 300-450                    Negative             <300    50-75           Positive            >900                  Gray                300-900                  Negative            <300      >75             Positive            >1800                  Gray                300-1800                  Negative            <300    High Sensitivity Troponin T [[id number]]  (Normal) Collected: 11/29/23 0850    Specimen: Blood Updated: 11/29/23 0927     HS Troponin T 15 ng/L     Narrative:      High Sensitive Troponin T Reference Range:  <14.0 ng/L- Negative Female for AMI  <22.0 ng/L- Negative Male for AMI  >=14 - Abnormal Female indicating possible myocardial injury.  >=22 - Abnormal Male indicating possible myocardial injury.   Clinicians would have to utilize clinical acumen, EKG, Troponin, and serial changes to determine if it is an Acute Myocardial Infarction or myocardial injury due to an underlying chronic condition.         Blood Culture - Blood, Arm, Right [[id number]] Collected: 11/29/23 0920    Specimen: Blood from Arm, Right Updated: 11/29/23 0925             I reviewed the patient's new clinical results.    Assessment & Plan      Pneumonia  -Patient has RSV but also has evidence of secondary bacterial pneumonia.  Will continue Rocephin.  Attempt to obtain sputum.  Hypoxemia  COPD-bronchodilators, inhaled steroids, systemic steroids, Mucinex, Daliresp, theophylline  Tobacco abuse-ongoing counseling  Leukocytosis-related to pneumonia  Type 2 diabetes-sliding scale insulin while on steroids; continue glipizide, metformin  Essential hypertension-bisoprolol/hydrochlorothiazide, lisinopril    Lovenox for DVT prophylaxis  Famotidine for stress ulcer prophylaxis    I discussed the patient's findings and my recommendations with patient.     Judit Singh MD  11/29/23  16:33 EST        Electronically signed by Judit Singh MD at 11/29/23  "[id number]          Emergency Department Notes        Ruiz Sandy DO at 11/29/23 4655          Subjective   History of Present Illness  Chief complaint: Patient is a 63-year-old with shortness of breath.  He called the EMS and has been actually short of breath for the last couple of days.  He was discharged from the hospital years ago on home oxygen but he was able to wean himself off completely last few months.  He does have COPD.  He does not have CHF.  He has not had any swelling.  He has had cough.  He feels \"tight and there\".  He is coughing up mucus but no blood.  He has not had fever that he knows of.  His oxygen saturations were in the mid 80s.    Context:    Duration:    Timing:    Severity: Severe    Associated Symptoms:        PCP:  LMP:      Review of Systems   Constitutional:  Positive for fatigue. Negative for fever.   HENT:  Positive for congestion.    Respiratory:  Positive for cough, chest tightness and shortness of breath.    Cardiovascular:  Negative for leg swelling.   Gastrointestinal: Negative.    Genitourinary: Negative.        Past Medical History:   Diagnosis Date    COPD (chronic obstructive pulmonary disease)     Diabetes mellitus     Hypertension        No Known Allergies    No past surgical history on file.    No family history on file.    Social History     Socioeconomic History    Marital status: Single   Tobacco Use    Smoking status: Every Day     Packs/day: 1.50     Years: 25.00     Additional pack years: 0.00     Total pack years: 37.50     Types: Cigarettes     Passive exposure: Never    Smokeless tobacco: Never   Vaping Use    Vaping Use: Never used    Passive vaping exposure: Yes   Substance and Sexual Activity    Alcohol use: Yes     Comment: RARE    Drug use: Never    Sexual activity: Defer           Objective   Physical Exam  Vitals and nursing note reviewed.   Constitutional:       General: He is in acute distress.      Appearance: He is ill-appearing.   HENT:      " Head: Normocephalic and atraumatic.   Cardiovascular:      Rate and Rhythm: Normal rate.   Pulmonary:      Effort: Tachypnea present.      Breath sounds: Decreased breath sounds present.   Abdominal:      Palpations: Abdomen is soft.      Tenderness: There is no abdominal tenderness.   Musculoskeletal:      Right lower leg: No tenderness. No edema.      Left lower leg: No tenderness. No edema.   Skin:     General: Skin is warm and dry.   Neurological:      Mental Status: He is alert and oriented to person, place, and time.   Psychiatric:         Mood and Affect: Mood normal.         Procedures          ED Course      Results for orders placed or performed during the hospital encounter of 11/29/23   COVID-19,CEPHEID/LEONEL,COR/TORY/PAD/JUWAN/LAG IN-HOUSE,NP SWAB IN TRANSPORT MEDIA 1 HR TAT, RT-PCR - Swab, Nasopharynx    Specimen: Nasopharynx; Swab   Result Value Ref Range    COVID19 Not Detected Not Detected - Ref. Range   Comprehensive Metabolic Panel    Specimen: Blood   Result Value Ref Range    Glucose 155 (H) 65 - 99 mg/dL    BUN 25 (H) 8 - 23 mg/dL    Creatinine 0.77 0.76 - 1.27 mg/dL    Sodium 132 (L) 136 - 145 mmol/L    Potassium 4.5 3.5 - 5.2 mmol/L    Chloride 94 (L) 98 - 107 mmol/L    CO2 26.0 22.0 - 29.0 mmol/L    Calcium 9.3 8.6 - 10.5 mg/dL    Total Protein 7.0 6.0 - 8.5 g/dL    Albumin 3.7 3.5 - 5.2 g/dL    ALT (SGPT) 13 1 - 41 U/L    AST (SGOT) 23 1 - 40 U/L    Alkaline Phosphatase 61 39 - 117 U/L    Total Bilirubin 0.4 0.0 - 1.2 mg/dL    Globulin 3.3 gm/dL    A/G Ratio 1.1 g/dL    BUN/Creatinine Ratio 32.5 (H) 7.0 - 25.0    Anion Gap 12.0 5.0 - 15.0 mmol/L    eGFR 100.0 >60.0 mL/min/1.73   Protime-INR    Specimen: Blood   Result Value Ref Range    Protime 11.4 9.6 - 11.7 Seconds    INR 1.05 0.93 - 1.10   aPTT    Specimen: Blood   Result Value Ref Range    PTT 30.1 (L) 61.0 - 76.5 seconds   BNP    Specimen: Blood   Result Value Ref Range    proBNP 190.4 0.0 - 900.0 pg/mL   High Sensitivity Troponin T     Specimen: Blood   Result Value Ref Range    HS Troponin T 15 <22 ng/L   CBC Auto Differential    Specimen: Blood   Result Value Ref Range    WBC 19.60 (H) 3.40 - 10.80 10*3/mm3    RBC 4.23 4.14 - 5.80 10*6/mm3    Hemoglobin 13.1 13.0 - 17.7 g/dL    Hematocrit 39.9 37.5 - 51.0 %    MCV 94.3 79.0 - 97.0 fL    MCH 31.0 26.6 - 33.0 pg    MCHC 32.9 31.5 - 35.7 g/dL    RDW 15.3 12.3 - 15.4 %    RDW-SD 49.9 37.0 - 54.0 fl    MPV 9.6 6.0 - 12.0 fL    Platelets 202 140 - 450 10*3/mm3   Scan Slide    Specimen: Blood   Result Value Ref Range    Scan Slide     Manual Differential    Specimen: Blood   Result Value Ref Range    Neutrophil % 58.0 42.7 - 76.0 %    Lymphocyte % 3.0 (L) 19.6 - 45.3 %    Monocyte % 13.0 (H) 5.0 - 12.0 %    Bands %  14.0 (H) 0.0 - 5.0 %    Metamyelocyte % 9.0 (H) 0.0 - 0.0 %    Atypical Lymphocyte % 3.0 0.0 - 5.0 %    Neutrophils Absolute 14.11 (H) 1.70 - 7.00 10*3/mm3    Lymphocytes Absolute 1.18 0.70 - 3.10 10*3/mm3    Monocytes Absolute 2.55 (H) 0.10 - 0.90 10*3/mm3    RBC Morphology Normal Normal    Vacuolated Neutrophils Slight/1+ None Seen    Platelet Morphology Normal Normal   Blood Gas, Arterial -    Specimen: Arterial Blood   Result Value Ref Range    Site Right Brachial     Felix's Test N/A     pH, Arterial 7.349 (L) 7.350 - 7.450 pH units    pCO2, Arterial 46.9 35.0 - 48.0 mm Hg    pO2, Arterial 53.6 (L) 83.0 - 108.0 mm Hg    HCO3, Arterial 25.9 21.0 - 28.0 mmol/L    Base Excess, Arterial -0.3 (L) 0.0 - 3.0 mmol/L    O2 Saturation, Arterial 85.4 (L) 94.0 - 98.0 %    CO2 Content 27.3 22 - 29 mmol/L    Barometric Pressure for Blood Gas      Modality Cannula     FIO2 40 %    Hemodilution No    POC Lactate    Specimen: Blood   Result Value Ref Range    Lactate 0.7 0.3 - 2.0 mmol/L   ECG 12 Lead Dyspnea   Result Value Ref Range    QT Interval 339 ms    QTC Interval 416 ms   Green Top (Gel)   Result Value Ref Range    Extra Tube Hold for add-ons.    Gold Top - SST   Result Value Ref Range    Extra  Tube Hold for add-ons.    Light Blue Top   Result Value Ref Range    Extra Tube Hold for add-ons.                               XR Chest 1 View    Result Date: 11/29/2023  Impression: New right basilar airspace disease which may relate to pneumonia. Electronically Signed: Boone Rasheed MD  11/29/2023 9:30 AM EST  Workstation ID: [id number]                 Medical Decision Making  Patient was seen and evaluated for the above problem    Differential diagnosis includes but is not limited to pneumonia, COPD exacerbation, pneumothorax, PE    Patient with elevated white count and right lower lobe infiltrate present on x-ray.  Most likely and symptoms consistent with pneumonia.  There is no pneumothorax.  I think less likely to be PE at this point in time with infectious treatment on top of COPD.  Discussed with Dr. Singh who agrees to admit the patient.  As the patient is now significantly oxygen requiring with pneumonia will admit to the hospital.  Critical care time is 42 minutes    Problems Addressed:  Acute respiratory failure with hypoxia: complicated acute illness or injury  Pneumonia of right lower lobe due to infectious organism: complicated acute illness or injury    Amount and/or Complexity of Data Reviewed  Labs: ordered. Decision-making details documented in ED Course.     Details: Labs reviewed by myself  Radiology: ordered and independent interpretation performed.     Details: Chest x-ray reviewed by myself as above  ECG/medicine tests: ordered and independent interpretation performed.     Details: EKG interpreted by myself sinus rhythm rate of 90 left atrial enlargement repolarization abnormality.    Risk  Prescription drug management.  Decision regarding hospitalization.    Critical Care  Total time providing critical care: 42 minutes        Final diagnoses:   None     Pneumonia  Acute hypoxemic respiratory failure  ED Disposition  ED Disposition       None            No follow-up provider specified.        Medication List      No changes were made to your prescriptions during this visit.            Ruiz Sandy DO  11/29/23 1107      Electronically signed by Ruiz Sandy DO at 11/29/23 1107       Vital Signs (last day)       Date/Time Temp Temp src Pulse Resp BP Patient Position SpO2    11/30/23 0934 98.8 (37.1) Oral 92 26 125/69 Lying 92    11/30/23 0645 -- -- 93 22 -- -- 97    11/30/23 0640 -- -- 90 24 -- -- 95    11/30/23 0639 -- -- 91 22 -- -- 93    11/30/23 0635 -- -- 90 24 -- -- 93    11/30/23 0540 97.8 (36.6) Axillary 85 30 113/71 Lying 97    11/30/23 0300 -- -- 89 -- 110/57 -- 97    11/30/23 0126 98 (36.7) Oral 97 12 137/70 Lying 94    11/30/23 0100 -- -- 90 -- 122/71 -- 95    11/29/23 2300 -- -- 88 -- 109/60 -- 95    11/29/23 20:21:41 -- -- 102 20 90/51 -- 98    11/29/23 1914 -- -- 91 16 108/53 -- 94    11/29/23 1907 -- -- 96 16 -- -- 94    11/29/23 1906 -- -- 94 16 -- -- 94    11/29/23 1902 -- -- 94 16 -- -- 93    11/29/23 1646 98.2 (36.8) -- 87 20 109/54 -- --    11/29/23 1618 -- -- 85 -- 100/56 -- 92    11/29/23 1548 -- -- 85 -- 100/54 -- 91    11/29/23 1518 -- -- 86 -- 97/50 -- 93    11/29/23 1448 -- -- 88 -- 92/48 -- 90    11/29/23 1418 -- -- 91 -- 96/38 -- 93    11/29/23 1348 -- -- 84 -- 99/48 -- 95    11/29/23 1318 -- -- 89 -- 95/48 -- 94    11/29/23 0956 -- -- -- -- -- -- 92    11/29/23 0953 -- -- 90 22 -- -- 91    11/29/23 0950 -- -- 93 22 -- -- 90    11/29/23 09:11:29 98.2 (36.8) Oral -- -- -- -- --    11/29/23 0839 -- -- 93 -- 109/64 -- 93    11/29/23 0824 98.2 (36.8) -- 88 24 109/70 -- 98          Oxygen Therapy (last day)       Date/Time SpO2 Device (Oxygen Therapy) Flow (L/min) Oxygen Concentration (%) ETCO2 (mmHg)    11/30/23 0934 92 high-flow nasal cannula -- -- --    11/30/23 0645 97 high-flow nasal cannula 10 -- --    11/30/23 0640 95 high-flow nasal cannula 10 -- --    11/30/23 0639 93 high-flow nasal cannula 10 -- --    11/30/23 0635 93 high-flow nasal cannula 10 --  --    11/30/23 0540 97 high-flow nasal cannula 10 -- --    11/30/23 0400 -- humidified;high-flow nasal cannula 10 -- --    11/30/23 0300 97 -- -- -- --    11/30/23 0200 -- humidified;high-flow nasal cannula 10 -- --    11/30/23 0126 94 high-flow nasal cannula 10 -- --    11/30/23 0100 95 -- 10 -- --    11/29/23 2300 95 -- -- -- --    11/29/23 20:21:41 98 -- -- -- --    11/29/23 1914 94 humidified;high-flow nasal cannula 10 -- --    11/29/23 1907 94 humidified;high-flow nasal cannula 10 -- --    11/29/23 1906 94 humidified;high-flow nasal cannula 10 -- --    11/29/23 1902 93 humidified;high-flow nasal cannula 10 -- --    11/29/23 1618 92 -- -- -- --    11/29/23 1548 91 -- -- -- --    11/29/23 1518 93 -- -- -- --    11/29/23 1448 90 -- -- -- --    11/29/23 1418 93 -- -- -- --    11/29/23 1348 95 -- -- -- --    11/29/23 1318 94 -- -- -- --    11/29/23 0956 92 high-flow nasal cannula 10 -- --    11/29/23 0953 91 nasal cannula 5 -- --    11/29/23 0950 90 nasal cannula 5 -- --    11/29/23 0839 93 nasal cannula 3 -- --    11/29/23 0824 98 nonrebreather mask 10 -- --          Facility-Administered Medications as of 11/30/2023   Medication Dose Route Frequency Provider Last Rate Last Admin    acetaminophen (TYLENOL) tablet 650 mg  650 mg Oral Q4H PRN Judit Singh MD        aspirin EC tablet 81 mg  81 mg Oral Daily Judit Singh MD   81 mg at 11/30/23 0942    sennosides-docusate (PERICOLACE) 8.6-50 MG per tablet 2 tablet  2 tablet Oral BID Judit Singh MD   2 tablet at 11/29/23 2028    And    polyethylene glycol (MIRALAX) packet 17 g  17 g Oral Daily PRN Judit Singh MD        And    bisacodyl (DULCOLAX) EC tablet 5 mg  5 mg Oral Daily PRN Judit Singh MD        And    bisacodyl (DULCOLAX) suppository 10 mg  10 mg Rectal Daily PRN uJdit Singh MD        bisoprolol-hydrochlorothiazide (ZIAC) 10-6.25 MG per tablet 1 tablet  1 tablet Oral Daily Judit Singh MD   1 tablet at 11/30/23 0942    budesonide-formoterol (SYMBICORT)  160-4.5 MCG/ACT inhaler 2 puff  2 puff Inhalation BID - RT Judit Singh MD   2 puff at 11/30/23 0640    Calcium Replacement - Follow Nurse / BPA Driven Protocol   Does not apply PRN Judit Singh MD        cefTRIAXone (ROCEPHIN) 1,000 mg in sodium chloride 0.9 % 100 mL IVPB  1,000 mg Intravenous Q24H Judit Singh MD        [COMPLETED] cefTRIAXone (ROCEPHIN) 2,000 mg in sodium chloride 0.9 % 100 mL IVPB  2,000 mg Intravenous Once HotRuiz mckinley, DO   Stopped at 11/29/23 1120    [COMPLETED] doxycycline (VIBRAMYCIN) 100 mg in sodium chloride 0.9 % 100 mL IVPB  100 mg [facility]ous [facility]   Stopped at 11/29/23 1500    Enoxaparin Sodium (LOVENOX) syringe 40 mg  40 mg Subcutaneous Daily Judit Singh MD        famotidine (PEPCID) tablet 40 mg  40 mg Oral Daily Judit Singh MD   40 mg at 11/30/23 0942    glipizide (GLUCOTROL) tablet 10 mg  10 mg Oral BID AC Judit Singh MD   5 mg at 11/30/23 0826    guaiFENesin (MUCINEX) 12 hr tablet 1,200 mg  1,200 mg Oral Q12H Meeta Ford MD        hydrALAZINE (APRESOLINE) injection 10 mg  10 mg Intravenous Q6H PRN Judit Singh MD        [COMPLETED] ipratropium-albuterol (DUO-NEB) nebulizer solution 3 mL  3 mL Nebulization Once Ruiz Sandy DO   3 mL at 11/29/23 0950    ipratropium-albuterol (DUO-NEB) nebulizer solution 3 mL  3 mL Nebulization 4x Daily - RT Judit Singh MD   3 mL at 11/30/23 0635    lisinopril (PRINIVIL,ZESTRIL) tablet 40 mg  40 mg Oral Q PM Judit Singh MD        Magnesium Standard Dose Replacement - Follow Nurse / BPA Driven Protocol   Does not apply PRN Judit Singh MD        metFORMIN (GLUCOPHAGE) tablet 1,000 mg  1,000 mg Oral BID With Meals Judit Singh MD   1,000 mg at 11/30/23 0826    [COMPLETED] methylPREDNISolone sodium succinate (SOLU-Medrol) injection 125 mg  125 mg Intravenous Once Ruiz Sandy DO   125 mg at 11/29/23 0925    methylPREDNISolone sodium succinate (SOLU-Medrol) injection 40 mg  40 mg  Intravenous Q12H Meeta Ford MD        mirtazapine (REMERON) tablet 15 mg  15 mg Oral Nightly Judit Singh MD   15 mg at 11/29/23 2028    montelukast (SINGULAIR) tablet 10 mg  10 mg Oral Nightly Judit Singh MD   10 mg at 11/29/23 2028    nitroglycerin (NITROSTAT) SL tablet 0.4 mg  0.4 mg Sublingual Q5 Min PRN Judit Singh MD        ondansetron (ZOFRAN) injection 4 mg  4 mg Intravenous Q6H PRN Judit Singh MD        Phosphorus Replacement - Follow Nurse / BPA Driven Protocol   Does not apply PRN Judit Singh MD        Potassium Replacement - Follow Nurse / BPA Driven Protocol   Does not apply PRN Judit Singh MD        roflumilast (DALIRESP) tablet 500 mcg  500 mcg Oral Daily Judit Singh MD   500 mcg at 11/30/23 0942    [COMPLETED] sodium chloride 0.9 % bolus 500 mL  500 mL Intravenous Once Ruiz Sandy,    Stopped at 11/29/23 1744    sodium chloride 0.9 % flush 10 mL  10 mL Intravenous PRN Judit Singh MD        sodium chloride 0.9 % flush 10 mL  10 mL Intravenous Q12H Judit Singh MD   10 mL at 11/30/23 0942    sodium chloride 0.9 % flush 10 mL  10 mL Intravenous PRN Judit Singh MD        sodium chloride 0.9 % infusion 40 mL  40 mL Intravenous PRN Judit Singh MD        theophylline (YULIANA-24) 24 hr capsule 300 mg  300 mg Oral Daily Judit Singh MD   300 mg at 11/30/23 0942     Lab Results (last 24 hours)       Procedure Component Value Units Date/Time    Blood Culture - Blood, Arm, Right [[id number]]  (Normal) Collected: 11/29/23 1013    Specimen: Blood from Arm, Right Updated: 11/30/23 1030     Blood Culture No growth at 24 hours    Blood Culture - Blood, Arm, Right [[id number]]  (Normal) Collected: 11/29/23 0920    Specimen: Blood from Arm, Right Updated: 11/30/23 0930     Blood Culture No growth at 24 hours    CBC & Differential [[id number]]  (Abnormal) Collected: 11/30/23 0212    Specimen: Blood Updated: 11/30/23 0346    Narrative:      The following orders were created for panel order CBC &  Differential.  Procedure                               Abnormality         Status                     ---------                               -----------         ------                     CBC Auto Differential[[id number]]        Abnormal            Final result               Scan Slide[[id number]]                                       Final result                 Please view results for these tests on the individual orders.    CBC Auto Differential [[id number]]  (Abnormal) Collected: 11/30/23 0212    Specimen: Blood Updated: 11/30/23 0346     WBC 15.60 10*3/mm3      RBC 4.17 10*6/mm3      Hemoglobin 13.0 g/dL      Hematocrit 38.4 %      MCV 92.2 fL      MCH 31.2 pg      MCHC 33.8 g/dL      RDW 15.4 %      RDW-SD 52.5 fl      MPV 8.7 fL      Platelets 186 10*3/mm3     Narrative:      The previously reported component NRBC is no longer being reported. Previous result was 0.0 /100 WBC (Reference Range: 0.0-0.2 /100 WBC) on 11/30/2023 at 0232 EST.    Scan Slide [[id number]] Collected: 11/30/23 0212    Specimen: Blood Updated: 11/30/23 0346     Scan Slide --     Comment: See Manual Differential Results       Manual Differential [[id number]]  (Abnormal) Collected: 11/30/23 0212    Specimen: Blood Updated: 11/30/23 0346     Neutrophil % 76.0 %      Lymphocyte % 4.0 %      Monocyte % 7.0 %      Bands %  8.0 %      Metamyelocyte % 4.0 %      Atypical Lymphocyte % 1.0 %      Neutrophils Absolute 13.10 10*3/mm3      Lymphocytes Absolute 0.78 10*3/mm3      Monocytes Absolute 1.09 10*3/mm3      nRBC 1.0 /100 WBC      Dacrocytes Slight/1+     Poikilocytes Slight/1+     WBC Morphology Normal     Platelet Estimate Adequate     Large Platelets Slight/1+    Basic Metabolic Panel [[id number]]  (Abnormal) Collected: 11/30/23 0212    Specimen: Blood Updated: 11/30/23 0254     Glucose 121 mg/dL      BUN 33 mg/dL      Creatinine 0.77 mg/dL      Sodium 139 mmol/L      Potassium 4.7 mmol/L      Chloride 101 mmol/L      CO2 30.0 mmol/L       Calcium 9.6 mg/dL      BUN/Creatinine Ratio 42.9     Anion Gap 8.0 mmol/L      eGFR 100.0 mL/min/1.73     Narrative:      GFR Normal >60  Chronic Kidney Disease <60  Kidney Failure <15      POC Glucose Once [[id number]]  (Abnormal) Collected: 11/30/23 0143    Specimen: Blood Updated: 11/30/23 0146     Glucose 171 mg/dL      Comment: Serial Number: [id number]:  [id number]       Respiratory Culture - Sputum, Cough [[id number]] Collected: 11/29/23 1654    Specimen: Sputum from Cough Updated: 11/29/23 1852     Respiratory Culture Rejected     Gram Stain Few (2+) WBCs seen      Rare (1+) Epithelial cells seen      Rare (1+) Gram positive cocci      Few (2+) Gram negative bacilli    Narrative:      Specimen rejected due to oropharyngeal contamination. Please reorder and recollect specimen if clinically necessary.    Respiratory Panel PCR w/COVID-19(SARS-CoV-2) [facility]O/TORY/[facility]/S[facility]AH In-House, NP Swab in UTM/VTM, 2 HR TAT - Swab, Nasopharynx [[id number]]  (Abnormal) Collected: 11/29/23 1109    Specimen: Swab from Nasopharynx Updated: 11/29/23 1213     ADENOVIRUS, PCR Not Detected     Coronavirus 229E Not Detected     Coronavirus HKU1 Not Detected     Coronavirus NL63 Not Detected     Coronavirus OC43 Not Detected     COVID19 Not Detected     Human Metapneumovirus Not Detected     Human Rhinovirus/Enterovirus Not Detected     Influenza A PCR Not Detected     Influenza B PCR Not Detected     Parainfluenza Virus 1 Not Detected     Parainfluenza Virus 2 Not Detected     Parainfluenza Virus 3 Not Detected     Parainfluenza Virus 4 Not Detected     RSV, PCR Detected     Bordetella pertussis pcr Not Detected     Bordetella parapertussis PCR Not Detected     Chlamydophila pneumoniae PCR Not Detected     Mycoplasma pneumo by PCR Not Detected    Narrative:      In the setting of a positive respiratory panel with a viral infection PLUS a negative procalcitonin without other underlying concern for bacterial infection, consider  observing off antibiotics or discontinuation of antibiotics and continue supportive care. If the respiratory panel is positive for atypical bacterial infection (Bordetella pertussis, Chlamydophila pneumoniae, or Mycoplasma pneumoniae), consider antibiotic de-escalation to target atypical bacterial infection.    High Sensitivity Troponin T 2Hr [[id number]]  (Normal) Collected: 11/29/23 1114    Specimen: Blood Updated: 11/29/23 1140     HS Troponin T 16 ng/L      Troponin T Delta 1 ng/L     Narrative:      High Sensitive Troponin T Reference Range:  <14.0 ng/L- Negative Female for AMI  <22.0 ng/L- Negative Male for AMI  >=14 - Abnormal Female indicating possible myocardial injury.  >=22 - Abnormal Male indicating possible myocardial injury.   Clinicians would have to utilize clinical acumen, EKG, Troponin, and serial changes to determine if it is an Acute Myocardial Infarction or myocardial injury due to an underlying chronic condition.               Imaging Results (Last 48 Hours)       Procedure Component Value Units Date/Time    XR Chest 1 View [[id number]] Collected: 11/29/23 0930     Updated: 11/29/23 0932    Narrative:      XR CHEST 1 VW    Date of Exam: 11/29/2023 9:08 AM EST    Indication: soa    Comparison: 12/12/2022    Findings:  Heart size top normal, stable. There is new right basilar airspace disease which may relate to pneumonia. No pneumothorax. No pleural effusion. Mild scarring in the left lung base unchanged. Osseous structures grossly intact.      Impression:      Impression:  New right basilar airspace disease which may relate to pneumonia.        Electronically Signed: Boone Rasheed MD    11/29/2023 9:30 AM EST    Workstation ID: [id number]             Physician Progress Notes (last 48 hours)        Judit Singh MD at 11/30/23 1124               LOS: 1 day   Patient Care Team:  Jareth Perea PA-C as PCP - General (Physician Assistant)    Subjective     Interval History: Stable  overnight    Patient Complaints: Shortness of breath is improving a little.  He continues on 10 L of oxygen through nasal cannula.  Cough is occasionally productive.  No new problems    History taken from: patient    Review of Systems   Constitutional:  Positive for activity change, appetite change and fatigue. Negative for chills and diaphoresis.   HENT:  Negative for facial swelling.    Eyes:  Negative for visual disturbance.   Respiratory:  Positive for cough and shortness of breath. Negative for wheezing and stridor.    Cardiovascular:  Negative for chest pain, palpitations and leg swelling.   Gastrointestinal:  Negative for abdominal distention, constipation, diarrhea, nausea and vomiting.   Endocrine: Negative for polyuria.   Genitourinary:  Negative for dysuria.   Musculoskeletal:  Negative for gait problem.   Skin:  Negative for rash and wound.   Neurological:  Negative for dizziness and numbness.   Psychiatric/Behavioral:  Negative for confusion.            Objective     Vital Signs  Temp:  [97.8 °F (36.6 °C)-98.8 °F (37.1 °C)] 98.8 °F (37.1 °C)  Heart Rate:  [] 92  Resp:  [12-30] 26  BP: ()/(38-71) 125/69    Physical Exam:     General Appearance:    Alert, cooperative, in no acute distress,   Head:    Normocephalic, without obvious abnormality, atraumatic   Eyes:            Lids and lashes normal, conjunctivae and sclerae normal, no   icterus, no pallor, corneas clear, PERRLA   Ears:    Ears appear intact with no abnormalities noted   Throat:   No oral lesions, no thrush, oral mucosa moist   Neck:   No adenopathy, supple, trachea midline, no thyromegaly, no   carotid bruit, no JVD   Lungs:   Diffuse rhonchi and wheezing    Heart:    Regular rhythm and normal rate, normal S1 and S2, no            murmur, no gallop, no rub, no click   Chest Wall:    No abnormalities observed   Abdomen:     Normal bowel sounds, no masses, no organomegaly, soft        Non-tender non-distended, no guarding,    Extremities:   Moves all extremities well, no edema, no cyanosis, no             Redness   Pulses:   Pulses palpable and equal bilaterally   Skin:   No bleeding, bruising or rash   Lymph nodes:   No palpable adenopathy   Neurologic:   Cranial nerves 2 - 12 grossly intact, sensation intact, DTR       present and equal bilaterally        Results Review:    Lab Results (last 24 hours)       Procedure Component Value Units Date/Time    Blood Culture - Blood, Arm, Right [[id number]]  (Normal) Collected: 11/29/23 1013    Specimen: Blood from Arm, Right Updated: 11/30/23 1030     Blood Culture No growth at 24 hours    Blood Culture - Blood, Arm, Right [[id number]]  (Normal) Collected: 11/29/23 0920    Specimen: Blood from Arm, Right Updated: 11/30/23 0930     Blood Culture No growth at 24 hours    CBC & Differential [[id number]]  (Abnormal) Collected: 11/30/23 0212    Specimen: Blood Updated: 11/30/23 0346    Narrative:      The following orders were created for panel order CBC & Differential.  Procedure                               Abnormality         Status                     ---------                               -----------         ------                     CBC Auto Differential[[id number]]        Abnormal            Final result               Scan Slide[[id number]]                                       Final result                 Please view results for these tests on the individual orders.    CBC Auto Differential [[id number]]  (Abnormal) Collected: 11/30/23 0212    Specimen: Blood Updated: 11/30/23 0346     WBC 15.60 10*3/mm3      RBC 4.17 10*6/mm3      Hemoglobin 13.0 g/dL      Hematocrit 38.4 %      MCV 92.2 fL      MCH 31.2 pg      MCHC 33.8 g/dL      RDW 15.4 %      RDW-SD 52.5 fl      MPV 8.7 fL      Platelets 186 10*3/mm3     Narrative:      The previously reported component NRBC is no longer being reported. Previous result was 0.0 /100 WBC (Reference Range: 0.0-0.2 /100 WBC) on 11/30/2023 at 0232 EST.     Scan Slide [[id number]] Collected: 11/30/23 0212    Specimen: Blood Updated: 11/30/23 0346     Scan Slide --     Comment: See Manual Differential Results       Manual Differential [[id number]]  (Abnormal) Collected: 11/30/23 0212    Specimen: Blood Updated: 11/30/23 0346     Neutrophil % 76.0 %      Lymphocyte % 4.0 %      Monocyte % 7.0 %      Bands %  8.0 %      Metamyelocyte % 4.0 %      Atypical Lymphocyte % 1.0 %      Neutrophils Absolute 13.10 10*3/mm3      Lymphocytes Absolute 0.78 10*3/mm3      Monocytes Absolute 1.09 10*3/mm3      nRBC 1.0 /100 WBC      Dacrocytes Slight/1+     Poikilocytes Slight/1+     WBC Morphology Normal     Platelet Estimate Adequate     Large Platelets Slight/1+    Basic Metabolic Panel [[id number]]  (Abnormal) Collected: 11/30/23 0212    Specimen: Blood Updated: 11/30/23 0254     Glucose 121 mg/dL      BUN 33 mg/dL      Creatinine 0.77 mg/dL      Sodium 139 mmol/L      Potassium 4.7 mmol/L      Chloride 101 mmol/L      CO2 30.0 mmol/L      Calcium 9.6 mg/dL      BUN/Creatinine Ratio 42.9     Anion Gap 8.0 mmol/L      eGFR 100.0 mL/min/1.73     Narrative:      GFR Normal >60  Chronic Kidney Disease <60  Kidney Failure <15      POC Glucose Once [[id number]]  (Abnormal) Collected: 11/30/23 0143    Specimen: Blood Updated: 11/30/23 0146     Glucose 171 mg/dL      Comment: Serial Number: [id number]:  [id number]       Respiratory Culture - Sputum, Cough [[id number]] Collected: 11/29/23 1654    Specimen: Sputum from Cough Updated: 11/29/23 1852     Respiratory Culture Rejected     Gram Stain Few (2+) WBCs seen      Rare (1+) Epithelial cells seen      Rare (1+) Gram positive cocci      Few (2+) Gram negative bacilli    Narrative:      Specimen rejected due to oropharyngeal contamination. Please reorder and recollect specimen if clinically necessary.    Respiratory Panel PCR w/COVID-19(SARS-CoV-2) [facility]/T[facility] In-House, NP Swab in UTM/VTM, 2 HR TAT - Swab, Nasopharynx  [[id number]]  (Abnormal) Collected: 11/29/23 1109    Specimen: Swab from Nasopharynx Updated: 11/29/23 1213     ADENOVIRUS, PCR Not Detected     Coronavirus 229E Not Detected     Coronavirus HKU1 Not Detected     Coronavirus NL63 Not Detected     Coronavirus OC43 Not Detected     COVID19 Not Detected     Human Metapneumovirus Not Detected     Human Rhinovirus/Enterovirus Not Detected     Influenza A PCR Not Detected     Influenza B PCR Not Detected     Parainfluenza Virus 1 Not Detected     Parainfluenza Virus 2 Not Detected     Parainfluenza Virus 3 Not Detected     Parainfluenza Virus 4 Not Detected     RSV, PCR Detected     Bordetella pertussis pcr Not Detected     Bordetella parapertussis PCR Not Detected     Chlamydophila pneumoniae PCR Not Detected     Mycoplasma pneumo by PCR Not Detected    Narrative:      In the setting of a positive respiratory panel with a viral infection PLUS a negative procalcitonin without other underlying concern for bacterial infection, consider observing off antibiotics or discontinuation of antibiotics and continue supportive care. If the respiratory panel is positive for atypical bacterial infection (Bordetella pertussis, Chlamydophila pneumoniae, or Mycoplasma pneumoniae), consider antibiotic de-escalation to target atypical bacterial infection.    High Sensitivity Troponin T 2Hr [[id number]]  (Normal) Collected: 11/29/23 1114    Specimen: Blood Updated: 11/29/23 1140     HS Troponin T 16 ng/L      Troponin T Delta 1 ng/L     Narrative:      High Sensitive Troponin T Reference Range:  <14.0 ng/L- Negative Female for AMI  <22.0 ng/L- Negative Male for AMI  >=14 - Abnormal Female indicating possible myocardial injury.  >=22 - Abnormal Male indicating possible myocardial injury.   Clinicians would have to utilize clinical acumen, EKG, Troponin, and serial changes to determine if it is an Acute Myocardial Infarction or myocardial injury due to an underlying chronic condition.                   Imaging Results (Last 24 Hours)       ** No results found for the last 24 hours. **                 I reviewed the patient's new clinical results.    Medication Review:   Scheduled Meds:aspirin, 81 mg, Oral, Daily  bisoprolol-hydrochlorothiazide, 1 tablet, Oral, Daily  budesonide-formoterol, 2 puff, Inhalation, BID - RT  cefTRIAXone, 1,000 mg, Intravenous, Q24H  enoxaparin, 40 mg, Subcutaneous, Daily  famotidine, 40 mg, Oral, Daily  glipizide, 10 mg, Oral, BID AC  guaiFENesin, 1,200 mg, Oral, Q12H  ipratropium-albuterol, 3 mL, Nebulization, 4x Daily - RT  lisinopril, 40 mg, Oral, Q PM  metFORMIN, 1,000 mg, Oral, BID With Meals  methylPREDNISolone sodium succinate, 40 mg, Intravenous, Q12H  mirtazapine, 15 mg, Oral, Nightly  montelukast, 10 mg, Oral, Nightly  roflumilast, 500 mcg, Oral, Daily  senna-docusate sodium, 2 tablet, Oral, BID  sodium chloride, 10 mL, Intravenous, Q12H  theophylline, 300 mg, Oral, Daily      Continuous Infusions:   PRN Meds:.  acetaminophen    senna-docusate sodium **AND** polyethylene glycol **AND** bisacodyl **AND** bisacodyl    Calcium Replacement - Follow Nurse / BPA Driven Protocol    hydrALAZINE    Magnesium Standard Dose Replacement - Follow Nurse / BPA Driven Protocol    nitroglycerin    ondansetron    Phosphorus Replacement - Follow Nurse / BPA Driven Protocol    Potassium Replacement - Follow Nurse / BPA Driven Protocol    [COMPLETED] Insert Peripheral IV **AND** sodium chloride    sodium chloride    sodium chloride     Assessment & Plan       Pneumonia    PNA (pneumonia)  - Rocephin  Leukocytosis - improving  COPD - steroids, bronchodilators, guaifenesin, Singulair, Daliresp, theophylline  Type 2 diabetes-metformin, glipizide  Essential hypertension-lisinopril, bisoprolol  Nicotine abuse-patient declines nicotine patch  Acute hypoxemic respiratory failure-supplemental oxygen    Will work on weaning oxygen today as tolerated.    Plan for disposition: Home at discharge    Judit  MD Samantha  11/30/23  11:24 EST            Electronically signed by Judit Singh MD at 11/30/23 1129          Consult Notes (last 48 hours)        Meeta Ford MD at 11/30/23 1048        Consult Orders    1. Inpatient Pulmonology Consult [[id number]] ordered by Judit Singh MD at 11/29/23 1633                 Group: [facility]         CONSULT NOTE    Patient Identification:  Maximino Bhatia  63 y.o.  male  1960  [id number]            Requesting physician: Attending physician    Reason for Consultation: Pneumonia      History of Present Illness:  63-year-old male with history of COPD, HTN and DM who presented 11/29/2023 with complaints of several days of progressive shortness of breath, symptoms started with URI symptoms with runny nose and sore throat then started to have productive cough and increased shortness of breath.  He is afebrile and hemodynamically stable but requiring 10 L of oxygen to maintain saturation in the 90s.  Leukocytosis with WBCs 19.6, respiratory panel is positive for RSV    Assessment:  Pneumonia  Hypoxemic respiratory failure: ABG 7.34/46/53  Leukocytosis  RSV infection  COPD with acute exacerbation  HTN  DM  Active tobacco smoker    Recommendations:  Antibiotics: Rocephin  Oxygen supplement and titration to maintain saturation 90 to 95%: Currently requiring 10 L per nasal cannula  Bronchodilators  Inhaled corticosteroids  IV steroids  Daliresp  Theophylline  Smoking cessation counseling  Glucose control  BP controlled  DVT/GI prophylaxis  Check daily labs and correct electrolytes as needed  I personally reviewed the radiological studies      Review of Sytems:  Constitutional: Negative for chills, and fever and positive for malaise/fatigue.   HENT: Nasal congestion and sore throat  Eyes: Negative.    Cardiovascular: Negative.    Respiratory: Positive for cough and shortness of breath.    Skin: Negative.    Musculoskeletal: Negative.    Gastrointestinal: Negative.     Genitourinary: Negative.    Neurological: Negative.    Psychiatric/Behavioral: Negative.    Past Medical History:  Past Medical History:   Diagnosis Date    COPD (chronic obstructive pulmonary disease)     Diabetes mellitus     Hypertension        Past Surgical History:  History reviewed. No pertinent surgical history.     Home Meds:  Medications Prior to Admission   Medication Sig Dispense Refill Last Dose    albuterol sulfate  (90 Base) MCG/ACT inhaler Inhale 2 puffs Every 4 (Four) Hours As Needed for Wheezing. 18 g 0     aspirin 81 MG EC tablet Take 1 tablet by mouth Daily.       bisoprolol-hydrochlorothiazide (ZIAC) 10-6.25 MG per tablet Take 1 tablet by mouth Daily.       glipizide (GLUCOTROL) 10 MG tablet Take 1 tablet by mouth Every Night.       glipizide (GLUCOTROL) 5 MG tablet Take 1 tablet by mouth Every Morning.       ipratropium-albuterol (DUO-NEB) 0.5-2.5 mg/3 ml nebulizer Take 3 mL by nebulization Every 4 (Four) Hours As Needed for Wheezing.       lisinopril (PRINIVIL,ZESTRIL) 40 MG tablet Take 1 tablet by mouth Every Evening.       metFORMIN (GLUCOPHAGE) 1000 MG tablet Take 1 tablet by mouth Every Night.       metFORMIN (GLUCOPHAGE) 500 MG tablet Take 1 tablet by mouth Every Morning.       mirtazapine (REMERON) 15 MG tablet Take 1 tablet by mouth Every Night. 30 tablet 2     montelukast (SINGULAIR) 10 MG tablet Take 1 tablet by mouth Every Night.       Multiple Vitamin (multivitamin) capsule Take 1 capsule by mouth Daily.       roflumilast (DALIRESP) 500 MCG tablet tablet Take 1 tablet by mouth Daily. 30 tablet 2     theophylline (THEODUR) 300 MG 12 hr tablet Take 1 tablet by mouth Daily.          Allergies:  No Known Allergies    Social History:   Social History     Socioeconomic History    Marital status: Single   Tobacco Use    Smoking status: Every Day     Packs/day: 1.50     Years: 25.00     Additional pack years: 0.00     Total pack years: 37.50     Types: Cigarettes     Passive  "exposure: Never    Smokeless tobacco: Never   Vaping Use    Vaping Use: Never used    Passive vaping exposure: Yes   Substance and Sexual Activity    Alcohol use: Yes     Comment: RARE    Drug use: Never    Sexual activity: Defer       Family History:  History reviewed. No pertinent family history.    Physical Exam:  /69 (BP Location: Right arm, Patient Position: Lying)   Pulse 92   Temp 98.8 °F (37.1 °C) (Oral)   Resp 26   Ht 180.3 cm (71\")   Wt 89.1 kg (196 lb 6.9 oz)   SpO2 92%   BMI 27.40 kg/m²  Body mass index is 27.4 kg/m². 92% 89.1 kg (196 lb 6.9 oz)  General Appearance:  Alert   HEENT:  Normocephalic, without obvious abnormality, Conjunctiva/corneas clear,.   Nares normal, no drainage     Neck:  Supple, symmetrical, trachea midline. No JVD.  Lungs /Chest wall: Wheezing and right-sided  rhonchi, respirations unlabored, symmetrical wall movement.     Heart:  Regular rate and rhythm, S1 S2 normal  Abdomen: Soft, non-tender, no masses, no organomegaly.    Extremities: No edema, no clubbing or cyanosis    LABS:  Lab Results   Component Value Date    CALCIUM 9.6 11/30/2023     Results from last 7 days   Lab Units 11/30/23  0212 11/29/23  0850   SODIUM mmol/L 139 132*   POTASSIUM mmol/L 4.7 4.5   CHLORIDE mmol/L 101 94*   CO2 mmol/L 30.0* 26.0   BUN mg/dL 33* 25*   CREATININE mg/dL 0.77 0.77   GLUCOSE mg/dL 121* 155*   CALCIUM mg/dL 9.6 9.3   WBC 10*3/mm3 15.60* 19.60*   HEMOGLOBIN g/dL 13.0 13.1   PLATELETS 10*3/mm3 186 202   ALT (SGPT) U/L  --  13   AST (SGOT) U/L  --  23   PROBNP pg/mL  --  190.4     Lab Results   Component Value Date    TROPONINT 16 11/29/2023     Results from last 7 days   Lab Units 11/29/23  1114 11/29/23  0850   HSTROP T ng/L 16 15     Results from last 7 days   Lab Units 11/29/23  1654 11/29/23  1013 11/29/23  0920   BLOODCX   --  No growth at 24 hours No growth at 24 hours   RESPCX  Rejected  --   --      Results from last 7 days   Lab Units 11/29/23  1016   LACTATE mmol/L 0.7 " "    Results from last 7 days   Lab Units 11/29/23  0954   PH, ARTERIAL pH units 7.349*   PCO2, ARTERIAL mm Hg 46.9   PO2 ART mm Hg 53.6*   O2 SATURATION ART % 85.4*   MODALITY  Cannula     Results from last 7 days   Lab Units 11/29/23  1109   ADENOVIRUS DETECTION BY PCR  Not Detected   CORONAVIRUS 229E  Not Detected   CORONAVIRUS HKU1  Not Detected   CORONAVIRUS NL63  Not Detected   CORONAVIRUS OC43  Not Detected   HUMAN METAPNEUMOVIRUS  Not Detected   HUMAN RHINOVIRUS/ENTEROVIRUS  Not Detected   INFLUENZA B PCR  Not Detected   PARAINFLUENZA 1  Not Detected   PARAINFLUENZA VIRUS 2  Not Detected   PARAINFLUENZA VIRUS 3  Not Detected   PARAINFLUENZA VIRUS 4  Not Detected   BORDETELLA PERTUSSIS PCR  Not Detected   CHLAMYDOPHILA PNEUMONIAE PCR  Not Detected   MYCOPLAMA PNEUMO PCR  Not Detected   INFLUENZA A PCR  Not Detected   RSV, PCR  Detected*     Results from last 7 days   Lab Units 11/29/23  0850   INR  1.05     Results from last 7 days   Lab Units 11/29/23  1654 11/29/23  1013 11/29/23  0920   BLOODCX   --  No growth at 24 hours No growth at 24 hours   RESPCX  Rejected  --   --      No results found for: \"TSH\"  Estimated Creatinine Clearance: 122.1 mL/min (by C-G formula based on SCr of 0.77 mg/dL).         Imaging:  Imaging Results (Last 24 Hours)       ** No results found for the last 24 hours. **              Current Meds:   SCHEDULE  aspirin, 81 mg, Oral, Daily  bisoprolol-hydrochlorothiazide, 1 tablet, Oral, Daily  budesonide-formoterol, 2 puff, Inhalation, BID - RT  cefTRIAXone, 1,000 mg, Intravenous, Q24H  enoxaparin, 40 mg, Subcutaneous, Daily  famotidine, 40 mg, Oral, Daily  glipizide, 10 mg, Oral, BID AC  ipratropium-albuterol, 3 mL, Nebulization, 4x Daily - RT  lisinopril, 40 mg, Oral, Q PM  metFORMIN, 1,000 mg, Oral, BID With Meals  methylPREDNISolone sodium succinate, 60 mg, Intravenous, Q12H  mirtazapine, 15 mg, Oral, Nightly  montelukast, 10 mg, Oral, Nightly  roflumilast, 500 mcg, Oral, " Daily  senna-docusate sodium, 2 tablet, Oral, BID  sodium chloride, 10 mL, Intravenous, Q12H  theophylline, 300 mg, Oral, Daily      Infusions     PRNs    acetaminophen    senna-docusate sodium **AND** polyethylene glycol **AND** bisacodyl **AND** bisacodyl    Calcium Replacement - Follow Nurse / BPA Driven Protocol    hydrALAZINE    Magnesium Standard Dose Replacement - Follow Nurse / BPA Driven Protocol    nitroglycerin    ondansetron    Phosphorus Replacement - Follow Nurse / BPA Driven Protocol    Potassium Replacement - Follow Nurse / BPA Driven Protocol    [COMPLETED] Insert Peripheral IV **AND** sodium chloride    sodium chloride    sodium chloride        Meeta Ford MD  11/30/2023  10:49 EST      Much of this encounter note is an electronic transcription/translation of spoken language to printed text using Dragon Software.      Electronically signed by Meeta Ford MD at 11/30/23 1217

## 2023-11-30 NOTE — PLAN OF CARE
Goal Outcome Evaluation:      Pt arrived to PCU around 0126 this morning. A&O x4. On 10L HFNC and tolerating well with O2 sat at 93%. Contact and droplet precautions maintained. Pt resting comfortably with no complaints at this time and with call light in reach. VSS at this time.    D/C plan: Home, Current O2 with [place]     Problem: Adult Inpatient Plan of Care  Goal: Plan of Care Review  Outcome: Ongoing, Progressing  Goal: Patient-Specific Goal (Individualized)  Outcome: Ongoing, Progressing  Goal: Absence of Hospital-Acquired Illness or Injury  Outcome: Ongoing, Progressing  Intervention: Identify and Manage Fall Risk  Recent Flowsheet Documentation  Taken 11/30/2023 0200 by Domingo Lantigua RN  Safety Promotion/Fall Prevention:   safety round/check completed   room organization consistent   nonskid shoes/slippers when out of bed   fall prevention program maintained   clutter free environment maintained   assistive device/personal items within reach   activity supervised  Intervention: Prevent Skin Injury  Recent Flowsheet Documentation  Taken 11/30/2023 0200 by Domingo Lantigua RN  Body Position: position changed independently  Skin Protection:   adhesive use limited   incontinence pads utilized   skin-to-device areas padded   transparent dressing maintained   tubing/devices free from skin contact  Intervention: Prevent and Manage VTE (Venous Thromboembolism) Risk  Recent Flowsheet Documentation  Taken 11/30/2023 0200 by Domingo Lantigua RN  Activity Management: activity encouraged  VTE Prevention/Management: patient refused intervention  Range of Motion: active ROM (range of motion) encouraged  Intervention: Prevent Infection  Recent Flowsheet Documentation  Taken 11/30/2023 0200 by Domingo Lantigua RN  Infection Prevention:   single patient room provided   rest/sleep promoted   personal protective equipment utilized   hand hygiene promoted  Goal: Optimal Comfort and Wellbeing  Outcome: Ongoing,  Progressing  Intervention: Provide Person-Centered Care  Recent Flowsheet Documentation  Taken 11/30/2023 0200 by Domingo Lantigua RN  Trust Relationship/Rapport:   care explained   choices provided   emotional support provided   empathic listening provided   questions answered   questions encouraged   reassurance provided   thoughts/feelings acknowledged  Goal: Readiness for Transition of Care  Outcome: Ongoing, Progressing     Problem: COPD (Chronic Obstructive Pulmonary Disease) Comorbidity  Goal: Maintenance of COPD Symptom Control  Outcome: Ongoing, Progressing  Intervention: Maintain COPD-Symptom Control  Recent Flowsheet Documentation  Taken 11/30/2023 0200 by Domingo Lantigua RN  Medication Review/Management: medications reviewed     Problem: Diabetes Comorbidity  Goal: Blood Glucose Level Within Targeted Range  Outcome: Ongoing, Progressing  Intervention: Monitor and Manage Glycemia  Recent Flowsheet Documentation  Taken 11/30/2023 0200 by Domingo Lantigua RN  Glycemic Management: blood glucose monitored     Problem: Adjustment to Illness (Sepsis/Septic Shock)  Goal: Optimal Coping  Outcome: Ongoing, Progressing  Intervention: Optimize Psychosocial Adjustment to Illness  Recent Flowsheet Documentation  Taken 11/30/2023 0200 by Domingo Lantigua RN  Family/Support System Care: self-care encouraged     Problem: Bleeding (Sepsis/Septic Shock)  Goal: Absence of Bleeding  Outcome: Ongoing, Progressing     Problem: Glycemic Control Impaired (Sepsis/Septic Shock)  Goal: Blood Glucose Level Within Desired Range  Outcome: Ongoing, Progressing  Intervention: Optimize Glycemic Control  Recent Flowsheet Documentation  Taken 11/30/2023 0200 by Domingo Lantigua RN  Glycemic Management: blood glucose monitored     Problem: Infection Progression (Sepsis/Septic Shock)  Goal: Absence of Infection Signs and Symptoms  Outcome: Ongoing, Progressing  Intervention: Initiate Sepsis Management  Recent Flowsheet  Documentation  Taken 11/30/2023 0200 by Domingo Lantigua RN  Infection Prevention:   single patient room provided   rest/sleep promoted   personal protective equipment utilized   hand hygiene promoted  Isolation Precautions:   precautions maintained   contact   droplet  Intervention: Promote Recovery  Recent Flowsheet Documentation  Taken 11/30/2023 0200 by Domingo Lantigua RN  Activity Management: activity encouraged  Sleep/Rest Enhancement:   awakenings minimized   noise level reduced   regular sleep/rest pattern promoted   room darkened  Intervention: Promote Stabilization  Recent Flowsheet Documentation  Taken 11/30/2023 0200 by Domingo Lantigua RN  Fluid/Electrolyte Management: fluids provided     Problem: Nutrition Impaired (Sepsis/Septic Shock)  Goal: Optimal Nutrition Intake  Outcome: Ongoing, Progressing  Intervention: Promote and Optimize Nutrition Delivery  Recent Flowsheet Documentation  Taken 11/30/2023 0200 by Domingo Lantigua RN  Nutrition Support Management: weight trending reviewed

## 2023-12-01 LAB
ANION GAP SERPL CALCULATED.3IONS-SCNC: 4 MMOL/L (ref 5–15)
ANISOCYTOSIS BLD QL: ABNORMAL
BUN SERPL-MCNC: 30 MG/DL (ref 8–23)
BUN/CREAT SERPL: 35.3 (ref 7–25)
CALCIUM SPEC-SCNC: 9.6 MG/DL (ref 8.6–10.5)
CHLORIDE SERPL-SCNC: 101 MMOL/L (ref 98–107)
CO2 SERPL-SCNC: 33 MMOL/L (ref 22–29)
CREAT SERPL-MCNC: 0.85 MG/DL (ref 0.76–1.27)
DEPRECATED RDW RBC AUTO: 50.3 FL (ref 37–54)
DOHLE BOD BLD QL SMEAR: PRESENT
EGFRCR SERPLBLD CKD-EPI 2021: 97 ML/MIN/1.73
ERYTHROCYTE [DISTWIDTH] IN BLOOD BY AUTOMATED COUNT: 15.2 % (ref 12.3–15.4)
GIANT PLATELETS: ABNORMAL
GLUCOSE SERPL-MCNC: 164 MG/DL (ref 65–99)
HCT VFR BLD AUTO: 35.9 % (ref 37.5–51)
HGB BLD-MCNC: 11.8 G/DL (ref 13–17.7)
LARGE PLATELETS: ABNORMAL
LYMPHOCYTES # BLD MANUAL: 0.79 10*3/MM3 (ref 0.7–3.1)
LYMPHOCYTES NFR BLD MANUAL: 8 % (ref 5–12)
MCH RBC QN AUTO: 31.2 PG (ref 26.6–33)
MCHC RBC AUTO-ENTMCNC: 32.9 G/DL (ref 31.5–35.7)
MCV RBC AUTO: 94.9 FL (ref 79–97)
MONOCYTES # BLD: 1.26 10*3/MM3 (ref 0.1–0.9)
NEUTROPHILS # BLD AUTO: 13.75 10*3/MM3 (ref 1.7–7)
NEUTROPHILS NFR BLD MANUAL: 84 % (ref 42.7–76)
NEUTS BAND NFR BLD MANUAL: 3 % (ref 0–5)
PLATELET # BLD AUTO: 246 10*3/MM3 (ref 140–450)
PMV BLD AUTO: 9 FL (ref 6–12)
POIKILOCYTOSIS BLD QL SMEAR: ABNORMAL
POLYCHROMASIA BLD QL SMEAR: ABNORMAL
POTASSIUM SERPL-SCNC: 5.3 MMOL/L (ref 3.5–5.2)
RBC # BLD AUTO: 3.78 10*6/MM3 (ref 4.14–5.8)
SCAN SLIDE: NORMAL
SODIUM SERPL-SCNC: 138 MMOL/L (ref 136–145)
TOXIC GRANULATION: ABNORMAL
VARIANT LYMPHS NFR BLD MANUAL: 1 % (ref 0–5)
VARIANT LYMPHS NFR BLD MANUAL: 4 % (ref 19.6–45.3)
WBC NRBC COR # BLD AUTO: 15.8 10*3/MM3 (ref 3.4–10.8)

## 2023-12-01 PROCEDURE — 94799 UNLISTED PULMONARY SVC/PX: CPT

## 2023-12-01 PROCEDURE — 94761 N-INVAS EAR/PLS OXIMETRY MLT: CPT

## 2023-12-01 PROCEDURE — 25010000002 CEFTRIAXONE PER 250 MG: Performed by: INTERNAL MEDICINE

## 2023-12-01 PROCEDURE — 25010000002 ENOXAPARIN PER 10 MG: Performed by: INTERNAL MEDICINE

## 2023-12-01 PROCEDURE — 25010000002 METHYLPREDNISOLONE PER 40 MG: Performed by: INTERNAL MEDICINE

## 2023-12-01 PROCEDURE — 94664 DEMO&/EVAL PT USE INHALER: CPT

## 2023-12-01 RX ORDER — METHYLPREDNISOLONE SODIUM SUCCINATE 40 MG/ML
40 INJECTION, POWDER, LYOPHILIZED, FOR SOLUTION INTRAMUSCULAR; INTRAVENOUS EVERY 8 HOURS
Status: DISCONTINUED | OUTPATIENT
Start: 2023-12-01 | End: 2023-12-04

## 2023-12-01 RX ADMIN — ENOXAPARIN SODIUM 40 MG: 100 INJECTION SUBCUTANEOUS at 17:40

## 2023-12-01 RX ADMIN — IPRATROPIUM BROMIDE AND ALBUTEROL SULFATE 3 ML: .5; 3 SOLUTION RESPIRATORY (INHALATION) at 16:23

## 2023-12-01 RX ADMIN — MONTELUKAST 10 MG: 10 TABLET, FILM COATED ORAL at 21:10

## 2023-12-01 RX ADMIN — METHYLPREDNISOLONE SODIUM SUCCINATE 40 MG: 40 INJECTION, POWDER, FOR SOLUTION INTRAMUSCULAR; INTRAVENOUS at 21:09

## 2023-12-01 RX ADMIN — ROFLUMILAST 500 MCG: 500 TABLET ORAL at 09:57

## 2023-12-01 RX ADMIN — METHYLPREDNISOLONE SODIUM SUCCINATE 40 MG: 40 INJECTION, POWDER, FOR SOLUTION INTRAMUSCULAR; INTRAVENOUS at 12:14

## 2023-12-01 RX ADMIN — ASPIRIN 81 MG: 81 TABLET, COATED ORAL at 09:57

## 2023-12-01 RX ADMIN — MIRTAZAPINE 15 MG: 15 TABLET, FILM COATED ORAL at 21:10

## 2023-12-01 RX ADMIN — GLIPIZIDE 10 MG: 5 TABLET ORAL at 18:10

## 2023-12-01 RX ADMIN — BISOPROLOL FUMARATE AND HYDROCHLOROTHIAZIDE 1 TABLET: 10; 6.25 TABLET, FILM COATED ORAL at 09:57

## 2023-12-01 RX ADMIN — METFORMIN HYDROCHLORIDE 1000 MG: 500 TABLET ORAL at 18:09

## 2023-12-01 RX ADMIN — IPRATROPIUM BROMIDE AND ALBUTEROL SULFATE 3 ML: .5; 3 SOLUTION RESPIRATORY (INHALATION) at 11:57

## 2023-12-01 RX ADMIN — LISINOPRIL 40 MG: 20 TABLET ORAL at 18:10

## 2023-12-01 RX ADMIN — GUAIFENESIN 1200 MG: 600 TABLET, MULTILAYER, EXTENDED RELEASE ORAL at 21:10

## 2023-12-01 RX ADMIN — IPRATROPIUM BROMIDE AND ALBUTEROL SULFATE 3 ML: .5; 3 SOLUTION RESPIRATORY (INHALATION) at 06:55

## 2023-12-01 RX ADMIN — METFORMIN HYDROCHLORIDE 1000 MG: 500 TABLET ORAL at 07:32

## 2023-12-01 RX ADMIN — CEFTRIAXONE SODIUM 1000 MG: 1 INJECTION, POWDER, FOR SOLUTION INTRAMUSCULAR; INTRAVENOUS at 12:15

## 2023-12-01 RX ADMIN — GUAIFENESIN 1200 MG: 600 TABLET, MULTILAYER, EXTENDED RELEASE ORAL at 09:57

## 2023-12-01 RX ADMIN — THEOPHYLLINE ANHYDROUS 300 MG: 300 CAPSULE, EXTENDED RELEASE ORAL at 09:57

## 2023-12-01 RX ADMIN — GLIPIZIDE 10 MG: 5 TABLET ORAL at 07:32

## 2023-12-01 RX ADMIN — BUDESONIDE AND FORMOTEROL FUMARATE DIHYDRATE 2 PUFF: 160; 4.5 AEROSOL RESPIRATORY (INHALATION) at 07:00

## 2023-12-01 RX ADMIN — Medication 10 ML: at 09:57

## 2023-12-01 RX ADMIN — Medication 10 ML: at 21:10

## 2023-12-01 RX ADMIN — METHYLPREDNISOLONE SODIUM SUCCINATE 40 MG: 40 INJECTION, POWDER, FOR SOLUTION INTRAMUSCULAR; INTRAVENOUS at 00:52

## 2023-12-01 RX ADMIN — IPRATROPIUM BROMIDE AND ALBUTEROL SULFATE 3 ML: .5; 3 SOLUTION RESPIRATORY (INHALATION) at 20:00

## 2023-12-01 RX ADMIN — BUDESONIDE AND FORMOTEROL FUMARATE DIHYDRATE 2 PUFF: 160; 4.5 AEROSOL RESPIRATORY (INHALATION) at 20:01

## 2023-12-01 RX ADMIN — FAMOTIDINE 40 MG: 20 TABLET ORAL at 09:57

## 2023-12-01 NOTE — SIGNIFICANT NOTE
Continued Stay Note   [place]     Patient Name: Maximino Bhatia  MRN: [id number]  Today's Date: 12/1/2023    Admit Date: 11/29/2023    Plan: Plan to return home alone.  Current home O2 with [place].   Discharge Plan       Row Name 12/01/23 1141       Plan    Plan Plan to return home alone.  Current home O2 with [place].    Patient/Family in Agreement with Plan yes    Plan Comments CM spoke with patient's nurse and reviewed chart documentation to obtain clinical updates.  Plan to return home alone. Current home O2 with [place]. DC Barriers:  10L HFNC, IV Abxs/steroids (P)                  Expected Discharge Date and Time       Expected Discharge Date Expected Discharge Time    Dec 2, 2023               RONNY Whitt RN  SIPS/ICU   O: [phone]  C: [phone]  [email or web address]

## 2023-12-01 NOTE — PLAN OF CARE
Goal Outcome Evaluation:      Pt A&O x4. On 10L HFNC and tolerating well. Pt resting comfortably at this time with no complaints and with call light in reach. VSS  at this time. Contact and droplet precautions maintained.     D/C plan: Anticipate routine home. Current home O2 through [place].     Problem: Adult Inpatient Plan of Care  Goal: Plan of Care Review  Outcome: Ongoing, Progressing  Goal: Patient-Specific Goal (Individualized)  Outcome: Ongoing, Progressing  Goal: Absence of Hospital-Acquired Illness or Injury  Outcome: Ongoing, Progressing  Intervention: Identify and Manage Fall Risk  Recent Flowsheet Documentation  Taken 12/1/2023 0200 by Domingo Lantigua RN  Safety Promotion/Fall Prevention:   safety round/check completed   room organization consistent   nonskid shoes/slippers when out of bed   fall prevention program maintained   clutter free environment maintained   assistive device/personal items within reach   activity supervised  Taken 12/1/2023 0000 by Domingo Lantigua RN  Safety Promotion/Fall Prevention:   safety round/check completed   room organization consistent   nonskid shoes/slippers when out of bed   fall prevention program maintained   clutter free environment maintained   assistive device/personal items within reach   activity supervised  Taken 11/30/2023 2000 by Domingo Lantigua RN  Safety Promotion/Fall Prevention:   safety round/check completed   room organization consistent   nonskid shoes/slippers when out of bed   fall prevention program maintained   clutter free environment maintained   assistive device/personal items within reach   activity supervised  Intervention: Prevent Skin Injury  Recent Flowsheet Documentation  Taken 12/1/2023 0000 by Domingo Lantigua RN  Body Position:   position changed independently   side-lying   right  Skin Protection:   adhesive use limited   skin-to-device areas padded   transparent dressing maintained   tubing/devices free from skin  contact  Taken 11/30/2023 2000 by Domingo Lantigua RN  Body Position:   position changed independently   side-lying   right  Skin Protection:   adhesive use limited   skin-to-device areas padded   transparent dressing maintained   tubing/devices free from skin contact  Intervention: Prevent and Manage VTE (Venous Thromboembolism) Risk  Recent Flowsheet Documentation  Taken 12/1/2023 0000 by Domingo Lantigua RN  Activity Management: activity encouraged  Range of Motion: active ROM (range of motion) encouraged  Taken 11/30/2023 2000 by Domingo Lantigua RN  Activity Management: activity encouraged  Range of Motion: active ROM (range of motion) encouraged  Intervention: Prevent Infection  Recent Flowsheet Documentation  Taken 12/1/2023 0200 by Domingo Lantigua RN  Infection Prevention:   single patient room provided   rest/sleep promoted   personal protective equipment utilized   hand hygiene promoted  Taken 12/1/2023 0000 by Domingo aLntigua RN  Infection Prevention:   single patient room provided   rest/sleep promoted   personal protective equipment utilized   hand hygiene promoted  Taken 11/30/2023 2000 by Domingo Lantigua RN  Infection Prevention:   single patient room provided   rest/sleep promoted   personal protective equipment utilized   hand hygiene promoted  Goal: Optimal Comfort and Wellbeing  Outcome: Ongoing, Progressing  Intervention: Provide Person-Centered Care  Recent Flowsheet Documentation  Taken 12/1/2023 0000 by Domingo Lantigua RN  Trust Relationship/Rapport:   care explained   choices provided   emotional support provided   empathic listening provided   questions answered   questions encouraged   reassurance provided   thoughts/feelings acknowledged  Taken 11/30/2023 2000 by Domingo Lantigua RN  Trust Relationship/Rapport:   care explained   choices provided   emotional support provided   empathic listening provided   questions answered   questions encouraged   reassurance  provided   thoughts/feelings acknowledged  Goal: Readiness for Transition of Care  Outcome: Ongoing, Progressing     Problem: COPD (Chronic Obstructive Pulmonary Disease) Comorbidity  Goal: Maintenance of COPD Symptom Control  Outcome: Ongoing, Progressing  Intervention: Maintain COPD-Symptom Control  Recent Flowsheet Documentation  Taken 12/1/2023 0200 by Domingo Lantigua RN  Medication Review/Management: medications reviewed  Taken 12/1/2023 0000 by Domingo Lantigua RN  Supportive Measures:   active listening utilized   verbalization of feelings encouraged  Medication Review/Management: medications reviewed  Taken 11/30/2023 2000 by Domingo Lantigua RN  Supportive Measures:   active listening utilized   verbalization of feelings encouraged  Medication Review/Management: medications reviewed     Problem: Diabetes Comorbidity  Goal: Blood Glucose Level Within Targeted Range  Outcome: Ongoing, Progressing     Problem: Adjustment to Illness (Sepsis/Septic Shock)  Goal: Optimal Coping  Outcome: Ongoing, Progressing  Intervention: Optimize Psychosocial Adjustment to Illness  Recent Flowsheet Documentation  Taken 12/1/2023 0000 by Domingo Lantigua RN  Supportive Measures:   active listening utilized   verbalization of feelings encouraged  Family/Support System Care:   presence promoted   self-care encouraged  Taken 11/30/2023 2000 by Domingo Lantigua RN  Supportive Measures:   active listening utilized   verbalization of feelings encouraged  Family/Support System Care:   presence promoted   self-care encouraged     Problem: Bleeding (Sepsis/Septic Shock)  Goal: Absence of Bleeding  Outcome: Ongoing, Progressing     Problem: Glycemic Control Impaired (Sepsis/Septic Shock)  Goal: Blood Glucose Level Within Desired Range  Outcome: Ongoing, Progressing     Problem: Infection Progression (Sepsis/Septic Shock)  Goal: Absence of Infection Signs and Symptoms  Outcome: Ongoing, Progressing  Intervention: Initiate  Sepsis Management  Recent Flowsheet Documentation  Taken 12/1/2023 0200 by Domingo Lantigua RN  Infection Prevention:   single patient room provided   rest/sleep promoted   personal protective equipment utilized   hand hygiene promoted  Isolation Precautions:   precautions maintained   contact   droplet  Taken 12/1/2023 0000 by Domingo Lantigua RN  Infection Prevention:   single patient room provided   rest/sleep promoted   personal protective equipment utilized   hand hygiene promoted  Isolation Precautions:   precautions maintained   contact   droplet  Taken 11/30/2023 2000 by Domingo Lantigua RN  Infection Prevention:   single patient room provided   rest/sleep promoted   personal protective equipment utilized   hand hygiene promoted  Isolation Precautions:   precautions maintained   contact   droplet  Intervention: Promote Recovery  Recent Flowsheet Documentation  Taken 12/1/2023 0000 by Domingo Lantigua RN  Activity Management: activity encouraged  Sleep/Rest Enhancement:   awakenings minimized   noise level reduced   regular sleep/rest pattern promoted   room darkened  Taken 11/30/2023 2000 by Domingo Lantigua RN  Activity Management: activity encouraged  Sleep/Rest Enhancement:   awakenings minimized   noise level reduced   regular sleep/rest pattern promoted   room darkened  Intervention: Promote Stabilization  Recent Flowsheet Documentation  Taken 12/1/2023 0000 by Domingo Lantigua RN  Fluid/Electrolyte Management: fluids provided  Taken 11/30/2023 2000 by Domingo Lantigua RN  Fluid/Electrolyte Management: fluids provided     Problem: Nutrition Impaired (Sepsis/Septic Shock)  Goal: Optimal Nutrition Intake  Outcome: Ongoing, Progressing     Problem: Fall Injury Risk  Goal: Absence of Fall and Fall-Related Injury  Outcome: Ongoing, Progressing  Intervention: Identify and Manage Contributors  Recent Flowsheet Documentation  Taken 12/1/2023 0200 by Domingo Lantigua RN  Medication  Review/Management: medications reviewed  Taken 12/1/2023 0000 by Domingo Lantigua RN  Medication Review/Management: medications reviewed  Taken 11/30/2023 2000 by Domingo Lantigua RN  Medication Review/Management: medications reviewed  Intervention: Promote Injury-Free Environment  Recent Flowsheet Documentation  Taken 12/1/2023 0200 by Domingo Lantigua RN  Safety Promotion/Fall Prevention:   safety round/check completed   room organization consistent   nonskid shoes/slippers when out of bed   fall prevention program maintained   clutter free environment maintained   assistive device/personal items within reach   activity supervised  Taken 12/1/2023 0000 by Domingo Lantigua RN  Safety Promotion/Fall Prevention:   safety round/check completed   room organization consistent   nonskid shoes/slippers when out of bed   fall prevention program maintained   clutter free environment maintained   assistive device/personal items within reach   activity supervised  Taken 11/30/2023 2000 by Domingo Lantigua RN  Safety Promotion/Fall Prevention:   safety round/check completed   room organization consistent   nonskid shoes/slippers when out of bed   fall prevention program maintained   clutter free environment maintained   assistive device/personal items within reach   activity supervised

## 2023-12-01 NOTE — PROGRESS NOTES
Daily Progress Note          Assessment    Pneumonia  Hypoxemic respiratory failure  Leukocytosis  RSV infection  COPD with acute exacerbation  HTN  DM  Active tobacco smoker     Recommendations:  Patient is still wheezing  Antibiotics: Rocephin  Oxygen supplement and titration to maintain saturation 90 to 95%: Currently requiring 10 L per nasal cannula  Bronchodilators  Inhaled corticosteroids  IV steroids  Daliresp  Theophylline  Smoking cessation counseling  Glucose control  BP controlled  DVT/GI prophylaxis  Check daily labs and correct electrolytes as needed  I personally reviewed the radiological studies             LOS: 2 days     Subjective     Patient reports cough and shortness of breath    Objective     Vital signs for last 24 hours:  Vitals:    12/01/23 0659 12/01/23 0700 12/01/23 0703 12/01/23 0927   BP:    123/63   BP Location:    Left arm   Patient Position:    Sitting   Pulse: 86 83 82 92   Resp: 16 16 16 24   Temp:    98.4 °F (36.9 °C)   TempSrc:    Oral   SpO2: 93% 94% 97% 90%   Weight:       Height:           Intake/Output last 3 shifts:  I/O last 3 completed shifts:  In: 600 [P.O.:600]  Out: 800 [Urine:800]  Intake/Output this shift:  No intake/output data recorded.      Radiology  Imaging Results (Last 24 Hours)       ** No results found for the last 24 hours. **            Labs:  Results from last 7 days   Lab Units 11/30/23  2338   WBC 10*3/mm3 15.80*   HEMOGLOBIN g/dL 11.8*   HEMATOCRIT % 35.9*   PLATELETS 10*3/mm3 246     Results from last 7 days   Lab Units 11/30/23  2338 11/30/23  0212 11/29/23  0850   SODIUM mmol/L 138   < > 132*   POTASSIUM mmol/L 5.3*   < > 4.5   CHLORIDE mmol/L 101   < > 94*   CO2 mmol/L 33.0*   < > 26.0   BUN mg/dL 30*   < > 25*   CREATININE mg/dL 0.85   < > 0.77   CALCIUM mg/dL 9.6   < > 9.3   BILIRUBIN mg/dL  --   --  0.4   ALK PHOS U/L  --   --  61   ALT (SGPT) U/L  --   --  13   AST (SGOT) U/L  --   --  23   GLUCOSE mg/dL 164*   < > 155*    < > = values in this  interval not displayed.     Results from last 7 days   Lab Units 11/29/23  0954   PH, ARTERIAL pH units 7.349*   PO2 ART mm Hg 53.6*   PCO2, ARTERIAL mm Hg 46.9   HCO3 ART mmol/L 25.9     Results from last 7 days   Lab Units 11/29/23  0850   ALBUMIN g/dL 3.7     Results from last 7 days   Lab Units 11/29/23  1114 11/29/23  0850   HSTROP T ng/L 16 15             Results from last 7 days   Lab Units 11/29/23  0850   INR  1.05   APTT seconds 30.1*               Meds:   SCHEDULE  aspirin, 81 mg, Oral, Daily  bisoprolol-hydrochlorothiazide, 1 tablet, Oral, Daily  budesonide-formoterol, 2 puff, Inhalation, BID - RT  cefTRIAXone, 1,000 mg, Intravenous, Q24H  enoxaparin, 40 mg, Subcutaneous, Daily  famotidine, 40 mg, Oral, Daily  glipizide, 10 mg, Oral, BID AC  guaiFENesin, 1,200 mg, Oral, Q12H  ipratropium-albuterol, 3 mL, Nebulization, 4x Daily - RT  lisinopril, 40 mg, Oral, Q PM  metFORMIN, 1,000 mg, Oral, BID With Meals  methylPREDNISolone sodium succinate, 40 mg, Intravenous, Q12H  mirtazapine, 15 mg, Oral, Nightly  montelukast, 10 mg, Oral, Nightly  roflumilast, 500 mcg, Oral, Daily  senna-docusate sodium, 2 tablet, Oral, BID  sodium chloride, 10 mL, Intravenous, Q12H  theophylline, 300 mg, Oral, Daily      Infusions     PRNs    acetaminophen    senna-docusate sodium **AND** polyethylene glycol **AND** bisacodyl **AND** bisacodyl    Calcium Replacement - Follow Nurse / BPA Driven Protocol    hydrALAZINE    Magnesium Standard Dose Replacement - Follow Nurse / BPA Driven Protocol    nitroglycerin    ondansetron    Phosphorus Replacement - Follow Nurse / BPA Driven Protocol    Potassium Replacement - Follow Nurse / BPA Driven Protocol    [COMPLETED] Insert Peripheral IV **AND** sodium chloride    sodium chloride    sodium chloride    Physical Exam:  General Appearance:  Alert   HEENT:  Normocephalic, without obvious abnormality, Conjunctiva/corneas clear,.   Nares normal, no drainage     Neck:  Supple, symmetrical,  trachea midline.   Lungs /Chest wall: Wheezing with bilateral basal rhonchi, respirations unlabored, symmetrical wall movement.     Heart:  Regular rate and rhythm, S1 S2 normal  Abdomen: Soft, non-tender, no masses, no organomegaly.    Extremities: No edema, no clubbing or cyanosis     ROS  Constitutional: Negative for chills, fever and malaise/fatigue.   HENT: Negative.    Eyes: Negative.    Cardiovascular: Negative.    Respiratory: Positive for cough and shortness of breath.    Skin: Negative.    Musculoskeletal: Negative.    Gastrointestinal: Negative.    Genitourinary: Negative.    Neurological: Negative.    Psychiatric/Behavioral: Negative.      I reviewed the recent clinical results  I personally reviewed the latest radiological studies    Part of this note may be an electronic transcription/translation of spoken language to printed text using the Dragon Dictation System.

## 2023-12-01 NOTE — PROGRESS NOTES
LOS: 2 days   Patient Care Team:  Jareth Perea PA-C as PCP - General (Physician Assistant)    Subjective     Patient continues with shortness of breath and denies any other complaints    Review of Systems   Constitutional:  Positive for activity change and fatigue.   HENT: Negative.     Respiratory:  Positive for cough and shortness of breath.    Cardiovascular: Negative.    Gastrointestinal: Negative.    Genitourinary: Negative.    Musculoskeletal: Negative.    Neurological: Negative.    Psychiatric/Behavioral: Negative.             Objective     Vital Signs  Temp:  [97.8 °F (36.6 °C)-98.6 °F (37 °C)] 98.4 °F (36.9 °C)  Heart Rate:  [76-92] 79  Resp:  [14-29] 20  BP: ()/(54-74) 123/63      Physical Exam  Vitals reviewed.   Constitutional:       Appearance: He is ill-appearing.   HENT:      Head: Normocephalic and atraumatic.      Right Ear: External ear normal.      Left Ear: External ear normal.      Nose: Nose normal.      Mouth/Throat:      Mouth: Mucous membranes are moist.   Eyes:      General:         Right eye: No discharge.         Left eye: No discharge.   Cardiovascular:      Rate and Rhythm: Normal rate and regular rhythm.      Pulses: Normal pulses.      Heart sounds: Normal heart sounds.   Pulmonary:      Effort: Pulmonary effort is normal.      Breath sounds: Examination of the right-middle field reveals rhonchi. Examination of the right-lower field reveals rhonchi.   Abdominal:      General: Bowel sounds are normal.      Palpations: Abdomen is soft.   Musculoskeletal:         General: Normal range of motion.      Cervical back: Normal range of motion.   Skin:     General: Skin is warm and dry.   Neurological:      Mental Status: He is alert and oriented to person, place, and time.   Psychiatric:         Behavior: Behavior normal.              Results Review:    Lab Results (last 24 hours)       Procedure Component Value Units Date/Time    Blood Culture - Blood, Arm, Right  [[id number]]  (Normal) Collected: 11/29/23 1013    Specimen: Blood from Arm, Right Updated: 12/01/23 1030     Blood Culture No growth at 2 days    Blood Culture - Blood, Arm, Right [[id number]]  (Normal) Collected: 11/29/23 0920    Specimen: Blood from Arm, Right Updated: 12/01/23 0930     Blood Culture No growth at 2 days    CBC & Differential [[id number]]  (Abnormal) Collected: 11/30/23 2338    Specimen: Blood Updated: 12/01/23 0652    Narrative:      The following orders were created for panel order CBC & Differential.  Procedure                               Abnormality         Status                     ---------                               -----------         ------                     CBC Auto Differential[[id number]]        Abnormal            Final result               Scan Slide[[id number]]                                       Final result                 Please view results for these tests on the individual orders.    CBC Auto Differential [[id number]]  (Abnormal) Collected: 11/30/23 2338    Specimen: Blood Updated: 12/01/23 0652     WBC 15.80 10*3/mm3      RBC 3.78 10*6/mm3      Hemoglobin 11.8 g/dL      Hematocrit 35.9 %      MCV 94.9 fL      MCH 31.2 pg      MCHC 32.9 g/dL      RDW 15.2 %      RDW-SD 50.3 fl      MPV 9.0 fL      Platelets 246 10*3/mm3     Narrative:      The previously reported component NRBC is no longer being reported. Previous result was 0.0 /100 WBC (Reference Range: 0.0-0.2 /100 WBC) on 12/1/2023 at 0045 EST.    Scan Slide [[id number]] Collected: 11/30/23 2338    Specimen: Blood Updated: 12/01/23 0652     Scan Slide --     Comment: See Manual Differential Results       Manual Differential [[id number]]  (Abnormal) Collected: 11/30/23 2338    Specimen: Blood Updated: 12/01/23 0652     Neutrophil % 84.0 %      Lymphocyte % 4.0 %      Monocyte % 8.0 %      Bands %  3.0 %      Atypical Lymphocyte % 1.0 %      Neutrophils Absolute 13.75 10*3/mm3      Lymphocytes Absolute 0.79 10*3/mm3       Monocytes Absolute 1.26 10*3/mm3      Anisocytosis Slight/1+     Poikilocytes Slight/1+     Polychromasia Slight/1+     Dohle Bodies Present     Toxic Granulation Slight/1+     Large Platelets Slight/1+     Giant Platelets Slight/1+    Basic Metabolic Panel [[id number]]  (Abnormal) Collected: 11/30/23 5143    Specimen: Blood Updated: 12/01/23 0054     Glucose 164 mg/dL      BUN 30 mg/dL      Creatinine 0.85 mg/dL      Sodium 138 mmol/L      Potassium 5.3 mmol/L      Chloride 101 mmol/L      CO2 33.0 mmol/L      Calcium 9.6 mg/dL      BUN/Creatinine Ratio 35.3     Anion Gap 4.0 mmol/L      eGFR 97.0 mL/min/1.73     Narrative:      GFR Normal >60  Chronic Kidney Disease <60  Kidney Failure <15               Imaging Results (Last 24 Hours)       ** No results found for the last 24 hours. **                 I reviewed the patient's new clinical results.    Medication Review:   Scheduled Meds:aspirin, 81 mg, Oral, Daily  bisoprolol-hydrochlorothiazide, 1 tablet, Oral, Daily  budesonide-formoterol, 2 puff, Inhalation, BID - RT  cefTRIAXone, 1,000 mg, Intravenous, Q24H  enoxaparin, 40 mg, Subcutaneous, Daily  famotidine, 40 mg, Oral, Daily  glipizide, 10 mg, Oral, BID AC  guaiFENesin, 1,200 mg, Oral, Q12H  ipratropium-albuterol, 3 mL, Nebulization, 4x Daily - RT  lisinopril, 40 mg, Oral, Q PM  metFORMIN, 1,000 mg, Oral, BID With Meals  methylPREDNISolone sodium succinate, 40 mg, Intravenous, Q8H  mirtazapine, 15 mg, Oral, Nightly  montelukast, 10 mg, Oral, Nightly  roflumilast, 500 mcg, Oral, Daily  senna-docusate sodium, 2 tablet, Oral, BID  sodium chloride, 10 mL, Intravenous, Q12H  theophylline, 300 mg, Oral, Daily      Continuous Infusions:   PRN Meds:.  acetaminophen    senna-docusate sodium **AND** polyethylene glycol **AND** bisacodyl **AND** bisacodyl    Calcium Replacement - Follow Nurse / BPA Driven Protocol    hydrALAZINE    Magnesium Standard Dose Replacement - Follow Nurse / BPA Driven Protocol     nitroglycerin    ondansetron    Phosphorus Replacement - Follow Nurse / BPA Driven Protocol    Potassium Replacement - Follow Nurse / BPA Driven Protocol    [COMPLETED] Insert Peripheral IV **AND** sodium chloride    sodium chloride    sodium chloride     Interval History:    Assessment & Plan     Acute hypoxemic respiratory failure   PNA (pneumonia)  RSV  Leukocytosis - improving  COPD    Type 2 diabetes   Essential hypertension   Nicotine abuse-patient declines nicotine patch     Plan of care  Patient continues on 10 liters will work on titrating down; continue home medications and supportive care with antibiotics; pulm following      Plan for disposition:Home    CARA Clark  12/01/23  12:44 EST

## 2023-12-02 LAB
ANION GAP SERPL CALCULATED.3IONS-SCNC: 5 MMOL/L (ref 5–15)
BUN SERPL-MCNC: 22 MG/DL (ref 8–23)
BUN/CREAT SERPL: 27.2 (ref 7–25)
CALCIUM SPEC-SCNC: 10.4 MG/DL (ref 8.6–10.5)
CHLORIDE SERPL-SCNC: 96 MMOL/L (ref 98–107)
CO2 SERPL-SCNC: 36 MMOL/L (ref 22–29)
CREAT SERPL-MCNC: 0.81 MG/DL (ref 0.76–1.27)
DEPRECATED RDW RBC AUTO: 52.9 FL (ref 37–54)
EGFRCR SERPLBLD CKD-EPI 2021: 98.5 ML/MIN/1.73
ERYTHROCYTE [DISTWIDTH] IN BLOOD BY AUTOMATED COUNT: 15.4 % (ref 12.3–15.4)
GLUCOSE SERPL-MCNC: 108 MG/DL (ref 65–99)
HCT VFR BLD AUTO: 38.4 % (ref 37.5–51)
HGB BLD-MCNC: 13.2 G/DL (ref 13–17.7)
LYMPHOCYTES # BLD MANUAL: 1.01 10*3/MM3 (ref 0.7–3.1)
LYMPHOCYTES NFR BLD MANUAL: 3 % (ref 5–12)
MCH RBC QN AUTO: 32 PG (ref 26.6–33)
MCHC RBC AUTO-ENTMCNC: 34.5 G/DL (ref 31.5–35.7)
MCV RBC AUTO: 92.9 FL (ref 79–97)
MONOCYTES # BLD: 0.34 10*3/MM3 (ref 0.1–0.9)
NEUTROPHILS # BLD AUTO: 9.86 10*3/MM3 (ref 1.7–7)
NEUTROPHILS NFR BLD MANUAL: 88 % (ref 42.7–76)
PLATELET # BLD AUTO: 272 10*3/MM3 (ref 140–450)
PMV BLD AUTO: 8.4 FL (ref 6–12)
POTASSIUM SERPL-SCNC: 5.7 MMOL/L (ref 3.5–5.2)
RBC # BLD AUTO: 4.13 10*6/MM3 (ref 4.14–5.8)
RBC MORPH BLD: NORMAL
SCAN SLIDE: NORMAL
SMALL PLATELETS BLD QL SMEAR: ADEQUATE
SODIUM SERPL-SCNC: 137 MMOL/L (ref 136–145)
VARIANT LYMPHS NFR BLD MANUAL: 9 % (ref 19.6–45.3)
WBC MORPH BLD: NORMAL
WBC NRBC COR # BLD AUTO: 11.2 10*3/MM3 (ref 3.4–10.8)

## 2023-12-02 PROCEDURE — 25010000002 FUROSEMIDE PER 20 MG: Performed by: INTERNAL MEDICINE

## 2023-12-02 PROCEDURE — 94664 DEMO&/EVAL PT USE INHALER: CPT

## 2023-12-02 PROCEDURE — 25010000002 ENOXAPARIN PER 10 MG: Performed by: INTERNAL MEDICINE

## 2023-12-02 PROCEDURE — 80048 BASIC METABOLIC PNL TOTAL CA: CPT | Performed by: INTERNAL MEDICINE

## 2023-12-02 PROCEDURE — 94799 UNLISTED PULMONARY SVC/PX: CPT

## 2023-12-02 PROCEDURE — 85007 BL SMEAR W/DIFF WBC COUNT: CPT | Performed by: INTERNAL MEDICINE

## 2023-12-02 PROCEDURE — 25010000002 CEFTRIAXONE PER 250 MG: Performed by: INTERNAL MEDICINE

## 2023-12-02 PROCEDURE — 25010000002 METHYLPREDNISOLONE PER 40 MG: Performed by: INTERNAL MEDICINE

## 2023-12-02 PROCEDURE — 94761 N-INVAS EAR/PLS OXIMETRY MLT: CPT

## 2023-12-02 PROCEDURE — 85025 COMPLETE CBC W/AUTO DIFF WBC: CPT | Performed by: INTERNAL MEDICINE

## 2023-12-02 PROCEDURE — 36415 COLL VENOUS BLD VENIPUNCTURE: CPT | Performed by: INTERNAL MEDICINE

## 2023-12-02 RX ORDER — BISOPROLOL FUMARATE 5 MG/1
10 TABLET, FILM COATED ORAL
Status: DISCONTINUED | OUTPATIENT
Start: 2023-12-03 | End: 2023-12-05 | Stop reason: HOSPADM

## 2023-12-02 RX ORDER — FUROSEMIDE 10 MG/ML
20 INJECTION INTRAMUSCULAR; INTRAVENOUS ONCE
Status: COMPLETED | OUTPATIENT
Start: 2023-12-02 | End: 2023-12-02

## 2023-12-02 RX ORDER — ALBUTEROL SULFATE 2.5 MG/3ML
2.5 SOLUTION RESPIRATORY (INHALATION)
Status: DISCONTINUED | OUTPATIENT
Start: 2023-12-03 | End: 2023-12-05 | Stop reason: HOSPADM

## 2023-12-02 RX ADMIN — SODIUM ZIRCONIUM CYCLOSILICATE 10 G: 10 POWDER, FOR SUSPENSION ORAL at 13:08

## 2023-12-02 RX ADMIN — METFORMIN HYDROCHLORIDE 1000 MG: 500 TABLET ORAL at 17:53

## 2023-12-02 RX ADMIN — BUDESONIDE AND FORMOTEROL FUMARATE DIHYDRATE 2 PUFF: 160; 4.5 AEROSOL RESPIRATORY (INHALATION) at 07:03

## 2023-12-02 RX ADMIN — METHYLPREDNISOLONE SODIUM SUCCINATE 40 MG: 40 INJECTION, POWDER, FOR SOLUTION INTRAMUSCULAR; INTRAVENOUS at 13:09

## 2023-12-02 RX ADMIN — ASPIRIN 81 MG: 81 TABLET, COATED ORAL at 08:48

## 2023-12-02 RX ADMIN — Medication 10 ML: at 20:35

## 2023-12-02 RX ADMIN — THEOPHYLLINE ANHYDROUS 300 MG: 300 CAPSULE, EXTENDED RELEASE ORAL at 08:48

## 2023-12-02 RX ADMIN — METHYLPREDNISOLONE SODIUM SUCCINATE 40 MG: 40 INJECTION, POWDER, FOR SOLUTION INTRAMUSCULAR; INTRAVENOUS at 03:17

## 2023-12-02 RX ADMIN — ROFLUMILAST 500 MCG: 500 TABLET ORAL at 08:48

## 2023-12-02 RX ADMIN — BUDESONIDE AND FORMOTEROL FUMARATE DIHYDRATE 2 PUFF: 160; 4.5 AEROSOL RESPIRATORY (INHALATION) at 19:14

## 2023-12-02 RX ADMIN — CEFTRIAXONE SODIUM 1000 MG: 1 INJECTION, POWDER, FOR SOLUTION INTRAMUSCULAR; INTRAVENOUS at 13:09

## 2023-12-02 RX ADMIN — IPRATROPIUM BROMIDE AND ALBUTEROL SULFATE 3 ML: .5; 3 SOLUTION RESPIRATORY (INHALATION) at 06:58

## 2023-12-02 RX ADMIN — GLIPIZIDE 10 MG: 5 TABLET ORAL at 17:53

## 2023-12-02 RX ADMIN — MONTELUKAST 10 MG: 10 TABLET, FILM COATED ORAL at 20:35

## 2023-12-02 RX ADMIN — GUAIFENESIN 1200 MG: 600 TABLET, MULTILAYER, EXTENDED RELEASE ORAL at 08:48

## 2023-12-02 RX ADMIN — ENOXAPARIN SODIUM 40 MG: 100 INJECTION SUBCUTANEOUS at 17:53

## 2023-12-02 RX ADMIN — FUROSEMIDE 20 MG: 10 INJECTION, SOLUTION INTRAMUSCULAR; INTRAVENOUS at 13:08

## 2023-12-02 RX ADMIN — METFORMIN HYDROCHLORIDE 1000 MG: 500 TABLET ORAL at 08:48

## 2023-12-02 RX ADMIN — LISINOPRIL 40 MG: 20 TABLET ORAL at 17:53

## 2023-12-02 RX ADMIN — DOCUSATE SODIUM 50 MG AND SENNOSIDES 8.6 MG 2 TABLET: 8.6; 5 TABLET, FILM COATED ORAL at 08:48

## 2023-12-02 RX ADMIN — GUAIFENESIN 1200 MG: 600 TABLET, MULTILAYER, EXTENDED RELEASE ORAL at 20:35

## 2023-12-02 RX ADMIN — IPRATROPIUM BROMIDE AND ALBUTEROL SULFATE 3 ML: .5; 3 SOLUTION RESPIRATORY (INHALATION) at 10:50

## 2023-12-02 RX ADMIN — IPRATROPIUM BROMIDE AND ALBUTEROL SULFATE 3 ML: .5; 3 SOLUTION RESPIRATORY (INHALATION) at 15:15

## 2023-12-02 RX ADMIN — BISOPROLOL FUMARATE AND HYDROCHLOROTHIAZIDE 1 TABLET: 10; 6.25 TABLET, FILM COATED ORAL at 08:48

## 2023-12-02 RX ADMIN — MIRTAZAPINE 15 MG: 15 TABLET, FILM COATED ORAL at 20:35

## 2023-12-02 RX ADMIN — IPRATROPIUM BROMIDE AND ALBUTEROL SULFATE 3 ML: .5; 3 SOLUTION RESPIRATORY (INHALATION) at 19:16

## 2023-12-02 RX ADMIN — FAMOTIDINE 40 MG: 20 TABLET ORAL at 08:47

## 2023-12-02 RX ADMIN — GLIPIZIDE 10 MG: 5 TABLET ORAL at 08:48

## 2023-12-02 RX ADMIN — Medication 10 ML: at 08:48

## 2023-12-02 RX ADMIN — METHYLPREDNISOLONE SODIUM SUCCINATE 40 MG: 40 INJECTION, POWDER, FOR SOLUTION INTRAMUSCULAR; INTRAVENOUS at 17:53

## 2023-12-02 NOTE — PROGRESS NOTES
Daily Progress Note          Assessment    Pneumonia  Hypoxemic respiratory failure  Leukocytosis  RSV infection  COPD with acute exacerbation  HTN  DM  Active tobacco smoker     Recommendations:  Patient is still wheezing but the air movement has improved  Antibiotics: Rocephin  Oxygen supplement and titration to maintain saturation 90 to 95%: Currently requiring 6 L per nasal cannula  Bronchodilators  Inhaled corticosteroids  IV steroids  Daliresp  Theophylline  Smoking cessation counseling  Glucose control  BP controlled  DVT/GI prophylaxis  Check daily labs and correct electrolytes as needed  I personally reviewed the radiological studies             LOS: 3 days     Subjective     Patient reports cough and shortness of breath    Objective     Vital signs for last 24 hours:  Vitals:    12/02/23 0705 12/02/23 0956 12/02/23 1050 12/02/23 1056   BP:  118/74     BP Location:  Left arm     Patient Position:  Lying     Pulse: 84 91 86 84   Resp: 16 18 16 16   Temp:  97.6 °F (36.4 °C)     TempSrc:  Oral     SpO2: 97% 92% 93% 96%   Weight:       Height:           Intake/Output last 3 shifts:  I/O last 3 completed shifts:  In: 500 [P.O.:480; I.V.:20]  Out: 2500 [Urine:2500]  Intake/Output this shift:  No intake/output data recorded.      Radiology  Imaging Results (Last 24 Hours)       ** No results found for the last 24 hours. **            Labs:  Results from last 7 days   Lab Units 12/02/23  0138   WBC 10*3/mm3 11.20*   HEMOGLOBIN g/dL 13.2   HEMATOCRIT % 38.4   PLATELETS 10*3/mm3 272     Results from last 7 days   Lab Units 12/02/23  0138 11/30/23  0212 11/29/23  0850   SODIUM mmol/L 137   < > 132*   POTASSIUM mmol/L 5.7*   < > 4.5   CHLORIDE mmol/L 96*   < > 94*   CO2 mmol/L 36.0*   < > 26.0   BUN mg/dL 22   < > 25*   CREATININE mg/dL 0.81   < > 0.77   CALCIUM mg/dL 10.4   < > 9.3   BILIRUBIN mg/dL  --   --  0.4   ALK PHOS U/L  --   --  61   ALT (SGPT) U/L  --   --  13   AST (SGOT) U/L  --   --  23   GLUCOSE mg/dL  108*   < > 155*    < > = values in this interval not displayed.     Results from last 7 days   Lab Units 11/29/23  0954   PH, ARTERIAL pH units 7.349*   PO2 ART mm Hg 53.6*   PCO2, ARTERIAL mm Hg 46.9   HCO3 ART mmol/L 25.9     Results from last 7 days   Lab Units 11/29/23  0850   ALBUMIN g/dL 3.7     Results from last 7 days   Lab Units 11/29/23  1114 11/29/23  0850   HSTROP T ng/L 16 15             Results from last 7 days   Lab Units 11/29/23  0850   INR  1.05   APTT seconds 30.1*               Meds:   SCHEDULE  aspirin, 81 mg, Oral, Daily  [START ON 12/3/2023] bisoprolol, 10 mg, Oral, Q24H  budesonide-formoterol, 2 puff, Inhalation, BID - RT  cefTRIAXone, 1,000 mg, Intravenous, Q24H  enoxaparin, 40 mg, Subcutaneous, Daily  famotidine, 40 mg, Oral, Daily  furosemide, 20 mg, Intravenous, Once  glipizide, 10 mg, Oral, BID AC  guaiFENesin, 1,200 mg, Oral, Q12H  ipratropium-albuterol, 3 mL, Nebulization, 4x Daily - RT  lisinopril, 40 mg, Oral, Q PM  metFORMIN, 1,000 mg, Oral, BID With Meals  methylPREDNISolone sodium succinate, 40 mg, Intravenous, Q8H  mirtazapine, 15 mg, Oral, Nightly  montelukast, 10 mg, Oral, Nightly  roflumilast, 500 mcg, Oral, Daily  senna-docusate sodium, 2 tablet, Oral, BID  sodium chloride, 10 mL, Intravenous, Q12H  sodium zirconium cyclosilicate, 10 g, Oral, Once  theophylline, 300 mg, Oral, Daily      Infusions     PRNs    acetaminophen    senna-docusate sodium **AND** polyethylene glycol **AND** bisacodyl **AND** bisacodyl    Calcium Replacement - Follow Nurse / BPA Driven Protocol    hydrALAZINE    Magnesium Standard Dose Replacement - Follow Nurse / BPA Driven Protocol    nitroglycerin    ondansetron    Phosphorus Replacement - Follow Nurse / BPA Driven Protocol    Potassium Replacement - Follow Nurse / BPA Driven Protocol    [COMPLETED] Insert Peripheral IV **AND** sodium chloride    sodium chloride    sodium chloride    Physical Exam:  General Appearance:  Alert   HEENT:   Normocephalic, without obvious abnormality, Conjunctiva/corneas clear,.   Nares normal, no drainage     Neck:  Supple, symmetrical, trachea midline.   Lungs /Chest wall: Wheezing with bilateral basal rhonchi, respirations unlabored, symmetrical wall movement.     Heart:  Regular rate and rhythm, S1 S2 normal  Abdomen: Soft, non-tender, no masses, no organomegaly.    Extremities: No edema, no clubbing or cyanosis     ROS  Constitutional: Negative for chills, fever and malaise/fatigue.   HENT: Negative.    Eyes: Negative.    Cardiovascular: Negative.    Respiratory: Positive for cough and shortness of breath.    Skin: Negative.    Musculoskeletal: Negative.    Gastrointestinal: Negative.    Genitourinary: Negative.    Neurological: Negative.    Psychiatric/Behavioral: Negative.      I reviewed the recent clinical results  I personally reviewed the latest radiological studies    Part of this note may be an electronic transcription/translation of spoken language to printed text using the Dragon Dictation System.

## 2023-12-02 NOTE — PROGRESS NOTES
LOS: 3 days   Patient Care Team:  Jareth Perea PA-C as PCP - General (Physician Assistant)    Subjective     Interval History: Improving    Patient Complaints: Cough is lessening, less short of breath, tolerating weaning oxygen to 7 L/min    History taken from: patient    Review of Systems   Constitutional:  Positive for activity change and fatigue. Negative for appetite change, chills and diaphoresis.   HENT:  Negative for facial swelling.    Eyes:  Negative for visual disturbance.   Respiratory:  Positive for cough and shortness of breath. Negative for wheezing and stridor.    Cardiovascular:  Negative for chest pain, palpitations and leg swelling.   Gastrointestinal:  Negative for abdominal pain.   Genitourinary:  Negative for dysuria.   Musculoskeletal:  Negative for arthralgias.   Neurological:  Negative for seizures and weakness.   Psychiatric/Behavioral:  Negative for confusion.            Objective     Vital Signs  Temp:  [97.3 °F (36.3 °C)-98.6 °F (37 °C)] 97.6 °F (36.4 °C)  Heart Rate:  [74-91] 84  Resp:  [12-28] 16  BP: (106-129)/(61-76) 118/74    Physical Exam:     General Appearance:    Alert, cooperative, in no acute distress,   Head:    Normocephalic, without obvious abnormality, atraumatic   Eyes:            Lids and lashes normal, conjunctivae and sclerae normal, no   icterus, no pallor, corneas clear, PERRLA   Ears:    Ears appear intact with no abnormalities noted   Throat:   No oral lesions, no thrush, oral mucosa moist   Neck:   No adenopathy, supple, trachea midline, no thyromegaly, no   carotid bruit, no JVD   Lungs:     Clear to auscultation,respirations regular, even and                  unlabored    Heart:    Regular rhythm and normal rate, normal S1 and S2, no            murmur, no gallop, no rub, no click   Chest Wall:    No abnormalities observed   Abdomen:     Normal bowel sounds, no masses, no organomegaly, soft        Non-tender non-distended, no guarding,   Extremities:    Moves all extremities well, no edema, no cyanosis, no             Redness   Pulses:   Pulses palpable and equal bilaterally   Skin:   No bleeding, bruising or rash   Lymph nodes:   No palpable adenopathy   Neurologic:   Cranial nerves 2 - 12 grossly intact, sensation intact, DTR       present and equal bilaterally        Results Review:    Lab Results (last 24 hours)       Procedure Component Value Units Date/Time    Blood Culture - Blood, Arm, Right [[id number]]  (Normal) Collected: 11/29/23 1013    Specimen: Blood from Arm, Right Updated: 12/02/23 1030     Blood Culture No growth at 3 days    Blood Culture - Blood, Arm, Right [[id number]]  (Normal) Collected: 11/29/23 0920    Specimen: Blood from Arm, Right Updated: 12/02/23 0930     Blood Culture No growth at 3 days    CBC & Differential [[id number]]  (Abnormal) Collected: 12/02/23 0138    Specimen: Blood Updated: 12/02/23 0254    Narrative:      The following orders were created for panel order CBC & Differential.  Procedure                               Abnormality         Status                     ---------                               -----------         ------                     CBC Auto Differential[[id number]]        Abnormal            Final result               Scan Slide[[id number]]                                       Final result                 Please view results for these tests on the individual orders.    CBC Auto Differential [[id number]]  (Abnormal) Collected: 12/02/23 0138    Specimen: Blood Updated: 12/02/23 0254     WBC 11.20 10*3/mm3      RBC 4.13 10*6/mm3      Hemoglobin 13.2 g/dL      Hematocrit 38.4 %      MCV 92.9 fL      MCH 32.0 pg      MCHC 34.5 g/dL      RDW 15.4 %      RDW-SD 52.9 fl      MPV 8.4 fL      Platelets 272 10*3/mm3     Narrative:      The previously reported component NRBC is no longer being reported. Previous result was 0.0 /100 WBC (Reference Range: 0.0-0.2 /100 WBC) on 12/2/2023 at 0201 EST.    Scan Slide [[id number]]  Collected: 12/02/23 0138    Specimen: Blood Updated: 12/02/23 0254     Scan Slide --     Comment: See Manual Differential Results       Manual Differential [[id number]]  (Abnormal) Collected: 12/02/23 0138    Specimen: Blood Updated: 12/02/23 0254     Neutrophil % 88.0 %      Lymphocyte % 9.0 %      Monocyte % 3.0 %      Neutrophils Absolute 9.86 10*3/mm3      Lymphocytes Absolute 1.01 10*3/mm3      Monocytes Absolute 0.34 10*3/mm3      RBC Morphology Normal     WBC Morphology Normal     Platelet Estimate Adequate    Basic Metabolic Panel [[id number]]  (Abnormal) Collected: 12/02/23 0138    Specimen: Blood Updated: 12/02/23 0221     Glucose 108 mg/dL      BUN 22 mg/dL      Creatinine 0.81 mg/dL      Sodium 137 mmol/L      Potassium 5.7 mmol/L      Chloride 96 mmol/L      CO2 36.0 mmol/L      Calcium 10.4 mg/dL      BUN/Creatinine Ratio 27.2     Anion Gap 5.0 mmol/L      eGFR 98.5 mL/min/1.73     Narrative:      GFR Normal >60  Chronic Kidney Disease <60  Kidney Failure <15               Imaging Results (Last 24 Hours)       ** No results found for the last 24 hours. **                 I reviewed the patient's new clinical results.    Medication Review:   Scheduled Meds:[START ON 12/3/2023] albuterol, 2.5 mg, Nebulization, 4x Daily - RT  aspirin, 81 mg, Oral, Daily  [START ON 12/3/2023] bisoprolol, 10 mg, Oral, Q24H  budesonide-formoterol, 2 puff, Inhalation, BID - RT  cefTRIAXone, 1,000 mg, Intravenous, Q24H  enoxaparin, 40 mg, Subcutaneous, Daily  famotidine, 40 mg, Oral, Daily  furosemide, 20 mg, Intravenous, Once  glipizide, 10 mg, Oral, BID AC  guaiFENesin, 1,200 mg, Oral, Q12H  ipratropium-albuterol, 3 mL, Nebulization, 4x Daily - RT  lisinopril, 40 mg, Oral, Q PM  metFORMIN, 1,000 mg, Oral, BID With Meals  methylPREDNISolone sodium succinate, 40 mg, Intravenous, Q8H  mirtazapine, 15 mg, Oral, Nightly  montelukast, 10 mg, Oral, Nightly  roflumilast, 500 mcg, Oral, Daily  senna-docusate sodium, 2 tablet, Oral,  BID  sodium chloride, 10 mL, Intravenous, Q12H  sodium zirconium cyclosilicate, 10 g, Oral, Once  theophylline, 300 mg, Oral, Daily  tiotropium bromide monohydrate, 2 puff, Inhalation, Daily - RT      Continuous Infusions:   PRN Meds:.  acetaminophen    senna-docusate sodium **AND** polyethylene glycol **AND** bisacodyl **AND** bisacodyl    Calcium Replacement - Follow Nurse / BPA Driven Protocol    hydrALAZINE    Magnesium Standard Dose Replacement - Follow Nurse / BPA Driven Protocol    nitroglycerin    ondansetron    Phosphorus Replacement - Follow Nurse / BPA Driven Protocol    Potassium Replacement - Follow Nurse / BPA Driven Protocol    [COMPLETED] Insert Peripheral IV **AND** sodium chloride    sodium chloride    sodium chloride     Assessment & Plan       Pneumonia    PNA (pneumonia)   PNA (pneumonia)  - Rocephin for secondary bacterial pneumonia superimposed on RSV infection  Leukocytosis - improving  COPD - steroids, bronchodilators, guaifenesin, Singulair, Daliresp, theophylline  Type 2 diabetes-metformin, glipizide  Essential hypertension-lisinopril, bisoprolol  Nicotine abuse-patient declines nicotine patch  Acute hypoxemic respiratory failure-supplemental oxygen     Will work on weaning oxygen today as tolerated.        Plan for disposition: Home at discharge    Judit Singh MD  12/02/23  12:15 EST

## 2023-12-02 NOTE — PLAN OF CARE
Goal Outcome Evaluation:         Pleasant and cooperative with care. Stable throughout shift. O2 requirements adjusted and patient currently on 6L. VSS. Continuing to monitor.

## 2023-12-02 NOTE — PLAN OF CARE
Goal Outcome Evaluation:  Plan of Care Reviewed With: patient        Progress: improving  Outcome Evaluation: No significant events overnight, pt has some shortness of breath and wheezing with activity. Good urine output via urinal and tolerating PO diet with good appetite. Vitals stable. Weaned to 6L HFNC. No complaints voiced this AM.

## 2023-12-03 LAB
ANION GAP SERPL CALCULATED.3IONS-SCNC: 9 MMOL/L (ref 5–15)
ANISOCYTOSIS BLD QL: ABNORMAL
BUN SERPL-MCNC: 23 MG/DL (ref 8–23)
BUN/CREAT SERPL: 32.4 (ref 7–25)
CALCIUM SPEC-SCNC: 9.5 MG/DL (ref 8.6–10.5)
CHLORIDE SERPL-SCNC: 92 MMOL/L (ref 98–107)
CO2 SERPL-SCNC: 38 MMOL/L (ref 22–29)
CREAT SERPL-MCNC: 0.71 MG/DL (ref 0.76–1.27)
DEPRECATED RDW RBC AUTO: 49.4 FL (ref 37–54)
EGFRCR SERPLBLD CKD-EPI 2021: 102.5 ML/MIN/1.73
ERYTHROCYTE [DISTWIDTH] IN BLOOD BY AUTOMATED COUNT: 15.1 % (ref 12.3–15.4)
GLUCOSE SERPL-MCNC: 98 MG/DL (ref 65–99)
HCT VFR BLD AUTO: 39.1 % (ref 37.5–51)
HGB BLD-MCNC: 13 G/DL (ref 13–17.7)
LARGE PLATELETS: ABNORMAL
LYMPHOCYTES # BLD MANUAL: 0.83 10*3/MM3 (ref 0.7–3.1)
LYMPHOCYTES NFR BLD MANUAL: 12 % (ref 5–12)
MCH RBC QN AUTO: 31.2 PG (ref 26.6–33)
MCHC RBC AUTO-ENTMCNC: 33.2 G/DL (ref 31.5–35.7)
MCV RBC AUTO: 93.9 FL (ref 79–97)
METAMYELOCYTES NFR BLD MANUAL: 4 % (ref 0–0)
MONOCYTES # BLD: 1.67 10*3/MM3 (ref 0.1–0.9)
MYELOCYTES NFR BLD MANUAL: 3 % (ref 0–0)
NEUTROPHILS # BLD AUTO: 10.29 10*3/MM3 (ref 1.7–7)
NEUTROPHILS NFR BLD MANUAL: 71 % (ref 42.7–76)
NEUTS BAND NFR BLD MANUAL: 3 % (ref 0–5)
PATHOLOGY REVIEW: YES
PLATELET # BLD AUTO: 319 10*3/MM3 (ref 140–450)
PMV BLD AUTO: 8.6 FL (ref 6–12)
POTASSIUM SERPL-SCNC: 4.9 MMOL/L (ref 3.5–5.2)
PROMYELOCYTES NFR BLD MANUAL: 1 % (ref 0–0)
RBC # BLD AUTO: 4.16 10*6/MM3 (ref 4.14–5.8)
SCAN SLIDE: NORMAL
SODIUM SERPL-SCNC: 139 MMOL/L (ref 136–145)
TOXIC GRANULATION: ABNORMAL
VARIANT LYMPHS NFR BLD MANUAL: 6 % (ref 19.6–45.3)
WBC NRBC COR # BLD AUTO: 13.9 10*3/MM3 (ref 3.4–10.8)

## 2023-12-03 PROCEDURE — 85007 BL SMEAR W/DIFF WBC COUNT: CPT | Performed by: INTERNAL MEDICINE

## 2023-12-03 PROCEDURE — 94799 UNLISTED PULMONARY SVC/PX: CPT

## 2023-12-03 PROCEDURE — 25010000002 CEFTRIAXONE PER 250 MG: Performed by: INTERNAL MEDICINE

## 2023-12-03 PROCEDURE — 80048 BASIC METABOLIC PNL TOTAL CA: CPT | Performed by: INTERNAL MEDICINE

## 2023-12-03 PROCEDURE — 94664 DEMO&/EVAL PT USE INHALER: CPT

## 2023-12-03 PROCEDURE — 36415 COLL VENOUS BLD VENIPUNCTURE: CPT | Performed by: INTERNAL MEDICINE

## 2023-12-03 PROCEDURE — 25010000002 METHYLPREDNISOLONE PER 40 MG: Performed by: INTERNAL MEDICINE

## 2023-12-03 PROCEDURE — 94761 N-INVAS EAR/PLS OXIMETRY MLT: CPT

## 2023-12-03 PROCEDURE — 85025 COMPLETE CBC W/AUTO DIFF WBC: CPT | Performed by: INTERNAL MEDICINE

## 2023-12-03 PROCEDURE — 25010000002 ENOXAPARIN PER 10 MG: Performed by: INTERNAL MEDICINE

## 2023-12-03 RX ADMIN — IPRATROPIUM BROMIDE AND ALBUTEROL SULFATE 3 ML: .5; 3 SOLUTION RESPIRATORY (INHALATION) at 19:42

## 2023-12-03 RX ADMIN — ENOXAPARIN SODIUM 40 MG: 100 INJECTION SUBCUTANEOUS at 16:31

## 2023-12-03 RX ADMIN — GLIPIZIDE 10 MG: 5 TABLET ORAL at 08:44

## 2023-12-03 RX ADMIN — BISOPROLOL FUMARATE 10 MG: 5 TABLET ORAL at 08:44

## 2023-12-03 RX ADMIN — BUDESONIDE AND FORMOTEROL FUMARATE DIHYDRATE 2 PUFF: 160; 4.5 AEROSOL RESPIRATORY (INHALATION) at 07:25

## 2023-12-03 RX ADMIN — FAMOTIDINE 40 MG: 20 TABLET ORAL at 08:44

## 2023-12-03 RX ADMIN — IPRATROPIUM BROMIDE AND ALBUTEROL SULFATE 3 ML: .5; 3 SOLUTION RESPIRATORY (INHALATION) at 16:57

## 2023-12-03 RX ADMIN — IPRATROPIUM BROMIDE AND ALBUTEROL SULFATE 3 ML: .5; 3 SOLUTION RESPIRATORY (INHALATION) at 07:19

## 2023-12-03 RX ADMIN — THEOPHYLLINE ANHYDROUS 300 MG: 300 CAPSULE, EXTENDED RELEASE ORAL at 08:43

## 2023-12-03 RX ADMIN — MONTELUKAST 10 MG: 10 TABLET, FILM COATED ORAL at 20:11

## 2023-12-03 RX ADMIN — LISINOPRIL 40 MG: 20 TABLET ORAL at 16:31

## 2023-12-03 RX ADMIN — METHYLPREDNISOLONE SODIUM SUCCINATE 40 MG: 40 INJECTION, POWDER, FOR SOLUTION INTRAMUSCULAR; INTRAVENOUS at 02:57

## 2023-12-03 RX ADMIN — METFORMIN HYDROCHLORIDE 1000 MG: 500 TABLET ORAL at 18:59

## 2023-12-03 RX ADMIN — ASPIRIN 81 MG: 81 TABLET, COATED ORAL at 08:44

## 2023-12-03 RX ADMIN — Medication 10 ML: at 20:12

## 2023-12-03 RX ADMIN — METHYLPREDNISOLONE SODIUM SUCCINATE 40 MG: 40 INJECTION, POWDER, FOR SOLUTION INTRAMUSCULAR; INTRAVENOUS at 18:59

## 2023-12-03 RX ADMIN — METFORMIN HYDROCHLORIDE 1000 MG: 500 TABLET ORAL at 08:44

## 2023-12-03 RX ADMIN — MIRTAZAPINE 15 MG: 15 TABLET, FILM COATED ORAL at 20:12

## 2023-12-03 RX ADMIN — GUAIFENESIN 1200 MG: 600 TABLET, MULTILAYER, EXTENDED RELEASE ORAL at 20:11

## 2023-12-03 RX ADMIN — CEFTRIAXONE SODIUM 1000 MG: 1 INJECTION, POWDER, FOR SOLUTION INTRAMUSCULAR; INTRAVENOUS at 10:36

## 2023-12-03 RX ADMIN — ROFLUMILAST 500 MCG: 500 TABLET ORAL at 08:44

## 2023-12-03 RX ADMIN — GUAIFENESIN 1200 MG: 600 TABLET, MULTILAYER, EXTENDED RELEASE ORAL at 08:44

## 2023-12-03 RX ADMIN — IPRATROPIUM BROMIDE AND ALBUTEROL SULFATE 3 ML: .5; 3 SOLUTION RESPIRATORY (INHALATION) at 11:22

## 2023-12-03 RX ADMIN — BUDESONIDE AND FORMOTEROL FUMARATE DIHYDRATE 2 PUFF: 160; 4.5 AEROSOL RESPIRATORY (INHALATION) at 19:47

## 2023-12-03 RX ADMIN — TIOTROPIUM BROMIDE INHALATION SPRAY 2 PUFF: 3.12 SPRAY, METERED RESPIRATORY (INHALATION) at 07:27

## 2023-12-03 RX ADMIN — GLIPIZIDE 10 MG: 5 TABLET ORAL at 16:31

## 2023-12-03 RX ADMIN — Medication 10 ML: at 08:43

## 2023-12-03 RX ADMIN — METHYLPREDNISOLONE SODIUM SUCCINATE 40 MG: 40 INJECTION, POWDER, FOR SOLUTION INTRAMUSCULAR; INTRAVENOUS at 10:35

## 2023-12-03 NOTE — PROGRESS NOTES
Daily Progress Note          Assessment    Pneumonia  Hypoxemic respiratory failure  Leukocytosis  RSV infection  COPD with acute exacerbation  HTN  DM  Active tobacco smoker     Recommendations:  Patient is slowly improving, repeat chest x-ray in a.m.  Antibiotics: Rocephin  Oxygen supplement and titration to maintain saturation 90 to 95%: Currently requiring 8 L per nasal cannula  Bronchodilators  Inhaled corticosteroids  IV steroids  Daliresp  Theophylline  Smoking cessation counseling  Glucose control  BP controlled  DVT/GI prophylaxis  Check daily labs and correct electrolytes as needed  I personally reviewed the radiological studies             LOS: 4 days     Subjective     Patient reports cough and shortness of breath    Objective     Vital signs for last 24 hours:  Vitals:    12/03/23 0727 12/03/23 0730 12/03/23 0731 12/03/23 0921   BP:    113/74   BP Location:    Right arm   Patient Position:    Lying   Pulse: 91 89 91    Resp: 18 18 18 24   Temp:    98.3 °F (36.8 °C)   TempSrc:    Oral   SpO2: 90% (!) 89% (!) 88%    Weight:       Height:           Intake/Output last 3 shifts:  I/O last 3 completed shifts:  In: 980 [P.O.:960; I.V.:20]  Out: 3150 [Urine:3150]  Intake/Output this shift:  I/O this shift:  In: 240 [P.O.:240]  Out: -       Radiology  Imaging Results (Last 24 Hours)       ** No results found for the last 24 hours. **            Labs:  Results from last 7 days   Lab Units 12/02/23 2255   WBC 10*3/mm3 13.90*   HEMOGLOBIN g/dL 13.0   HEMATOCRIT % 39.1   PLATELETS 10*3/mm3 319     Results from last 7 days   Lab Units 12/02/23  2255 11/30/23  0212 11/29/23  0850   SODIUM mmol/L 139   < > 132*   POTASSIUM mmol/L 4.9   < > 4.5   CHLORIDE mmol/L 92*   < > 94*   CO2 mmol/L 38.0*   < > 26.0   BUN mg/dL 23   < > 25*   CREATININE mg/dL 0.71*   < > 0.77   CALCIUM mg/dL 9.5   < > 9.3   BILIRUBIN mg/dL  --   --  0.4   ALK PHOS U/L  --   --  61   ALT (SGPT) U/L  --   --  13   AST (SGOT) U/L  --   --  23    GLUCOSE mg/dL 98   < > 155*    < > = values in this interval not displayed.     Results from last 7 days   Lab Units 11/29/23  0954   PH, ARTERIAL pH units 7.349*   PO2 ART mm Hg 53.6*   PCO2, ARTERIAL mm Hg 46.9   HCO3 ART mmol/L 25.9     Results from last 7 days   Lab Units 11/29/23  0850   ALBUMIN g/dL 3.7     Results from last 7 days   Lab Units 11/29/23  1114 11/29/23  0850   HSTROP T ng/L 16 15             Results from last 7 days   Lab Units 11/29/23  0850   INR  1.05   APTT seconds 30.1*               Meds:   SCHEDULE  albuterol, 2.5 mg, Nebulization, 4x Daily - RT  aspirin, 81 mg, Oral, Daily  bisoprolol, 10 mg, Oral, Q24H  budesonide-formoterol, 2 puff, Inhalation, BID - RT  cefTRIAXone, 1,000 mg, Intravenous, Q24H  enoxaparin, 40 mg, Subcutaneous, Daily  famotidine, 40 mg, Oral, Daily  glipizide, 10 mg, Oral, BID AC  guaiFENesin, 1,200 mg, Oral, Q12H  ipratropium-albuterol, 3 mL, Nebulization, 4x Daily - RT  lisinopril, 40 mg, Oral, Q PM  metFORMIN, 1,000 mg, Oral, BID With Meals  methylPREDNISolone sodium succinate, 40 mg, Intravenous, Q8H  mirtazapine, 15 mg, Oral, Nightly  montelukast, 10 mg, Oral, Nightly  roflumilast, 500 mcg, Oral, Daily  senna-docusate sodium, 2 tablet, Oral, BID  sodium chloride, 10 mL, Intravenous, Q12H  theophylline, 300 mg, Oral, Daily  tiotropium bromide monohydrate, 2 puff, Inhalation, Daily - RT      Infusions     PRNs    acetaminophen    senna-docusate sodium **AND** polyethylene glycol **AND** bisacodyl **AND** bisacodyl    Calcium Replacement - Follow Nurse / BPA Driven Protocol    hydrALAZINE    Magnesium Standard Dose Replacement - Follow Nurse / BPA Driven Protocol    nitroglycerin    ondansetron    Phosphorus Replacement - Follow Nurse / BPA Driven Protocol    Potassium Replacement - Follow Nurse / BPA Driven Protocol    [COMPLETED] Insert Peripheral IV **AND** sodium chloride    sodium chloride    sodium chloride    Physical Exam:  General Appearance:  Alert    HEENT:  Normocephalic, without obvious abnormality, Conjunctiva/corneas clear,.   Nares normal, no drainage     Neck:  Supple, symmetrical, trachea midline.   Lungs /Chest wall: Wheezing with bilateral basal rhonchi, respirations unlabored, symmetrical wall movement.     Heart:  Regular rate and rhythm, S1 S2 normal  Abdomen: Soft, non-tender, no masses, no organomegaly.    Extremities: No edema, no clubbing or cyanosis     ROS  Constitutional: Negative for chills, fever and malaise/fatigue.   HENT: Negative.    Eyes: Negative.    Cardiovascular: Negative.    Respiratory: Positive for cough and shortness of breath.    Skin: Negative.    Musculoskeletal: Negative.    Gastrointestinal: Negative.    Genitourinary: Negative.    Neurological: Negative.    Psychiatric/Behavioral: Negative.      I reviewed the recent clinical results  I personally reviewed the latest radiological studies    Part of this note may be an electronic transcription/translation of spoken language to printed text using the Dragon Dictation System.

## 2023-12-03 NOTE — PLAN OF CARE
Goal Outcome Evaluation:  Plan of Care Reviewed With: patient        Progress: no change  Outcome Evaluation: Continues on 6-8L HFNC, no significant changes overnight. No complaints voiced this AM. Will monitor. JOSE Hawthorne RN

## 2023-12-03 NOTE — PROGRESS NOTES
LOS: 4 days   Patient Care Team:  Jareth Perea PA-C as PCP - General (Physician Assistant)    Subjective     Patient continues with shortness of breath and denies any other complaints    Review of Systems   Constitutional:  Positive for activity change and fatigue.   HENT: Negative.     Respiratory:  Positive for cough and shortness of breath.    Cardiovascular: Negative.    Gastrointestinal: Negative.    Genitourinary: Negative.    Musculoskeletal: Negative.    Neurological: Negative.    Psychiatric/Behavioral: Negative.             Objective     Vital Signs  Temp:  [97.1 °F (36.2 °C)-98.5 °F (36.9 °C)] 97.1 °F (36.2 °C)  Heart Rate:  [71-91] 87  Resp:  [16-28] 19  BP: (103-118)/(74-81) 103/78      Physical Exam  Vitals reviewed.   Constitutional:       Appearance: He is ill-appearing.   HENT:      Head: Normocephalic and atraumatic.      Right Ear: External ear normal.      Left Ear: External ear normal.      Nose: Nose normal.      Mouth/Throat:      Mouth: Mucous membranes are moist.   Eyes:      General:         Right eye: No discharge.         Left eye: No discharge.   Cardiovascular:      Rate and Rhythm: Normal rate and regular rhythm.      Pulses: Normal pulses.      Heart sounds: Normal heart sounds.   Pulmonary:      Effort: Pulmonary effort is normal.      Breath sounds: Examination of the right-middle field reveals rhonchi. Examination of the right-lower field reveals rhonchi.   Abdominal:      General: Bowel sounds are normal.      Palpations: Abdomen is soft.   Musculoskeletal:         General: Normal range of motion.      Cervical back: Normal range of motion.   Skin:     General: Skin is warm and dry.   Neurological:      Mental Status: He is alert and oriented to person, place, and time.   Psychiatric:         Behavior: Behavior normal.              Results Review:    Lab Results (last 24 hours)       Procedure Component Value Units Date/Time    Path Consult Reflex [[id number]]  Collected: 12/02/23 2255    Specimen: Blood Updated: 12/03/23 0202     Pathology Review Yes    CBC & Differential [[id number]]  (Abnormal) Collected: 12/02/23 2255    Specimen: Blood Updated: 12/03/23 0202    Narrative:      The following orders were created for panel order CBC & Differential.  Procedure                               Abnormality         Status                     ---------                               -----------         ------                     CBC Auto Differential[[id number]]        Abnormal            Final result               Scan Slide[[id number]]                                       Final result                 Please view results for these tests on the individual orders.    CBC Auto Differential [[id number]]  (Abnormal) Collected: 12/02/23 2255    Specimen: Blood Updated: 12/03/23 0202     WBC 13.90 10*3/mm3      RBC 4.16 10*6/mm3      Hemoglobin 13.0 g/dL      Hematocrit 39.1 %      MCV 93.9 fL      MCH 31.2 pg      MCHC 33.2 g/dL      RDW 15.1 %      RDW-SD 49.4 fl      MPV 8.6 fL      Platelets 319 10*3/mm3     Narrative:      The previously reported component NRBC is no longer being reported. Previous result was 0.0 /100 WBC (Reference Range: 0.0-0.2 /100 WBC) on 12/3/2023 at 0031 EST.    Scan Slide [[id number]] Collected: 12/02/23 2255    Specimen: Blood Updated: 12/03/23 0202     Scan Slide --     Comment: See Manual Differential Results       Manual Differential [[id number]]  (Abnormal) Collected: 12/02/23 2255    Specimen: Blood Updated: 12/03/23 0202     Neutrophil % 71.0 %      Lymphocyte % 6.0 %      Monocyte % 12.0 %      Bands %  3.0 %      Metamyelocyte % 4.0 %      Myelocyte % 3.0 %      Promyelocyte % 1.0 %      Neutrophils Absolute 10.29 10*3/mm3      Lymphocytes Absolute 0.83 10*3/mm3      Monocytes Absolute 1.67 10*3/mm3      Anisocytosis Slight/1+     Toxic Granulation Slight/1+     Large Platelets Slight/1+    Basic Metabolic Panel [[id number]]  (Abnormal) Collected:  12/02/23 2255    Specimen: Blood Updated: 12/03/23 0040     Glucose 98 mg/dL      BUN 23 mg/dL      Creatinine 0.71 mg/dL      Sodium 139 mmol/L      Potassium 4.9 mmol/L      Chloride 92 mmol/L      CO2 38.0 mmol/L      Calcium 9.5 mg/dL      BUN/Creatinine Ratio 32.4     Anion Gap 9.0 mmol/L      eGFR 102.5 mL/min/1.73     Narrative:      GFR Normal >60  Chronic Kidney Disease <60  Kidney Failure <15      Blood Culture - Blood, Arm, Right [[id number]]  (Normal) Collected: 11/29/23 1013    Specimen: Blood from Arm, Right Updated: 12/02/23 1030     Blood Culture No growth at 3 days    Blood Culture - Blood, Arm, Right [[id number]]  (Normal) Collected: 11/29/23 0920    Specimen: Blood from Arm, Right Updated: 12/02/23 0930     Blood Culture No growth at 3 days             Imaging Results (Last 24 Hours)       ** No results found for the last 24 hours. **                 I reviewed the patient's new clinical results.    Medication Review:   Scheduled Meds:albuterol, 2.5 mg, Nebulization, 4x Daily - RT  aspirin, 81 mg, Oral, Daily  bisoprolol, 10 mg, Oral, Q24H  budesonide-formoterol, 2 puff, Inhalation, BID - RT  cefTRIAXone, 1,000 mg, Intravenous, Q24H  enoxaparin, 40 mg, Subcutaneous, Daily  famotidine, 40 mg, Oral, Daily  glipizide, 10 mg, Oral, BID AC  guaiFENesin, 1,200 mg, Oral, Q12H  ipratropium-albuterol, 3 mL, Nebulization, 4x Daily - RT  lisinopril, 40 mg, Oral, Q PM  metFORMIN, 1,000 mg, Oral, BID With Meals  methylPREDNISolone sodium succinate, 40 mg, Intravenous, Q8H  mirtazapine, 15 mg, Oral, Nightly  montelukast, 10 mg, Oral, Nightly  roflumilast, 500 mcg, Oral, Daily  senna-docusate sodium, 2 tablet, Oral, BID  sodium chloride, 10 mL, Intravenous, Q12H  theophylline, 300 mg, Oral, Daily  tiotropium bromide monohydrate, 2 puff, Inhalation, Daily - RT      Continuous Infusions:   PRN Meds:.  acetaminophen    senna-docusate sodium **AND** polyethylene glycol **AND** bisacodyl **AND** bisacodyl    Calcium  Replacement - Follow Nurse / BPA Driven Protocol    hydrALAZINE    Magnesium Standard Dose Replacement - Follow Nurse / BPA Driven Protocol    nitroglycerin    ondansetron    Phosphorus Replacement - Follow Nurse / BPA Driven Protocol    Potassium Replacement - Follow Nurse / BPA Driven Protocol    [COMPLETED] Insert Peripheral IV **AND** sodium chloride    sodium chloride    sodium chloride     Interval History:    Assessment & Plan     Acute hypoxemic respiratory failure   PNA (pneumonia)  RSV  Leukocytosis - improving  COPD    Type 2 diabetes   Essential hypertension   Nicotine abuse-patient declines nicotine patch     Plan of care  Patient continues down to 8 liters will work on titrating down; continue home medications and supportive care with antibiotics; pulm following      Plan for disposition:Home    Benita Sears, APRN  12/03/23  07:05 EST

## 2023-12-04 ENCOUNTER — APPOINTMENT (OUTPATIENT)
Dept: CT IMAGING | Facility: HOSPITAL | Age: 63
End: 2023-12-04
Payer: MEDICAID

## 2023-12-04 ENCOUNTER — APPOINTMENT (OUTPATIENT)
Dept: GENERAL RADIOLOGY | Facility: HOSPITAL | Age: 63
End: 2023-12-04
Payer: MEDICAID

## 2023-12-04 LAB
ANION GAP SERPL CALCULATED.3IONS-SCNC: 3 MMOL/L (ref 5–15)
BACTERIA SPEC AEROBE CULT: NORMAL
BACTERIA SPEC AEROBE CULT: NORMAL
BUN SERPL-MCNC: 24 MG/DL (ref 8–23)
BUN/CREAT SERPL: 34.8 (ref 7–25)
CALCIUM SPEC-SCNC: 9.6 MG/DL (ref 8.6–10.5)
CHLORIDE SERPL-SCNC: 94 MMOL/L (ref 98–107)
CO2 SERPL-SCNC: 39 MMOL/L (ref 22–29)
CREAT SERPL-MCNC: 0.69 MG/DL (ref 0.76–1.27)
DEPRECATED RDW RBC AUTO: 51.6 FL (ref 37–54)
EGFRCR SERPLBLD CKD-EPI 2021: 103.3 ML/MIN/1.73
ERYTHROCYTE [DISTWIDTH] IN BLOOD BY AUTOMATED COUNT: 15.3 % (ref 12.3–15.4)
GLUCOSE SERPL-MCNC: 152 MG/DL (ref 65–99)
HCT VFR BLD AUTO: 38.8 % (ref 37.5–51)
HGB BLD-MCNC: 13.1 G/DL (ref 13–17.7)
LAB AP CASE REPORT: NORMAL
LYMPHOCYTES # BLD MANUAL: 0.92 10*3/MM3 (ref 0.7–3.1)
LYMPHOCYTES NFR BLD MANUAL: 2 % (ref 5–12)
MCH RBC QN AUTO: 30.8 PG (ref 26.6–33)
MCHC RBC AUTO-ENTMCNC: 33.7 G/DL (ref 31.5–35.7)
MCV RBC AUTO: 91.5 FL (ref 79–97)
METAMYELOCYTES NFR BLD MANUAL: 2 % (ref 0–0)
MONOCYTES # BLD: 0.31 10*3/MM3 (ref 0.1–0.9)
NEUTROPHILS # BLD AUTO: 13.77 10*3/MM3 (ref 1.7–7)
NEUTROPHILS NFR BLD MANUAL: 81 % (ref 42.7–76)
NEUTS BAND NFR BLD MANUAL: 9 % (ref 0–5)
PATH REPORT.FINAL DX SPEC: NORMAL
PLATELET # BLD AUTO: 311 10*3/MM3 (ref 140–450)
PMV BLD AUTO: 8 FL (ref 6–12)
POTASSIUM SERPL-SCNC: 5.4 MMOL/L (ref 3.5–5.2)
RBC # BLD AUTO: 4.24 10*6/MM3 (ref 4.14–5.8)
RBC MORPH BLD: NORMAL
SCAN SLIDE: NORMAL
SMALL PLATELETS BLD QL SMEAR: ADEQUATE
SODIUM SERPL-SCNC: 136 MMOL/L (ref 136–145)
VARIANT LYMPHS NFR BLD MANUAL: 2 % (ref 19.6–45.3)
VARIANT LYMPHS NFR BLD MANUAL: 4 % (ref 0–5)
WBC MORPH BLD: NORMAL
WBC NRBC COR # BLD AUTO: 15.3 10*3/MM3 (ref 3.4–10.8)

## 2023-12-04 PROCEDURE — 80048 BASIC METABOLIC PNL TOTAL CA: CPT | Performed by: INTERNAL MEDICINE

## 2023-12-04 PROCEDURE — 85007 BL SMEAR W/DIFF WBC COUNT: CPT | Performed by: INTERNAL MEDICINE

## 2023-12-04 PROCEDURE — 94664 DEMO&/EVAL PT USE INHALER: CPT

## 2023-12-04 PROCEDURE — 63710000001 PREDNISONE PER 5 MG: Performed by: INTERNAL MEDICINE

## 2023-12-04 PROCEDURE — 71250 CT THORAX DX C-: CPT

## 2023-12-04 PROCEDURE — 85025 COMPLETE CBC W/AUTO DIFF WBC: CPT | Performed by: INTERNAL MEDICINE

## 2023-12-04 PROCEDURE — 97161 PT EVAL LOW COMPLEX 20 MIN: CPT | Performed by: PHYSICAL THERAPIST

## 2023-12-04 PROCEDURE — 71045 X-RAY EXAM CHEST 1 VIEW: CPT

## 2023-12-04 PROCEDURE — 25010000002 CEFTRIAXONE PER 250 MG: Performed by: INTERNAL MEDICINE

## 2023-12-04 PROCEDURE — 94799 UNLISTED PULMONARY SVC/PX: CPT

## 2023-12-04 PROCEDURE — 25010000002 METHYLPREDNISOLONE PER 40 MG: Performed by: INTERNAL MEDICINE

## 2023-12-04 PROCEDURE — 25010000002 ENOXAPARIN PER 10 MG: Performed by: INTERNAL MEDICINE

## 2023-12-04 PROCEDURE — 94761 N-INVAS EAR/PLS OXIMETRY MLT: CPT

## 2023-12-04 PROCEDURE — 63710000001 PREDNISONE PER 1 MG: Performed by: INTERNAL MEDICINE

## 2023-12-04 RX ORDER — PREDNISONE 20 MG/1
20 TABLET ORAL DAILY
Qty: 2 TABLET | Refills: 0 | Status: DISCONTINUED | OUTPATIENT
Start: 2023-12-06 | End: 2023-12-05 | Stop reason: HOSPADM

## 2023-12-04 RX ORDER — IPRATROPIUM BROMIDE AND ALBUTEROL SULFATE 2.5; .5 MG/3ML; MG/3ML
3 SOLUTION RESPIRATORY (INHALATION) EVERY 4 HOURS PRN
Status: DISCONTINUED | OUTPATIENT
Start: 2023-12-04 | End: 2023-12-05 | Stop reason: HOSPADM

## 2023-12-04 RX ORDER — PREDNISONE 10 MG/1
10 TABLET ORAL DAILY
Qty: 2 TABLET | Refills: 0 | Status: DISCONTINUED | OUTPATIENT
Start: 2023-12-08 | End: 2023-12-05 | Stop reason: HOSPADM

## 2023-12-04 RX ADMIN — ALBUTEROL SULFATE 2.5 MG: 2.5 SOLUTION RESPIRATORY (INHALATION) at 15:15

## 2023-12-04 RX ADMIN — METHYLPREDNISOLONE SODIUM SUCCINATE 40 MG: 40 INJECTION, POWDER, FOR SOLUTION INTRAMUSCULAR; INTRAVENOUS at 04:07

## 2023-12-04 RX ADMIN — BISOPROLOL FUMARATE 10 MG: 5 TABLET ORAL at 10:46

## 2023-12-04 RX ADMIN — CEFTRIAXONE SODIUM 1000 MG: 1 INJECTION, POWDER, FOR SOLUTION INTRAMUSCULAR; INTRAVENOUS at 10:48

## 2023-12-04 RX ADMIN — MIRTAZAPINE 15 MG: 15 TABLET, FILM COATED ORAL at 20:34

## 2023-12-04 RX ADMIN — ENOXAPARIN SODIUM 40 MG: 100 INJECTION SUBCUTANEOUS at 16:57

## 2023-12-04 RX ADMIN — ROFLUMILAST 500 MCG: 500 TABLET ORAL at 10:48

## 2023-12-04 RX ADMIN — METFORMIN HYDROCHLORIDE 1000 MG: 500 TABLET ORAL at 10:48

## 2023-12-04 RX ADMIN — TIOTROPIUM BROMIDE INHALATION SPRAY 2 PUFF: 3.12 SPRAY, METERED RESPIRATORY (INHALATION) at 07:56

## 2023-12-04 RX ADMIN — GLIPIZIDE 10 MG: 5 TABLET ORAL at 10:48

## 2023-12-04 RX ADMIN — MONTELUKAST 10 MG: 10 TABLET, FILM COATED ORAL at 20:34

## 2023-12-04 RX ADMIN — BUDESONIDE AND FORMOTEROL FUMARATE DIHYDRATE 2 PUFF: 160; 4.5 AEROSOL RESPIRATORY (INHALATION) at 07:55

## 2023-12-04 RX ADMIN — THEOPHYLLINE ANHYDROUS 300 MG: 300 CAPSULE, EXTENDED RELEASE ORAL at 10:45

## 2023-12-04 RX ADMIN — Medication 10 ML: at 20:34

## 2023-12-04 RX ADMIN — ASPIRIN 81 MG: 81 TABLET, COATED ORAL at 10:48

## 2023-12-04 RX ADMIN — ALBUTEROL SULFATE 2.5 MG: 2.5 SOLUTION RESPIRATORY (INHALATION) at 12:25

## 2023-12-04 RX ADMIN — GUAIFENESIN 1200 MG: 600 TABLET, MULTILAYER, EXTENDED RELEASE ORAL at 10:46

## 2023-12-04 RX ADMIN — Medication 10 ML: at 10:50

## 2023-12-04 RX ADMIN — GLIPIZIDE 10 MG: 5 TABLET ORAL at 16:57

## 2023-12-04 RX ADMIN — FAMOTIDINE 40 MG: 20 TABLET ORAL at 10:45

## 2023-12-04 RX ADMIN — LISINOPRIL 40 MG: 20 TABLET ORAL at 16:57

## 2023-12-04 RX ADMIN — PREDNISONE 30 MG: 20 TABLET ORAL at 12:57

## 2023-12-04 RX ADMIN — GUAIFENESIN 1200 MG: 600 TABLET, MULTILAYER, EXTENDED RELEASE ORAL at 20:34

## 2023-12-04 RX ADMIN — BUDESONIDE AND FORMOTEROL FUMARATE DIHYDRATE 2 PUFF: 160; 4.5 AEROSOL RESPIRATORY (INHALATION) at 20:07

## 2023-12-04 RX ADMIN — METFORMIN HYDROCHLORIDE 1000 MG: 500 TABLET ORAL at 17:01

## 2023-12-04 RX ADMIN — ALBUTEROL SULFATE 2.5 MG: 2.5 SOLUTION RESPIRATORY (INHALATION) at 20:02

## 2023-12-04 NOTE — PLAN OF CARE
Goal Outcome Evaluation:         Pt eating well, not complaints, breathing well on 6L. Vitals stable.

## 2023-12-04 NOTE — PLAN OF CARE
Goal Outcome Evaluation:  Plan of Care Reviewed With: patient        Progress: improving  Outcome Evaluation: Weaned to 3L NC this shift and is tolerating well. Patient has been independent with ADLs, tolerating diet, and denies any complaints this AM. States he hopes to go home very soon. Vitals stable throughout the night. JOSE Hawthorne RN

## 2023-12-04 NOTE — PROGRESS NOTES
LOS: 5 days   Patient Care Team:  Jareth Perea PA-C as PCP - General (Physician Assistant)    Subjective     Patient improving with cough and exertional shortness of breath    Review of Systems   Constitutional:  Positive for activity change and fatigue.   HENT: Negative.     Respiratory:  Positive for cough and shortness of breath.    Cardiovascular: Negative.    Gastrointestinal: Negative.    Genitourinary: Negative.    Musculoskeletal: Negative.    Neurological: Negative.    Psychiatric/Behavioral: Negative.             Objective     Vital Signs  Temp:  [97.8 °F (36.6 °C)-98.9 °F (37.2 °C)] 97.8 °F (36.6 °C)  Heart Rate:  [81-96] 81  Resp:  [8-22] 8  BP: ()/(70-77) 133/77      Physical Exam  Vitals reviewed.   Constitutional:       Appearance: He is ill-appearing.   HENT:      Head: Normocephalic and atraumatic.      Right Ear: External ear normal.      Left Ear: External ear normal.      Nose: Nose normal.      Mouth/Throat:      Mouth: Mucous membranes are moist.   Eyes:      General:         Right eye: No discharge.         Left eye: No discharge.   Cardiovascular:      Rate and Rhythm: Normal rate and regular rhythm.      Pulses: Normal pulses.      Heart sounds: Normal heart sounds.   Pulmonary:      Effort: Pulmonary effort is normal.      Breath sounds: Examination of the right-middle field reveals rhonchi. Examination of the right-lower field reveals rhonchi.   Abdominal:      General: Bowel sounds are normal.      Palpations: Abdomen is soft.   Musculoskeletal:         General: Normal range of motion.      Cervical back: Normal range of motion.   Skin:     General: Skin is warm and dry.   Neurological:      Mental Status: He is alert and oriented to person, place, and time.   Psychiatric:         Behavior: Behavior normal.              Results Review:    Lab Results (last 24 hours)       Procedure Component Value Units Date/Time    Pathology Consultation [[id number]] Collected: 12/02/23  2255    Specimen: Blood, Venous Line Updated: 12/04/23 1305     Final Diagnosis --     Leukocytosis with left shift  No blasts identified       Case Report --     Surgical Pathology Report                         Case: [id number]                                  Authorizing Provider:  Judit Singh MD            Collected:           12/02/2023 10:55 PM          Ordering Location:     [facility]       Received:            12/04/2023 07:44 AM                                 PROGRESS CARE                                                                Pathologist:           Maximus Ortiz MD                                                             Specimen:    Blood, Venous Line                                                                         Blood Culture - Blood, Arm, Right [[id number]]  (Normal) Collected: 11/29/23 1013    Specimen: Blood from Arm, Right Updated: 12/04/23 1030     Blood Culture No growth at 5 days    Blood Culture - Blood, Arm, Right [[id number]]  (Normal) Collected: 11/29/23 0920    Specimen: Blood from Arm, Right Updated: 12/04/23 0930     Blood Culture No growth at 5 days    Basic Metabolic Panel [[id number]]  (Abnormal) Collected: 12/03/23 2341    Specimen: Blood Updated: 12/04/23 0016     Glucose 152 mg/dL      BUN 24 mg/dL      Creatinine 0.69 mg/dL      Sodium 136 mmol/L      Potassium 5.4 mmol/L      Comment: Slight hemolysis detected by analyzer. Result may be falsely elevated.        Chloride 94 mmol/L      CO2 39.0 mmol/L      Calcium 9.6 mg/dL      BUN/Creatinine Ratio 34.8     Anion Gap 3.0 mmol/L      eGFR 103.3 mL/min/1.73     Narrative:      GFR Normal >60  Chronic Kidney Disease <60  Kidney Failure <15      CBC & Differential [[id number]]  (Abnormal) Collected: 12/03/23 2341    Specimen: Blood Updated: 12/04/23 0007    Narrative:      The following orders were created for panel order CBC & Differential.  Procedure                               Abnormality         Status                      ---------                               -----------         ------                     CBC Auto Differential[[id number]]        Abnormal            Final result               Scan Slide[[id number]]                                       Final result                 Please view results for these tests on the individual orders.    CBC Auto Differential [[id number]]  (Abnormal) Collected: 12/03/23 2341    Specimen: Blood Updated: 12/04/23 0007     WBC 15.30 10*3/mm3      RBC 4.24 10*6/mm3      Hemoglobin 13.1 g/dL      Hematocrit 38.8 %      MCV 91.5 fL      MCH 30.8 pg      MCHC 33.7 g/dL      RDW 15.3 %      RDW-SD 51.6 fl      MPV 8.0 fL      Platelets 311 10*3/mm3     Narrative:      The previously reported component NRBC is no longer being reported. Previous result was 0.0 /100 WBC (Reference Range: 0.0-0.2 /100 WBC) on 12/3/2023 at 2353 EST.    Scan Slide [[id number]] Collected: 12/03/23 2341    Specimen: Blood Updated: 12/04/23 0007     Scan Slide --     Comment: See Manual Differential Results       Manual Differential [[id number]]  (Abnormal) Collected: 12/03/23 2341    Specimen: Blood Updated: 12/04/23 0007     Neutrophil % 81.0 %      Lymphocyte % 2.0 %      Monocyte % 2.0 %      Bands %  9.0 %      Metamyelocyte % 2.0 %      Atypical Lymphocyte % 4.0 %      Neutrophils Absolute 13.77 10*3/mm3      Lymphocytes Absolute 0.92 10*3/mm3      Monocytes Absolute 0.31 10*3/mm3      RBC Morphology Normal     WBC Morphology Normal     Platelet Estimate Adequate             Imaging Results (Last 24 Hours)       Procedure Component Value Units Date/Time    CT Chest Without Contrast Diagnostic [[id number]] Collected: 12/04/23 1001     Updated: 12/04/23 1018    Narrative:      CT CHEST WO CONTRAST DIAGNOSTIC    Date of Exam: 12/4/2023 8:53 AM CST    Indication: Dyspnea, chronic, unclear etiology.    Comparison: Chest x-ray 12/4/2023 and CT chest 12/5/2022    Technique: Axial CT images were obtained of the  chest without contrast administration.  Sagittal and coronal reconstructions were performed.  Automated exposure control and iterative reconstruction methods were used.      Findings:  Hilum and Mediastinum: No pathologically enlarged lymph nodes.  Normal heart size.   There is trace pericardial effusion. There is calcified atherosclerotic disease of the thoracic aorta and coronary arteries.    Lung Parenchyma and Pleura: Evaluation of lung parenchyma demonstrates centrilobular emphysema. There are tree-in-bud opacities in the right middle and peripheral right upper lung compatible with chronic infectious inflammatory changes. Scarring versus   atelectasis is present at the lung bases right greater than left.    At the right lung base there is consolidation measuring 2.4 x 4.7 cm (image 82 of series 5) with some adjacent septal prominence/thickening. It is unclear whether this represents infectious/inflammatory process or underlying pulmonary lesion.    There is a groundglass nodule in the peripheral right upper lung (image 58 of series 5) which measures 4 mm, previously 2 mm.  There is a subpleural nodule in the right middle lung (image 78 of series 5) now measuring 5 mm, previously 4 mm.    Upper Abdomen: No acute process.     Soft tissues: Unremarkable.    Osseous structures: No aggressive focal lytic or sclerotic osseous lesions.      Impression:      Impression:    1. Irregular consolidation at the right lung base measuring 2.4 x 4.7 cm. This may represent pneumonia in the setting of elevated white count and fever but recommend clinical correlation and treatment . After treatment follow-up imaging is recommended to   ensure resolution and exclude underlying pulmonary neoplasm.    2. Pulmonary nodules in the right middle and right upper lung showing slight interval growth from prior examination for which follow-up will be recommended per guidelines below.      The Fleischner society pulmonary nodule  recommendations are for the follow-up and management of pulmonary nodules smaller than 8 mm detected incidentally in patients >35 years on non-screening CT. The initial guidelines for the management of solid   nodules were released in 2005 1, and guidelines for the management of subsolid nodules were released in 2013 2. New revised 2017 recommendations for both solid and subsolid have since been released 4.    2017 guidelines  Solid nodules  Solitary nodule size: <6 mm  *low risk patients: no follow-up needed  *high risk patients: optional CT at 12 months  Solitary nodule size: 6-8 mm  *low risk patients: follow-up at 6-12 months, then consider further follow-up at 18-24 months  *high risk patients: initial follow-up CT at 6-12 months and then at 18-24 months if no change  Solitary nodule size: >8 mm  *either low or high risk patients  *consider follow-up CT at 3 months, and/or CT-PET, and/or biopsy  Multiple nodules size: <6 mm  *low risk patients: no routine follow-up  *high risk patients: optional CT at 12 months  Multiple nodules size: 6-8 mm  *low risk patients: follow-up at 3-6 months, then consider further follow-up at 18-24 months  *high risk patients: follow-up at 3-6 months, then at 18-24 months if no change  Multiple nodules size: >8 mm  *low risk patients: follow-up at 3-6 months, then consider further follow-up at 18-24 months  *high risk patients: follow-up at 3-6 months, then at 18-24 months if no change  *   Note: newly detected indeterminate nodule in persons 35 years of age or older.  *low risk patients: minimal or absent history of smoking and or other known risk factors  *high risk patients: history of smoking or of other known risk factors (e.g. first degree relative with lung cancer, or exposure to asbestos, radon, uranium)  *if a nodule up to 8 mm is partly solid or is ground glass further follow-up is required after 24 months to exclude possible slow growing adenocarcinoma (PAULO)    Subsolid  "nodules  Solitary pure ground-glass nodule  *nodule size <6mm  *no CT follow-up required  *nodule size \"e6mm  *follow up CT at 6-12 months, then every 2 years until 5 years  Solitary part-solid nodule  *nodule size <6mm  *no CT follow-up required  *nodule size \"e6mm  *follow-up CT at 3-6 months  *if unchanged, and solid component remains <6mm, then annual follow-up for 5 years  Multiple subsolid nodules  *nodule size <6mm  *follow-up CT at 3-6 months  *consider further follow-up at 2 and 4 years if stable  *nodule size \"e6mm  *follow-up CT at 3-6 months  *subsequent management based on the most suspicious nodule(s)    Electronically Signed: Flaca Antonio MD    12/4/2023 9:16 AM CST    Workstation ID: [id number]    XR Chest 1 View [[id number]] Collected: 12/04/23 0723     Updated: 12/04/23 0727    Narrative:      XR CHEST 1 VW    Date of Exam: 12/4/2023 5:57 AM EST    Indication: Pneumonia    Comparison: 11/29/2023    Findings:  There are no airspace consolidations. There is hyperinflation suspicious for COPD. No pleural fluid. No pneumothorax. The pulmonary vasculature appears within normal limits. The cardiac and mediastinal silhouette appear unremarkable. No acute osseous   abnormality identified.      Impression:      Impression:  No acute pulmonary process      Electronically Signed: Marvin Smith MD    12/4/2023 7:25 AM EST    Workstation ID: [id number]                 I reviewed the patient's new clinical results.    Medication Review:   Scheduled Meds:albuterol, 2.5 mg, Nebulization, 4x Daily - RT  aspirin, 81 mg, Oral, Daily  bisoprolol, 10 mg, Oral, Q24H  budesonide-formoterol, 2 puff, Inhalation, BID - RT  cefTRIAXone, 1,000 mg, Intravenous, Q24H  enoxaparin, 40 mg, Subcutaneous, Daily  famotidine, 40 mg, Oral, Daily  glipizide, 10 mg, Oral, BID AC  guaiFENesin, 1,200 mg, Oral, Q12H  lisinopril, 40 mg, Oral, Q PM  metFORMIN, 1,000 mg, Oral, BID With Meals  mirtazapine, 15 mg, Oral, Nightly  montelukast, 10 " mg, Oral, Nightly  predniSONE, 30 mg, Oral, Daily   Followed by  [START ON 12/6/2023] predniSONE, 20 mg, Oral, Daily   Followed by  [START ON 12/8/2023] predniSONE, 10 mg, Oral, Daily  roflumilast, 500 mcg, Oral, Daily  senna-docusate sodium, 2 tablet, Oral, BID  sodium chloride, 10 mL, Intravenous, Q12H  theophylline, 300 mg, Oral, Daily  tiotropium bromide monohydrate, 2 puff, Inhalation, Daily - RT      Continuous Infusions:   PRN Meds:.  acetaminophen    senna-docusate sodium **AND** polyethylene glycol **AND** bisacodyl **AND** bisacodyl    Calcium Replacement - Follow Nurse / BPA Driven Protocol    hydrALAZINE    ipratropium-albuterol    Magnesium Standard Dose Replacement - Follow Nurse / BPA Driven Protocol    nitroglycerin    ondansetron    Phosphorus Replacement - Follow Nurse / BPA Driven Protocol    Potassium Replacement - Follow Nurse / BPA Driven Protocol    [COMPLETED] Insert Peripheral IV **AND** sodium chloride    sodium chloride    sodium chloride     Interval History:    Assessment & Plan     Acute hypoxemic respiratory failure   PNA (pneumonia)  RSV  Leukocytosis - improving  COPD    Type 2 diabetes   Essential hypertension   Nicotine abuse-patient declines nicotine patch     Plan of care  Patient continues down to 3 liters will work on titrating down; continue home medications and supportive care with antibiotics; pulm following      Plan for disposition:Home    CARA Clark  12/04/23  13:06 EST

## 2023-12-04 NOTE — PLAN OF CARE
Goal Outcome Evaluation:  Plan of Care Reviewed With: patient           Outcome Evaluation: Patient is a 62 y/o M who was admitted to [facility] on 11/29/23 via EMS due to reports of SOA.  Pt was found positive for RSV and he also has PNA.  PMHx includes COPD, DM, HTN, and h/o tobacco use.  Pt is currently on 3L O2 and also uses O2 at home.  Pt reports he needs more O2 tubing for home and  was notified.  At baseline pt lives home alone and is independent with all mobility and still drives.  This date pt was independent with all mobility.  PT recommends pt d/c home.  PT will complete orders and sign off at this time.      Anticipated Discharge Disposition (PT): home

## 2023-12-04 NOTE — CASE MANAGEMENT/SOCIAL WORK
Continued Stay Note  JHOAN Perry     Patient Name: Maximino Bhatia  MRN: [id number]  Today's Date: 12/4/2023    Admit Date: 11/29/2023    Plan: Anticipate routine home alone. Current home O2 through [facility].   Discharge Plan       Row Name 12/04/23 1202       Plan    Plan Anticipate routine home alone. Current home O2 through [facility].    Plan Comments DC Barriers: CT chest ordered per pulmonology.             Cecelia Bradford RN     Office Phone: [phone]  Office Cell: [phone]

## 2023-12-04 NOTE — THERAPY EVALUATION
Patient Name: Maximino Bhatia  : 1960    MRN: [id number]                              Today's Date: 2023       Admit Date: 2023    Visit Dx:     ICD-10-CM ICD-9-CM   1. Acute respiratory failure with hypoxia  J96.01 518.81   2. Pneumonia of right lower lobe due to infectious organism  J18.9 486     Patient Active Problem List   Diagnosis    COPD with acute exacerbation    Influenza A    Diabetes mellitus with coincident hypertension    Bacterial pneumonia    Leukocytosis    Acute hypoxemic respiratory failure    Pneumonia    PNA (pneumonia)     Past Medical History:   Diagnosis Date    COPD (chronic obstructive pulmonary disease)     Diabetes mellitus     Hypertension      History reviewed. No pertinent surgical history.   General Information       Row Name 23 1307          Physical Therapy Time and Intention    Document Type evaluation  -EJ     Mode of Treatment physical therapy  -EJ       Row Name 23 1307          General Information    Patient Profile Reviewed yes  -EJ     Prior Level of Function independent:  -EJ     Existing Precautions/Restrictions oxygen therapy device and L/min  -EJ     Barriers to Rehab none identified  -EJ       Row Name 23 1307          Living Environment    People in Home alone  -EJ       Row Name 23 1307          Home Main Entrance    Number of Stairs, Main Entrance two  -EJ       Row Name 23 1307          Stairs Within Home, Primary    Number of Stairs, Within Home, Primary none  -EJ       Row Name 23 1307          Cognition    Orientation Status (Cognition) oriented x 4  -EJ               User Key  (r) = Recorded By, (t) = Taken By, (c) = Cosigned By      Initials Name Provider Type    EJ Mara Vickers, PT Physical Therapist                   Mobility       Row Name 23 1308          Bed Mobility    Bed Mobility bed mobility (all) activities  -EJ     All Activities, [place] (Bed Mobility) independent  -EJ       Row Name  12/04/23 1308          Sit-Stand Transfer    Sit-Stand [place] (Transfers) independent  -EJ       Row Name 12/04/23 1308          Gait/Stairs (Locomotion)    [place] Level (Gait) independent  -EJ     Distance in Feet (Gait) 10 feet  -EJ               User Key  (r) = Recorded By, (t) = Taken By, (c) = Cosigned By      Initials Name Provider Type    Mara Torres, PT Physical Therapist                   Obj/Interventions       Row Name 12/04/23 1309          Range of Motion Comprehensive    General Range of Motion no range of motion deficits identified  -EJ       Row Name 12/04/23 1309          Strength Comprehensive (MMT)    General Manual Muscle Testing (MMT) Assessment no strength deficits identified  -EJ       Row Name 12/04/23 1309          Balance    Balance Assessment sitting static balance;sitting dynamic balance;sit to stand dynamic balance;standing static balance;standing dynamic balance  -EJ     Static Sitting Balance independent  -EJ     Dynamic Sitting Balance independent  -EJ     Position, Sitting Balance unsupported;sitting edge of bed  -EJ     Sit to Stand Dynamic Balance independent  -EJ     Static Standing Balance independent  -EJ     Dynamic Standing Balance independent  -EJ     Position/Device Used, Standing Balance unsupported  -EJ     Balance Interventions sitting;standing;sit to stand;supported;static;dynamic;minimal challenge  -EJ               User Key  (r) = Recorded By, (t) = Taken By, (c) = Cosigned By      Initials Name Provider Type    Mara Torres, PT Physical Therapist                   Goals/Plan    No documentation.                  Clinical Impression       Row Name 12/04/23 1310          Pain    Pretreatment Pain Rating 0/10 - no pain  -EJ     Posttreatment Pain Rating 0/10 - no pain  -EJ       Row Name 12/04/23 1310          Plan of Care Review    Plan of Care Reviewed With patient  -EJ     Outcome Evaluation Patient is a 62 y/o M who was admitted to [facility] on  11/29/23 via EMS due to reports of SOA.  Pt was found positive for RSV and he also has PNA.  PMHx includes COPD, DM, HTN, and h/o tobacco use.  Pt is currently on 3L O2 and also uses O2 at home.  Pt reports he needs more O2 tubing for home and  was notified.  At baseline pt lives home alone and is independent with all mobility and still drives.  This date pt was independent with all mobility.  PT recommends pt d/c home.  PT will complete orders and sign off at this time.  -EJ       Row Name 12/04/23 1310          Therapy Assessment/Plan (PT)    Criteria for Skilled Interventions Met (PT) no;does not meet criteria for skilled intervention  -EJ     Therapy Frequency (PT) evaluation only  -EJ     Predicted Duration of Therapy Intervention (PT) discharge  -EJ       Row Name 12/04/23 1310          Positioning and Restraints    Pre-Treatment Position in bed  -EJ     Post Treatment Position bed  -EJ     In Bed sitting EOB;call light within reach  -EJ               User Key  (r) = Recorded By, (t) = Taken By, (c) = Cosigned By      Initials Name Provider Type    Mara Torres, PT Physical Therapist                   Outcome Measures       Row Name 12/04/23 1314 12/04/23 0800       How much help from another person do you currently need...    Turning from your back to your side while in flat bed without using bedrails? 4  -EJ 4  -LM    Moving from lying on back to sitting on the side of a flat bed without bedrails? 4  -EJ 4  -LM    Moving to and from a bed to a chair (including a wheelchair)? 4  -EJ 4  -LM    Standing up from a chair using your arms (e.g., wheelchair, bedside chair)? 4  -EJ 4  -LM    Climbing 3-5 steps with a railing? 4  -EJ 3  -LM    To walk in hospital room? 4  -EJ 4  -LM    AM-PAC 6 Clicks Score (PT) 24  -EJ 23  -LM    Highest Level of Mobility Goal 8 --> Walked 250 feet or more  -EJ 7 --> Walk 25 feet or more  -LM      Row Name 12/04/23 1314          Functional Assessment    Outcome  Measure Options AM-PAC 6 Clicks Basic Mobility (PT)  -EJ               User Key  (r) = Recorded By, (t) = Taken By, (c) = Cosigned By      Initials Name Provider Type    EJ Mara Vickers, PT Physical Therapist    Esme Day, RN Registered Nurse                                 Physical Therapy Education       Title: PT OT SLP Therapies (In Progress)       Topic: Physical Therapy (In Progress)       Point: Mobility training (Done)       Learning Progress Summary             Patient Acceptance, E, VU by  at 12/4/2023 1314                         Point: Home exercise program (Not Started)       Learner Progress:  Not documented in this visit.              Point: Body mechanics (Not Started)       Learner Progress:  Not documented in this visit.              Point: Precautions (Done)       Learning Progress Summary             Patient Acceptance, E, VU by TRAVON at 12/4/2023 1314                                         User Key       Initials Effective Dates Name Provider Type [facility] 07/11/23 -  Mara Vickers, PT Physical Therapist PT                  PT Recommendation and Plan     Plan of Care Reviewed With: patient  Outcome Evaluation: Patient is a 62 y/o M who was admitted to [facility] on 11/29/23 via EMS due to reports of SOA.  Pt was found positive for RSV and he also has PNA.  PMHx includes COPD, DM, HTN, and h/o tobacco use.  Pt is currently on 3L O2 and also uses O2 at home.  Pt reports he needs more O2 tubing for home and  was notified.  At baseline pt lives home alone and is independent with all mobility and still drives.  This date pt was independent with all mobility.  PT recommends pt d/c home.  PT will complete orders and sign off at this time.     Time Calculation:         PT Charges       Row Name 12/04/23 1315             Time Calculation    Start Time 0902  -EJ      Stop Time 0912  -EJ      Time Calculation (min) 10 min  -EJ      PT Received On 12/04/23  -         Time  Calculation- PT    Total Timed Code Minutes- PT 0 minute(s)  -TRAVON                User Key  (r) = Recorded By, (t) = Taken By, (c) = Cosigned By      Initials Name Provider Type    Mara Torres, PT Physical Therapist                  Therapy Charges for Today       Code Description Service Date Service Provider Modifiers Qty    [id number] HC PT EVAL LOW COMPLEXITY 3 12/4/2023 Mara Vickers, PT GP 1            PT G-Codes  Outcome Measure Options: AM-PAC 6 Clicks Basic Mobility (PT)  AM-PAC 6 Clicks Score (PT): 24  PT Discharge Summary  Anticipated Discharge Disposition (PT): home    Mara Vickers, PT  12/4/2023

## 2023-12-04 NOTE — PROGRESS NOTES
Daily Progress Note          Assessment    Pneumonia  Hypoxemic respiratory failure  Leukocytosis  RSV infection  COPD with acute exacerbation  HTN  DM  Active tobacco smoker  CT chest 12/5/2022: mild bibasilar infection, severe emphysema      Recommendations:    CT chest for lung cancer screening which will be done yearly  Last was December 2022    Antibiotics: Rocephin ,7-day course  Oxygen supplement and titration to maintain saturation 90 to 95%: Currently requiring 3 L per nasal cannula  Bronchodilators  Inhaled corticosteroids    Steroids wean  IV Solu-Medrol  Prednisone 6-day taper    Daliresp  Theophylline  Smoking cessation counseling    DVT/GI prophylaxis        I personally reviewed the radiological studies      Scheduled for 2023 with improvement               LOS: 5 days     Subjective     Patient reports cough and shortness of breath    Objective     Vital signs for last 24 hours:  Vitals:    12/04/23 0400 12/04/23 0448 12/04/23 0507 12/04/23 0516   BP:    129/70   BP Location:    Right arm   Patient Position:    Lying   Pulse: 81 83 89 86   Resp:    12   Temp:    98.5 °F (36.9 °C)   TempSrc:    Oral   SpO2:  91% (!) 86% 90%   Weight:    83.2 kg (183 lb 6.8 oz)   Height:           Intake/Output last 3 shifts:  I/O last 3 completed shifts:  In: 1440 [P.O.:1440]  Out: 1625 [Urine:1625]  Intake/Output this shift:  No intake/output data recorded.      Radiology  Imaging Results (Last 24 Hours)       Procedure Component Value Units Date/Time    XR Chest 1 View [[id number]] Collected: 12/04/23 0723     Updated: 12/04/23 0727    Narrative:      XR CHEST 1 VW    Date of Exam: 12/4/2023 5:57 AM EST    Indication: Pneumonia    Comparison: 11/29/2023    Findings:  There are no airspace consolidations. There is hyperinflation suspicious for COPD. No pleural fluid. No pneumothorax. The pulmonary vasculature appears within normal limits. The cardiac and mediastinal silhouette appear unremarkable. No acute osseous    abnormality identified.      Impression:      Impression:  No acute pulmonary process      Electronically Signed: Marvin Smith MD    12/4/2023 7:25 AM EST    Workstation ID: [id number]            Labs:  Results from last 7 days   Lab Units 12/03/23  2341   WBC 10*3/mm3 15.30*   HEMOGLOBIN g/dL 13.1   HEMATOCRIT % 38.8   PLATELETS 10*3/mm3 311     Results from last 7 days   Lab Units 12/03/23  2341 11/30/23  0212 11/29/23  0850   SODIUM mmol/L 136   < > 132*   POTASSIUM mmol/L 5.4*   < > 4.5   CHLORIDE mmol/L 94*   < > 94*   CO2 mmol/L 39.0*   < > 26.0   BUN mg/dL 24*   < > 25*   CREATININE mg/dL 0.69*   < > 0.77   CALCIUM mg/dL 9.6   < > 9.3   BILIRUBIN mg/dL  --   --  0.4   ALK PHOS U/L  --   --  61   ALT (SGPT) U/L  --   --  13   AST (SGOT) U/L  --   --  23   GLUCOSE mg/dL 152*   < > 155*    < > = values in this interval not displayed.     Results from last 7 days   Lab Units 11/29/23  0954   PH, ARTERIAL pH units 7.349*   PO2 ART mm Hg 53.6*   PCO2, ARTERIAL mm Hg 46.9   HCO3 ART mmol/L 25.9     Results from last 7 days   Lab Units 11/29/23  0850   ALBUMIN g/dL 3.7     Results from last 7 days   Lab Units 11/29/23  1114 11/29/23  0850   HSTROP T ng/L 16 15             Results from last 7 days   Lab Units 11/29/23  0850   INR  1.05   APTT seconds 30.1*               Meds:   SCHEDULE  albuterol, 2.5 mg, Nebulization, 4x Daily - RT  aspirin, 81 mg, Oral, Daily  bisoprolol, 10 mg, Oral, Q24H  budesonide-formoterol, 2 puff, Inhalation, BID - RT  cefTRIAXone, 1,000 mg, Intravenous, Q24H  enoxaparin, 40 mg, Subcutaneous, Daily  famotidine, 40 mg, Oral, Daily  glipizide, 10 mg, Oral, BID AC  guaiFENesin, 1,200 mg, Oral, Q12H  ipratropium-albuterol, 3 mL, Nebulization, 4x Daily - RT  lisinopril, 40 mg, Oral, Q PM  metFORMIN, 1,000 mg, Oral, BID With Meals  methylPREDNISolone sodium succinate, 40 mg, Intravenous, Q8H  mirtazapine, 15 mg, Oral, Nightly  montelukast, 10 mg, Oral, Nightly  roflumilast, 500 mcg, Oral,  Daily  senna-docusate sodium, 2 tablet, Oral, BID  sodium chloride, 10 mL, Intravenous, Q12H  theophylline, 300 mg, Oral, Daily  tiotropium bromide monohydrate, 2 puff, Inhalation, Daily - RT      Infusions     PRNs    acetaminophen    senna-docusate sodium **AND** polyethylene glycol **AND** bisacodyl **AND** bisacodyl    Calcium Replacement - Follow Nurse / BPA Driven Protocol    hydrALAZINE    Magnesium Standard Dose Replacement - Follow Nurse / BPA Driven Protocol    nitroglycerin    ondansetron    Phosphorus Replacement - Follow Nurse / BPA Driven Protocol    Potassium Replacement - Follow Nurse / BPA Driven Protocol    [COMPLETED] Insert Peripheral IV **AND** sodium chloride    sodium chloride    sodium chloride    Physical Exam:  General Appearance:  Alert   HEENT:  Normocephalic, without obvious abnormality, Conjunctiva/corneas clear,.   Nares normal, no drainage     Neck:  Supple, symmetrical, trachea midline.   Lungs /Chest wall: Wheezing with bilateral basal rhonchi, respirations unlabored, symmetrical wall movement.     Heart:  Regular rate and rhythm, S1 S2 normal  Abdomen: Soft, non-tender, no masses, no organomegaly.    Extremities: No edema, no clubbing or cyanosis     ROS  Constitutional: Negative for chills, fever and malaise/fatigue.   HENT: Negative.    Eyes: Negative.    Cardiovascular: Negative.    Respiratory: Positive for cough and shortness of breath.    Skin: Negative.    Musculoskeletal: Negative.    Gastrointestinal: Negative.    Genitourinary: Negative.    Neurological: Negative.    Psychiatric/Behavioral: Negative.      I reviewed the recent clinical results  I personally reviewed the latest radiological studies    Part of this note may be an electronic transcription/translation of spoken language to printed text using the Dragon Dictation System.

## 2023-12-05 ENCOUNTER — READMISSION MANAGEMENT (OUTPATIENT)
Dept: CALL CENTER | Facility: HOSPITAL | Age: 63
End: 2023-12-05
Payer: MEDICAID

## 2023-12-05 VITALS
TEMPERATURE: 97.6 F | OXYGEN SATURATION: 90 % | SYSTOLIC BLOOD PRESSURE: 116 MMHG | DIASTOLIC BLOOD PRESSURE: 65 MMHG | BODY MASS INDEX: 26.27 KG/M2 | HEIGHT: 71 IN | RESPIRATION RATE: 16 BRPM | WEIGHT: 187.61 LBS | HEART RATE: 74 BPM

## 2023-12-05 PROBLEM — J12.1 PNEUMONIA DUE TO RESPIRATORY SYNCYTIAL VIRUS (RSV): Status: ACTIVE | Noted: 2023-12-05

## 2023-12-05 PROBLEM — J21.0 RSV (ACUTE BRONCHIOLITIS DUE TO RESPIRATORY SYNCYTIAL VIRUS): Status: ACTIVE | Noted: 2023-12-05

## 2023-12-05 LAB
ANION GAP SERPL CALCULATED.3IONS-SCNC: 7 MMOL/L (ref 5–15)
BASOPHILS # BLD MANUAL: 0.19 10*3/MM3 (ref 0–0.2)
BASOPHILS NFR BLD MANUAL: 1 % (ref 0–1.5)
BUN SERPL-MCNC: 26 MG/DL (ref 8–23)
BUN/CREAT SERPL: 38.2 (ref 7–25)
CALCIUM SPEC-SCNC: 9.3 MG/DL (ref 8.6–10.5)
CHLORIDE SERPL-SCNC: 94 MMOL/L (ref 98–107)
CO2 SERPL-SCNC: 37 MMOL/L (ref 22–29)
CREAT SERPL-MCNC: 0.68 MG/DL (ref 0.76–1.27)
DEPRECATED RDW RBC AUTO: 47.3 FL (ref 37–54)
EGFRCR SERPLBLD CKD-EPI 2021: 103.8 ML/MIN/1.73
EOSINOPHIL # BLD MANUAL: 0.19 10*3/MM3 (ref 0–0.4)
EOSINOPHIL NFR BLD MANUAL: 1 % (ref 0.3–6.2)
ERYTHROCYTE [DISTWIDTH] IN BLOOD BY AUTOMATED COUNT: 14.5 % (ref 12.3–15.4)
GLUCOSE SERPL-MCNC: 108 MG/DL (ref 65–99)
HCT VFR BLD AUTO: 39.9 % (ref 37.5–51)
HGB BLD-MCNC: 13.2 G/DL (ref 13–17.7)
LYMPHOCYTES # BLD MANUAL: 2.6 10*3/MM3 (ref 0.7–3.1)
LYMPHOCYTES NFR BLD MANUAL: 1 % (ref 5–12)
MCH RBC QN AUTO: 30.8 PG (ref 26.6–33)
MCHC RBC AUTO-ENTMCNC: 33 G/DL (ref 31.5–35.7)
MCV RBC AUTO: 93.3 FL (ref 79–97)
METAMYELOCYTES NFR BLD MANUAL: 1 % (ref 0–0)
MONOCYTES # BLD: 0.19 10*3/MM3 (ref 0.1–0.9)
MYELOCYTES NFR BLD MANUAL: 2 % (ref 0–0)
NEUTROPHILS # BLD AUTO: 14.69 10*3/MM3 (ref 1.7–7)
NEUTROPHILS NFR BLD MANUAL: 75 % (ref 42.7–76)
NEUTS BAND NFR BLD MANUAL: 4 % (ref 0–5)
PLAT MORPH BLD: NORMAL
PLATELET # BLD AUTO: 347 10*3/MM3 (ref 140–450)
PMV BLD AUTO: 8 FL (ref 6–12)
POTASSIUM SERPL-SCNC: 4.7 MMOL/L (ref 3.5–5.2)
PROLYMPHOCYTES NFR BLD MANUAL: 1 % (ref 0–0)
RBC # BLD AUTO: 4.28 10*6/MM3 (ref 4.14–5.8)
RBC MORPH BLD: NORMAL
SCAN SLIDE: NORMAL
SODIUM SERPL-SCNC: 138 MMOL/L (ref 136–145)
VARIANT LYMPHS NFR BLD MANUAL: 10 % (ref 19.6–45.3)
VARIANT LYMPHS NFR BLD MANUAL: 4 % (ref 0–5)
WBC MORPH BLD: NORMAL
WBC NRBC COR # BLD AUTO: 18.6 10*3/MM3 (ref 3.4–10.8)

## 2023-12-05 PROCEDURE — 94664 DEMO&/EVAL PT USE INHALER: CPT

## 2023-12-05 PROCEDURE — 94761 N-INVAS EAR/PLS OXIMETRY MLT: CPT

## 2023-12-05 PROCEDURE — 94799 UNLISTED PULMONARY SVC/PX: CPT

## 2023-12-05 PROCEDURE — 63710000001 PREDNISONE PER 5 MG: Performed by: INTERNAL MEDICINE

## 2023-12-05 PROCEDURE — 25010000002 CEFTRIAXONE PER 250 MG: Performed by: INTERNAL MEDICINE

## 2023-12-05 PROCEDURE — 63710000001 PREDNISONE PER 1 MG: Performed by: INTERNAL MEDICINE

## 2023-12-05 RX ORDER — PREDNISONE 10 MG/1
TABLET ORAL
Qty: 6 TABLET | Refills: 0 | Status: SHIPPED | OUTPATIENT
Start: 2023-12-06 | End: 2023-12-10

## 2023-12-05 RX ORDER — CEFDINIR 300 MG/1
300 CAPSULE ORAL EVERY 12 HOURS SCHEDULED
Qty: 4 CAPSULE | Refills: 0 | Status: SHIPPED | OUTPATIENT
Start: 2023-12-05 | End: 2023-12-07

## 2023-12-05 RX ORDER — GUAIFENESIN 600 MG/1
1200 TABLET, EXTENDED RELEASE ORAL EVERY 12 HOURS SCHEDULED
Qty: 30 TABLET | Refills: 0 | Status: SHIPPED | OUTPATIENT
Start: 2023-12-05

## 2023-12-05 RX ORDER — BISOPROLOL FUMARATE 10 MG/1
10 TABLET, FILM COATED ORAL
Qty: 30 TABLET | Refills: 1 | Status: SHIPPED | OUTPATIENT
Start: 2023-12-06

## 2023-12-05 RX ORDER — CEFDINIR 300 MG/1
300 CAPSULE ORAL EVERY 12 HOURS SCHEDULED
Qty: 4 CAPSULE | Refills: 0 | Status: DISCONTINUED | OUTPATIENT
Start: 2023-12-05 | End: 2023-12-05 | Stop reason: HOSPADM

## 2023-12-05 RX ADMIN — ALBUTEROL SULFATE 2.5 MG: 2.5 SOLUTION RESPIRATORY (INHALATION) at 07:15

## 2023-12-05 RX ADMIN — TIOTROPIUM BROMIDE INHALATION SPRAY 2 PUFF: 3.12 SPRAY, METERED RESPIRATORY (INHALATION) at 07:20

## 2023-12-05 RX ADMIN — FAMOTIDINE 40 MG: 20 TABLET ORAL at 08:43

## 2023-12-05 RX ADMIN — BUDESONIDE AND FORMOTEROL FUMARATE DIHYDRATE 2 PUFF: 160; 4.5 AEROSOL RESPIRATORY (INHALATION) at 07:23

## 2023-12-05 RX ADMIN — GLIPIZIDE 10 MG: 5 TABLET ORAL at 08:42

## 2023-12-05 RX ADMIN — METFORMIN HYDROCHLORIDE 1000 MG: 500 TABLET ORAL at 08:43

## 2023-12-05 RX ADMIN — PREDNISONE 30 MG: 20 TABLET ORAL at 08:43

## 2023-12-05 RX ADMIN — BISOPROLOL FUMARATE 10 MG: 5 TABLET ORAL at 08:43

## 2023-12-05 RX ADMIN — ALBUTEROL SULFATE 2.5 MG: 2.5 SOLUTION RESPIRATORY (INHALATION) at 10:57

## 2023-12-05 RX ADMIN — CEFTRIAXONE SODIUM 1000 MG: 1 INJECTION, POWDER, FOR SOLUTION INTRAMUSCULAR; INTRAVENOUS at 10:03

## 2023-12-05 RX ADMIN — THEOPHYLLINE ANHYDROUS 300 MG: 300 CAPSULE, EXTENDED RELEASE ORAL at 08:42

## 2023-12-05 RX ADMIN — ASPIRIN 81 MG: 81 TABLET, COATED ORAL at 08:43

## 2023-12-05 RX ADMIN — Medication 10 ML: at 08:43

## 2023-12-05 RX ADMIN — ALBUTEROL SULFATE 2.5 MG: 2.5 SOLUTION RESPIRATORY (INHALATION) at 15:37

## 2023-12-05 RX ADMIN — ROFLUMILAST 500 MCG: 500 TABLET ORAL at 08:43

## 2023-12-05 RX ADMIN — GUAIFENESIN 1200 MG: 600 TABLET, MULTILAYER, EXTENDED RELEASE ORAL at 08:43

## 2023-12-05 NOTE — DISCHARGE PLACEMENT REQUEST
"Maximino Murillo (63 y.o. Male)       Date of Birth   1960    Social Security Number       Address   [address] [place] [address] [place] [address]    Home Phone   [phone]    MRN   [id number]       Adventism   None    Marital Status   Single                            Admission Date   11/29/23    Admission Type   Emergency    Admitting Provider   Judit Singh MD    Attending Provider   Judit Singh MD    Department, Room/Bed   [facility], 2115/1       Discharge Date       Discharge Disposition   Home or Self Care    Discharge Destination                                 Attending Provider: Judit Singh MD    Allergies: No Known Allergies    Isolation: Contact Drop   Infection: RSV (11/29/23)   Code Status: CPR    Ht: 180.3 cm (71\")   Wt: 85.1 kg (187 lb 9.8 oz)    Admission Cmt: None   Principal Problem: Pneumonia [J18.9]                   Active Insurance as of 11/29/2023       Primary Coverage       Payor Plan Insurance Group Employer/Plan Group    [facility] MEDICAID [facility] [id number]       Payor Plan Address Payor Plan Phone Number Payor Plan Fax Number Effective Dates    MAIL STOP:   4/8/2020 - None Entered    [address] [address]       [facility] [address]         Subscriber Name Subscriber Birth Date Member ID       MAXIMINO MURILLO 1960 [id number]                     Emergency Contacts        (Rel.) Home Phone Work Phone Mobile Phone    ERIN CERVANTES (Friend) [phone] -- --                 History & Physical        Judit Singh MD at 11/29/23 4433              Patient Care Team:  Jareth Perea PA-C as PCP - General (Physician Assistant)    Chief complaint shortness of breath    Subjective    Patient is a 63 y.o. male with advanced COPD who presents with worsening shortness of breath for several days.  Initial symptoms were runny nose and sore throat.  He then began coughing and becoming more short of breath.  Cough is intermittently " productive of clear mucus.  He denies any fever or chills.  He called EMS this morning because he was so short of breath and sats were noted to be in the mid 80s.  He is currently on 10 L of oxygen in the emergency room.  He has tested positive for RSV.  X-ray is concerning for right lower lobe pneumonia.  He is currently smoking.    Review of Systems       History  Past Medical History:   Diagnosis Date    COPD (chronic obstructive pulmonary disease)     Diabetes mellitus     Hypertension      No past surgical history on file.  No family history on file.  Social History     Tobacco Use    Smoking status: Every Day     Packs/day: 1.50     Years: 25.00     Additional pack years: 0.00     Total pack years: 37.50     Types: Cigarettes     Passive exposure: Never    Smokeless tobacco: Never   Vaping Use    Vaping Use: Never used    Passive vaping exposure: Yes   Substance Use Topics    Alcohol use: Yes     Comment: RARE    Drug use: Never     (Not in a hospital admission)    Allergies:  Patient has no known allergies.    Objective    Vital Signs  Temp:  [98.2 °F (36.8 °C)] 98.2 °F (36.8 °C)  Heart Rate:  [88-93] 90  Resp:  [22-24] 22  BP: (109)/(64-70) 109/64     Physical Exam:      General Appearance:    Alert, cooperative, in no acute distress    Head:    Normocephalic, without obvious abnormality, atraumatic   Eyes:            Lids and lashes normal, conjunctivae and sclerae normal, no   icterus, no pallor, corneas clear, PERRLA   Ears:    Ears appear intact with no abnormalities noted   Throat:   No oral lesions, no thrush, oral mucosa moist   Neck:   No adenopathy, supple, trachea midline, no thyromegaly, no   carotid bruit, no JVD   Lungs:   Diffuse rhonchi    Heart:    Regular rhythm and normal rate, normal S1 and S2, no            murmur, no gallop, no rub, no click   Chest Wall:    No abnormalities observed   Abdomen:     Normal bowel sounds, no masses, no organomegaly, soft        non-tender, non-distended, no  guarding, no rebound                tenderness   Extremities:   Moves all extremities well, no edema, no cyanosis, no             redness   Pulses:   Pulses palpable and equal bilaterally   Skin:   No bleeding, bruising or rash   Lymph nodes:   No palpable adenopathy   Neurologic:   Cranial nerves 2 - 12 grossly intact, sensation intact, DTR       present and equal bilaterally       Results Review:     Imaging Results (Last 24 Hours)       Procedure Component Value Units Date/Time    XR Chest 1 View [[id number]] Collected: 11/29/23 0930     Updated: 11/29/23 0932    Narrative:      XR CHEST 1 VW    Date of Exam: 11/29/2023 9:08 AM EST    Indication: soa    Comparison: 12/12/2022    Findings:  Heart size top normal, stable. There is new right basilar airspace disease which may relate to pneumonia. No pneumothorax. No pleural effusion. Mild scarring in the left lung base unchanged. Osseous structures grossly intact.      Impression:      Impression:  New right basilar airspace disease which may relate to pneumonia.        Electronically Signed: Boone Rasheed MD    11/29/2023 9:30 AM EST    Workstation ID: [id number]             Lab Results (last 24 hours)       Procedure Component Value Units Date/Time    Respiratory Panel PCR w/COVID-19(SARS-CoV-2) LESLIE/GALO/TORY/PAD/COR/SARAH In-House, NP Swab in UTM/VTM, 2 HR TAT - Swab, Nasopharynx [[id number]]  (Abnormal) Collected: 11/29/23 1109    Specimen: Swab from Nasopharynx Updated: 11/29/23 1213     ADENOVIRUS, PCR Not Detected     Coronavirus 229E Not Detected     Coronavirus HKU1 Not Detected     Coronavirus NL63 Not Detected     Coronavirus OC43 Not Detected     COVID19 Not Detected     Human Metapneumovirus Not Detected     Human Rhinovirus/Enterovirus Not Detected     Influenza A PCR Not Detected     Influenza B PCR Not Detected     Parainfluenza Virus 1 Not Detected     Parainfluenza Virus 2 Not Detected     Parainfluenza Virus 3 Not Detected     Parainfluenza Virus 4 Not  Detected     RSV, PCR Detected     Bordetella pertussis pcr Not Detected     Bordetella parapertussis PCR Not Detected     Chlamydophila pneumoniae PCR Not Detected     Mycoplasma pneumo by PCR Not Detected    Narrative:      In the setting of a positive respiratory panel with a viral infection PLUS a negative procalcitonin without other underlying concern for bacterial infection, consider observing off antibiotics or discontinuation of antibiotics and continue supportive care. If the respiratory panel is positive for atypical bacterial infection (Bordetella pertussis, Chlamydophila pneumoniae, or Mycoplasma pneumoniae), consider antibiotic de-escalation to target atypical bacterial infection.    High Sensitivity Troponin T 2Hr [[id number]]  (Normal) Collected: 11/29/23 1114    Specimen: Blood Updated: 11/29/23 1140     HS Troponin T 16 ng/L      Troponin T Delta 1 ng/L     Narrative:      High Sensitive Troponin T Reference Range:  <14.0 ng/L- Negative Female for AMI  <22.0 ng/L- Negative Male for AMI  >=14 - Abnormal Female indicating possible myocardial injury.  >=22 - Abnormal Male indicating possible myocardial injury.   Clinicians would have to utilize clinical acumen, EKG, Troponin, and serial changes to determine if it is an Acute Myocardial Infarction or myocardial injury due to an underlying chronic condition.         POC Lactate [[id number]]  (Normal) Collected: 11/29/23 1016    Specimen: Blood Updated: 11/29/23 1019     Lactate 0.7 mmol/L      Comment: Serial Number: [id number]:  [id number]       Blood Culture - Blood, Arm, Right [[id number]] Collected: 11/29/23 1013    Specimen: Blood from Arm, Right Updated: 11/29/23 1015    [place] Draw [[id number]] Collected: 11/29/23 0850    Specimen: Blood Updated: 11/29/23 1000    Narrative:      The following orders were created for panel order [place] Draw.  Procedure                               Abnormality         Status                     ---------                                -----------         ------                     Green Top (Gel)[[id number]]                                  Final result               Lavender Top[[id number]]                                                                Gold Top - SST[[id number]]                                   Final result               Light Blue Top[[id number]]                                   Final result                 Please view results for these tests on the individual orders.    Green Top (Gel) [[id number]] Collected: 11/29/23 0850    Specimen: Blood Updated: 11/29/23 1000     Extra Tube Hold for add-ons.     Comment: Auto resulted.       Gold Top - SST [[id number]] Collected: 11/29/23 0850    Specimen: Blood Updated: 11/29/23 1000     Extra Tube Hold for add-ons.     Comment: Auto resulted.       Light Blue Top [[id number]] Collected: 11/29/23 0850    Specimen: Blood Updated: 11/29/23 1000     Extra Tube Hold for add-ons.     Comment: Auto resulted       CBC & Differential [[id number]]  (Abnormal) Collected: 11/29/23 0850    Specimen: Blood Updated: 11/29/23 0959    Narrative:      The following orders were created for panel order CBC & Differential.  Procedure                               Abnormality         Status                     ---------                               -----------         ------                     CBC Auto Differential[[id number]]        Abnormal            Final result               Scan Slide[[id number]]                                       Final result                 Please view results for these tests on the individual orders.    CBC Auto Differential [[id number]]  (Abnormal) Collected: 11/29/23 0850    Specimen: Blood Updated: 11/29/23 0959     WBC 19.60 10*3/mm3      RBC 4.23 10*6/mm3      Hemoglobin 13.1 g/dL      Hematocrit 39.9 %      MCV 94.3 fL      MCH 31.0 pg      MCHC 32.9 g/dL      RDW 15.3 %      RDW-SD 49.9 fl      MPV 9.6 fL      Platelets 202 10*3/mm3     Narrative:      The  previously reported component NRBC is no longer being reported. Previous result was 0.1 /100 WBC (Reference Range: 0.0-0.2 /100 WBC) on 11/29/2023 at 0941 EST.    Scan Slide [[id number]] Collected: 11/29/23 0850    Specimen: Blood Updated: 11/29/23 0959     Scan Slide --     Comment: See Manual Differential Results       Manual Differential [[id number]]  (Abnormal) Collected: 11/29/23 0850    Specimen: Blood Updated: 11/29/23 0959     Neutrophil % 58.0 %      Lymphocyte % 3.0 %      Monocyte % 13.0 %      Bands %  14.0 %      Metamyelocyte % 9.0 %      Atypical Lymphocyte % 3.0 %      Neutrophils Absolute 14.11 10*3/mm3      Lymphocytes Absolute 1.18 10*3/mm3      Monocytes Absolute 2.55 10*3/mm3      RBC Morphology Normal     Vacuolated Neutrophils Slight/1+     Platelet Morphology Normal    Blood Gas, Arterial - [[id number]]  (Abnormal) Collected: 11/29/23 0954    Specimen: Arterial Blood Updated: 11/29/23 0958     Site Right Brachial     Felix's Test N/A     pH, Arterial 7.349 pH units      pCO2, Arterial 46.9 mm Hg      pO2, Arterial 53.6 mm Hg      HCO3, Arterial 25.9 mmol/L      Base Excess, Arterial -0.3 mmol/L      Comment: Serial Number: [id number]:  [id number]        O2 Saturation, Arterial 85.4 %      CO2 Content 27.3 mmol/L      Barometric Pressure for Blood Gas --     Comment: N/A        Modality Cannula     FIO2 40 %      Hemodilution No    Protime-INR [[id number]]  (Normal) Collected: 11/29/23 0850    Specimen: Blood Updated: 11/29/23 0944     Protime 11.4 Seconds      INR 1.05    aPTT [[id number]]  (Abnormal) Collected: 11/29/23 0850    Specimen: Blood Updated: 11/29/23 0944     PTT 30.1 seconds     COVID-19,CEPHEID/LEONEL,COR/TORY/PAD/JUWAN/LAG IN-HOUSE,NP SWAB IN TRANSPORT MEDIA 1 HR TAT, RT-PCR - Swab, Nasopharynx [[id number]]  (Normal) Collected: 11/29/23 0911    Specimen: Swab from Nasopharynx Updated: 11/29/23 0942     COVID19 Not Detected    Narrative:      Fact sheet for providers:  https://www.fda.gov/media/764372/download     Fact sheet for patients: https://www.fda.gov/media/538578/download  Fact sheet for providers: https://www.fda.gov/media/644983/download    Fact sheet for patients: https://www.fda.gov/media/552143/download    Test performed by PCR.    Comprehensive Metabolic Panel [[id number]]  (Abnormal) Collected: 11/29/23 0850    Specimen: Blood Updated: 11/29/23 0927     Glucose 155 mg/dL      BUN 25 mg/dL      Creatinine 0.77 mg/dL      Sodium 132 mmol/L      Potassium 4.5 mmol/L      Chloride 94 mmol/L      CO2 26.0 mmol/L      Calcium 9.3 mg/dL      Total Protein 7.0 g/dL      Albumin 3.7 g/dL      ALT (SGPT) 13 U/L      AST (SGOT) 23 U/L      Alkaline Phosphatase 61 U/L      Total Bilirubin 0.4 mg/dL      Globulin 3.3 gm/dL      A/G Ratio 1.1 g/dL      BUN/Creatinine Ratio 32.5     Anion Gap 12.0 mmol/L      eGFR 100.0 mL/min/1.73     Narrative:      GFR Normal >60  Chronic Kidney Disease <60  Kidney Failure <15      BNP [[id number]]  (Normal) Collected: 11/29/23 0850    Specimen: Blood Updated: 11/29/23 0927     proBNP 190.4 pg/mL     Narrative:      This assay is used as an aid in the diagnosis of individuals suspected of having heart failure. It can be used as an aid in the diagnosis of acute decompensated heart failure (ADHF) in patients presenting with signs and symptoms of ADHF to the emergency department (ED). In addition, NT-proBNP of <300 pg/mL indicates ADHF is not likely.    Age Range Result Interpretation  NT-proBNP Concentration (pg/mL:      <50             Positive            >450                   Gray                 300-450                    Negative             <300    50-75           Positive            >900                  Gray                300-900                  Negative            <300      >75             Positive            >1800                  Gray                300-1800                  Negative            <300    High Sensitivity Troponin T  [[id number]]  (Normal) Collected: 11/29/23 0850    Specimen: Blood Updated: 11/29/23 0927     HS Troponin T 15 ng/L     Narrative:      High Sensitive Troponin T Reference Range:  <14.0 ng/L- Negative Female for AMI  <22.0 ng/L- Negative Male for AMI  >=14 - Abnormal Female indicating possible myocardial injury.  >=22 - Abnormal Male indicating possible myocardial injury.   Clinicians would have to utilize clinical acumen, EKG, Troponin, and serial changes to determine if it is an Acute Myocardial Infarction or myocardial injury due to an underlying chronic condition.         Blood Culture - Blood, Arm, Right [[id number]] Collected: 11/29/23 0920    Specimen: Blood from Arm, Right Updated: 11/29/23 0925             I reviewed the patient's new clinical results.    Assessment & Plan      Pneumonia  -Patient has RSV but also has evidence of secondary bacterial pneumonia.  Will continue Rocephin.  Attempt to obtain sputum.  Hypoxemia  COPD-bronchodilators, inhaled steroids, systemic steroids, Mucinex, Daliresp, theophylline  Tobacco abuse-ongoing counseling  Leukocytosis-related to pneumonia  Type 2 diabetes-sliding scale insulin while on steroids; continue glipizide, metformin  Essential hypertension-bisoprolol/hydrochlorothiazide, lisinopril    Lovenox for DVT prophylaxis  Famotidine for stress ulcer prophylaxis    I discussed the patient's findings and my recommendations with patient.     Judit Singh MD  11/29/23  16:33 EST        Electronically signed by Judit Singh MD at 11/29/23 4253

## 2023-12-05 NOTE — PLAN OF CARE
"Goal Outcome Evaluation:            Pt A&Ox4.  NAD noted with activity.  States he feels a little \"blaa\" from being in the hospital.  Is looking forward to getting back to his normal routine.    Has been up ad ezra without difficulty.  Still receiving rocephin, Solumedrol and breathing tx.      Has oxygen at home if needed upon discharge.            "

## 2023-12-05 NOTE — DISCHARGE SUMMARY
Date of Discharge:  12/5/2023    Discharge Diagnosis:   Acute hypoxemic respiratory failure   PNA (pneumonia)  RSV  Leukocytosis    COPD    Type 2 diabetes   Essential hypertension   Nicotine abuse      Presenting Problem/History of Present Illness  Active Hospital Problems    Diagnosis  POA    **Pneumonia [J18.9]  Yes    RSV (acute bronchiolitis due to respiratory syncytial virus) [J21.0]  Unknown    Pneumonia due to respiratory syncytial virus (RSV) [J12.1]  Yes    PNA (pneumonia) [J18.9]  Yes      Resolved Hospital Problems   No resolved problems to display.          Hospital Course    Patient is a 63 y.o. male presented with  advanced COPD who presents with worsening shortness of breath for several day. He called EMS  he was so short of breath and sats were noted to be in the mid 80s.  He is currently on 10 L of oxygen in the emergency room.  He has tested positive for RSV.  X-ray is concerning for right lower lobe pneumonia.  He was followed by pulmonology received antibiotics and steroids with improvement today he is back to baseline 3 L oxygen.  He does have oxygen at home inability to monitor saturations.  He will be discharged with follow-up appointment with PCP.    Procedures Performed         Consults:   Consults       Date and Time Order Name Status Description    11/29/2023  4:33 PM Inpatient Pulmonology Consult Completed     11/29/2023 10:47 AM Hospitalist (on-call MD unless specified)              Pertinent Test Results:    Lab Results (most recent)       Procedure Component Value Units Date/Time    CBC & Differential [[id number]]  (Abnormal) Collected: 12/04/23 2256    Specimen: Blood Updated: 12/05/23 0035    Narrative:      The following orders were created for panel order CBC & Differential.  Procedure                               Abnormality         Status                     ---------                               -----------         ------                     CBC Auto Differential[[id number]]         Abnormal            Final result               Scan Slide[[id number]]                                       Final result                 Please view results for these tests on the individual orders.    CBC Auto Differential [[id number]]  (Abnormal) Collected: 12/04/23 2256    Specimen: Blood Updated: 12/05/23 0035     WBC 18.60 10*3/mm3      RBC 4.28 10*6/mm3      Hemoglobin 13.2 g/dL      Hematocrit 39.9 %      MCV 93.3 fL      MCH 30.8 pg      MCHC 33.0 g/dL      RDW 14.5 %      RDW-SD 47.3 fl      MPV 8.0 fL      Platelets 347 10*3/mm3     Narrative:      The previously reported component NRBC is no longer being reported. Previous result was 0.1 /100 WBC (Reference Range: 0.0-0.2 /100 WBC) on 12/4/2023 at 2347 EST.    Scan Slide [[id number]] Collected: 12/04/23 2256    Specimen: Blood Updated: 12/05/23 0035     Scan Slide --     Comment: See Manual Differential Results       Manual Differential [[id number]]  (Abnormal) Collected: 12/04/23 2256    Specimen: Blood Updated: 12/05/23 0035     Neutrophil % 75.0 %      Lymphocyte % 10.0 %      Monocyte % 1.0 %      Eosinophil % 1.0 %      Basophil % 1.0 %      Bands %  4.0 %      Metamyelocyte % 1.0 %      Myelocyte % 2.0 %      Atypical Lymphocyte % 4.0 %      Prolymphocyte % 1.0 %      Neutrophils Absolute 14.69 10*3/mm3      Lymphocytes Absolute 2.60 10*3/mm3      Monocytes Absolute 0.19 10*3/mm3      Eosinophils Absolute 0.19 10*3/mm3      Basophils Absolute 0.19 10*3/mm3      RBC Morphology Normal     WBC Morphology Normal     Platelet Morphology Normal    Basic Metabolic Panel [[id number]]  (Abnormal) Collected: 12/04/23 2256    Specimen: Blood Updated: 12/05/23 0012     Glucose 108 mg/dL      BUN 26 mg/dL      Creatinine 0.68 mg/dL      Sodium 138 mmol/L      Potassium 4.7 mmol/L      Chloride 94 mmol/L      CO2 37.0 mmol/L      Calcium 9.3 mg/dL      BUN/Creatinine Ratio 38.2     Anion Gap 7.0 mmol/L      eGFR 103.8 mL/min/1.73     Narrative:      GFR Normal  >60  Chronic Kidney Disease <60  Kidney Failure <15      Pathology Consultation [[id number]] Collected: 12/02/23 2255    Specimen: Blood, Venous Line Updated: 12/04/23 1305     Final Diagnosis --     Leukocytosis with left shift  No blasts identified       Case Report --     Surgical Pathology Report                         Case: [id number]                                  Authorizing Provider:  Judit Singh MD            Collected:           12/02/2023 10:55 PM          Ordering Location:     [facility]       Received:            12/04/2023 07:44 AM                                 PROGRESS CARE                                                                Pathologist:           Maximus Ortiz MD                                                             Specimen:    Blood, Venous Line                                                                         Blood Culture - Blood, Arm, Right [[id number]]  (Normal) Collected: 11/29/23 1013    Specimen: Blood from Arm, Right Updated: 12/04/23 1030     Blood Culture No growth at 5 days    Blood Culture - Blood, Arm, Right [[id number]]  (Normal) Collected: 11/29/23 0920    Specimen: Blood from Arm, Right Updated: 12/04/23 0930     Blood Culture No growth at 5 days    Basic Metabolic Panel [[id number]]  (Abnormal) Collected: 12/03/23 2341    Specimen: Blood Updated: 12/04/23 0016     Glucose 152 mg/dL      BUN 24 mg/dL      Creatinine 0.69 mg/dL      Sodium 136 mmol/L      Potassium 5.4 mmol/L      Comment: Slight hemolysis detected by analyzer. Result may be falsely elevated.        Chloride 94 mmol/L      CO2 39.0 mmol/L      Calcium 9.6 mg/dL      BUN/Creatinine Ratio 34.8     Anion Gap 3.0 mmol/L      eGFR 103.3 mL/min/1.73     Narrative:      GFR Normal >60  Chronic Kidney Disease <60  Kidney Failure <15      CBC & Differential [[id number]]  (Abnormal) Collected: 12/03/23 2341    Specimen: Blood Updated: 12/04/23 0007    Narrative:      The following orders were  created for panel order CBC & Differential.  Procedure                               Abnormality         Status                     ---------                               -----------         ------                     CBC Auto Differential[[id number]]        Abnormal            Final result               Scan Slide[[id number]]                                       Final result                 Please view results for these tests on the individual orders.    CBC Auto Differential [[id number]]  (Abnormal) Collected: 12/03/23 2341    Specimen: Blood Updated: 12/04/23 0007     WBC 15.30 10*3/mm3      RBC 4.24 10*6/mm3      Hemoglobin 13.1 g/dL      Hematocrit 38.8 %      MCV 91.5 fL      MCH 30.8 pg      MCHC 33.7 g/dL      RDW 15.3 %      RDW-SD 51.6 fl      MPV 8.0 fL      Platelets 311 10*3/mm3     Narrative:      The previously reported component NRBC is no longer being reported. Previous result was 0.0 /100 WBC (Reference Range: 0.0-0.2 /100 WBC) on 12/3/2023 at 2353 EST.    Scan Slide [[id number]] Collected: 12/03/23 2341    Specimen: Blood Updated: 12/04/23 0007     Scan Slide --     Comment: See Manual Differential Results       Manual Differential [[id number]]  (Abnormal) Collected: 12/03/23 2341    Specimen: Blood Updated: 12/04/23 0007     Neutrophil % 81.0 %      Lymphocyte % 2.0 %      Monocyte % 2.0 %      Bands %  9.0 %      Metamyelocyte % 2.0 %      Atypical Lymphocyte % 4.0 %      Neutrophils Absolute 13.77 10*3/mm3      Lymphocytes Absolute 0.92 10*3/mm3      Monocytes Absolute 0.31 10*3/mm3      RBC Morphology Normal     WBC Morphology Normal     Platelet Estimate Adequate    Path Consult Reflex [[id number]] Collected: 12/02/23 2255    Specimen: Blood Updated: 12/03/23 0202     Pathology Review Yes    POC Glucose Once [[id number]]  (Abnormal) Collected: 11/30/23 0143    Specimen: Blood Updated: 11/30/23 0146     Glucose 171 mg/dL      Comment: Serial Number: [id number]:  [id number]       Respiratory  Culture - Sputum, Cough [[id number]] Collected: 11/29/23 1654    Specimen: Sputum from Cough Updated: 11/29/23 1852     Respiratory Culture Rejected     Gram Stain Few (2+) WBCs seen      Rare (1+) Epithelial cells seen      Rare (1+) Gram positive cocci      Few (2+) Gram negative bacilli    Narrative:      Specimen rejected due to oropharyngeal contamination. Please reorder and recollect specimen if clinically necessary.    Respiratory Panel PCR w/COVID-19(SARS-CoV-2) [facility]/GALO/TORY/PAD/COR/SARAH In-House, NP Swab in UTM/VTM, 2 HR TAT - Swab, Nasopharynx [[id number]]  (Abnormal) Collected: 11/29/23 1109    Specimen: Swab from Nasopharynx Updated: 11/29/23 1213     ADENOVIRUS, PCR Not Detected     Coronavirus 229E Not Detected     Coronavirus HKU1 Not Detected     Coronavirus NL63 Not Detected     Coronavirus OC43 Not Detected     COVID19 Not Detected     Human Metapneumovirus Not Detected     Human Rhinovirus/Enterovirus Not Detected     Influenza A PCR Not Detected     Influenza B PCR Not Detected     Parainfluenza Virus 1 Not Detected     Parainfluenza Virus 2 Not Detected     Parainfluenza Virus 3 Not Detected     Parainfluenza Virus 4 Not Detected     RSV, PCR Detected     Bordetella pertussis pcr Not Detected     Bordetella parapertussis PCR Not Detected     Chlamydophila pneumoniae PCR Not Detected     Mycoplasma pneumo by PCR Not Detected    Narrative:      In the setting of a positive respiratory panel with a viral infection PLUS a negative procalcitonin without other underlying concern for bacterial infection, consider observing off antibiotics or discontinuation of antibiotics and continue supportive care. If the respiratory panel is positive for atypical bacterial infection (Bordetella pertussis, Chlamydophila pneumoniae, or Mycoplasma pneumoniae), consider antibiotic de-escalation to target atypical bacterial infection.    High Sensitivity Troponin T 2Hr [[id number]]  (Normal) Collected: 11/29/23 1114     Specimen: Blood Updated: 11/29/23 1140     HS Troponin T 16 ng/L      Troponin T Delta 1 ng/L     Narrative:      High Sensitive Troponin T Reference Range:  <14.0 ng/L- Negative Female for AMI  <22.0 ng/L- Negative Male for AMI  >=14 - Abnormal Female indicating possible myocardial injury.  >=22 - Abnormal Male indicating possible myocardial injury.   Clinicians would have to utilize clinical acumen, EKG, Troponin, and serial changes to determine if it is an Acute Myocardial Infarction or myocardial injury due to an underlying chronic condition.         POC Lactate [[id number]]  (Normal) Collected: 11/29/23 1016    Specimen: Blood Updated: 11/29/23 1019     Lactate 0.7 mmol/L      Comment: Serial Number: [id number]:  [id number]       [place] Draw [[id number]] Collected: 11/29/23 0850    Specimen: Blood Updated: 11/29/23 1000    Narrative:      The following orders were created for panel order [place] Draw.  Procedure                               Abnormality         Status                     ---------                               -----------         ------                     Green Top (Gel)[[id number]]                                  Final result               Lavender Top[[id number]]                                                                Gold Top - SST[[id number]]                                   Final result               Light Blue Top[[id number]]                                   Final result                 Please view results for these tests on the individual orders.    Green Top (Gel) [[id number]] Collected: 11/29/23 0850    Specimen: Blood Updated: 11/29/23 1000     Extra Tube Hold for add-ons.     Comment: Auto resulted.       Gold Top - SST [[id number]] Collected: 11/29/23 0850    Specimen: Blood Updated: 11/29/23 1000     Extra Tube Hold for add-ons.     Comment: Auto resulted.       Light Blue Top [[id number]] Collected: 11/29/23 0850    Specimen: Blood Updated: 11/29/23 1000     Extra Tube  Hold for add-ons.     Comment: Auto resulted       Blood Gas, Arterial - [[id number]]  (Abnormal) Collected: 11/29/23 0954    Specimen: Arterial Blood Updated: 11/29/23 0958     Site Right Brachial     Felix's Test N/A     pH, Arterial 7.349 pH units      pCO2, Arterial 46.9 mm Hg      pO2, Arterial 53.6 mm Hg      HCO3, Arterial 25.9 mmol/L      Base Excess, Arterial -0.3 mmol/L      Comment: Serial Number: [id number]:  [id number]        O2 Saturation, Arterial 85.4 %      CO2 Content 27.3 mmol/L      Barometric Pressure for Blood Gas --     Comment: N/A        Modality Cannula     FIO2 40 %      Hemodilution No    Protime-INR [[id number]]  (Normal) Collected: 11/29/23 0850    Specimen: Blood Updated: 11/29/23 0944     Protime 11.4 Seconds      INR 1.05    aPTT [[id number]]  (Abnormal) Collected: 11/29/23 0850    Specimen: Blood Updated: 11/29/23 0944     PTT 30.1 seconds     COVID-19,CEPHEID/LEONEL,COR/TORY/PAD/JUWAN/LAG IN-HOUSE,NP SWAB IN TRANSPORT MEDIA 1 HR TAT, RT-PCR - Swab, Nasopharynx [[id number]]  (Normal) Collected: 11/29/23 0911    Specimen: Swab from Nasopharynx Updated: 11/29/23 0942     COVID19 Not Detected    Narrative:      Fact sheet for providers: https://www.fda.gov/media/786335/download     Fact sheet for patients: https://www.fda.gov/media/305542/download  Fact sheet for providers: https://www.fda.gov/media/135356/download    Fact sheet for patients: https://www.fda.gov/media/114834/download    Test performed by PCR.    Comprehensive Metabolic Panel [[id number]]  (Abnormal) Collected: 11/29/23 0850    Specimen: Blood Updated: 11/29/23 0927     Glucose 155 mg/dL      BUN 25 mg/dL      Creatinine 0.77 mg/dL      Sodium 132 mmol/L      Potassium 4.5 mmol/L      Chloride 94 mmol/L      CO2 26.0 mmol/L      Calcium 9.3 mg/dL      Total Protein 7.0 g/dL      Albumin 3.7 g/dL      ALT (SGPT) 13 U/L      AST (SGOT) 23 U/L      Alkaline Phosphatase 61 U/L      Total Bilirubin 0.4 mg/dL      Globulin 3.3 gm/dL       A/G Ratio 1.1 g/dL      BUN/Creatinine Ratio 32.5     Anion Gap 12.0 mmol/L      eGFR 100.0 mL/min/1.73     Narrative:      GFR Normal >60  Chronic Kidney Disease <60  Kidney Failure <15      BNP [[id number]]  (Normal) Collected: 11/29/23 0850    Specimen: Blood Updated: 11/29/23 0927     proBNP 190.4 pg/mL     Narrative:      This assay is used as an aid in the diagnosis of individuals suspected of having heart failure. It can be used as an aid in the diagnosis of acute decompensated heart failure (ADHF) in patients presenting with signs and symptoms of ADHF to the emergency department (ED). In addition, NT-proBNP of <300 pg/mL indicates ADHF is not likely.    Age Range Result Interpretation  NT-proBNP Concentration (pg/mL:      <50             Positive            >450                   Gray                 300-450                    Negative             <300    50-75           Positive            >900                  Gray                300-900                  Negative            <300      >75             Positive            >1800                  Gray                300-1800                  Negative            <300    High Sensitivity Troponin T [[id number]]  (Normal) Collected: 11/29/23 0850    Specimen: Blood Updated: 11/29/23 0927     HS Troponin T 15 ng/L     Narrative:      High Sensitive Troponin T Reference Range:  <14.0 ng/L- Negative Female for AMI  <22.0 ng/L- Negative Male for AMI  >=14 - Abnormal Female indicating possible myocardial injury.  >=22 - Abnormal Male indicating possible myocardial injury.   Clinicians would have to utilize clinical acumen, EKG, Troponin, and serial changes to determine if it is an Acute Myocardial Infarction or myocardial injury due to an underlying chronic condition.                  Results for orders placed during the hospital encounter of 12/03/22    Adult Transthoracic Echo Complete W/ Cont if Necessary Per Protocol    Interpretation Summary    Left ventricular  systolic function is low normal. Left ventricular ejection fraction appears to be 46 - 50%.    The left ventricular cavity is moderate to severely dilated.    Left ventricular diastolic function is consistent with (grade I) impaired relaxation.    The study is technically difficult for diagnosis.       Condition on Discharge:  Stable    Vital Signs  Temp:  [97.6 °F (36.4 °C)-98.1 °F (36.7 °C)] 97.6 °F (36.4 °C)  Heart Rate:  [64-92] 80  Resp:  [14-26] 18  BP: ()/(65-82) 116/65      Physical Exam  Vitals reviewed.   Constitutional:       Appearance: He is not ill-appearing.   HENT:      Head: Normocephalic and atraumatic.      Right Ear: External ear normal.      Left Ear: External ear normal.      Nose: Nose normal.      Mouth/Throat:      Mouth: Mucous membranes are moist.   Eyes:      General:         Right eye: No discharge.         Left eye: No discharge.   Cardiovascular:      Rate and Rhythm: Normal rate and regular rhythm.      Pulses: Normal pulses.      Heart sounds: Normal heart sounds.   Pulmonary:      Effort: Pulmonary effort is normal.      Breath sounds: Normal breath sounds.   Abdominal:      General: Bowel sounds are normal.      Palpations: Abdomen is soft.   Musculoskeletal:         General: Normal range of motion.      Cervical back: Normal range of motion.   Skin:     General: Skin is warm.   Neurological:      Mental Status: He is alert and oriented to person, place, and time.   Psychiatric:         Behavior: Behavior normal.              Discharge Disposition  Home or Self Care    Discharge Medications     Discharge Medications        New Medications        Instructions Start Date   bisoprolol 10 MG tablet  Commonly known as: ZEBeta   10 mg, Oral, Every 24 Hours Scheduled   Start Date: December 6, 2023     cefdinir 300 MG capsule  Commonly known as: OMNICEF   300 mg, Oral, Every 12 Hours Scheduled      guaiFENesin 600 MG 12 hr tablet  Commonly known as: MUCINEX   1,200 mg, Oral, Every 12  Hours Scheduled      predniSONE 10 MG tablet  Commonly known as: DELTASONE   Take 1 tablet by mouth Daily for 2 days, THEN 1 tablet Daily for 2 days.   Start Date: December 6, 2023            Continue These Medications        Instructions Start Date   albuterol sulfate  (90 Base) MCG/ACT inhaler  Commonly known as: PROVENTIL HFA;VENTOLIN HFA;PROAIR HFA   2 puffs, Inhalation, Every 4 Hours PRN      aspirin 81 MG EC tablet   81 mg, Oral, Daily      bisoprolol-hydrochlorothiazide 10-6.25 MG per tablet  Commonly known as: ZIAC   1 tablet, Oral, Daily      glipizide 10 MG tablet  Commonly known as: GLUCOTROL   10 mg, Oral, Nightly      glipizide 5 MG tablet  Commonly known as: GLUCOTROL   5 mg, Oral, Every Morning      ipratropium-albuterol 0.5-2.5 mg/3 ml nebulizer  Commonly known as: DUO-NEB   3 mL, Nebulization, Every 4 Hours PRN      lisinopril 40 MG tablet  Commonly known as: PRINIVIL,ZESTRIL   40 mg, Oral, Every Evening      metFORMIN 1000 MG tablet  Commonly known as: GLUCOPHAGE   1,000 mg, Oral, Nightly      metFORMIN 500 MG tablet  Commonly known as: GLUCOPHAGE   500 mg, Oral, Every Morning      mirtazapine 15 MG tablet  Commonly known as: REMERON   15 mg, Oral, Nightly      montelukast 10 MG tablet  Commonly known as: SINGULAIR   10 mg, Oral, Nightly      multivitamin capsule   1 capsule, Oral, Daily      roflumilast 500 MCG tablet tablet  Commonly known as: DALIRESP   500 mcg, Oral, Daily      theophylline 300 MG 12 hr tablet  Commonly known as: THEODUR   300 mg, Oral, Daily               Discharge Diet:   Diet Instructions       Diet: Diabetic Diets; Consistent Carbohydrate; Thin (IDDSI 0)      Discharge Diet: Diabetic Diets    Diabetic Diet: Consistent Carbohydrate    Fluid Consistency: Thin (IDDSI 0)            Activity at Discharge:   Activity Instructions       Gradually Increase Activity Until at Pre-Hospitalization Level              Follow-up Appointments  No future appointments.  Additional  Instructions for the Follow-ups that You Need to Schedule       Discharge Follow-up with PCP   As directed       Currently Documented PCP:    Jareth Perea PA-C    PCP Phone Number:    [phone]     Follow Up Details: office will call with f/u        Discharge Follow-up with Specified Provider: Dr. Almazan; 3 Weeks   As directed      To: Dr. Almazan   Follow Up: 3 Weeks                Test Results Pending at Discharge       CARA Clark  12/05/23  13:17 EST    Time: Discharge 25 min

## 2023-12-05 NOTE — CASE MANAGEMENT/SOCIAL WORK
Continued Stay Note   [place]     Patient Name: Maximino Bhatia  MRN: [id number]  Today's Date: 12/5/2023    Admit Date: 11/29/2023    Plan: Anticipate routine home alone. Current home O2 through [facility].   Discharge Plan       Row Name 12/05/23 1439       Plan    Plan Comments DC orders noted. Received update from bedside RN that patient reported he did not have portable O2 for d/c. CM added in basket and sent to [facility]. CM confirmed with patient that he did not have portable O2 but did have a ride home. He reported that the person picking him up had a stroke previously and does not get around very well so he could not stop at his house to get his portable O2. He reported that his transportation was scheduled to come around 3pm. Updated patient that [facility] liaison would provide portable O2 tank for patient to get home with.    Final Discharge Disposition Code 01 - home or self-care               Expected Discharge Date and Time       Expected Discharge Date Expected Discharge Time    Dec 5, 2023           Case Management Discharge Note      Selected Continued Care - Admitted Since 11/29/2023       Durable Medical Equipment Coordination complete.      Service Provider Selected Services Address Phone Fax Patient Preferred    [facility] [id number] [address] [phone]            Transportation Services  Private: Car  Final Discharge Disposition Code: 01 - home or self-care     Cecelia Bradford RN     Office Phone: [phone]  Office Cell: [phone]

## 2023-12-05 NOTE — PROGRESS NOTES
Daily Progress Note          Assessment    RLL Pneumonia  Hypoxemic respiratory failure  Leukocytosis  RSV infection  COPD with acute exacerbation  HTN  DM  Active tobacco smoker  CT chest 12/5/2022: mild bibasilar infection, severe emphysema      Recommendations:    Repeat CT chest without contrast after 3 months to ensure right lower lobe density resolution     Antibiotics: Rocephin ,7-day course  Oxygen supplement and titration to maintain saturation 90 to 95%: Currently requiring 3 L per nasal cannula  Bronchodilators  Inhaled corticosteroids    Steroids wean  Off IV Solu-Medrol  Prednisone 6-day taper    Daliresp  Theophylline  Smoking cessation counseling    DVT/GI prophylaxis        I personally reviewed the radiological studies      CT chest 12/4/2023               LOS: 6 days     Subjective     Patient reports cough and shortness of breath    Objective     Vital signs for last 24 hours:  Vitals:    12/05/23 0720 12/05/23 0722 12/05/23 0723 12/05/23 0724   BP:       BP Location:       Patient Position:       Pulse: 86 91 87 88   Resp: 16 16 16 16   Temp:       TempSrc:       SpO2: 93% 91% 91% 91%   Weight:       Height:           Intake/Output last 3 shifts:  I/O last 3 completed shifts:  In: 2400 [P.O.:2400]  Out: 1650 [Urine:1650]  Intake/Output this shift:  No intake/output data recorded.      Radiology  Imaging Results (Last 24 Hours)       Procedure Component Value Units Date/Time    CT Chest Without Contrast Diagnostic [[id number]] Collected: 12/04/23 1001     Updated: 12/04/23 1018    Narrative:      CT CHEST WO CONTRAST DIAGNOSTIC    Date of Exam: 12/4/2023 8:53 AM CST    Indication: Dyspnea, chronic, unclear etiology.    Comparison: Chest x-ray 12/4/2023 and CT chest 12/5/2022    Technique: Axial CT images were obtained of the chest without contrast administration.  Sagittal and coronal reconstructions were performed.  Automated exposure control and iterative reconstruction methods were  used.      Findings:  Hilum and Mediastinum: No pathologically enlarged lymph nodes.  Normal heart size.   There is trace pericardial effusion. There is calcified atherosclerotic disease of the thoracic aorta and coronary arteries.    Lung Parenchyma and Pleura: Evaluation of lung parenchyma demonstrates centrilobular emphysema. There are tree-in-bud opacities in the right middle and peripheral right upper lung compatible with chronic infectious inflammatory changes. Scarring versus   atelectasis is present at the lung bases right greater than left.    At the right lung base there is consolidation measuring 2.4 x 4.7 cm (image 82 of series 5) with some adjacent septal prominence/thickening. It is unclear whether this represents infectious/inflammatory process or underlying pulmonary lesion.    There is a groundglass nodule in the peripheral right upper lung (image 58 of series 5) which measures 4 mm, previously 2 mm.  There is a subpleural nodule in the right middle lung (image 78 of series 5) now measuring 5 mm, previously 4 mm.    Upper Abdomen: No acute process.     Soft tissues: Unremarkable.    Osseous structures: No aggressive focal lytic or sclerotic osseous lesions.      Impression:      Impression:    1. Irregular consolidation at the right lung base measuring 2.4 x 4.7 cm. This may represent pneumonia in the setting of elevated white count and fever but recommend clinical correlation and treatment . After treatment follow-up imaging is recommended to   ensure resolution and exclude underlying pulmonary neoplasm.    2. Pulmonary nodules in the right middle and right upper lung showing slight interval growth from prior examination for which follow-up will be recommended per guidelines below.      The Fleischner society pulmonary nodule recommendations are for the follow-up and management of pulmonary nodules smaller than 8 mm detected incidentally in patients >35 years on non-screening CT. The initial  "guidelines for the management of solid   nodules were released in 2005 1, and guidelines for the management of subsolid nodules were released in 2013 2. New revised 2017 recommendations for both solid and subsolid have since been released 4.    2017 guidelines  Solid nodules  Solitary nodule size: <6 mm  *low risk patients: no follow-up needed  *high risk patients: optional CT at 12 months  Solitary nodule size: 6-8 mm  *low risk patients: follow-up at 6-12 months, then consider further follow-up at 18-24 months  *high risk patients: initial follow-up CT at 6-12 months and then at 18-24 months if no change  Solitary nodule size: >8 mm  *either low or high risk patients  *consider follow-up CT at 3 months, and/or CT-PET, and/or biopsy  Multiple nodules size: <6 mm  *low risk patients: no routine follow-up  *high risk patients: optional CT at 12 months  Multiple nodules size: 6-8 mm  *low risk patients: follow-up at 3-6 months, then consider further follow-up at 18-24 months  *high risk patients: follow-up at 3-6 months, then at 18-24 months if no change  Multiple nodules size: >8 mm  *low risk patients: follow-up at 3-6 months, then consider further follow-up at 18-24 months  *high risk patients: follow-up at 3-6 months, then at 18-24 months if no change  *   Note: newly detected indeterminate nodule in persons 35 years of age or older.  *low risk patients: minimal or absent history of smoking and or other known risk factors  *high risk patients: history of smoking or of other known risk factors (e.g. first degree relative with lung cancer, or exposure to asbestos, radon, uranium)  *if a nodule up to 8 mm is partly solid or is ground glass further follow-up is required after 24 months to exclude possible slow growing adenocarcinoma (PAULO)    Subsolid nodules  Solitary pure ground-glass nodule  *nodule size <6mm  *no CT follow-up required  *nodule size \"e6mm  *follow up CT at 6-12 months, then every 2 years until 5 " "years  Solitary part-solid nodule  *nodule size <6mm  *no CT follow-up required  *nodule size \"e6mm  *follow-up CT at 3-6 months  *if unchanged, and solid component remains <6mm, then annual follow-up for 5 years  Multiple subsolid nodules  *nodule size <6mm  *follow-up CT at 3-6 months  *consider further follow-up at 2 and 4 years if stable  *nodule size \"e6mm  *follow-up CT at 3-6 months  *subsequent management based on the most suspicious nodule(s)    Electronically Signed: Flaca Antonio MD    12/4/2023 9:16 AM CST    Workstation ID: [id number]            Labs:  Results from last 7 days   Lab Units 12/04/23 2256   WBC 10*3/mm3 18.60*   HEMOGLOBIN g/dL 13.2   HEMATOCRIT % 39.9   PLATELETS 10*3/mm3 347     Results from last 7 days   Lab Units 12/04/23  2256 11/30/23  0212 11/29/23  0850   SODIUM mmol/L 138   < > 132*   POTASSIUM mmol/L 4.7   < > 4.5   CHLORIDE mmol/L 94*   < > 94*   CO2 mmol/L 37.0*   < > 26.0   BUN mg/dL 26*   < > 25*   CREATININE mg/dL 0.68*   < > 0.77   CALCIUM mg/dL 9.3   < > 9.3   BILIRUBIN mg/dL  --   --  0.4   ALK PHOS U/L  --   --  61   ALT (SGPT) U/L  --   --  13   AST (SGOT) U/L  --   --  23   GLUCOSE mg/dL 108*   < > 155*    < > = values in this interval not displayed.     Results from last 7 days   Lab Units 11/29/23  0954   PH, ARTERIAL pH units 7.349*   PO2 ART mm Hg 53.6*   PCO2, ARTERIAL mm Hg 46.9   HCO3 ART mmol/L 25.9     Results from last 7 days   Lab Units 11/29/23  0850   ALBUMIN g/dL 3.7     Results from last 7 days   Lab Units 11/29/23  1114 11/29/23  0850   HSTROP T ng/L 16 15             Results from last 7 days   Lab Units 11/29/23  0850   INR  1.05   APTT seconds 30.1*               Meds:   SCHEDULE  albuterol, 2.5 mg, Nebulization, 4x Daily - RT  aspirin, 81 mg, Oral, Daily  bisoprolol, 10 mg, Oral, Q24H  budesonide-formoterol, 2 puff, Inhalation, BID - RT  cefTRIAXone, 1,000 mg, Intravenous, Q24H  enoxaparin, 40 mg, Subcutaneous, Daily  famotidine, 40 mg, Oral, " Daily  glipizide, 10 mg, Oral, BID AC  guaiFENesin, 1,200 mg, Oral, Q12H  lisinopril, 40 mg, Oral, Q PM  metFORMIN, 1,000 mg, Oral, BID With Meals  mirtazapine, 15 mg, Oral, Nightly  montelukast, 10 mg, Oral, Nightly  predniSONE, 30 mg, Oral, Daily   Followed by  [START ON 12/6/2023] predniSONE, 20 mg, Oral, Daily   Followed by  [START ON 12/8/2023] predniSONE, 10 mg, Oral, Daily  roflumilast, 500 mcg, Oral, Daily  senna-docusate sodium, 2 tablet, Oral, BID  sodium chloride, 10 mL, Intravenous, Q12H  theophylline, 300 mg, Oral, Daily  tiotropium bromide monohydrate, 2 puff, Inhalation, Daily - RT      Infusions     PRNs    acetaminophen    senna-docusate sodium **AND** polyethylene glycol **AND** bisacodyl **AND** bisacodyl    Calcium Replacement - Follow Nurse / BPA Driven Protocol    hydrALAZINE    ipratropium-albuterol    Magnesium Standard Dose Replacement - Follow Nurse / BPA Driven Protocol    nitroglycerin    ondansetron    Phosphorus Replacement - Follow Nurse / BPA Driven Protocol    Potassium Replacement - Follow Nurse / BPA Driven Protocol    [COMPLETED] Insert Peripheral IV **AND** sodium chloride    sodium chloride    sodium chloride    Physical Exam:  General Appearance:  Alert   HEENT:  Normocephalic, without obvious abnormality, Conjunctiva/corneas clear,.   Nares normal, no drainage     Neck:  Supple, symmetrical, trachea midline.   Lungs /Chest wall: Wheezing with bilateral basal rhonchi, respirations unlabored, symmetrical wall movement.     Heart:  Regular rate and rhythm, S1 S2 normal  Abdomen: Soft, non-tender, no masses, no organomegaly.    Extremities: No edema, no clubbing or cyanosis     ROS  Constitutional: Negative for chills, fever and malaise/fatigue.   HENT: Negative.    Eyes: Negative.    Cardiovascular: Negative.    Respiratory: Positive for cough and shortness of breath.    Skin: Negative.    Musculoskeletal: Negative.    Gastrointestinal: Negative.    Genitourinary: Negative.     Neurological: Negative.    Psychiatric/Behavioral: Negative.      I reviewed the recent clinical results  I personally reviewed the latest radiological studies    Part of this note may be an electronic transcription/translation of spoken language to printed text using the Dragon Dictation System.

## 2023-12-05 NOTE — PAYOR COMM NOTE
"Discharge notification for case# [id number]     ==========    THANK YOU,    RAYMOND Sosa, RN  Utilization Review  [facility]  Phone: [phone]  Fax: [phone]      NPI: [id number]  Tax ID: [id number]        Maximino Bhatia (63 y.o. Male)       Date of Birth   1960    Social Security Number       Address   [address] [place] [address] [place] [address]    Home Phone   [phone]    MRN   [id number]       Zoroastrian   None    Marital Status   Single                            Admission Date   11/29/23    Admission Type   Emergency    Admitting Provider   Judit Singh MD    Attending Provider       Department, Room/Bed   [facility] PROGRESS CARE, 2115/1       Discharge Date   12/5/2023    Discharge Disposition   Home or Self Care    Discharge Destination                                 Attending Provider: (none)   Allergies: No Known Allergies    Isolation: Contact Drop   Infection: RSV (11/29/23)   Code Status: CPR    Ht: 180.3 cm (71\")   Wt: 85.1 kg (187 lb 9.8 oz)    Admission Cmt: None   Principal Problem: Pneumonia [J18.9]                   Active Insurance as of 11/29/2023       Primary Coverage       Payor Plan Insurance Group Employer/Plan Group    [id number]       Payor Plan Address Payor Plan Phone Number Payor Plan Fax Number Effective Dates    MAIL STOP:   4/8/2020 - None Entered    [address] [address]       [facility] [address]         Subscriber Name Subscriber Birth Date Member ID       MAXIMINO BHATIA 1960 [id number]                     Emergency Contacts        (Rel.) Home Phone Work Phone Mobile Phone    HELENLONNYHA (Friend) [phone] -- --                 Discharge Summary        Benita Sears APRN at 12/05/23 1309            Date of Discharge:  12/5/2023    Discharge Diagnosis:   Acute hypoxemic respiratory failure   PNA (pneumonia)  RSV  Leukocytosis    COPD    Type 2 diabetes   Essential " hypertension   Nicotine abuse      Presenting Problem/History of Present Illness  Active Hospital Problems    Diagnosis  POA    **Pneumonia [J18.9]  Yes    RSV (acute bronchiolitis due to respiratory syncytial virus) [J21.0]  Unknown    Pneumonia due to respiratory syncytial virus (RSV) [J12.1]  Yes    PNA (pneumonia) [J18.9]  Yes      Resolved Hospital Problems   No resolved problems to display.          Hospital Course    Patient is a 63 y.o. male presented with  advanced COPD who presents with worsening shortness of breath for several day. He called EMS  he was so short of breath and sats were noted to be in the mid 80s.  He is currently on 10 L of oxygen in the emergency room.  He has tested positive for RSV.  X-ray is concerning for right lower lobe pneumonia.  He was followed by pulmonology received antibiotics and steroids with improvement today he is back to baseline 3 L oxygen.  He does have oxygen at home inability to monitor saturations.  He will be discharged with follow-up appointment with PCP.    Procedures Performed         Consults:   Consults       Date and Time Order Name Status Description    11/29/2023  4:33 PM Inpatient Pulmonology Consult Completed     11/29/2023 10:47 AM Hospitalist (on-call MD unless specified)              Pertinent Test Results:    Lab Results (most recent)       Procedure Component Value Units Date/Time    CBC & Differential [[id number]]  (Abnormal) Collected: 12/04/23 2256    Specimen: Blood Updated: 12/05/23 0035    Narrative:      The following orders were created for panel order CBC & Differential.  Procedure                               Abnormality         Status                     ---------                               -----------         ------                     CBC Auto Differential[[id number]]        Abnormal            Final result               Scan Slide[[id number]]                                       Final result                 Please view results for  these tests on the individual orders.    CBC Auto Differential [[id number]]  (Abnormal) Collected: 12/04/23 2256    Specimen: Blood Updated: 12/05/23 0035     WBC 18.60 10*3/mm3      RBC 4.28 10*6/mm3      Hemoglobin 13.2 g/dL      Hematocrit 39.9 %      MCV 93.3 fL      MCH 30.8 pg      MCHC 33.0 g/dL      RDW 14.5 %      RDW-SD 47.3 fl      MPV 8.0 fL      Platelets 347 10*3/mm3     Narrative:      The previously reported component NRBC is no longer being reported. Previous result was 0.1 /100 WBC (Reference Range: 0.0-0.2 /100 WBC) on 12/4/2023 at 2347 EST.    Scan Slide [[id number]] Collected: 12/04/23 2256    Specimen: Blood Updated: 12/05/23 0035     Scan Slide --     Comment: See Manual Differential Results       Manual Differential [[id number]]  (Abnormal) Collected: 12/04/23 2256    Specimen: Blood Updated: 12/05/23 0035     Neutrophil % 75.0 %      Lymphocyte % 10.0 %      Monocyte % 1.0 %      Eosinophil % 1.0 %      Basophil % 1.0 %      Bands %  4.0 %      Metamyelocyte % 1.0 %      Myelocyte % 2.0 %      Atypical Lymphocyte % 4.0 %      Prolymphocyte % 1.0 %      Neutrophils Absolute 14.69 10*3/mm3      Lymphocytes Absolute 2.60 10*3/mm3      Monocytes Absolute 0.19 10*3/mm3      Eosinophils Absolute 0.19 10*3/mm3      Basophils Absolute 0.19 10*3/mm3      RBC Morphology Normal     WBC Morphology Normal     Platelet Morphology Normal    Basic Metabolic Panel [[id number]]  (Abnormal) Collected: 12/04/23 2256    Specimen: Blood Updated: 12/05/23 0012     Glucose 108 mg/dL      BUN 26 mg/dL      Creatinine 0.68 mg/dL      Sodium 138 mmol/L      Potassium 4.7 mmol/L      Chloride 94 mmol/L      CO2 37.0 mmol/L      Calcium 9.3 mg/dL      BUN/Creatinine Ratio 38.2     Anion Gap 7.0 mmol/L      eGFR 103.8 mL/min/1.73     Narrative:      GFR Normal >60  Chronic Kidney Disease <60  Kidney Failure <15      Pathology Consultation [[id number]] Collected: 12/02/23 2255    Specimen: Blood, Venous Line Updated: 12/04/23  1305     Final Diagnosis --     Leukocytosis with left shift  No blasts identified       Case Report --     Surgical Pathology Report                         Case: [id number]                                  Authorizing Provider:  Judit Singh MD            Collected:           12/02/2023 10:55 PM          Ordering Location:     [facility]       Received:            12/04/2023 07:44 AM                                 PROGRESS CARE                                                                Pathologist:           Maximus Ortiz MD                                                             Specimen:    Blood, Venous Line                                                                         Blood Culture - Blood, Arm, Right [[id number]]  (Normal) Collected: 11/29/23 1013    Specimen: Blood from Arm, Right Updated: 12/04/23 1030     Blood Culture No growth at 5 days    Blood Culture - Blood, Arm, Right [[id number]]  (Normal) Collected: 11/29/23 0920    Specimen: Blood from Arm, Right Updated: 12/04/23 0930     Blood Culture No growth at 5 days    Basic Metabolic Panel [[id number]]  (Abnormal) Collected: 12/03/23 2341    Specimen: Blood Updated: 12/04/23 0016     Glucose 152 mg/dL      BUN 24 mg/dL      Creatinine 0.69 mg/dL      Sodium 136 mmol/L      Potassium 5.4 mmol/L      Comment: Slight hemolysis detected by analyzer. Result may be falsely elevated.        Chloride 94 mmol/L      CO2 39.0 mmol/L      Calcium 9.6 mg/dL      BUN/Creatinine Ratio 34.8     Anion Gap 3.0 mmol/L      eGFR 103.3 mL/min/1.73     Narrative:      GFR Normal >60  Chronic Kidney Disease <60  Kidney Failure <15      CBC & Differential [[id number]]  (Abnormal) Collected: 12/03/23 2341    Specimen: Blood Updated: 12/04/23 0007    Narrative:      The following orders were created for panel order CBC & Differential.  Procedure                               Abnormality         Status                     ---------                                -----------         ------                     CBC Auto Differential[[id number]]        Abnormal            Final result               Scan Slide[[id number]]                                       Final result                 Please view results for these tests on the individual orders.    CBC Auto Differential [[id number]]  (Abnormal) Collected: 12/03/23 2341    Specimen: Blood Updated: 12/04/23 0007     WBC 15.30 10*3/mm3      RBC 4.24 10*6/mm3      Hemoglobin 13.1 g/dL      Hematocrit 38.8 %      MCV 91.5 fL      MCH 30.8 pg      MCHC 33.7 g/dL      RDW 15.3 %      RDW-SD 51.6 fl      MPV 8.0 fL      Platelets 311 10*3/mm3     Narrative:      The previously reported component NRBC is no longer being reported. Previous result was 0.0 /100 WBC (Reference Range: 0.0-0.2 /100 WBC) on 12/3/2023 at 2353 EST.    Scan Slide [[id number]] Collected: 12/03/23 2341    Specimen: Blood Updated: 12/04/23 0007     Scan Slide --     Comment: See Manual Differential Results       Manual Differential [[id number]]  (Abnormal) Collected: 12/03/23 2341    Specimen: Blood Updated: 12/04/23 0007     Neutrophil % 81.0 %      Lymphocyte % 2.0 %      Monocyte % 2.0 %      Bands %  9.0 %      Metamyelocyte % 2.0 %      Atypical Lymphocyte % 4.0 %      Neutrophils Absolute 13.77 10*3/mm3      Lymphocytes Absolute 0.92 10*3/mm3      Monocytes Absolute 0.31 10*3/mm3      RBC Morphology Normal     WBC Morphology Normal     Platelet Estimate Adequate    Path Consult Reflex [[id number]] Collected: 12/02/23 2255    Specimen: Blood Updated: 12/03/23 0202     Pathology Review Yes    POC Glucose Once [[id number]]  (Abnormal) Collected: 11/30/23 0143    Specimen: Blood Updated: 11/30/23 0146     Glucose 171 mg/dL      Comment: Serial Number: [id number]:  [id number]       Respiratory Culture - Sputum, Cough [[id number]] Collected: 11/29/23 1654    Specimen: Sputum from Cough Updated: 11/29/23 1852     Respiratory Culture Rejected     Gram Stain Few  (2+) WBCs seen      Rare (1+) Epithelial cells seen      Rare (1+) Gram positive cocci      Few (2+) Gram negative bacilli    Narrative:      Specimen rejected due to oropharyngeal contamination. Please reorder and recollect specimen if clinically necessary.    Respiratory Panel PCR w/COVID-19(SARS-CoV-2) LESLIE/GALO/TORY/PAD/COR/SARAH In-House, NP Swab in UTM/VTM, 2 HR TAT - Swab, Nasopharynx [[id number]]  (Abnormal) Collected: 11/29/23 1109    Specimen: Swab from Nasopharynx Updated: 11/29/23 1213     ADENOVIRUS, PCR Not Detected     Coronavirus 229E Not Detected     Coronavirus HKU1 Not Detected     Coronavirus NL63 Not Detected     Coronavirus OC43 Not Detected     COVID19 Not Detected     Human Metapneumovirus Not Detected     Human Rhinovirus/Enterovirus Not Detected     Influenza A PCR Not Detected     Influenza B PCR Not Detected     Parainfluenza Virus 1 Not Detected     Parainfluenza Virus 2 Not Detected     Parainfluenza Virus 3 Not Detected     Parainfluenza Virus 4 Not Detected     RSV, PCR Detected     Bordetella pertussis pcr Not Detected     Bordetella parapertussis PCR Not Detected     Chlamydophila pneumoniae PCR Not Detected     Mycoplasma pneumo by PCR Not Detected    Narrative:      In the setting of a positive respiratory panel with a viral infection PLUS a negative procalcitonin without other underlying concern for bacterial infection, consider observing off antibiotics or discontinuation of antibiotics and continue supportive care. If the respiratory panel is positive for atypical bacterial infection (Bordetella pertussis, Chlamydophila pneumoniae, or Mycoplasma pneumoniae), consider antibiotic de-escalation to target atypical bacterial infection.    High Sensitivity Troponin T 2Hr [[id number]]  (Normal) Collected: 11/29/23 1114    Specimen: Blood Updated: 11/29/23 1140     HS Troponin T 16 ng/L      Troponin T Delta 1 ng/L     Narrative:      High Sensitive Troponin T Reference Range:  <14.0 ng/L-  Negative Female for AMI  <22.0 ng/L- Negative Male for AMI  >=14 - Abnormal Female indicating possible myocardial injury.  >=22 - Abnormal Male indicating possible myocardial injury.   Clinicians would have to utilize clinical acumen, EKG, Troponin, and serial changes to determine if it is an Acute Myocardial Infarction or myocardial injury due to an underlying chronic condition.         POC Lactate [[id number]]  (Normal) Collected: 11/29/23 1016    Specimen: Blood Updated: 11/29/23 1019     Lactate 0.7 mmol/L      Comment: Serial Number: [id number]:  [id number]       [place] Draw [[id number]] Collected: 11/29/23 0850    Specimen: Blood Updated: 11/29/23 1000    Narrative:      The following orders were created for panel order [place] Draw.  Procedure                               Abnormality         Status                     ---------                               -----------         ------                     Green Top (Gel)[[id number]]                                  Final result               Lavender Top[[id number]]                                                                Gold Top - SST[[id number]]                                   Final result               Light Blue Top[[id number]]                                   Final result                 Please view results for these tests on the individual orders.    Green Top (Gel) [[id number]] Collected: 11/29/23 0850    Specimen: Blood Updated: 11/29/23 1000     Extra Tube Hold for add-ons.     Comment: Auto resulted.       Gold Top - SST [[id number]] Collected: 11/29/23 0850    Specimen: Blood Updated: 11/29/23 1000     Extra Tube Hold for add-ons.     Comment: Auto resulted.       Light Blue Top [[id number]] Collected: 11/29/23 0850    Specimen: Blood Updated: 11/29/23 1000     Extra Tube Hold for add-ons.     Comment: Auto resulted       Blood Gas, Arterial - [[id number]]  (Abnormal) Collected: 11/29/23 0954    Specimen: Arterial Blood Updated: 11/29/23 0958      Site Right Brachial     Felix's Test N/A     pH, Arterial 7.349 pH units      pCO2, Arterial 46.9 mm Hg      pO2, Arterial 53.6 mm Hg      HCO3, Arterial 25.9 mmol/L      Base Excess, Arterial -0.3 mmol/L      Comment: Serial Number: [id number]:  [id number]        O2 Saturation, Arterial 85.4 %      CO2 Content 27.3 mmol/L      Barometric Pressure for Blood Gas --     Comment: N/A        Modality Cannula     FIO2 40 %      Hemodilution No    Protime-INR [[id number]]  (Normal) Collected: 11/29/23 0850    Specimen: Blood Updated: 11/29/23 0944     Protime 11.4 Seconds      INR 1.05    aPTT [[id number]]  (Abnormal) Collected: 11/29/23 0850    Specimen: Blood Updated: 11/29/23 0944     PTT 30.1 seconds     COVID-19,CEPHEID/LEONEL,COR/TORY/PAD/JUWAN/LAG IN-HOUSE,NP SWAB IN TRANSPORT MEDIA 1 HR TAT, RT-PCR - Swab, Nasopharynx [[id number]]  (Normal) Collected: 11/29/23 0911    Specimen: Swab from Nasopharynx Updated: 11/29/23 0942     COVID19 Not Detected    Narrative:      Fact sheet for providers: https://www.fda.gov/media/077937/download     Fact sheet for patients: https://www.fda.gov/media/588409/download  Fact sheet for providers: https://www.fda.gov/media/772572/download    Fact sheet for patients: https://www.fda.gov/media/401436/download    Test performed by PCR.    Comprehensive Metabolic Panel [[id number]]  (Abnormal) Collected: 11/29/23 0850    Specimen: Blood Updated: 11/29/23 0927     Glucose 155 mg/dL      BUN 25 mg/dL      Creatinine 0.77 mg/dL      Sodium 132 mmol/L      Potassium 4.5 mmol/L      Chloride 94 mmol/L      CO2 26.0 mmol/L      Calcium 9.3 mg/dL      Total Protein 7.0 g/dL      Albumin 3.7 g/dL      ALT (SGPT) 13 U/L      AST (SGOT) 23 U/L      Alkaline Phosphatase 61 U/L      Total Bilirubin 0.4 mg/dL      Globulin 3.3 gm/dL      A/G Ratio 1.1 g/dL      BUN/Creatinine Ratio 32.5     Anion Gap 12.0 mmol/L      eGFR 100.0 mL/min/1.73     Narrative:      GFR Normal >60  Chronic Kidney Disease  <60  Kidney Failure <15      BNP [[id number]]  (Normal) Collected: 11/29/23 0850    Specimen: Blood Updated: 11/29/23 0927     proBNP 190.4 pg/mL     Narrative:      This assay is used as an aid in the diagnosis of individuals suspected of having heart failure. It can be used as an aid in the diagnosis of acute decompensated heart failure (ADHF) in patients presenting with signs and symptoms of ADHF to the emergency department (ED). In addition, NT-proBNP of <300 pg/mL indicates ADHF is not likely.    Age Range Result Interpretation  NT-proBNP Concentration (pg/mL:      <50             Positive            >450                   Gray                 300-450                    Negative             <300    50-75           Positive            >900                  Gray                300-900                  Negative            <300      >75             Positive            >1800                  Gray                300-1800                  Negative            <300    High Sensitivity Troponin T [[id number]]  (Normal) Collected: 11/29/23 0850    Specimen: Blood Updated: 11/29/23 0927     HS Troponin T 15 ng/L     Narrative:      High Sensitive Troponin T Reference Range:  <14.0 ng/L- Negative Female for AMI  <22.0 ng/L- Negative Male for AMI  >=14 - Abnormal Female indicating possible myocardial injury.  >=22 - Abnormal Male indicating possible myocardial injury.   Clinicians would have to utilize clinical acumen, EKG, Troponin, and serial changes to determine if it is an Acute Myocardial Infarction or myocardial injury due to an underlying chronic condition.                  Results for orders placed during the hospital encounter of 12/03/22    Adult Transthoracic Echo Complete W/ Cont if Necessary Per Protocol    Interpretation Summary    Left ventricular systolic function is low normal. Left ventricular ejection fraction appears to be 46 - 50%.    The left ventricular cavity is moderate to severely dilated.    Left  ventricular diastolic function is consistent with (grade I) impaired relaxation.    The study is technically difficult for diagnosis.       Condition on Discharge:  Stable    Vital Signs  Temp:  [97.6 °F (36.4 °C)-98.1 °F (36.7 °C)] 97.6 °F (36.4 °C)  Heart Rate:  [64-92] 80  Resp:  [14-26] 18  BP: ()/(65-82) 116/65      Physical Exam  Vitals reviewed.   Constitutional:       Appearance: He is not ill-appearing.   HENT:      Head: Normocephalic and atraumatic.      Right Ear: External ear normal.      Left Ear: External ear normal.      Nose: Nose normal.      Mouth/Throat:      Mouth: Mucous membranes are moist.   Eyes:      General:         Right eye: No discharge.         Left eye: No discharge.   Cardiovascular:      Rate and Rhythm: Normal rate and regular rhythm.      Pulses: Normal pulses.      Heart sounds: Normal heart sounds.   Pulmonary:      Effort: Pulmonary effort is normal.      Breath sounds: Normal breath sounds.   Abdominal:      General: Bowel sounds are normal.      Palpations: Abdomen is soft.   Musculoskeletal:         General: Normal range of motion.      Cervical back: Normal range of motion.   Skin:     General: Skin is warm.   Neurological:      Mental Status: He is alert and oriented to person, place, and time.   Psychiatric:         Behavior: Behavior normal.              Discharge Disposition  Home or Self Care    Discharge Medications     Discharge Medications        New Medications        Instructions Start Date   bisoprolol 10 MG tablet  Commonly known as: ZEBeta   10 mg, Oral, Every 24 Hours Scheduled   Start Date: December 6, 2023     cefdinir 300 MG capsule  Commonly known as: OMNICEF   300 mg, Oral, Every 12 Hours Scheduled      guaiFENesin 600 MG 12 hr tablet  Commonly known as: MUCINEX   1,200 mg, Oral, Every 12 Hours Scheduled      predniSONE 10 MG tablet  Commonly known as: DELTASONE   Take 1 tablet by mouth Daily for 2 days, THEN 1 tablet Daily for 2 days.   Start Date:  December 6, 2023            Continue These Medications        Instructions Start Date   albuterol sulfate  (90 Base) MCG/ACT inhaler  Commonly known as: PROVENTIL HFA;VENTOLIN HFA;PROAIR HFA   2 puffs, Inhalation, Every 4 Hours PRN      aspirin 81 MG EC tablet   81 mg, Oral, Daily      bisoprolol-hydrochlorothiazide 10-6.25 MG per tablet  Commonly known as: ZIAC   1 tablet, Oral, Daily      glipizide 10 MG tablet  Commonly known as: GLUCOTROL   10 mg, Oral, Nightly      glipizide 5 MG tablet  Commonly known as: GLUCOTROL   5 mg, Oral, Every Morning      ipratropium-albuterol 0.5-2.5 mg/3 ml nebulizer  Commonly known as: DUO-NEB   3 mL, Nebulization, Every 4 Hours PRN      lisinopril 40 MG tablet  Commonly known as: PRINIVIL,ZESTRIL   40 mg, Oral, Every Evening      metFORMIN 1000 MG tablet  Commonly known as: GLUCOPHAGE   1,000 mg, Oral, Nightly      metFORMIN 500 MG tablet  Commonly known as: GLUCOPHAGE   500 mg, Oral, Every Morning      mirtazapine 15 MG tablet  Commonly known as: REMERON   15 mg, Oral, Nightly      montelukast 10 MG tablet  Commonly known as: SINGULAIR   10 mg, Oral, Nightly      multivitamin capsule   1 capsule, Oral, Daily      roflumilast 500 MCG tablet tablet  Commonly known as: DALIRESP   500 mcg, Oral, Daily      theophylline 300 MG 12 hr tablet  Commonly known as: THEODUR   300 mg, Oral, Daily               Discharge Diet:   Diet Instructions       Diet: Diabetic Diets; Consistent Carbohydrate; Thin (IDDSI 0)      Discharge Diet: Diabetic Diets    Diabetic Diet: Consistent Carbohydrate    Fluid Consistency: Thin (IDDSI 0)            Activity at Discharge:   Activity Instructions       Gradually Increase Activity Until at Pre-Hospitalization Level              Follow-up Appointments  No future appointments.  Additional Instructions for the Follow-ups that You Need to Schedule       Discharge Follow-up with PCP   As directed       Currently Documented PCP:    Jareth Perea PA-C     PCP Phone Number:    [phone]     Follow Up Details: office will call with f/u        Discharge Follow-up with Specified Provider: Dr. Almazan; 3 Weeks   As directed      To: Dr. Almazan   Follow Up: 3 Weeks                Test Results Pending at Discharge       CARA Clark  12/05/23  13:17 EST    Time: Discharge 25 min          Electronically signed by Benita Sears APRN at 12/05/23 1318       Discharge Order (From admission, onward)       Start     Ordered    12/05/23 1314  Discharge patient  Once        Expected Discharge Date: 12/05/23   Expected Discharge Time: Afternoon   Discharge Disposition: Home or Self Care   Physician of Record for Attribution - Please select from Treatment Team: NGOC ORTIZ [[id number]]   Review needed by CMO to determine Physician of Record: No   Please choose which facility the patient is currently admitted if they are being discharged to another facility or unit.: [facility]   Mode: Family      Question Answer Comment   Physician of Record for Attribution - Please select from Treatment Team NGOC ORTIZ    Review needed by CMO to determine Physician of Record No    Please choose which facility the patient is currently admitted if they are being discharged to another facility or unit. [facility]    Mode: Family        12/05/23 1766

## 2023-12-06 NOTE — OUTREACH NOTE
Prep Survey      Flowsheet Row Responses   [facility] facility patient discharged from? [place]   Is LACE score < 7 ? No   Eligibility Readm Mgmt   Discharge diagnosis Pneumonia   Does the patient have one of the following disease processes/diagnoses(primary or secondary)? Pneumonia   Does the patient have Home health ordered? No   Is there a DME ordered? Yes   What DME was ordered? [facility]   Prep survey completed? Yes            Alisa CISSE - Registered Nurse

## 2023-12-11 ENCOUNTER — READMISSION MANAGEMENT (OUTPATIENT)
Dept: CALL CENTER | Facility: HOSPITAL | Age: 63
End: 2023-12-11
Payer: MEDICAID

## 2023-12-19 ENCOUNTER — READMISSION MANAGEMENT (OUTPATIENT)
Dept: CALL CENTER | Facility: HOSPITAL | Age: 63
End: 2023-12-19
Payer: MEDICAID

## 2023-12-19 NOTE — OUTREACH NOTE
COPD/PN Week 2 Survey      Flowsheet Row Responses   [facility] patient discharged from? [place]   Does the patient have one of the following disease processes/diagnoses(primary or secondary)? Pneumonia   Week 2 attempt successful? Yes   Call start time 1501   Call end time 1506   Discharge diagnosis Pneumonia   DME comments Using home O2 @3L   Pulse Ox monitoring Intermittent   Pulse Ox device source Patient   O2 Sat comments 95-99% at rest,  has not checked with movement   O2 Sat: education provided Sat levels, Monitoring frequency, When to seek care   Comments Pt reports SOA improved since DC. He reports that he does not typically bring his O2 tanks with him r/t bulky/heaviness of managing the portable tanks. Pt continues to have SOA w/exertion, resolves with rest.   What is the patient's perception of their health status since discharge? Same   Nursing Interventions Nurse provided patient education   Is the patient/caregiver able to teach back the hierarchy of who to call/visit for symptoms/problems? PCP, Specialist, Home health nurse, Urgent Care, ED, 911 Yes   Is the patient/caregiver able to teach back signs and symptoms of worsening condition: Fever/chills, Shortness of breath, Chest pain   Is the patient/caregiver able to teach back importance of completing antibiotic course of treatment? Yes   Week 2 call completed? Yes   Call end time 1506            Giuliana ROMERO - Registered Nurse

## 2023-12-28 ENCOUNTER — READMISSION MANAGEMENT (OUTPATIENT)
Dept: CALL CENTER | Facility: HOSPITAL | Age: 63
End: 2023-12-28
Payer: MEDICAID

## 2023-12-28 NOTE — OUTREACH NOTE
"COPD/PN Week 3 Survey      Flowsheet Row Responses   [facility] discharged from? [place]   Does the patient have one of the following disease processes/diagnoses(primary or secondary)? Pneumonia   Week 3 attempt successful? No   Unsuccessful attempts Attempt 1  [Unable to reach pt-person listed under \"friend\" was \"not available\"]            Kerri H - Registered Nurse  "

## 2024-01-04 ENCOUNTER — READMISSION MANAGEMENT (OUTPATIENT)
Dept: CALL CENTER | Facility: HOSPITAL | Age: 64
End: 2024-01-04
Payer: MEDICAID

## 2024-01-04 NOTE — OUTREACH NOTE
COPD/PN Week 3 Survey      Flowsheet Row Responses   [facility] patient discharged from? [place]   Does the patient have one of the following disease processes/diagnoses(primary or secondary)? Pneumonia   Week 3 attempt successful? Yes   Call start time 1556   Call end time 1600   Discharge diagnosis Pneumonia   Person spoke with today (if not patient) and relationship patient   Does the patient have a primary care provider?  Yes   Comments regarding PCP Has seen pulm since DC awaiting another CAT Scan   Has home health visited the patient within 72 hours of discharge? N/A   Psychosocial issues? No   Is the patient/caregiver able to teach back signs and symptoms of worsening condition: Shortness of breath, Fever/chills, Chest pain   Is the patient/caregiver able to teach back importance of completing antibiotic course of treatment? Yes   Week 3 call completed? Yes   Graduated Yes   Is the patient interested in additional calls from an ambulatory ? No   Would this patient benefit from a Referral to Amb Social Work? No   Wrap up additional comments Patient reports doing okay- he has seen pulm and needing another CT. No other concerns or questions noted.   Call end time 1600            Alisa GONZALEZ - Registered Nurse

## 2024-03-06 ENCOUNTER — TRANSCRIBE ORDERS (OUTPATIENT)
Dept: ADMINISTRATIVE | Facility: HOSPITAL | Age: 64
End: 2024-03-06
Payer: MEDICAID

## 2024-03-06 DIAGNOSIS — J44.9 CHRONIC OBSTRUCTIVE PULMONARY DISEASE, UNSPECIFIED COPD TYPE: Primary | ICD-10-CM

## 2024-03-30 ENCOUNTER — HOSPITAL ENCOUNTER (INPATIENT)
Facility: HOSPITAL | Age: 64
LOS: 6 days | Discharge: HOME | DRG: 177 | End: 2024-04-05
Attending: EMERGENCY MEDICINE | Admitting: FAMILY MEDICINE
Payer: MEDICARE

## 2024-03-30 ENCOUNTER — APPOINTMENT (OUTPATIENT)
Dept: CT IMAGING | Facility: HOSPITAL | Age: 64
DRG: 177 | End: 2024-03-30
Payer: MEDICARE

## 2024-03-30 ENCOUNTER — APPOINTMENT (OUTPATIENT)
Dept: GENERAL RADIOLOGY | Facility: HOSPITAL | Age: 64
DRG: 177 | End: 2024-03-30
Payer: MEDICARE

## 2024-03-30 DIAGNOSIS — R00.0 TACHYCARDIA: ICD-10-CM

## 2024-03-30 DIAGNOSIS — R05.1 ACUTE COUGH: ICD-10-CM

## 2024-03-30 DIAGNOSIS — J44.1 COPD EXACERBATION: ICD-10-CM

## 2024-03-30 DIAGNOSIS — J18.9 PNEUMONIA OF RIGHT LOWER LOBE DUE TO INFECTIOUS ORGANISM: ICD-10-CM

## 2024-03-30 DIAGNOSIS — J96.01 ACUTE RESPIRATORY FAILURE WITH HYPOXIA: Primary | ICD-10-CM

## 2024-03-30 DIAGNOSIS — R06.02 SHORTNESS OF BREATH: ICD-10-CM

## 2024-03-30 DIAGNOSIS — A41.9 SEPSIS, DUE TO UNSPECIFIED ORGANISM, UNSPECIFIED WHETHER ACUTE ORGAN DYSFUNCTION PRESENT: ICD-10-CM

## 2024-03-30 DIAGNOSIS — D72.829 LEUKOCYTOSIS, UNSPECIFIED TYPE: ICD-10-CM

## 2024-03-30 LAB
ALBUMIN SERPL-MCNC: 4.1 G/DL (ref 3.5–5.2)
ALBUMIN/GLOB SERPL: 1.5 G/DL
ALP SERPL-CCNC: 71 U/L (ref 39–117)
ALT SERPL W P-5'-P-CCNC: 10 U/L (ref 1–41)
ANION GAP SERPL CALCULATED.3IONS-SCNC: 10 MMOL/L (ref 5–15)
ARTERIAL PATENCY WRIST A: POSITIVE
AST SERPL-CCNC: 13 U/L (ref 1–40)
ATMOSPHERIC PRESS: ABNORMAL MM[HG]
B PARAPERT DNA SPEC QL NAA+PROBE: NOT DETECTED
B PERT DNA SPEC QL NAA+PROBE: NOT DETECTED
BACTERIA UR QL AUTO: ABNORMAL /HPF
BASE EXCESS BLDA CALC-SCNC: 1.9 MMOL/L (ref 0–3)
BASOPHILS # BLD AUTO: 0.05 10*3/MM3 (ref 0–0.2)
BASOPHILS NFR BLD AUTO: 0.2 % (ref 0–1.5)
BDY SITE: ABNORMAL
BILIRUB SERPL-MCNC: 0.3 MG/DL (ref 0–1.2)
BILIRUB UR QL STRIP: NEGATIVE
BUN SERPL-MCNC: 14 MG/DL (ref 8–23)
BUN/CREAT SERPL: 20.3 (ref 7–25)
C PNEUM DNA NPH QL NAA+NON-PROBE: NOT DETECTED
CALCIUM SPEC-SCNC: 9.4 MG/DL (ref 8.6–10.5)
CHLORIDE SERPL-SCNC: 97 MMOL/L (ref 98–107)
CLARITY UR: CLEAR
CO2 BLDA-SCNC: 29.2 MMOL/L (ref 22–29)
CO2 SERPL-SCNC: 31 MMOL/L (ref 22–29)
COLOR UR: YELLOW
CREAT SERPL-MCNC: 0.69 MG/DL (ref 0.76–1.27)
D DIMER PPP FEU-MCNC: 0.35 MG/L (FEU) (ref 0–0.65)
D-LACTATE SERPL-SCNC: 0.9 MMOL/L (ref 0.3–2)
DEPRECATED RDW RBC AUTO: 51.8 FL (ref 37–54)
EGFRCR SERPLBLD CKD-EPI 2021: 102.7 ML/MIN/1.73
EOSINOPHIL # BLD AUTO: 0.02 10*3/MM3 (ref 0–0.4)
EOSINOPHIL NFR BLD AUTO: 0.1 % (ref 0.3–6.2)
ERYTHROCYTE [DISTWIDTH] IN BLOOD BY AUTOMATED COUNT: 15.1 % (ref 12.3–15.4)
FLUAV SUBTYP SPEC NAA+PROBE: NOT DETECTED
FLUBV RNA ISLT QL NAA+PROBE: NOT DETECTED
GLOBULIN UR ELPH-MCNC: 2.8 GM/DL
GLUCOSE BLDC GLUCOMTR-MCNC: 194 MG/DL (ref 70–105)
GLUCOSE SERPL-MCNC: 204 MG/DL (ref 65–99)
GLUCOSE UR STRIP-MCNC: NEGATIVE MG/DL
HADV DNA SPEC NAA+PROBE: NOT DETECTED
HCO3 BLDA-SCNC: 27.7 MMOL/L (ref 21–28)
HCOV 229E RNA SPEC QL NAA+PROBE: NOT DETECTED
HCOV HKU1 RNA SPEC QL NAA+PROBE: NOT DETECTED
HCOV NL63 RNA SPEC QL NAA+PROBE: NOT DETECTED
HCOV OC43 RNA SPEC QL NAA+PROBE: NOT DETECTED
HCT VFR BLD AUTO: 35.6 % (ref 37.5–51)
HEMODILUTION: NO
HGB BLD-MCNC: 11.7 G/DL (ref 13–17.7)
HGB UR QL STRIP.AUTO: ABNORMAL
HMPV RNA NPH QL NAA+NON-PROBE: NOT DETECTED
HOLD SPECIMEN: NORMAL
HPIV1 RNA ISLT QL NAA+PROBE: NOT DETECTED
HPIV2 RNA SPEC QL NAA+PROBE: NOT DETECTED
HPIV3 RNA NPH QL NAA+PROBE: NOT DETECTED
HPIV4 P GENE NPH QL NAA+PROBE: NOT DETECTED
HYALINE CASTS UR QL AUTO: ABNORMAL /LPF
IMM GRANULOCYTES # BLD AUTO: 0.2 10*3/MM3 (ref 0–0.05)
IMM GRANULOCYTES NFR BLD AUTO: 1 % (ref 0–0.5)
INHALED O2 CONCENTRATION: 36 %
KETONES UR QL STRIP: ABNORMAL
LEUKOCYTE ESTERASE UR QL STRIP.AUTO: NEGATIVE
LYMPHOCYTES # BLD AUTO: 0.72 10*3/MM3 (ref 0.7–3.1)
LYMPHOCYTES NFR BLD AUTO: 3.5 % (ref 19.6–45.3)
M PNEUMO IGG SER IA-ACNC: NOT DETECTED
MCH RBC QN AUTO: 30.9 PG (ref 26.6–33)
MCHC RBC AUTO-ENTMCNC: 32.9 G/DL (ref 31.5–35.7)
MCV RBC AUTO: 93.9 FL (ref 79–97)
MODALITY: ABNORMAL
MONOCYTES # BLD AUTO: 2.98 10*3/MM3 (ref 0.1–0.9)
MONOCYTES NFR BLD AUTO: 14.4 % (ref 5–12)
NEUTROPHILS NFR BLD AUTO: 16.77 10*3/MM3 (ref 1.7–7)
NEUTROPHILS NFR BLD AUTO: 80.8 % (ref 42.7–76)
NITRITE UR QL STRIP: NEGATIVE
NRBC BLD AUTO-RTO: 0 /100 WBC (ref 0–0.2)
NT-PROBNP SERPL-MCNC: 92.5 PG/ML (ref 0–900)
PCO2 BLDA: 47.2 MM HG (ref 35–48)
PH BLDA: 7.38 PH UNITS (ref 7.35–7.45)
PH UR STRIP.AUTO: 5.5 [PH] (ref 5–8)
PLATELET # BLD AUTO: 181 10*3/MM3 (ref 140–450)
PMV BLD AUTO: 10.5 FL (ref 6–12)
PO2 BLDA: 81.1 MM HG (ref 83–108)
POTASSIUM SERPL-SCNC: 4.3 MMOL/L (ref 3.5–5.2)
PROT SERPL-MCNC: 6.9 G/DL (ref 6–8.5)
PROT UR QL STRIP: ABNORMAL
RBC # BLD AUTO: 3.79 10*6/MM3 (ref 4.14–5.8)
RBC # UR STRIP: ABNORMAL /HPF
REF LAB TEST METHOD: ABNORMAL
RHINOVIRUS RNA SPEC NAA+PROBE: NOT DETECTED
RSV RNA NPH QL NAA+NON-PROBE: NOT DETECTED
SAO2 % BLDCOA: 95.5 % (ref 94–98)
SARS-COV-2 RNA NPH QL NAA+NON-PROBE: NOT DETECTED
SODIUM SERPL-SCNC: 138 MMOL/L (ref 136–145)
SP GR UR STRIP: 1.05 (ref 1–1.03)
SQUAMOUS #/AREA URNS HPF: ABNORMAL /HPF
TROPONIN T SERPL HS-MCNC: 18 NG/L
UROBILINOGEN UR QL STRIP: ABNORMAL
WBC # UR STRIP: ABNORMAL /HPF
WBC NRBC COR # BLD AUTO: 20.74 10*3/MM3 (ref 3.4–10.8)
WHOLE BLOOD HOLD COAG: NORMAL
WHOLE BLOOD HOLD SPECIMEN: NORMAL

## 2024-03-30 PROCEDURE — 83880 ASSAY OF NATRIURETIC PEPTIDE: CPT | Performed by: PHYSICIAN ASSISTANT

## 2024-03-30 PROCEDURE — 87040 BLOOD CULTURE FOR BACTERIA: CPT | Performed by: PHYSICIAN ASSISTANT

## 2024-03-30 PROCEDURE — 36415 COLL VENOUS BLD VENIPUNCTURE: CPT

## 2024-03-30 PROCEDURE — 85025 COMPLETE CBC W/AUTO DIFF WBC: CPT | Performed by: PHYSICIAN ASSISTANT

## 2024-03-30 PROCEDURE — 25810000003 SODIUM CHLORIDE 0.9 % SOLUTION: Performed by: PHYSICIAN ASSISTANT

## 2024-03-30 PROCEDURE — 82803 BLOOD GASES ANY COMBINATION: CPT | Performed by: PHYSICIAN ASSISTANT

## 2024-03-30 PROCEDURE — 82948 REAGENT STRIP/BLOOD GLUCOSE: CPT | Performed by: PHYSICIAN ASSISTANT

## 2024-03-30 PROCEDURE — 0202U NFCT DS 22 TRGT SARS-COV-2: CPT | Performed by: PHYSICIAN ASSISTANT

## 2024-03-30 PROCEDURE — 71045 X-RAY EXAM CHEST 1 VIEW: CPT

## 2024-03-30 PROCEDURE — 80053 COMPREHEN METABOLIC PANEL: CPT | Performed by: PHYSICIAN ASSISTANT

## 2024-03-30 PROCEDURE — 85379 FIBRIN DEGRADATION QUANT: CPT | Performed by: PHYSICIAN ASSISTANT

## 2024-03-30 PROCEDURE — 94799 UNLISTED PULMONARY SVC/PX: CPT

## 2024-03-30 PROCEDURE — 94640 AIRWAY INHALATION TREATMENT: CPT

## 2024-03-30 PROCEDURE — 93005 ELECTROCARDIOGRAM TRACING: CPT

## 2024-03-30 PROCEDURE — 25510000001 IOPAMIDOL PER 1 ML: Performed by: EMERGENCY MEDICINE

## 2024-03-30 PROCEDURE — 99285 EMERGENCY DEPT VISIT HI MDM: CPT

## 2024-03-30 PROCEDURE — 83605 ASSAY OF LACTIC ACID: CPT

## 2024-03-30 PROCEDURE — 81001 URINALYSIS AUTO W/SCOPE: CPT | Performed by: PHYSICIAN ASSISTANT

## 2024-03-30 PROCEDURE — 25010000002 CEFTRIAXONE PER 250 MG: Performed by: PHYSICIAN ASSISTANT

## 2024-03-30 PROCEDURE — 25010000002 METHYLPREDNISOLONE PER 125 MG: Performed by: PHYSICIAN ASSISTANT

## 2024-03-30 PROCEDURE — 93005 ELECTROCARDIOGRAM TRACING: CPT | Performed by: EMERGENCY MEDICINE

## 2024-03-30 PROCEDURE — 84484 ASSAY OF TROPONIN QUANT: CPT | Performed by: PHYSICIAN ASSISTANT

## 2024-03-30 PROCEDURE — 36600 WITHDRAWAL OF ARTERIAL BLOOD: CPT | Performed by: PHYSICIAN ASSISTANT

## 2024-03-30 PROCEDURE — 71260 CT THORAX DX C+: CPT

## 2024-03-30 RX ORDER — ONDANSETRON 2 MG/ML
4 INJECTION INTRAMUSCULAR; INTRAVENOUS EVERY 6 HOURS PRN
Status: DISCONTINUED | OUTPATIENT
Start: 2024-03-30 | End: 2024-04-05 | Stop reason: HOSPADM

## 2024-03-30 RX ORDER — BISACODYL 10 MG
10 SUPPOSITORY, RECTAL RECTAL DAILY PRN
Status: DISCONTINUED | OUTPATIENT
Start: 2024-03-30 | End: 2024-04-05 | Stop reason: HOSPADM

## 2024-03-30 RX ORDER — ONDANSETRON 4 MG/1
4 TABLET, ORALLY DISINTEGRATING ORAL EVERY 6 HOURS PRN
Status: DISCONTINUED | OUTPATIENT
Start: 2024-03-30 | End: 2024-04-05 | Stop reason: HOSPADM

## 2024-03-30 RX ORDER — SODIUM CHLORIDE 0.9 % (FLUSH) 0.9 %
10 SYRINGE (ML) INJECTION AS NEEDED
Status: DISCONTINUED | OUTPATIENT
Start: 2024-03-30 | End: 2024-04-05 | Stop reason: HOSPADM

## 2024-03-30 RX ORDER — SODIUM CHLORIDE 9 MG/ML
40 INJECTION, SOLUTION INTRAVENOUS AS NEEDED
Status: DISCONTINUED | OUTPATIENT
Start: 2024-03-30 | End: 2024-04-05 | Stop reason: HOSPADM

## 2024-03-30 RX ORDER — METHYLPREDNISOLONE SODIUM SUCCINATE 125 MG/2ML
125 INJECTION, POWDER, LYOPHILIZED, FOR SOLUTION INTRAMUSCULAR; INTRAVENOUS ONCE
Status: COMPLETED | OUTPATIENT
Start: 2024-03-30 | End: 2024-03-30

## 2024-03-30 RX ORDER — AMOXICILLIN 250 MG
2 CAPSULE ORAL 2 TIMES DAILY PRN
Status: DISCONTINUED | OUTPATIENT
Start: 2024-03-30 | End: 2024-04-05 | Stop reason: HOSPADM

## 2024-03-30 RX ORDER — ACETAMINOPHEN 325 MG/1
650 TABLET ORAL EVERY 4 HOURS PRN
Status: DISCONTINUED | OUTPATIENT
Start: 2024-03-30 | End: 2024-04-05 | Stop reason: HOSPADM

## 2024-03-30 RX ORDER — ALBUTEROL SULFATE 90 UG/1
2 AEROSOL, METERED RESPIRATORY (INHALATION) ONCE
Status: COMPLETED | OUTPATIENT
Start: 2024-03-30 | End: 2024-03-30

## 2024-03-30 RX ORDER — DOXYCYCLINE 100 MG/1
100 CAPSULE ORAL ONCE
Status: COMPLETED | OUTPATIENT
Start: 2024-03-30 | End: 2024-03-30

## 2024-03-30 RX ORDER — IPRATROPIUM BROMIDE AND ALBUTEROL SULFATE 2.5; .5 MG/3ML; MG/3ML
3 SOLUTION RESPIRATORY (INHALATION)
Status: DISCONTINUED | OUTPATIENT
Start: 2024-03-31 | End: 2024-04-02

## 2024-03-30 RX ORDER — BISACODYL 5 MG/1
5 TABLET, DELAYED RELEASE ORAL DAILY PRN
Status: DISCONTINUED | OUTPATIENT
Start: 2024-03-30 | End: 2024-04-05 | Stop reason: HOSPADM

## 2024-03-30 RX ORDER — SODIUM CHLORIDE 0.9 % (FLUSH) 0.9 %
10 SYRINGE (ML) INJECTION EVERY 12 HOURS SCHEDULED
Status: DISCONTINUED | OUTPATIENT
Start: 2024-03-30 | End: 2024-04-05 | Stop reason: HOSPADM

## 2024-03-30 RX ORDER — IPRATROPIUM BROMIDE AND ALBUTEROL SULFATE 2.5; .5 MG/3ML; MG/3ML
3 SOLUTION RESPIRATORY (INHALATION) ONCE
Status: COMPLETED | OUTPATIENT
Start: 2024-03-30 | End: 2024-03-30

## 2024-03-30 RX ORDER — POLYETHYLENE GLYCOL 3350 17 G/17G
17 POWDER, FOR SOLUTION ORAL DAILY PRN
Status: DISCONTINUED | OUTPATIENT
Start: 2024-03-30 | End: 2024-04-05 | Stop reason: HOSPADM

## 2024-03-30 RX ORDER — GUAIFENESIN 600 MG/1
600 TABLET, EXTENDED RELEASE ORAL EVERY 12 HOURS SCHEDULED
Status: DISCONTINUED | OUTPATIENT
Start: 2024-03-30 | End: 2024-04-05 | Stop reason: HOSPADM

## 2024-03-30 RX ORDER — ASPIRIN 81 MG/1
243 TABLET, CHEWABLE ORAL ONCE
Status: DISCONTINUED | OUTPATIENT
Start: 2024-03-30 | End: 2024-03-30

## 2024-03-30 RX ADMIN — CEFTRIAXONE 1000 MG: 1 INJECTION, POWDER, FOR SOLUTION INTRAMUSCULAR; INTRAVENOUS at 20:49

## 2024-03-30 RX ADMIN — IPRATROPIUM BROMIDE AND ALBUTEROL SULFATE 3 ML: .5; 3 SOLUTION RESPIRATORY (INHALATION) at 23:13

## 2024-03-30 RX ADMIN — SODIUM CHLORIDE 500 ML: 9 INJECTION, SOLUTION INTRAVENOUS at 20:48

## 2024-03-30 RX ADMIN — Medication 10 ML: at 23:24

## 2024-03-30 RX ADMIN — GUAIFENESIN 600 MG: 600 TABLET, EXTENDED RELEASE ORAL at 23:16

## 2024-03-30 RX ADMIN — SODIUM CHLORIDE 500 ML: 9 INJECTION, SOLUTION INTRAVENOUS at 23:16

## 2024-03-30 RX ADMIN — METHYLPREDNISOLONE SODIUM SUCCINATE 125 MG: 125 INJECTION, POWDER, FOR SOLUTION INTRAMUSCULAR; INTRAVENOUS at 20:46

## 2024-03-30 RX ADMIN — IOPAMIDOL 100 ML: 755 INJECTION, SOLUTION INTRAVENOUS at 22:41

## 2024-03-30 RX ADMIN — DOXYCYCLINE 100 MG: 100 CAPSULE ORAL at 20:46

## 2024-03-30 RX ADMIN — ALBUTEROL SULFATE 2 PUFF: 108 AEROSOL, METERED RESPIRATORY (INHALATION) at 20:20

## 2024-03-30 NOTE — ED PROVIDER NOTES
Subjective   History of Present Illness      Patient 64-year-old male comes in complaining of shortness of breath for the past 3 days.  Patient states he wears oxygen as needed at home which he wears 2 to 3 L.  Patient states he does not wear oxygen when he is out and about.  Patient denies any significant chest pain.  Patient states he has had a productive cough for about the past 4 to 5 days as well.  Patient reports some subjective fevers and bodyaches since last night.  Patient did not take his temperature at home.  Patient denies any vomiting or diarrhea.    Upon chart review, patient was discharged on 12/5/2023 for acute respiratory failure with hypoxia secondary to pneumonia and RSV.  Patient was reportedly on baseline 3 L nasal cannula at time of discharge.    Review of Systems   Constitutional:  Positive for chills and fatigue. Negative for fever.   HENT:  Negative for congestion, sore throat, tinnitus and trouble swallowing.    Eyes:  Negative for photophobia, discharge and visual disturbance.   Respiratory:  Positive for cough, shortness of breath and wheezing.    Cardiovascular:  Negative for chest pain, palpitations and leg swelling.   Gastrointestinal:  Negative for abdominal pain, diarrhea, nausea and vomiting.   Genitourinary:  Negative for flank pain.   Musculoskeletal:  Negative for arthralgias and myalgias.   Skin:  Negative for rash.   Neurological:  Negative for dizziness, syncope and headaches.   Psychiatric/Behavioral:  Negative for confusion.        Past Medical History:   Diagnosis Date    COPD (chronic obstructive pulmonary disease)     Diabetes mellitus     Hypertension        No Known Allergies    History reviewed. No pertinent surgical history.    History reviewed. No pertinent family history.    Social History     Socioeconomic History    Marital status: Single   Tobacco Use    Smoking status: Every Day     Current packs/day: 1.50     Average packs/day: 1.5 packs/day for 25.0 years  (37.5 ttl pk-yrs)     Types: Cigarettes     Passive exposure: Never    Smokeless tobacco: Never   Vaping Use    Vaping status: Never Used    Passive vaping exposure: Yes   Substance and Sexual Activity    Alcohol use: Yes     Comment: RARE    Drug use: Never    Sexual activity: Defer           Objective   Physical Exam  Vitals and nursing note reviewed.   Constitutional:       General: He is not in acute distress.     Appearance: He is well-developed. He is not diaphoretic.      Interventions: He is not intubated.  HENT:      Head: Normocephalic and atraumatic.      Nose: Nose normal.      Mouth/Throat:      Pharynx: No oropharyngeal exudate.   Eyes:      Extraocular Movements: Extraocular movements intact.      Conjunctiva/sclera: Conjunctivae normal.      Pupils: Pupils are equal, round, and reactive to light.   Cardiovascular:      Rate and Rhythm: Normal rate and regular rhythm.      Pulses: Normal pulses.      Heart sounds: Normal heart sounds.      Comments: S1, S2 audible.  Pulmonary:      Effort: Pulmonary effort is normal. No tachypnea, accessory muscle usage or respiratory distress. He is not intubated.      Breath sounds: No stridor. Examination of the right-middle field reveals wheezing. Examination of the left-lower field reveals wheezing. Wheezing (faint) present. No rhonchi or rales.      Comments: On room air.  Abdominal:      General: Bowel sounds are normal. There is no distension.      Palpations: Abdomen is soft.      Tenderness: There is no abdominal tenderness.   Musculoskeletal:         General: No tenderness or deformity. Normal range of motion.      Cervical back: Normal range of motion.      Right lower leg: No edema.      Left lower leg: No edema.   Skin:     General: Skin is warm.      Capillary Refill: Capillary refill takes less than 2 seconds.      Findings: No erythema or rash.   Neurological:      General: No focal deficit present.      Mental Status: He is alert and oriented to  person, place, and time.      Cranial Nerves: No cranial nerve deficit.   Psychiatric:         Mood and Affect: Mood normal.         Behavior: Behavior normal.         Procedures           ED Course  ED Course as of 03/30/24 2309   Sat Mar 30, 2024   1934 EKG independently interpreted by myself shows sinus tachycardia rate 115 bpm, no ST elevation apparent.  Similar to previous study done on 11/29/2023 showed sinus rhythm 90 bpm no ST elevation.  Findings confirmed by Dr. Sandy. [RL]   2041 Chest x-ray independently interpreted by myself shows possible infiltrate of right lower lobe, slightly improved since previous.  No obvious cardiomegaly.  See radiology report above [RL]   2043 Upon chart review, patient had last CT of chest without contrast at priority radiology ordered by Dr. Ford, pulmonology and this was performed on 2/13/2024.  Impression showed right basilar scarring/parenchymal distortion appears unchanged since 12/4/2023.  4 mm nodule opacity within the left lower lobe may represent focal scarring, situated by motion artifact.  Punctate right lower lobe nodule unchanged.  Thin/oblong fissural nodule along the minor fissure likely a fissural vessel or lymph node.  Optional follow-up CT chest in 1 year may be beneficial.  Pulmonary emphysema. [RL]   2058 Awaiting CT [RL]   2234 Awaiting ct [RL]      ED Course User Index  [RL] Robby Hayes PA                                 Labs Reviewed   COMPREHENSIVE METABOLIC PANEL - Abnormal; Notable for the following components:       Result Value    Glucose 204 (*)     Creatinine 0.69 (*)     Chloride 97 (*)     CO2 31.0 (*)     All other components within normal limits    Narrative:     GFR Normal >60  Chronic Kidney Disease <60  Kidney Failure <15     CBC WITH AUTO DIFFERENTIAL - Abnormal; Notable for the following components:    WBC 20.74 (*)     RBC 3.79 (*)     Hemoglobin 11.7 (*)     Hematocrit 35.6 (*)     Neutrophil % 80.8 (*)     Lymphocyte % 3.5 (*)      Monocyte % 14.4 (*)     Eosinophil % 0.1 (*)     Immature Grans % 1.0 (*)     Neutrophils, Absolute 16.77 (*)     Monocytes, Absolute 2.98 (*)     Immature Grans, Absolute 0.20 (*)     All other components within normal limits   BLOOD GAS, ARTERIAL - Abnormal; Notable for the following components:    pO2, Arterial 81.1 (*)     CO2 Content 29.2 (*)     All other components within normal limits   POCT GLUCOSE FINGERSTICK - Abnormal; Notable for the following components:    Glucose 194 (*)     All other components within normal limits   RESPIRATORY PANEL PCR W/ COVID-19 (SARS-COV-2), NP SWAB IN UTM/VTP, 2 HR TAT - Normal    Narrative:     In the setting of a positive respiratory panel with a viral infection PLUS a negative procalcitonin without other underlying concern for bacterial infection, consider observing off antibiotics or discontinuation of antibiotics and continue supportive care. If the respiratory panel is positive for atypical bacterial infection (Bordetella pertussis, Chlamydophila pneumoniae, or Mycoplasma pneumoniae), consider antibiotic de-escalation to target atypical bacterial infection.   BNP (IN-HOUSE) - Normal    Narrative:     This assay is used as an aid in the diagnosis of individuals suspected of having heart failure. It can be used as an aid in the diagnosis of acute decompensated heart failure (ADHF) in patients presenting with signs and symptoms of ADHF to the emergency department (ED). In addition, NT-proBNP of <300 pg/mL indicates ADHF is not likely.    Age Range Result Interpretation  NT-proBNP Concentration (pg/mL:      <50             Positive            >450                   Gray                 300-450                    Negative             <300    50-75           Positive            >900                  Gray                300-900                  Negative            <300      >75             Positive            >1800                  Gray                300-1800                   "Negative            <300   SINGLE HS TROPONIN T - Normal    Narrative:     High Sensitive Troponin T Reference Range:  <14.0 ng/L- Negative Female for AMI  <22.0 ng/L- Negative Male for AMI  >=14 - Abnormal Female indicating possible myocardial injury.  >=22 - Abnormal Male indicating possible myocardial injury.   Clinicians would have to utilize clinical acumen, EKG, Troponin, and serial changes to determine if it is an Acute Myocardial Infarction or myocardial injury due to an underlying chronic condition.        D-DIMER, QUANTITATIVE - Normal    Narrative:     According to the assay 's published package insert, a normal (<0.50 mg/L (FEU)) D-dimer result in conjunction with a non-high clinical probability assessment, excludes deep vein thrombosis (DVT) and pulmonary embolism (PE) with high sensitivity.    D-dimer values increase with age and this can make VTE exclusion of an older population difficult. To address this, the [employer], based on best available evidence and recent guidelines, recommends that clinicians use age-adjusted D-dimer thresholds in patients greater than 50 years of age with: a) a low probability of PE who do not meet all Pulmonary Embolism Rule Out Criteria, or b) in those with intermediate probability of PE.   The formula for an age-adjusted D-dimer cut-off is \"age/100\".  For example, a 60 year old patient would have an age-adjusted cut-off of 0.60 mg/L (FEU) and an 80 year old 0.80 mg/L (FEU).   POC LACTATE - Normal   BLOOD CULTURE   BLOOD CULTURE   RAINBOW DRAW    Narrative:     The following orders were created for panel order [place] Draw.  Procedure                               Abnormality         Status                     ---------                               -----------         ------                     Green Top (Gel)[[id number]]                                                             Lavender Top[[id number]]                                     " Final result               Gold Top - [facility][[id number]]                                   Final result               Light Blue Top[[id number]]                                   Final result                 Please view results for these tests on the individual orders.   URINALYSIS W/ CULTURE IF INDICATED   POC LACTATE   CBC AND DIFFERENTIAL    Narrative:     The following orders were created for panel order CBC & Differential.  Procedure                               Abnormality         Status                     ---------                               -----------         ------                     CBC Auto Differential[[id number]]        Abnormal            Final result                 Please view results for these tests on the individual orders.   LAVENDER TOP   GOLD TOP - SST   LIGHT BLUE TOP                 Medical Decision Making  Problems Addressed:  Acute cough: complicated acute illness or injury  Acute respiratory failure with hypoxia: complicated acute illness or injury  COPD exacerbation: complicated acute illness or injury  Leukocytosis, unspecified type: complicated acute illness or injury  Sepsis, due to unspecified organism, unspecified whether acute organ dysfunction present: complicated acute illness or injury  Shortness of breath: complicated acute illness or injury  Tachycardia: complicated acute illness or injury    Amount and/or Complexity of Data Reviewed  Labs: ordered.  Radiology: ordered.  ECG/medicine tests: ordered.    Risk  Prescription drug management.  Decision regarding hospitalization.      Differential diagnosis: COPD exac, PNA, not meant to be an all inclusive list.  Chart review: Upon chart review, see above    EKG:  see above     Imaging: If applicable see my interpretation above.  CT Chest With Contrast Diagnostic   Final Result   Impression:   1.Patchy airspace disease seen within the right lower lobe and the base of the right middle lobe consistent with a mild pneumonia, new as  "compared to the previous study, superimposed on chronic changes. No suspicious pulmonary nodules. Follow-up to    resolution recommended.   2.Ancillary findings as described above.            Electronically Signed: Kathy Gonzalez MD     3/30/2024 10:47 PM EDT     Workstation ID: [id number]      XR Chest 1 View   Final Result   Impression:   Lungs demonstrate coarsened interstitial markings, possibly related to underlying interstitial lung disease. Concomitant atypical infiltrates within the mid lungs cannot be entirely excluded.       Electronically Signed: Bill Carlson MD     3/30/2024 7:57 PM EDT     Workstation ID: [id number]        Labs:  Lab work independently interpreted by myself shows initial ABG shows normal pH, normal pCO2, pO2 low at 4 L at 81.1, patient was increased to 4.5 L nasal cannula.  Initial lactic normal.  Initial glucose 194.  Viral panel negative.  Blood cultures x 2 pending.  BNP normal.  D-dimer negative patient felt not to have pulmonary embolism.  Initial troponin negative.  White blood cell count is elevated 20.4.     Vitals:  /78 (BP Location: Right arm, Patient Position: Sitting)   Pulse 105   Temp 99.3 °F (37.4 °C)   Resp 15   Ht 180.3 cm (71\")   Wt 90.7 kg (200 lb)   SpO2 94%   BMI 27.89 kg/m²    Medications given:    Medications   sodium chloride 0.9 % flush 10 mL (has no administration in time range)   sodium chloride 0.9 % flush 10 mL (has no administration in time range)   sodium chloride 0.9 % flush 10 mL (has no administration in time range)   sodium chloride 0.9 % infusion 40 mL (has no administration in time range)   Potassium Replacement - Follow Nurse / BPA Driven Protocol (has no administration in time range)   Magnesium Standard Dose Replacement - Follow Nurse / BPA Driven Protocol (has no administration in time range)   Phosphorus Replacement - Follow Nurse / BPA Driven Protocol (has no administration in time range)   Calcium Replacement - Follow Nurse / BPA " Driven Protocol (has no administration in time range)   acetaminophen (TYLENOL) tablet 650 mg (has no administration in time range)   ondansetron ODT (ZOFRAN-ODT) disintegrating tablet 4 mg (has no administration in time range)     Or   ondansetron (ZOFRAN) injection 4 mg (has no administration in time range)   sennosides-docusate (PERICOLACE) 8.6-50 MG per tablet 2 tablet (has no administration in time range)     And   polyethylene glycol (MIRALAX) packet 17 g (has no administration in time range)     And   bisacodyl (DULCOLAX) EC tablet 5 mg (has no administration in time range)     And   bisacodyl (DULCOLAX) suppository 10 mg (has no administration in time range)   ipratropium-albuterol (DUO-NEB) nebulizer solution 3 mL (has no administration in time range)   guaiFENesin (MUCINEX) 12 hr tablet 600 mg (has no administration in time range)   albuterol sulfate HFA (PROVENTIL HFA;VENTOLIN HFA;PROAIR HFA) inhaler 2 puff (2 puffs Inhalation Given 3/30/24 2020)   sodium chloride 0.9 % bolus 500 mL (0 mL Intravenous Stopped 3/30/24 2306)   methylPREDNISolone sodium succinate (SOLU-Medrol) injection 125 mg (125 mg Intravenous Given 3/30/24 2046)   doxycycline (MONODOX) capsule 100 mg (100 mg Oral Given 3/30/24 2046)   cefTRIAXone (ROCEPHIN) 1,000 mg in sodium chloride 0.9 % 100 mL MBP (0 mg Intravenous Stopped 3/30/24 2306)   iopamidol (ISOVUE-370) 76 % injection 100 mL (100 mL Intravenous Given 3/30/24 2241)       Procedures:  not indicated    MDM: Patient is a 64-year-old male comes in complaining of shortness of breath and cough.  Patient states he only wears 2 to 3 L nasal cannula at home as needed but does not wear it when he is out and about.  Lab work independently interpreted by myself shows initial ABG shows normal pH, normal pCO2, pO2 low at 4 L at 81.1, patient was increased to 4.5 L nasal cannula.  Initial lactic normal.  Initial glucose 194.  Viral panel negative.  Blood cultures x 2 pending.  BNP normal.   D-dimer negative patient felt not to have pulmonary embolism.  Initial troponin negative.  White blood cell count is elevated 20.4.  Patient given albuterol and DuoNeb here in ED.  Patient started on doxycycline and Rocephin for sepsis and pneumonia.  Patient did have faint wheezes and given Solu-Medrol as well.  Spoke with CARA Pleitez, who accepted patient behalf of Dr. Ortiz for admission to hospital further workup and treatment and likely pulmonology consultation. Based on the clinical findings at this time I anticipate the patient will require a 2 midnight stay.  Plan discussed with patient and is agreeable with plan.         Final diagnoses:   Acute respiratory failure with hypoxia   Shortness of breath   Acute cough   Leukocytosis, unspecified type   COPD exacerbation   Tachycardia   Sepsis, due to unspecified organism, unspecified whether acute organ dysfunction present   Pneumonia of right lower lobe due to infectious organism       ED Disposition  ED Disposition       ED Disposition   Decision to Admit    Condition   --    Comment   Level of Care: Telemetry [5]   Diagnosis: PNA (pneumonia) [233424]   Admitting Physician: NGOC ORTIZ [[id number]]   Attending Physician: NGOC ORTIZ [[id number]]   Certification: I Certify That Inpatient Hospital Services Are Medically Necessary For Greater Than 2 Midnights                 No follow-up provider specified.       Medication List      No changes were made to your prescriptions during this visit.            Robby Hayes PA  03/30/24 [id number]

## 2024-03-30 NOTE — Clinical Note
Level of Care: Telemetry [5]   Admitting Physician: NGOC ORTIZ [[id number]]   Attending Physician: NGOC ORTIZ [[id number]]

## 2024-03-31 PROBLEM — H26.493 PCO (POSTERIOR CAPSULAR OPACIFICATION), BILATERAL: Status: ACTIVE | Noted: 2020-07-22

## 2024-03-31 PROBLEM — I10 HYPERTENSION: Status: ACTIVE | Noted: 2024-03-31

## 2024-03-31 PROBLEM — E11.9 TYPE 2 DIABETES MELLITUS: Status: ACTIVE | Noted: 2020-06-09

## 2024-03-31 LAB
QT INTERVAL: 307 MS
QTC INTERVAL: 426 MS

## 2024-03-31 PROCEDURE — 94761 N-INVAS EAR/PLS OXIMETRY MLT: CPT

## 2024-03-31 PROCEDURE — 25010000002 CEFTRIAXONE PER 250 MG: Performed by: FAMILY MEDICINE

## 2024-03-31 PROCEDURE — 94664 DEMO&/EVAL PT USE INHALER: CPT

## 2024-03-31 PROCEDURE — 25010000002 METHYLPREDNISOLONE PER 40 MG: Performed by: FAMILY MEDICINE

## 2024-03-31 PROCEDURE — 94799 UNLISTED PULMONARY SVC/PX: CPT

## 2024-03-31 RX ORDER — GLIPIZIDE 5 MG/1
5 TABLET ORAL EVERY MORNING
Status: DISCONTINUED | OUTPATIENT
Start: 2024-03-31 | End: 2024-04-05 | Stop reason: HOSPADM

## 2024-03-31 RX ORDER — LISINOPRIL 20 MG/1
40 TABLET ORAL EVERY EVENING
Status: DISCONTINUED | OUTPATIENT
Start: 2024-03-31 | End: 2024-04-05 | Stop reason: HOSPADM

## 2024-03-31 RX ORDER — MONTELUKAST SODIUM 10 MG/1
10 TABLET ORAL NIGHTLY
Status: DISCONTINUED | OUTPATIENT
Start: 2024-03-31 | End: 2024-04-05 | Stop reason: HOSPADM

## 2024-03-31 RX ORDER — THEOPHYLLINE 300 MG/1
300 TABLET, EXTENDED RELEASE ORAL DAILY
Status: DISCONTINUED | OUTPATIENT
Start: 2024-03-31 | End: 2024-04-05 | Stop reason: HOSPADM

## 2024-03-31 RX ORDER — MIRTAZAPINE 15 MG/1
15 TABLET, FILM COATED ORAL NIGHTLY
Status: DISCONTINUED | OUTPATIENT
Start: 2024-03-31 | End: 2024-04-05 | Stop reason: HOSPADM

## 2024-03-31 RX ORDER — BISOPROLOL FUMARATE AND HYDROCHLOROTHIAZIDE 10; 6.25 MG/1; MG/1
1 TABLET ORAL DAILY
Status: DISCONTINUED | OUTPATIENT
Start: 2024-03-31 | End: 2024-04-05 | Stop reason: HOSPADM

## 2024-03-31 RX ORDER — DOXYCYCLINE 100 MG/1
100 CAPSULE ORAL EVERY 12 HOURS SCHEDULED
Status: DISCONTINUED | OUTPATIENT
Start: 2024-03-31 | End: 2024-04-05 | Stop reason: HOSPADM

## 2024-03-31 RX ORDER — GLIPIZIDE 5 MG/1
10 TABLET ORAL NIGHTLY
Status: DISCONTINUED | OUTPATIENT
Start: 2024-03-31 | End: 2024-04-05 | Stop reason: HOSPADM

## 2024-03-31 RX ORDER — ROFLUMILAST 500 UG/1
500 TABLET ORAL DAILY
Status: DISCONTINUED | OUTPATIENT
Start: 2024-03-31 | End: 2024-04-05 | Stop reason: HOSPADM

## 2024-03-31 RX ORDER — METHYLPREDNISOLONE SODIUM SUCCINATE 40 MG/ML
40 INJECTION, POWDER, LYOPHILIZED, FOR SOLUTION INTRAMUSCULAR; INTRAVENOUS DAILY
Status: DISCONTINUED | OUTPATIENT
Start: 2024-03-31 | End: 2024-04-03

## 2024-03-31 RX ADMIN — MONTELUKAST 10 MG: 10 TABLET, FILM COATED ORAL at 20:34

## 2024-03-31 RX ADMIN — GUAIFENESIN 600 MG: 600 TABLET, EXTENDED RELEASE ORAL at 20:34

## 2024-03-31 RX ADMIN — METFORMIN HYDROCHLORIDE 500 MG: 500 TABLET ORAL at 12:20

## 2024-03-31 RX ADMIN — LISINOPRIL 40 MG: 20 TABLET ORAL at 17:45

## 2024-03-31 RX ADMIN — GUAIFENESIN 600 MG: 600 TABLET, EXTENDED RELEASE ORAL at 08:48

## 2024-03-31 RX ADMIN — BISOPROLOL FUMARATE AND HYDROCHLOROTHIAZIDE 1 TABLET: 10; 6.25 TABLET, FILM COATED ORAL at 12:20

## 2024-03-31 RX ADMIN — CEFTRIAXONE 1000 MG: 1 INJECTION, POWDER, FOR SOLUTION INTRAMUSCULAR; INTRAVENOUS at 12:19

## 2024-03-31 RX ADMIN — MIRTAZAPINE 15 MG: 15 TABLET, FILM COATED ORAL at 20:34

## 2024-03-31 RX ADMIN — Medication 10 ML: at 20:35

## 2024-03-31 RX ADMIN — Medication 10 ML: at 08:49

## 2024-03-31 RX ADMIN — DOXYCYCLINE 100 MG: 100 CAPSULE ORAL at 20:34

## 2024-03-31 RX ADMIN — IPRATROPIUM BROMIDE AND ALBUTEROL SULFATE 3 ML: .5; 3 SOLUTION RESPIRATORY (INHALATION) at 08:26

## 2024-03-31 RX ADMIN — IPRATROPIUM BROMIDE AND ALBUTEROL SULFATE 3 ML: .5; 3 SOLUTION RESPIRATORY (INHALATION) at 11:35

## 2024-03-31 RX ADMIN — METFORMIN HYDROCHLORIDE 1000 MG: 500 TABLET ORAL at 20:34

## 2024-03-31 RX ADMIN — METHYLPREDNISOLONE SODIUM SUCCINATE 40 MG: 40 INJECTION, POWDER, FOR SOLUTION INTRAMUSCULAR; INTRAVENOUS at 12:20

## 2024-03-31 RX ADMIN — GLIPIZIDE 5 MG: 5 TABLET ORAL at 12:20

## 2024-03-31 RX ADMIN — DOXYCYCLINE 100 MG: 100 CAPSULE ORAL at 12:20

## 2024-03-31 RX ADMIN — IPRATROPIUM BROMIDE AND ALBUTEROL SULFATE 3 ML: .5; 3 SOLUTION RESPIRATORY (INHALATION) at 19:40

## 2024-03-31 RX ADMIN — GLIPIZIDE 10 MG: 5 TABLET ORAL at 20:34

## 2024-03-31 RX ADMIN — THEOPHYLLINE 300 MG: 300 TABLET, EXTENDED RELEASE ORAL at 12:20

## 2024-03-31 RX ADMIN — ROFLUMILAST 500 MCG: 500 TABLET ORAL at 12:20

## 2024-03-31 NOTE — PLAN OF CARE
Problem: Adjustment to Illness (Sepsis/Septic Shock)  Goal: Optimal Coping  Outcome: Ongoing, Progressing  Intervention: Optimize Psychosocial Adjustment to Illness  Recent Flowsheet Documentation  Taken 3/31/2024 0211 by Estelle Linn RN  Family/Support System Care: self-care encouraged     Problem: Bleeding (Sepsis/Septic Shock)  Goal: Absence of Bleeding  Outcome: Ongoing, Progressing     Problem: Glycemic Control Impaired (Sepsis/Septic Shock)  Goal: Blood Glucose Level Within Desired Range  Outcome: Ongoing, Progressing     Problem: Infection Progression (Sepsis/Septic Shock)  Goal: Absence of Infection Signs and Symptoms  Outcome: Ongoing, Progressing  Intervention: Initiate Sepsis Management  Recent Flowsheet Documentation  Taken 3/31/2024 0211 by Estelle Linn RN  Infection Prevention: rest/sleep promoted  Intervention: Promote Recovery  Recent Flowsheet Documentation  Taken 3/31/2024 0211 by Estelle Linn RN  Activity Management: ambulated in room  Sleep/Rest Enhancement:   consistent schedule promoted   room darkened     Problem: Nutrition Impaired (Sepsis/Septic Shock)  Goal: Optimal Nutrition Intake  Outcome: Ongoing, Progressing     Problem: Adult Inpatient Plan of Care  Goal: Plan of Care Review  Outcome: Ongoing, Progressing  Goal: Patient-Specific Goal (Individualized)  Outcome: Ongoing, Progressing  Goal: Absence of Hospital-Acquired Illness or Injury  Outcome: Ongoing, Progressing  Intervention: Identify and Manage Fall Risk  Recent Flowsheet Documentation  Taken 3/31/2024 0211 by Estelle Linn RN  Safety Promotion/Fall Prevention:   safety round/check completed   fall prevention program maintained  Intervention: Prevent Skin Injury  Recent Flowsheet Documentation  Taken 3/31/2024 0211 by Estelle Linn RN  Body Position: position changed independently  Intervention: Prevent and Manage VTE (Venous Thromboembolism) Risk  Recent Flowsheet Documentation  Taken 3/31/2024 0211  by Estelle Linn RN  Activity Management: ambulated in room  Intervention: Prevent Infection  Recent Flowsheet Documentation  Taken 3/31/2024 0211 by Estelle Linn RN  Infection Prevention: rest/sleep promoted  Goal: Optimal Comfort and Wellbeing  Outcome: Ongoing, Progressing  Intervention: Provide Person-Centered Care  Recent Flowsheet Documentation  Taken 3/31/2024 0211 by Estelle Linn RN  Trust Relationship/Rapport:   care explained   choices provided   emotional support provided   empathic listening provided   questions answered   questions encouraged   reassurance provided   thoughts/feelings acknowledged  Goal: Readiness for Transition of Care  Outcome: Ongoing, Progressing  Intervention: Mutually Develop Transition Plan  Recent Flowsheet Documentation  Taken 3/31/2024 0246 by Estelle Linn RN  Transportation Anticipated: family or friend will provide  Patient/Family Anticipated Services at Transition: none  Patient/Family Anticipates Transition to: home  Taken 3/31/2024 0245 by Estelle Linn RN  Equipment Currently Used at Home: oxygen     Problem: COPD (Chronic Obstructive Pulmonary Disease) Comorbidity  Goal: Maintenance of COPD Symptom Control  Outcome: Ongoing, Progressing  Intervention: Maintain COPD-Symptom Control  Recent Flowsheet Documentation  Taken 3/31/2024 0211 by Estelle Linn RN  Medication Review/Management: medications reviewed   Goal Outcome Evaluation:

## 2024-03-31 NOTE — H&P
Patient Care Team:  Jareth Perea PA-C as PCP - General (Physician Assistant)    Chief Complaint  Subjective     The patient is a 64 y.o. male who presents with aggressive shortness of breath with evidence of acute exacerbation of panlobular COPD with emphysema and acute community-acquired pneumonia    HPI  Patient was in his usual state of health until approximately 3 to 5 days prior to presentation when he began experiencing progressive shortness of breath and dyspnea upon exertion.  He attempted to use home medications including his home oxygen but decompensated and presented to [facility] emergency room for evaluation where he was found to have community-acquired pneumonia and acute exacerbation of panlobular COPD with emphysema.  He was admitted and has received parenteral steroids and mini neb treatments and empiric antimicrobial therapy.  He is feeling somewhat better overall.  He specifically denies substernal chest pain lower extremity erythema edema tenderness hemoptysis or other constitutional complaint  Review of Systems  Review of Systems   Constitutional:  Positive for activity change.   Respiratory:  Positive for cough, shortness of breath and wheezing.    Cardiovascular:  Negative for chest pain.   Neurological:  Positive for weakness.       History  Past Medical History:   Diagnosis Date    COPD (chronic obstructive pulmonary disease)     Diabetes mellitus     Hypertension      History reviewed. No pertinent surgical history.  History reviewed. No pertinent family history.  Social History     Tobacco Use    Smoking status: Every Day     Current packs/day: 1.50     Average packs/day: 1.5 packs/day for 25.0 years (37.5 ttl pk-yrs)     Types: Cigarettes     Passive exposure: Never    Smokeless tobacco: Never   Vaping Use    Vaping status: Never Used    Passive vaping exposure: Yes   Substance Use Topics    Alcohol use: Yes     Comment: RARE    Drug use: Never     Allergies:  Patient has  "no known allergies.    Objective     Vital Signs  Temp:  [98 °F (36.7 °C)-99.3 °F (37.4 °C)] 98 °F (36.7 °C)  Heart Rate:  [] 101  Resp:  [12-28] 12  BP: (108-150)/(62-79) 122/65      Physical Exam:   Physical Exam  Vitals and nursing note reviewed.   Constitutional:       General: He is in acute distress.      Appearance: He is ill-appearing. He is not toxic-appearing or diaphoretic.   Cardiovascular:      Rate and Rhythm: Rhythm irregular.      Heart sounds: Normal heart sounds.   Pulmonary:      Breath sounds: Wheezing present. No rhonchi.   Abdominal:      General: There is no distension.      Tenderness: There is no abdominal tenderness.   Musculoskeletal:         General: Normal range of motion.   Neurological:      General: No focal deficit present.      Mental Status: He is alert and oriented to person, place, and time.              Results Review:   CBC    Results from last 7 days   Lab Units 03/30/24 1953   WBC 10*3/mm3 20.74*   HEMOGLOBIN g/dL 11.7*   PLATELETS 10*3/mm3 181     BMP   Results from last 7 days   Lab Units 03/30/24 1953   SODIUM mmol/L 138   POTASSIUM mmol/L 4.3   CHLORIDE mmol/L 97*   CO2 mmol/L 31.0*   BUN mg/dL 14   CREATININE mg/dL 0.69*   GLUCOSE mg/dL 204*     Cr Clearance Estimated Creatinine Clearance: 123 mL/min (A) (by C-G formula based on SCr of 0.69 mg/dL (L)).  Coag     HbA1C No results found for: \"HGBA1C\"  Blood Glucose   Glucose   Date/Time Value Ref Range Status   03/30/2024 2008 194 (H) 70 - 105 mg/dL Final     Comment:     Serial Number: [id number]  [id number]     Infection     CMP   Results from last 7 days   Lab Units 03/30/24 1953   SODIUM mmol/L 138   POTASSIUM mmol/L 4.3   CHLORIDE mmol/L 97*   CO2 mmol/L 31.0*   BUN mg/dL 14   CREATININE mg/dL 0.69*   GLUCOSE mg/dL 204*   ALBUMIN g/dL 4.1   BILIRUBIN mg/dL 0.3   ALK PHOS U/L 71   AST (SGOT) U/L 13   ALT (SGPT) U/L 10     UA    Results from last 7 days   Lab Units 03/30/24  2306   NITRITE UA  Negative "   WBC UA /HPF 3-5*   BACTERIA UA /HPF None Seen   SQUAM EPITHEL UA /HPF 0-2     Radiology(recent) CT Chest With Contrast Diagnostic    Result Date: 3/30/2024  Impression: 1.Patchy airspace disease seen within the right lower lobe and the base of the right middle lobe consistent with a mild pneumonia, new as compared to the previous study, superimposed on chronic changes. No suspicious pulmonary nodules. Follow-up to resolution recommended. 2.Ancillary findings as described above. Electronically Signed: Kathy Gonzalez MD  3/30/2024 10:47 PM EDT  Workstation ID: [id number]    XR Chest 1 View    Result Date: 3/30/2024  Impression: Lungs demonstrate coarsened interstitial markings, possibly related to underlying interstitial lung disease. Concomitant atypical infiltrates within the mid lungs cannot be entirely excluded. 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     James Craig MD  03/31/24  10:35 EDT

## 2024-03-31 NOTE — PLAN OF CARE
Goal Outcome Evaluation:  Plan of Care Reviewed With: patient        Progress: no change  Outcome Evaluation: patient resting in bed throughout shift. no complaints. reports of SOB. breathing treatment given per respiratory.

## 2024-03-31 NOTE — ED NOTES
Patient sat up to use the bathroom on the edge of the stretcher the patient became short of breath and tachycardic provider was notified and the provider ordered another duo neb treatment along with fluids and some mucinex

## 2024-04-01 LAB
ANION GAP SERPL CALCULATED.3IONS-SCNC: 8 MMOL/L (ref 5–15)
BUN SERPL-MCNC: 15 MG/DL (ref 8–23)
BUN/CREAT SERPL: 25.4 (ref 7–25)
CALCIUM SPEC-SCNC: 9.3 MG/DL (ref 8.6–10.5)
CHLORIDE SERPL-SCNC: 101 MMOL/L (ref 98–107)
CO2 SERPL-SCNC: 32 MMOL/L (ref 22–29)
CREAT SERPL-MCNC: 0.59 MG/DL (ref 0.76–1.27)
DEPRECATED RDW RBC AUTO: 52.5 FL (ref 37–54)
EGFRCR SERPLBLD CKD-EPI 2021: 107.7 ML/MIN/1.73
ERYTHROCYTE [DISTWIDTH] IN BLOOD BY AUTOMATED COUNT: 15.1 % (ref 12.3–15.4)
GLUCOSE BLDC GLUCOMTR-MCNC: 82 MG/DL (ref 70–105)
GLUCOSE SERPL-MCNC: 62 MG/DL (ref 65–99)
HCT VFR BLD AUTO: 38.3 % (ref 37.5–51)
HGB BLD-MCNC: 12.1 G/DL (ref 13–17.7)
MCH RBC QN AUTO: 30.1 PG (ref 26.6–33)
MCHC RBC AUTO-ENTMCNC: 31.6 G/DL (ref 31.5–35.7)
MCV RBC AUTO: 95.3 FL (ref 79–97)
PLATELET # BLD AUTO: 234 10*3/MM3 (ref 140–450)
PMV BLD AUTO: 10.7 FL (ref 6–12)
POTASSIUM SERPL-SCNC: 4.3 MMOL/L (ref 3.5–5.2)
RBC # BLD AUTO: 4.02 10*6/MM3 (ref 4.14–5.8)
SODIUM SERPL-SCNC: 141 MMOL/L (ref 136–145)
WBC NRBC COR # BLD AUTO: 14.82 10*3/MM3 (ref 3.4–10.8)

## 2024-04-01 PROCEDURE — 25010000002 CEFTRIAXONE PER 250 MG: Performed by: FAMILY MEDICINE

## 2024-04-01 PROCEDURE — 94761 N-INVAS EAR/PLS OXIMETRY MLT: CPT

## 2024-04-01 PROCEDURE — 82948 REAGENT STRIP/BLOOD GLUCOSE: CPT

## 2024-04-01 PROCEDURE — 80048 BASIC METABOLIC PNL TOTAL CA: CPT | Performed by: FAMILY MEDICINE

## 2024-04-01 PROCEDURE — 94664 DEMO&/EVAL PT USE INHALER: CPT

## 2024-04-01 PROCEDURE — 25010000002 METHYLPREDNISOLONE PER 40 MG: Performed by: FAMILY MEDICINE

## 2024-04-01 PROCEDURE — 85027 COMPLETE CBC AUTOMATED: CPT | Performed by: FAMILY MEDICINE

## 2024-04-01 PROCEDURE — 94799 UNLISTED PULMONARY SVC/PX: CPT

## 2024-04-01 RX ADMIN — DOXYCYCLINE 100 MG: 100 CAPSULE ORAL at 08:47

## 2024-04-01 RX ADMIN — DOXYCYCLINE 100 MG: 100 CAPSULE ORAL at 20:27

## 2024-04-01 RX ADMIN — GLIPIZIDE 5 MG: 5 TABLET ORAL at 08:47

## 2024-04-01 RX ADMIN — IPRATROPIUM BROMIDE AND ALBUTEROL SULFATE 3 ML: .5; 3 SOLUTION RESPIRATORY (INHALATION) at 05:09

## 2024-04-01 RX ADMIN — Medication 10 ML: at 08:47

## 2024-04-01 RX ADMIN — GUAIFENESIN 600 MG: 600 TABLET, EXTENDED RELEASE ORAL at 20:27

## 2024-04-01 RX ADMIN — CEFTRIAXONE 1000 MG: 1 INJECTION, POWDER, FOR SOLUTION INTRAMUSCULAR; INTRAVENOUS at 11:04

## 2024-04-01 RX ADMIN — BISOPROLOL FUMARATE AND HYDROCHLOROTHIAZIDE 1 TABLET: 10; 6.25 TABLET, FILM COATED ORAL at 08:47

## 2024-04-01 RX ADMIN — GLIPIZIDE 10 MG: 5 TABLET ORAL at 20:27

## 2024-04-01 RX ADMIN — LISINOPRIL 40 MG: 20 TABLET ORAL at 17:05

## 2024-04-01 RX ADMIN — IPRATROPIUM BROMIDE AND ALBUTEROL SULFATE 3 ML: .5; 3 SOLUTION RESPIRATORY (INHALATION) at 10:50

## 2024-04-01 RX ADMIN — MIRTAZAPINE 15 MG: 15 TABLET, FILM COATED ORAL at 20:27

## 2024-04-01 RX ADMIN — MONTELUKAST 10 MG: 10 TABLET, FILM COATED ORAL at 20:27

## 2024-04-01 RX ADMIN — THEOPHYLLINE 300 MG: 300 TABLET, EXTENDED RELEASE ORAL at 08:47

## 2024-04-01 RX ADMIN — GUAIFENESIN 600 MG: 600 TABLET, EXTENDED RELEASE ORAL at 08:47

## 2024-04-01 RX ADMIN — METFORMIN HYDROCHLORIDE 1000 MG: 500 TABLET ORAL at 20:27

## 2024-04-01 RX ADMIN — Medication 10 ML: at 20:27

## 2024-04-01 RX ADMIN — ROFLUMILAST 500 MCG: 500 TABLET ORAL at 08:47

## 2024-04-01 RX ADMIN — METHYLPREDNISOLONE SODIUM SUCCINATE 40 MG: 40 INJECTION, POWDER, FOR SOLUTION INTRAMUSCULAR; INTRAVENOUS at 08:47

## 2024-04-01 RX ADMIN — METFORMIN HYDROCHLORIDE 500 MG: 500 TABLET ORAL at 08:47

## 2024-04-01 RX ADMIN — IPRATROPIUM BROMIDE AND ALBUTEROL SULFATE 3 ML: .5; 3 SOLUTION RESPIRATORY (INHALATION) at 20:51

## 2024-04-01 RX ADMIN — IPRATROPIUM BROMIDE AND ALBUTEROL SULFATE 3 ML: .5; 3 SOLUTION RESPIRATORY (INHALATION) at 15:00

## 2024-04-01 NOTE — CASE MANAGEMENT/SOCIAL WORK
Discharge Planning Assessment   [place]     Patient Name: Maximnio Bhatia  MRN: [id number]  Today's Date: 4/1/2024    Admit Date: 3/30/2024    Plan: Return home. Home O2 3L w/ [place].   Discharge Needs Assessment       Row Name 04/01/24 1059       Living Environment    People in Home alone    Current Living Arrangements home    Potentially Unsafe Housing Conditions none    In the past 12 months has the electric, gas, oil, or water company threatened to shut off services in your home? No    Primary Care Provided by self    Provides Primary Care For no one    Family Caregiver if Needed other (see comments);sibling(s)    Family Caregiver Names Brother in [place]    Quality of Family Relationships helpful;involved;supportive    Able to Return to Prior Arrangements yes       Resource/Environmental Concerns    Resource/Environmental Concerns none    Transportation Concerns none       Transportation Needs    In the past 12 months, has lack of transportation kept you from medical appointments or from getting medications? no    In the past 12 months, has lack of transportation kept you from meetings, work, or from getting things needed for daily living? No       Food Insecurity    Within the past 12 months, you worried that your food would run out before you got the money to buy more. Never true    Within the past 12 months, the food you bought just didn't last and you didn't have money to get more. Never true       Transition Planning    Patient/Family Anticipates Transition to home    Patient/Family Anticipated Services at Transition none    Transportation Anticipated family or friend will provide;car, drives self       Discharge Needs Assessment    Readmission Within the Last 30 Days no previous admission in last 30 days    Equipment Currently Used at Home oxygen;nebulizer    Concerns to be Addressed discharge planning;care coordination/care conferences    Anticipated Changes Related to Illness none    Equipment Needed  After Discharge none    Provided Post Acute Provider List? N/A    Provided Post Acute Provider Quality & Resource List? N/A                   Discharge Plan       Row Name 04/01/24 1100       Plan    Plan Return home. Home O2 3L w/ [place].    Patient/Family in Agreement with Plan yes    Plan Comments CM met with patient at bedside. Pt lives at home alone, drives, and is independent with ADL's. PCP and pharmacy confirmed. DME includes nebulizer, O2 concentrator/tanks with [place], pulse ox, and glucometer. Pt denies current HHC/PT services. Denies issues affording medications/food. Reports his brother in law [place] will transport at discharge.                Demographic Summary       Row Name 04/01/24 1059       General Information    Admission Type inpatient    Arrived From emergency department    Referral Source admission list    Reason for Consult discharge planning;care coordination/care conference    Preferred Language English       Contact Information    Permission Granted to Share Info With                    Functional Status       Row Name 04/01/24 1059       Functional Status    Usual Activity Tolerance good    Current Activity Tolerance good       Functional Status, IADL    Medications independent    Meal Preparation independent    Housekeeping independent    Laundry independent    Shopping independent             Megan Naegele, RN     Office Phone: [phone]  Office Cell: [phone]

## 2024-04-01 NOTE — CONSULTS
[facility]: [facility]         CONSULT NOTE    Patient Identification:  Maximino Bhatia  64 y.o.  male  1960  [id number]            Requesting physician: Attending physician    Reason for Consultation: Pneumonia        History of Present Illness:   64-year-old male admitted on 03/31/2024 with decreasing shortness of breath COPD exacerbation    Assessment:     Right lower lobe pneumonia  COPD with acute exacerbation  HTN  DM  Active tobacco smoker  CT chest 12/5/2022: mild bibasilar infection, severe emphysema       Recommendations:   Oxygen titration currently on 5 L   Antibiotics currently on Rocephin and doxycycline   Steroids IV Solu-Medrol 40 mg daily   Bronchodilators DuoNeb         Review of Sytems:  Review of Systems   Respiratory:  Positive for cough and shortness of breath. Negative for wheezing and stridor.    Cardiovascular:  Negative for chest pain, palpitations and leg swelling.       Past Medical History:  Past Medical History:   Diagnosis Date    COPD (chronic obstructive pulmonary disease)     Diabetes mellitus     Hypertension        Past Surgical History:  History reviewed. No pertinent surgical history.     Home Meds:  Medications Prior to Admission   Medication Sig Dispense Refill Last Dose    albuterol sulfate  (90 Base) MCG/ACT inhaler Inhale 2 puffs Every 4 (Four) Hours As Needed for Wheezing. 18 g 0     aspirin 81 MG EC tablet Take 1 tablet by mouth Daily.   3/30/2024    bisoprolol-hydrochlorothiazide (ZIAC) 10-6.25 MG per tablet Take 1 tablet by mouth Daily.   3/30/2024    Budeson-Glycopyrrol-Formoterol (BREZTRI) 160-9-4.8 MCG/ACT aerosol inhaler Inhale 2 puffs 2 (Two) Times a Day.       glipizide (GLUCOTROL) 10 MG tablet Take 1 tablet by mouth Every Night.   3/29/2024    glipizide (GLUCOTROL) 10 MG tablet Take 0.5 tablets by mouth Every Morning.   3/30/2024    ipratropium-albuterol (DUO-NEB) 0.5-2.5 mg/3 ml nebulizer Take 3 mL by nebulization Every 4 (Four) Hours As Needed  "for Wheezing.       lisinopril (PRINIVIL,ZESTRIL) 40 MG tablet Take 1 tablet by mouth Every Evening.   3/29/2024    metFORMIN (GLUCOPHAGE) 1000 MG tablet Take 1 tablet by mouth Every Night.   3/29/2024    metFORMIN (GLUCOPHAGE) 1000 MG tablet Take 0.5 tablets by mouth Every Morning.   3/30/2024    mirtazapine (REMERON) 15 MG tablet Take 1 tablet by mouth Every Night. 30 tablet 2 3/29/2024    montelukast (SINGULAIR) 10 MG tablet Take 1 tablet by mouth Every Night.   3/29/2024    Multiple Vitamin (multivitamin) capsule Take 1 capsule by mouth Daily.   3/30/2024    roflumilast (DALIRESP) 500 MCG tablet tablet Take 1 tablet by mouth Daily. 30 tablet 2 3/30/2024    theophylline (THEODUR) 300 MG 12 hr tablet Take 1 tablet by mouth Daily.   3/30/2024       Allergies:  No Known Allergies    Social History:   Social History     Socioeconomic History    Marital status: Single   Tobacco Use    Smoking status: Every Day     Current packs/day: 1.50     Average packs/day: 1.5 packs/day for 25.0 years (37.5 ttl pk-yrs)     Types: Cigarettes     Passive exposure: Never    Smokeless tobacco: Never   Vaping Use    Vaping status: Never Used    Passive vaping exposure: Yes   Substance and Sexual Activity    Alcohol use: Yes     Comment: RARE    Drug use: Never    Sexual activity: Defer       Family History:  History reviewed. No pertinent family history.    Physical Exam:  /64 (BP Location: Right arm, Patient Position: Lying)   Pulse 85   Temp 97.2 °F (36.2 °C) (Axillary)   Resp 22   Ht 180.3 cm (71\")   Wt 90.7 kg (200 lb)   SpO2 93%   BMI 27.89 kg/m²  Body mass index is 27.89 kg/m². 93% 90.7 kg (200 lb)  Physical Exam  Cardiovascular:      Heart sounds: No murmur heard.     No gallop.   Pulmonary:      Effort: No respiratory distress.      Breath sounds: No stridor. Rhonchi and rales present. No wheezing.   Chest:      Chest wall: No tenderness.         LABS:  Lab Results   Component Value Date    CALCIUM 9.3 04/01/2024 " "    Results from last 7 days   Lab Units 04/01/24  0434 03/30/24 1953   SODIUM mmol/L 141 138   POTASSIUM mmol/L 4.3 4.3   CHLORIDE mmol/L 101 97*   CO2 mmol/L 32.0* 31.0*   BUN mg/dL 15 14   CREATININE mg/dL 0.59* 0.69*   GLUCOSE mg/dL 62* 204*   CALCIUM mg/dL 9.3 9.4   WBC 10*3/mm3 14.82* 20.74*   HEMOGLOBIN g/dL 12.1* 11.7*   PLATELETS 10*3/mm3 234 181   ALT (SGPT) U/L  --  10   AST (SGOT) U/L  --  13   PROBNP pg/mL  --  92.5     Lab Results   Component Value Date    TROPONINT 18 03/30/2024     Results from last 7 days   Lab Units 03/30/24 1953   HSTROP T ng/L 18     Results from last 7 days   Lab Units 03/30/24 1956   BLOODCX  No growth at 24 hours  No growth at 24 hours     Results from last 7 days   Lab Units 03/30/24 2004   LACTATE mmol/L 0.9     Results from last 7 days   Lab Units 03/30/24 2020   PH, ARTERIAL pH units 7.377   PCO2, ARTERIAL mm Hg 47.2   PO2 ART mm Hg 81.1*   O2 SATURATION ART % 95.5   MODALITY  Cannula     Results from last 7 days   Lab Units 03/30/24 2001   ADENOVIRUS DETECTION BY PCR  Not Detected   CORONAVIRUS 229E  Not Detected   CORONAVIRUS HKU1  Not Detected   CORONAVIRUS NL63  Not Detected   CORONAVIRUS OC43  Not Detected   HUMAN METAPNEUMOVIRUS  Not Detected   HUMAN RHINOVIRUS/ENTEROVIRUS  Not Detected   INFLUENZA B PCR  Not Detected   PARAINFLUENZA 1  Not Detected   PARAINFLUENZA VIRUS 2  Not Detected   PARAINFLUENZA VIRUS 3  Not Detected   PARAINFLUENZA VIRUS 4  Not Detected   BORDETELLA PERTUSSIS PCR  Not Detected   CHLAMYDOPHILA PNEUMONIAE PCR  Not Detected   MYCOPLAMA PNEUMO PCR  Not Detected   INFLUENZA A PCR  Not Detected   RSV, PCR  Not Detected         Results from last 7 days   Lab Units 03/30/24 1956   BLOODCX  No growth at 24 hours  No growth at 24 hours     No results found for: \"TSH\"  Estimated Creatinine Clearance: 143.9 mL/min (A) (by C-G formula based on SCr of 0.59 mg/dL (L)).  Results from last 7 days   Lab Units 03/30/24 2306   NITRITE UA  Negative "   WBC UA /HPF 3-5*   BACTERIA UA /HPF None Seen   SQUAM EPITHEL UA /HPF 0-2        Imaging:  Imaging Results (Last 24 Hours)       ** No results found for the last 24 hours. **              Current Meds:   SCHEDULE  bisoprolol-hydrochlorothiazide, 1 tablet, Oral, Daily  cefTRIAXone, 1,000 mg, Intravenous, Q24H  doxycycline, 100 mg, Oral, Q12H  glipizide, 10 mg, Oral, Nightly  glipizide, 5 mg, Oral, QAM  guaiFENesin, 600 mg, Oral, Q12H  ipratropium-albuterol, 3 mL, Nebulization, 4x Daily - RT  lisinopril, 40 mg, Oral, Q PM  metFORMIN, 1,000 mg, Oral, Nightly  metFORMIN, 500 mg, Oral, QAM  methylPREDNISolone sodium succinate, 40 mg, Intravenous, Daily  mirtazapine, 15 mg, Oral, Nightly  montelukast, 10 mg, Oral, Nightly  roflumilast, 500 mcg, Oral, Daily  sodium chloride, 10 mL, Intravenous, Q12H  theophylline, 300 mg, Oral, Daily      Infusions     PRNs    acetaminophen    senna-docusate sodium **AND** polyethylene glycol **AND** bisacodyl **AND** bisacodyl    Calcium Replacement - Follow Nurse / BPA Driven Protocol    Magnesium Standard Dose Replacement - Follow Nurse / BPA Driven Protocol    ondansetron ODT **OR** ondansetron    Phosphorus Replacement - Follow Nurse / BPA Driven Protocol    Potassium Replacement - Follow Nurse / BPA Driven Protocol    sodium chloride    sodium chloride    sodium chloride        Caleb Almazan MD  4/1/2024  15:14 EDT      Much of this encounter note is an electronic transcription/translation of spoken language to printed text using Dragon Software.

## 2024-04-01 NOTE — PROGRESS NOTES
LOS: 2 days   Patient Care Team:  Jareth Perea PA-C as PCP - General (Physician Assistant)    Subjective:  Some conversational dyspnea    Objective:   Afebrile      Review of Systems:   Review of Systems   Respiratory:  Positive for shortness of breath.            Vital Signs  Temp:  [97.2 °F (36.2 °C)-97.8 °F (36.6 °C)] 97.2 °F (36.2 °C)  Heart Rate:  [] 94  Resp:  [10-31] 18  BP: ()/(49-82) 111/64    Physical Exam:  Physical Exam  Vitals and nursing note reviewed.   Cardiovascular:      Rate and Rhythm: Rhythm irregular.      Heart sounds: Normal heart sounds.   Pulmonary:      Comments: Poor air exchange  Neurological:      Mental Status: He is alert.          Radiology:  CT Chest With Contrast Diagnostic    Result Date: 3/30/2024  Impression: 1.Patchy airspace disease seen within the right lower lobe and the base of the right middle lobe consistent with a mild pneumonia, new as compared to the previous study, superimposed on chronic changes. No suspicious pulmonary nodules. Follow-up to resolution recommended. 2.Ancillary findings as described above. Electronically Signed: Kathy Gonzalez MD  3/30/2024 10:47 PM EDT  Workstation ID: [id number]    XR Chest 1 View    Result Date: 3/30/2024  Impression: Lungs demonstrate coarsened interstitial markings, possibly related to underlying interstitial lung disease. Concomitant atypical infiltrates within the mid lungs cannot be entirely excluded. Electronically Signed: Bill Carlson MD  3/30/2024 7:57 PM EDT  Workstation ID: [id number]        Results Review:     I reviewed the patient's new clinical results.  I reviewed the patient's new imaging results and agree with the interpretation.    Medication Review:   Scheduled Meds:bisoprolol-hydrochlorothiazide, 1 tablet, Oral, Daily  cefTRIAXone, 1,000 mg, Intravenous, Q24H  doxycycline, 100 mg, Oral, Q12H  glipizide, 10 mg, Oral, Nightly  glipizide, 5 mg, Oral, QAM  guaiFENesin, 600 mg, Oral,  "Q12H  ipratropium-albuterol, 3 mL, Nebulization, 4x Daily - RT  lisinopril, 40 mg, Oral, Q PM  metFORMIN, 1,000 mg, Oral, Nightly  metFORMIN, 500 mg, Oral, QAM  methylPREDNISolone sodium succinate, 40 mg, Intravenous, Daily  mirtazapine, 15 mg, Oral, Nightly  montelukast, 10 mg, Oral, Nightly  roflumilast, 500 mcg, Oral, Daily  sodium chloride, 10 mL, Intravenous, Q12H  theophylline, 300 mg, Oral, Daily      Continuous Infusions:   PRN Meds:.  acetaminophen    senna-docusate sodium **AND** polyethylene glycol **AND** bisacodyl **AND** bisacodyl    Calcium Replacement - Follow Nurse / BPA Driven Protocol    Magnesium Standard Dose Replacement - Follow Nurse / BPA Driven Protocol    ondansetron ODT **OR** ondansetron    Phosphorus Replacement - Follow Nurse / BPA Driven Protocol    Potassium Replacement - Follow Nurse / BPA Driven Protocol    sodium chloride    sodium chloride    sodium chloride    Labs:    CBC    Results from last 7 days   Lab Units 04/01/24 0434 03/30/24 1953   WBC 10*3/mm3 14.82* 20.74*   HEMOGLOBIN g/dL 12.1* 11.7*   PLATELETS 10*3/mm3 234 181     BMP   Results from last 7 days   Lab Units 04/01/24 0434 03/30/24 1953   SODIUM mmol/L 141 138   POTASSIUM mmol/L 4.3 4.3   CHLORIDE mmol/L 101 97*   CO2 mmol/L 32.0* 31.0*   BUN mg/dL 15 14   CREATININE mg/dL 0.59* 0.69*   GLUCOSE mg/dL 62* 204*     Cr Clearance Estimated Creatinine Clearance: 143.9 mL/min (A) (by C-G formula based on SCr of 0.59 mg/dL (L)).  Coag     HbA1C No results found for: \"HGBA1C\"  Blood Glucose   Glucose   Date/Time Value Ref Range Status   04/01/2024 0608 82 70 - 105 mg/dL Final     Comment:     Serial Number: [id number]:  [id number]   03/30/2024 2008 194 (H) 70 - 105 mg/dL Final     Comment:     Serial Number: [id number]:  [id number]     Infection   Results from last 7 days   Lab Units 03/30/24 1956   BLOODCX  No growth at 24 hours  No growth at 24 hours     CMP   Results from last 7 days   Lab Units " 04/01/24  0434 03/30/24 1953   SODIUM mmol/L 141 138   POTASSIUM mmol/L 4.3 4.3   CHLORIDE mmol/L 101 97*   CO2 mmol/L 32.0* 31.0*   BUN mg/dL 15 14   CREATININE mg/dL 0.59* 0.69*   GLUCOSE mg/dL 62* 204*   ALBUMIN g/dL  --  4.1   BILIRUBIN mg/dL  --  0.3   ALK PHOS U/L  --  71   AST (SGOT) U/L  --  13   ALT (SGPT) U/L  --  10     UA    Results from last 7 days   Lab Units 03/30/24  2306   NITRITE UA  Negative   WBC UA /HPF 3-5*   BACTERIA UA /HPF None Seen   SQUAM EPITHEL UA /HPF 0-2     Radiology(recent) CT Chest With Contrast Diagnostic    Result Date: 3/30/2024  Impression: 1.Patchy airspace disease seen within the right lower lobe and the base of the right middle lobe consistent with a mild pneumonia, new as compared to the previous study, superimposed on chronic changes. No suspicious pulmonary nodules. Follow-up to resolution recommended. 2.Ancillary findings as described above. Electronically Signed: Kathy Gonzalez MD  3/30/2024 10:47 PM EDT  Workstation ID: [id number]    XR Chest 1 View    Result Date: 3/30/2024  Impression: Lungs demonstrate coarsened interstitial markings, possibly related to underlying interstitial lung disease. Concomitant atypical infiltrates within the mid lungs cannot be entirely excluded. 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

## 2024-04-02 LAB
ANION GAP SERPL CALCULATED.3IONS-SCNC: 7 MMOL/L (ref 5–15)
BUN SERPL-MCNC: 16 MG/DL (ref 8–23)
BUN/CREAT SERPL: 21.1 (ref 7–25)
CALCIUM SPEC-SCNC: 10.1 MG/DL (ref 8.6–10.5)
CHLORIDE SERPL-SCNC: 101 MMOL/L (ref 98–107)
CO2 SERPL-SCNC: 35 MMOL/L (ref 22–29)
CREAT SERPL-MCNC: 0.76 MG/DL (ref 0.76–1.27)
DEPRECATED RDW RBC AUTO: 53.1 FL (ref 37–54)
EGFRCR SERPLBLD CKD-EPI 2021: 99.7 ML/MIN/1.73
ERYTHROCYTE [DISTWIDTH] IN BLOOD BY AUTOMATED COUNT: 15.2 % (ref 12.3–15.4)
GLUCOSE SERPL-MCNC: 79 MG/DL (ref 65–99)
HCT VFR BLD AUTO: 40.2 % (ref 37.5–51)
HGB BLD-MCNC: 12.7 G/DL (ref 13–17.7)
MCH RBC QN AUTO: 30.2 PG (ref 26.6–33)
MCHC RBC AUTO-ENTMCNC: 31.6 G/DL (ref 31.5–35.7)
MCV RBC AUTO: 95.5 FL (ref 79–97)
PLATELET # BLD AUTO: 273 10*3/MM3 (ref 140–450)
PMV BLD AUTO: 10.6 FL (ref 6–12)
POTASSIUM SERPL-SCNC: 4.1 MMOL/L (ref 3.5–5.2)
RBC # BLD AUTO: 4.21 10*6/MM3 (ref 4.14–5.8)
SODIUM SERPL-SCNC: 143 MMOL/L (ref 136–145)
WBC NRBC COR # BLD AUTO: 11.31 10*3/MM3 (ref 3.4–10.8)

## 2024-04-02 PROCEDURE — 25010000002 CEFTRIAXONE PER 250 MG: Performed by: FAMILY MEDICINE

## 2024-04-02 PROCEDURE — 85027 COMPLETE CBC AUTOMATED: CPT | Performed by: FAMILY MEDICINE

## 2024-04-02 PROCEDURE — 25010000002 METHYLPREDNISOLONE PER 40 MG: Performed by: FAMILY MEDICINE

## 2024-04-02 PROCEDURE — 94761 N-INVAS EAR/PLS OXIMETRY MLT: CPT

## 2024-04-02 PROCEDURE — 94799 UNLISTED PULMONARY SVC/PX: CPT

## 2024-04-02 PROCEDURE — 94664 DEMO&/EVAL PT USE INHALER: CPT

## 2024-04-02 PROCEDURE — 80048 BASIC METABOLIC PNL TOTAL CA: CPT | Performed by: FAMILY MEDICINE

## 2024-04-02 RX ORDER — IPRATROPIUM BROMIDE AND ALBUTEROL SULFATE 2.5; .5 MG/3ML; MG/3ML
3 SOLUTION RESPIRATORY (INHALATION) EVERY 6 HOURS PRN
Status: DISCONTINUED | OUTPATIENT
Start: 2024-04-02 | End: 2024-04-05 | Stop reason: HOSPADM

## 2024-04-02 RX ADMIN — Medication 10 ML: at 21:43

## 2024-04-02 RX ADMIN — IPRATROPIUM BROMIDE AND ALBUTEROL SULFATE 3 ML: .5; 3 SOLUTION RESPIRATORY (INHALATION) at 06:36

## 2024-04-02 RX ADMIN — CEFTRIAXONE 1000 MG: 1 INJECTION, POWDER, FOR SOLUTION INTRAMUSCULAR; INTRAVENOUS at 11:37

## 2024-04-02 RX ADMIN — GLIPIZIDE 5 MG: 5 TABLET ORAL at 05:59

## 2024-04-02 RX ADMIN — METFORMIN HYDROCHLORIDE 500 MG: 500 TABLET ORAL at 05:59

## 2024-04-02 RX ADMIN — IPRATROPIUM BROMIDE AND ALBUTEROL SULFATE 3 ML: .5; 3 SOLUTION RESPIRATORY (INHALATION) at 21:53

## 2024-04-02 RX ADMIN — DOXYCYCLINE 100 MG: 100 CAPSULE ORAL at 08:32

## 2024-04-02 RX ADMIN — METFORMIN HYDROCHLORIDE 1000 MG: 500 TABLET ORAL at 21:42

## 2024-04-02 RX ADMIN — GUAIFENESIN 600 MG: 600 TABLET, EXTENDED RELEASE ORAL at 08:32

## 2024-04-02 RX ADMIN — MIRTAZAPINE 15 MG: 15 TABLET, FILM COATED ORAL at 21:42

## 2024-04-02 RX ADMIN — BISOPROLOL FUMARATE AND HYDROCHLOROTHIAZIDE 1 TABLET: 10; 6.25 TABLET, FILM COATED ORAL at 08:32

## 2024-04-02 RX ADMIN — DOXYCYCLINE 100 MG: 100 CAPSULE ORAL at 21:43

## 2024-04-02 RX ADMIN — ROFLUMILAST 500 MCG: 500 TABLET ORAL at 08:32

## 2024-04-02 RX ADMIN — GUAIFENESIN 600 MG: 600 TABLET, EXTENDED RELEASE ORAL at 21:43

## 2024-04-02 RX ADMIN — MONTELUKAST 10 MG: 10 TABLET, FILM COATED ORAL at 21:43

## 2024-04-02 RX ADMIN — LISINOPRIL 40 MG: 20 TABLET ORAL at 17:22

## 2024-04-02 RX ADMIN — IPRATROPIUM BROMIDE AND ALBUTEROL SULFATE 3 ML: .5; 3 SOLUTION RESPIRATORY (INHALATION) at 11:30

## 2024-04-02 RX ADMIN — IPRATROPIUM BROMIDE AND ALBUTEROL SULFATE 3 ML: .5; 3 SOLUTION RESPIRATORY (INHALATION) at 14:50

## 2024-04-02 RX ADMIN — THEOPHYLLINE 300 MG: 300 TABLET, EXTENDED RELEASE ORAL at 08:32

## 2024-04-02 RX ADMIN — GLIPIZIDE 10 MG: 5 TABLET ORAL at 21:42

## 2024-04-02 RX ADMIN — METHYLPREDNISOLONE SODIUM SUCCINATE 40 MG: 40 INJECTION, POWDER, FOR SOLUTION INTRAMUSCULAR; INTRAVENOUS at 08:32

## 2024-04-02 RX ADMIN — Medication 10 ML: at 08:32

## 2024-04-02 NOTE — PROGRESS NOTES
LOS: 3 days   Patient Care Team:  Jareth Perea PA-C as PCP - General (Physician Assistant)    Subjective:  Slight improvement    Objective:   afebrile      Review of Systems:   Review of Systems   Constitutional:  Positive for activity change.   Respiratory:  Positive for cough and shortness of breath.    Neurological:  Positive for weakness.           Vital Signs  Temp:  [97.6 °F (36.4 °C)-98.1 °F (36.7 °C)] 98 °F (36.7 °C)  Heart Rate:  [72-91] 81  Resp:  [13-27] 20  BP: (101-130)/(65-85) 115/80    Physical Exam:  Physical Exam  Vitals reviewed.   Cardiovascular:      Rate and Rhythm: Normal rate.      Heart sounds: Normal heart sounds.   Pulmonary:      Breath sounds: Normal breath sounds.   Skin:     General: Skin is warm.   Neurological:      Mental Status: He is alert.          Radiology:  CT Chest With Contrast Diagnostic    Result Date: 3/30/2024  Impression: 1.Patchy airspace disease seen within the right lower lobe and the base of the right middle lobe consistent with a mild pneumonia, new as compared to the previous study, superimposed on chronic changes. No suspicious pulmonary nodules. Follow-up to resolution recommended. 2.Ancillary findings as described above. Electronically Signed: Kathy Gonzalez MD  3/30/2024 10:47 PM EDT  Workstation ID: [id number]    XR Chest 1 View    Result Date: 3/30/2024  Impression: Lungs demonstrate coarsened interstitial markings, possibly related to underlying interstitial lung disease. Concomitant atypical infiltrates within the mid lungs cannot be entirely excluded. Electronically Signed: Bill Carlson MD  3/30/2024 7:57 PM EDT  Workstation ID: [id number]        Results Review:     I reviewed the patient's new clinical results.  I reviewed the patient's new imaging results and agree with the interpretation.    Medication Review:   Scheduled Meds:bisoprolol-hydrochlorothiazide, 1 tablet, Oral, Daily  cefTRIAXone, 1,000 mg, Intravenous, Q24H  doxycycline, 100 mg,  "Oral, Q12H  glipizide, 10 mg, Oral, Nightly  glipizide, 5 mg, Oral, QAM  guaiFENesin, 600 mg, Oral, Q12H  lisinopril, 40 mg, Oral, Q PM  metFORMIN, 1,000 mg, Oral, Nightly  metFORMIN, 500 mg, Oral, QAM  methylPREDNISolone sodium succinate, 40 mg, Intravenous, Daily  mirtazapine, 15 mg, Oral, Nightly  montelukast, 10 mg, Oral, Nightly  roflumilast, 500 mcg, Oral, Daily  sodium chloride, 10 mL, Intravenous, Q12H  theophylline, 300 mg, Oral, Daily      Continuous Infusions:   PRN Meds:.  acetaminophen    senna-docusate sodium **AND** polyethylene glycol **AND** bisacodyl **AND** bisacodyl    Calcium Replacement - Follow Nurse / BPA Driven Protocol    ipratropium-albuterol    Magnesium Standard Dose Replacement - Follow Nurse / BPA Driven Protocol    ondansetron ODT **OR** ondansetron    Phosphorus Replacement - Follow Nurse / BPA Driven Protocol    Potassium Replacement - Follow Nurse / BPA Driven Protocol    sodium chloride    sodium chloride    sodium chloride    Labs:    CBC    Results from last 7 days   Lab Units 04/02/24  0444 04/01/24  0434 03/30/24 1953   WBC 10*3/mm3 11.31* 14.82* 20.74*   HEMOGLOBIN g/dL 12.7* 12.1* 11.7*   PLATELETS 10*3/mm3 273 234 181     BMP   Results from last 7 days   Lab Units 04/02/24  0444 04/01/24  0434 03/30/24 1953   SODIUM mmol/L 143 141 138   POTASSIUM mmol/L 4.1 4.3 4.3   CHLORIDE mmol/L 101 101 97*   CO2 mmol/L 35.0* 32.0* 31.0*   BUN mg/dL 16 15 14   CREATININE mg/dL 0.76 0.59* 0.69*   GLUCOSE mg/dL 79 62* 204*     Cr Clearance Estimated Creatinine Clearance: 111.7 mL/min (by C-G formula based on SCr of 0.76 mg/dL).  Coag     HbA1C No results found for: \"HGBA1C\"  Blood Glucose   Glucose   Date/Time Value Ref Range Status   04/01/2024 0608 82 70 - 105 mg/dL Final     Comment:     Serial Number: [id number]:  [id number]   03/30/2024 2008 194 (H) 70 - 105 mg/dL Final     Comment:     Serial Number: [id number]:  [id number]     Infection   Results from last 7 days "   Lab Units 03/30/24 1956   BLOODCX  No growth at 2 days  No growth at 2 days     CMP   Results from last 7 days   Lab Units 04/02/24  0444 04/01/24  0434 03/30/24 1953   SODIUM mmol/L 143 141 138   POTASSIUM mmol/L 4.1 4.3 4.3   CHLORIDE mmol/L 101 101 97*   CO2 mmol/L 35.0* 32.0* 31.0*   BUN mg/dL 16 15 14   CREATININE mg/dL 0.76 0.59* 0.69*   GLUCOSE mg/dL 79 62* 204*   ALBUMIN g/dL  --   --  4.1   BILIRUBIN mg/dL  --   --  0.3   ALK PHOS U/L  --   --  71   AST (SGOT) U/L  --   --  13   ALT (SGPT) U/L  --   --  10     UA    Results from last 7 days   Lab Units 03/30/24  2306   NITRITE UA  Negative   WBC UA /HPF 3-5*   BACTERIA UA /HPF None Seen   SQUAM EPITHEL UA /HPF 0-2     Radiology(recent) No radiology results for the last day   Assessment:    Right lower lobe and right middle lobe pneumonia present upon admission, community-acquired  Acute on chronic hypoxic respiratory failure  Acute exacerbation of panlobular COPD with emphysema  Acute exacerbation of mucopurulent chronic bronchitis  Supplemental oxygen dependency  Primary essential hypertension  Diabetes mellitus type 2 with neuropathic manifestations  Diabetic dyslipidemia  Nicotine dependency with nicotine use disorder, cigarettes    Plan:  Pulmonary support        James Craig MD  04/02/24  19:25 EDT

## 2024-04-02 NOTE — PROGRESS NOTES
Daily Progress Note        PNA (pneumonia)    Pneumonia      Assessment:      Right lower lobe pneumonia  COPD with acute exacerbation  HTN  DM  Active tobacco smoker  CT chest 12/5/2022: mild bibasilar infection, severe emphysema         Recommendations:   Oxygen titration currently on 5 L   Antibiotics currently on Rocephin and doxycycline   Steroids IV Solu-Medrol 40 mg daily   Bronchodilators DuoNeb              LOS: 3 days     Subjective         Objective     Vital signs for last 24 hours:  Vitals:    04/02/24 0036 04/02/24 0447 04/02/24 0636 04/02/24 0639   BP: 101/65 124/76     BP Location: Right arm Right arm     Patient Position: Lying Lying     Pulse: 72 85 86 87   Resp: 23 20 21    Temp: 97.6 °F (36.4 °C) 97.8 °F (36.6 °C)     TempSrc: Oral Oral     SpO2: 93% 90% 93% 97%   Weight:       Height:           Intake/Output last 3 shifts:  I/O last 3 completed shifts:  In: 1200 [P.O.:1200]  Out: 2650 [Urine:2650]  Intake/Output this shift:  No intake/output data recorded.      Radiology  Imaging Results (Last 24 Hours)       ** No results found for the last 24 hours. **            Labs:  Results from last 7 days   Lab Units 04/02/24  0444   WBC 10*3/mm3 11.31*   HEMOGLOBIN g/dL 12.7*   HEMATOCRIT % 40.2   PLATELETS 10*3/mm3 273     Results from last 7 days   Lab Units 04/02/24  0444 04/01/24  0434 03/30/24  1953   SODIUM mmol/L 143   < > 138   POTASSIUM mmol/L 4.1   < > 4.3   CHLORIDE mmol/L 101   < > 97*   CO2 mmol/L 35.0*   < > 31.0*   BUN mg/dL 16   < > 14   CREATININE mg/dL 0.76   < > 0.69*   CALCIUM mg/dL 10.1   < > 9.4   BILIRUBIN mg/dL  --   --  0.3   ALK PHOS U/L  --   --  71   ALT (SGPT) U/L  --   --  10   AST (SGOT) U/L  --   --  13   GLUCOSE mg/dL 79   < > 204*    < > = values in this interval not displayed.     Results from last 7 days   Lab Units 03/30/24 2020   PH, ARTERIAL pH units 7.377   PO2 ART mm Hg 81.1*   PCO2, ARTERIAL mm Hg 47.2   HCO3 ART mmol/L 27.7     Results from last 7 days   Lab  Units 03/30/24 1953   ALBUMIN g/dL 4.1     Results from last 7 days   Lab Units 03/30/24 1953   HSTROP T ng/L 18                           Meds:   SCHEDULE  bisoprolol-hydrochlorothiazide, 1 tablet, Oral, Daily  cefTRIAXone, 1,000 mg, Intravenous, Q24H  doxycycline, 100 mg, Oral, Q12H  glipizide, 10 mg, Oral, Nightly  glipizide, 5 mg, Oral, QAM  guaiFENesin, 600 mg, Oral, Q12H  ipratropium-albuterol, 3 mL, Nebulization, 4x Daily - RT  lisinopril, 40 mg, Oral, Q PM  metFORMIN, 1,000 mg, Oral, Nightly  metFORMIN, 500 mg, Oral, QAM  methylPREDNISolone sodium succinate, 40 mg, Intravenous, Daily  mirtazapine, 15 mg, Oral, Nightly  montelukast, 10 mg, Oral, Nightly  roflumilast, 500 mcg, Oral, Daily  sodium chloride, 10 mL, Intravenous, Q12H  theophylline, 300 mg, Oral, Daily      Infusions     PRNs    acetaminophen    senna-docusate sodium **AND** polyethylene glycol **AND** bisacodyl **AND** bisacodyl    Calcium Replacement - Follow Nurse / BPA Driven Protocol    Magnesium Standard Dose Replacement - Follow Nurse / BPA Driven Protocol    ondansetron ODT **OR** ondansetron    Phosphorus Replacement - Follow Nurse / BPA Driven Protocol    Potassium Replacement - Follow Nurse / BPA Driven Protocol    sodium chloride    sodium chloride    sodium chloride    Physical Exam:  Physical Exam  Cardiovascular:      Heart sounds: No murmur heard.     No gallop.   Pulmonary:      Effort: No respiratory distress.      Breath sounds: No stridor. Rhonchi and rales present. No wheezing.   Chest:      Chest wall: No tenderness.         ROS  Review of Systems   Respiratory:  Positive for cough and shortness of breath. Negative for wheezing and stridor.    Cardiovascular:  Negative for chest pain, palpitations and leg swelling.             Total time spent with patient greater than: 45 Minutes

## 2024-04-03 PROCEDURE — 94761 N-INVAS EAR/PLS OXIMETRY MLT: CPT

## 2024-04-03 PROCEDURE — 94664 DEMO&/EVAL PT USE INHALER: CPT

## 2024-04-03 PROCEDURE — 94799 UNLISTED PULMONARY SVC/PX: CPT

## 2024-04-03 PROCEDURE — 25010000002 CEFTRIAXONE PER 250 MG: Performed by: FAMILY MEDICINE

## 2024-04-03 PROCEDURE — 25010000002 METHYLPREDNISOLONE PER 40 MG: Performed by: FAMILY MEDICINE

## 2024-04-03 RX ORDER — METHYLPREDNISOLONE SODIUM SUCCINATE 40 MG/ML
20 INJECTION, POWDER, LYOPHILIZED, FOR SOLUTION INTRAMUSCULAR; INTRAVENOUS DAILY
Status: DISCONTINUED | OUTPATIENT
Start: 2024-04-04 | End: 2024-04-05 | Stop reason: HOSPADM

## 2024-04-03 RX ADMIN — GUAIFENESIN 600 MG: 600 TABLET, EXTENDED RELEASE ORAL at 20:19

## 2024-04-03 RX ADMIN — BISOPROLOL FUMARATE AND HYDROCHLOROTHIAZIDE 1 TABLET: 10; 6.25 TABLET, FILM COATED ORAL at 09:12

## 2024-04-03 RX ADMIN — DOXYCYCLINE 100 MG: 100 CAPSULE ORAL at 20:18

## 2024-04-03 RX ADMIN — CEFTRIAXONE 1000 MG: 1 INJECTION, POWDER, FOR SOLUTION INTRAMUSCULAR; INTRAVENOUS at 11:15

## 2024-04-03 RX ADMIN — IPRATROPIUM BROMIDE AND ALBUTEROL SULFATE 3 ML: .5; 3 SOLUTION RESPIRATORY (INHALATION) at 15:11

## 2024-04-03 RX ADMIN — GLIPIZIDE 5 MG: 5 TABLET ORAL at 05:36

## 2024-04-03 RX ADMIN — IPRATROPIUM BROMIDE AND ALBUTEROL SULFATE 3 ML: .5; 3 SOLUTION RESPIRATORY (INHALATION) at 10:01

## 2024-04-03 RX ADMIN — METFORMIN HYDROCHLORIDE 500 MG: 500 TABLET ORAL at 05:36

## 2024-04-03 RX ADMIN — IPRATROPIUM BROMIDE AND ALBUTEROL SULFATE 3 ML: .5; 3 SOLUTION RESPIRATORY (INHALATION) at 04:13

## 2024-04-03 RX ADMIN — Medication 10 ML: at 20:19

## 2024-04-03 RX ADMIN — Medication 10 ML: at 09:13

## 2024-04-03 RX ADMIN — DOXYCYCLINE 100 MG: 100 CAPSULE ORAL at 09:12

## 2024-04-03 RX ADMIN — LISINOPRIL 40 MG: 20 TABLET ORAL at 18:07

## 2024-04-03 RX ADMIN — MIRTAZAPINE 15 MG: 15 TABLET, FILM COATED ORAL at 20:19

## 2024-04-03 RX ADMIN — METFORMIN HYDROCHLORIDE 1000 MG: 500 TABLET ORAL at 20:19

## 2024-04-03 RX ADMIN — MONTELUKAST 10 MG: 10 TABLET, FILM COATED ORAL at 20:19

## 2024-04-03 RX ADMIN — ROFLUMILAST 500 MCG: 500 TABLET ORAL at 09:12

## 2024-04-03 RX ADMIN — GUAIFENESIN 600 MG: 600 TABLET, EXTENDED RELEASE ORAL at 09:12

## 2024-04-03 RX ADMIN — METHYLPREDNISOLONE SODIUM SUCCINATE 40 MG: 40 INJECTION, POWDER, FOR SOLUTION INTRAMUSCULAR; INTRAVENOUS at 09:12

## 2024-04-03 RX ADMIN — IPRATROPIUM BROMIDE AND ALBUTEROL SULFATE 3 ML: .5; 3 SOLUTION RESPIRATORY (INHALATION) at 20:59

## 2024-04-03 RX ADMIN — THEOPHYLLINE 300 MG: 300 TABLET, EXTENDED RELEASE ORAL at 09:12

## 2024-04-03 RX ADMIN — GLIPIZIDE 10 MG: 5 TABLET ORAL at 20:19

## 2024-04-03 NOTE — PLAN OF CARE
Goal Outcome Evaluation:   Pt rested well overnigtht with no new complaints.

## 2024-04-03 NOTE — PROGRESS NOTES
LOS: 4 days   Patient Care Team:  Jareth Perea PA-C as PCP - General (Physician Assistant)    Subjective:  Slightly less SOB    Objective:       Afebrile    Review of Systems:   Review of Systems   Constitutional:  Positive for activity change.   Respiratory:  Positive for cough and shortness of breath.    Cardiovascular:  Negative for chest pain.   Neurological:  Positive for weakness.           Vital Signs  Temp:  [97.9 °F (36.6 °C)-98.2 °F (36.8 °C)] 98 °F (36.7 °C)  Heart Rate:  [75-91] 78  Resp:  [14-22] 22  BP: (112-128)/(71-80) 123/78    Physical Exam:  Physical Exam  Vitals and nursing note reviewed.   Constitutional:       General: He is not in acute distress.  Cardiovascular:      Rate and Rhythm: Rhythm irregular.      Heart sounds: Normal heart sounds.   Pulmonary:      Breath sounds: Normal breath sounds.   Skin:     General: Skin is warm.   Neurological:      Mental Status: He is alert.          Radiology:  CT Chest With Contrast Diagnostic    Result Date: 3/30/2024  Impression: 1.Patchy airspace disease seen within the right lower lobe and the base of the right middle lobe consistent with a mild pneumonia, new as compared to the previous study, superimposed on chronic changes. No suspicious pulmonary nodules. Follow-up to resolution recommended. 2.Ancillary findings as described above. Electronically Signed: Kathy Gonzalez MD  3/30/2024 10:47 PM EDT  Workstation ID: [id number]    XR Chest 1 View    Result Date: 3/30/2024  Impression: Lungs demonstrate coarsened interstitial markings, possibly related to underlying interstitial lung disease. Concomitant atypical infiltrates within the mid lungs cannot be entirely excluded. Electronically Signed: Bill Carlson MD  3/30/2024 7:57 PM EDT  Workstation ID: [id number]        Results Review:     I reviewed the patient's new clinical results.  I reviewed the patient's new imaging results and agree with the interpretation.    Medication Review:  "  Scheduled Meds:bisoprolol-hydrochlorothiazide, 1 tablet, Oral, Daily  cefTRIAXone, 1,000 mg, Intravenous, Q24H  doxycycline, 100 mg, Oral, Q12H  glipizide, 10 mg, Oral, Nightly  glipizide, 5 mg, Oral, QAM  guaiFENesin, 600 mg, Oral, Q12H  lisinopril, 40 mg, Oral, Q PM  metFORMIN, 1,000 mg, Oral, Nightly  metFORMIN, 500 mg, Oral, QAM  methylPREDNISolone sodium succinate, 40 mg, Intravenous, Daily  mirtazapine, 15 mg, Oral, Nightly  montelukast, 10 mg, Oral, Nightly  roflumilast, 500 mcg, Oral, Daily  sodium chloride, 10 mL, Intravenous, Q12H  theophylline, 300 mg, Oral, Daily      Continuous Infusions:   PRN Meds:.  acetaminophen    senna-docusate sodium **AND** polyethylene glycol **AND** bisacodyl **AND** bisacodyl    Calcium Replacement - Follow Nurse / BPA Driven Protocol    ipratropium-albuterol    Magnesium Standard Dose Replacement - Follow Nurse / BPA Driven Protocol    ondansetron ODT **OR** ondansetron    Phosphorus Replacement - Follow Nurse / BPA Driven Protocol    Potassium Replacement - Follow Nurse / BPA Driven Protocol    sodium chloride    sodium chloride    sodium chloride    Labs:    CBC    Results from last 7 days   Lab Units 04/02/24 0444 04/01/24 0434 03/30/24 1953   WBC 10*3/mm3 11.31* 14.82* 20.74*   HEMOGLOBIN g/dL 12.7* 12.1* 11.7*   PLATELETS 10*3/mm3 273 234 181     BMP   Results from last 7 days   Lab Units 04/02/24 0444 04/01/24  0434 03/30/24 1953   SODIUM mmol/L 143 141 138   POTASSIUM mmol/L 4.1 4.3 4.3   CHLORIDE mmol/L 101 101 97*   CO2 mmol/L 35.0* 32.0* 31.0*   BUN mg/dL 16 15 14   CREATININE mg/dL 0.76 0.59* 0.69*   GLUCOSE mg/dL 79 62* 204*     Cr Clearance Estimated Creatinine Clearance: 111.7 mL/min (by C-G formula based on SCr of 0.76 mg/dL).  Coag     HbA1C No results found for: \"HGBA1C\"  Blood Glucose   Glucose   Date/Time Value Ref Range Status   04/01/2024 0608 82 70 - 105 mg/dL Final     Comment:     Serial Number: [id number]:  [id number]     Infection "   Results from last 7 days   Lab Units 03/30/24 1956   BLOODCX  No growth at 3 days  No growth at 3 days     CMP   Results from last 7 days   Lab Units 04/02/24  0444 04/01/24  0434 03/30/24 1953   SODIUM mmol/L 143 141 138   POTASSIUM mmol/L 4.1 4.3 4.3   CHLORIDE mmol/L 101 101 97*   CO2 mmol/L 35.0* 32.0* 31.0*   BUN mg/dL 16 15 14   CREATININE mg/dL 0.76 0.59* 0.69*   GLUCOSE mg/dL 79 62* 204*   ALBUMIN g/dL  --   --  4.1   BILIRUBIN mg/dL  --   --  0.3   ALK PHOS U/L  --   --  71   AST (SGOT) U/L  --   --  13   ALT (SGPT) U/L  --   --  10     UA    Results from last 7 days   Lab Units 03/30/24  2306   NITRITE UA  Negative   WBC UA /HPF 3-5*   BACTERIA UA /HPF None Seen   SQUAM EPITHEL UA /HPF 0-2     Radiology(recent) No radiology results for the last day   Assessment:    Right lower lobe and right middle lobe pneumonia present upon admission, community-acquired  Acute on chronic hypoxic respiratory failure  Acute exacerbation of panlobular COPD with emphysema  Acute exacerbation of mucopurulent chronic bronchitis  Supplemental oxygen dependency  Primary essential hypertension  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

## 2024-04-03 NOTE — PROGRESS NOTES
Daily Progress Note        PNA (pneumonia)    Pneumonia      Assessment:      Right lower lobe pneumonia  COPD with acute exacerbation  HTN  DM  Active tobacco smoker  CT chest 12/5/2022: mild bibasilar infection, severe emphysema         Recommendations:   Oxygen titration currently on 5 L   Antibiotics currently on Rocephin and doxycycline   Steroids IV Solu-Medrol 40 mg daily   Bronchodilators DuoNeb              LOS: 4 days     Subjective         Objective     Vital signs for last 24 hours:  Vitals:    04/02/24 2320 04/03/24 0405 04/03/24 0413 04/03/24 0423   BP: 112/71 117/78     BP Location: Right arm Right arm     Patient Position: Lying Lying     Pulse: 75 86 87 89   Resp: 22 17 14 14   Temp: 98.2 °F (36.8 °C) 98 °F (36.7 °C)     TempSrc: Oral Oral     SpO2: 95% 93% 93% 97%   Weight:       Height:           Intake/Output last 3 shifts:  I/O last 3 completed shifts:  In: 600 [P.O.:600]  Out: 2350 [Urine:2350]  Intake/Output this shift:  No intake/output data recorded.      Radiology  Imaging Results (Last 24 Hours)       ** No results found for the last 24 hours. **            Labs:  Results from last 7 days   Lab Units 04/02/24  0444   WBC 10*3/mm3 11.31*   HEMOGLOBIN g/dL 12.7*   HEMATOCRIT % 40.2   PLATELETS 10*3/mm3 273     Results from last 7 days   Lab Units 04/02/24  0444 04/01/24  0434 03/30/24  1953   SODIUM mmol/L 143   < > 138   POTASSIUM mmol/L 4.1   < > 4.3   CHLORIDE mmol/L 101   < > 97*   CO2 mmol/L 35.0*   < > 31.0*   BUN mg/dL 16   < > 14   CREATININE mg/dL 0.76   < > 0.69*   CALCIUM mg/dL 10.1   < > 9.4   BILIRUBIN mg/dL  --   --  0.3   ALK PHOS U/L  --   --  71   ALT (SGPT) U/L  --   --  10   AST (SGOT) U/L  --   --  13   GLUCOSE mg/dL 79   < > 204*    < > = values in this interval not displayed.     Results from last 7 days   Lab Units 03/30/24 2020   PH, ARTERIAL pH units 7.377   PO2 ART mm Hg 81.1*   PCO2, ARTERIAL mm Hg 47.2   HCO3 ART mmol/L 27.7     Results from last 7 days   Lab  Units 03/30/24 1953   ALBUMIN g/dL 4.1     Results from last 7 days   Lab Units 03/30/24 1953   HSTROP T ng/L 18                           Meds:   SCHEDULE  bisoprolol-hydrochlorothiazide, 1 tablet, Oral, Daily  cefTRIAXone, 1,000 mg, Intravenous, Q24H  doxycycline, 100 mg, Oral, Q12H  glipizide, 10 mg, Oral, Nightly  glipizide, 5 mg, Oral, QAM  guaiFENesin, 600 mg, Oral, Q12H  lisinopril, 40 mg, Oral, Q PM  metFORMIN, 1,000 mg, Oral, Nightly  metFORMIN, 500 mg, Oral, QAM  methylPREDNISolone sodium succinate, 40 mg, Intravenous, Daily  mirtazapine, 15 mg, Oral, Nightly  montelukast, 10 mg, Oral, Nightly  roflumilast, 500 mcg, Oral, Daily  sodium chloride, 10 mL, Intravenous, Q12H  theophylline, 300 mg, Oral, Daily      Infusions     PRNs    acetaminophen    senna-docusate sodium **AND** polyethylene glycol **AND** bisacodyl **AND** bisacodyl    Calcium Replacement - Follow Nurse / BPA Driven Protocol    ipratropium-albuterol    Magnesium Standard Dose Replacement - Follow Nurse / BPA Driven Protocol    ondansetron ODT **OR** ondansetron    Phosphorus Replacement - Follow Nurse / BPA Driven Protocol    Potassium Replacement - Follow Nurse / BPA Driven Protocol    sodium chloride    sodium chloride    sodium chloride    Physical Exam:  Physical Exam  Cardiovascular:      Heart sounds: No murmur heard.     No gallop.   Pulmonary:      Effort: No respiratory distress.      Breath sounds: No stridor. Rhonchi and rales present. No wheezing.   Chest:      Chest wall: No tenderness.         ROS  Review of Systems   Respiratory:  Positive for cough and shortness of breath. Negative for wheezing and stridor.    Cardiovascular:  Negative for chest pain, palpitations and leg swelling.             Total time spent with patient greater than: 45 Minutes

## 2024-04-04 LAB
ANION GAP SERPL CALCULATED.3IONS-SCNC: 6 MMOL/L (ref 5–15)
BACTERIA SPEC AEROBE CULT: NORMAL
BACTERIA SPEC AEROBE CULT: NORMAL
BUN SERPL-MCNC: 12 MG/DL (ref 8–23)
BUN/CREAT SERPL: 19.4 (ref 7–25)
CALCIUM SPEC-SCNC: 9.5 MG/DL (ref 8.6–10.5)
CHLORIDE SERPL-SCNC: 97 MMOL/L (ref 98–107)
CO2 SERPL-SCNC: 37 MMOL/L (ref 22–29)
CREAT SERPL-MCNC: 0.62 MG/DL (ref 0.76–1.27)
DEPRECATED RDW RBC AUTO: 50.7 FL (ref 37–54)
EGFRCR SERPLBLD CKD-EPI 2021: 106.1 ML/MIN/1.73
ERYTHROCYTE [DISTWIDTH] IN BLOOD BY AUTOMATED COUNT: 14.8 % (ref 12.3–15.4)
GLUCOSE SERPL-MCNC: 71 MG/DL (ref 65–99)
HCT VFR BLD AUTO: 38.4 % (ref 37.5–51)
HGB BLD-MCNC: 12.2 G/DL (ref 13–17.7)
MCH RBC QN AUTO: 30 PG (ref 26.6–33)
MCHC RBC AUTO-ENTMCNC: 31.8 G/DL (ref 31.5–35.7)
MCV RBC AUTO: 94.3 FL (ref 79–97)
PLATELET # BLD AUTO: 275 10*3/MM3 (ref 140–450)
PMV BLD AUTO: 9.9 FL (ref 6–12)
POTASSIUM SERPL-SCNC: 4 MMOL/L (ref 3.5–5.2)
RBC # BLD AUTO: 4.07 10*6/MM3 (ref 4.14–5.8)
SODIUM SERPL-SCNC: 140 MMOL/L (ref 136–145)
WBC NRBC COR # BLD AUTO: 11.47 10*3/MM3 (ref 3.4–10.8)

## 2024-04-04 PROCEDURE — 25010000002 METHYLPREDNISOLONE PER 40 MG: Performed by: FAMILY MEDICINE

## 2024-04-04 PROCEDURE — 94761 N-INVAS EAR/PLS OXIMETRY MLT: CPT

## 2024-04-04 PROCEDURE — 80048 BASIC METABOLIC PNL TOTAL CA: CPT | Performed by: FAMILY MEDICINE

## 2024-04-04 PROCEDURE — 94799 UNLISTED PULMONARY SVC/PX: CPT

## 2024-04-04 PROCEDURE — 97162 PT EVAL MOD COMPLEX 30 MIN: CPT

## 2024-04-04 PROCEDURE — 94664 DEMO&/EVAL PT USE INHALER: CPT

## 2024-04-04 PROCEDURE — 85027 COMPLETE CBC AUTOMATED: CPT | Performed by: FAMILY MEDICINE

## 2024-04-04 PROCEDURE — 25010000002 CEFTRIAXONE PER 250 MG: Performed by: FAMILY MEDICINE

## 2024-04-04 RX ADMIN — IPRATROPIUM BROMIDE AND ALBUTEROL SULFATE 3 ML: .5; 3 SOLUTION RESPIRATORY (INHALATION) at 04:04

## 2024-04-04 RX ADMIN — GLIPIZIDE 10 MG: 5 TABLET ORAL at 20:08

## 2024-04-04 RX ADMIN — MIRTAZAPINE 15 MG: 15 TABLET, FILM COATED ORAL at 20:08

## 2024-04-04 RX ADMIN — IPRATROPIUM BROMIDE AND ALBUTEROL SULFATE 3 ML: .5; 3 SOLUTION RESPIRATORY (INHALATION) at 21:50

## 2024-04-04 RX ADMIN — ROFLUMILAST 500 MCG: 500 TABLET ORAL at 09:43

## 2024-04-04 RX ADMIN — Medication 10 ML: at 09:44

## 2024-04-04 RX ADMIN — CEFTRIAXONE 1000 MG: 1 INJECTION, POWDER, FOR SOLUTION INTRAMUSCULAR; INTRAVENOUS at 14:10

## 2024-04-04 RX ADMIN — THEOPHYLLINE 300 MG: 300 TABLET, EXTENDED RELEASE ORAL at 09:43

## 2024-04-04 RX ADMIN — METHYLPREDNISOLONE SODIUM SUCCINATE 20 MG: 40 INJECTION, POWDER, FOR SOLUTION INTRAMUSCULAR; INTRAVENOUS at 09:43

## 2024-04-04 RX ADMIN — Medication 10 ML: at 20:09

## 2024-04-04 RX ADMIN — LISINOPRIL 40 MG: 20 TABLET ORAL at 16:22

## 2024-04-04 RX ADMIN — METFORMIN HYDROCHLORIDE 1000 MG: 500 TABLET ORAL at 20:08

## 2024-04-04 RX ADMIN — DOXYCYCLINE 100 MG: 100 CAPSULE ORAL at 20:08

## 2024-04-04 RX ADMIN — MONTELUKAST 10 MG: 10 TABLET, FILM COATED ORAL at 20:08

## 2024-04-04 RX ADMIN — IPRATROPIUM BROMIDE AND ALBUTEROL SULFATE 3 ML: .5; 3 SOLUTION RESPIRATORY (INHALATION) at 10:48

## 2024-04-04 RX ADMIN — GLIPIZIDE 5 MG: 5 TABLET ORAL at 09:43

## 2024-04-04 RX ADMIN — GUAIFENESIN 600 MG: 600 TABLET, EXTENDED RELEASE ORAL at 09:43

## 2024-04-04 RX ADMIN — DOXYCYCLINE 100 MG: 100 CAPSULE ORAL at 09:43

## 2024-04-04 RX ADMIN — GUAIFENESIN 600 MG: 600 TABLET, EXTENDED RELEASE ORAL at 20:08

## 2024-04-04 RX ADMIN — IPRATROPIUM BROMIDE AND ALBUTEROL SULFATE 3 ML: .5; 3 SOLUTION RESPIRATORY (INHALATION) at 16:03

## 2024-04-04 RX ADMIN — BISOPROLOL FUMARATE AND HYDROCHLOROTHIAZIDE 1 TABLET: 10; 6.25 TABLET, FILM COATED ORAL at 09:43

## 2024-04-04 RX ADMIN — METFORMIN HYDROCHLORIDE 500 MG: 500 TABLET ORAL at 09:43

## 2024-04-04 NOTE — PROGRESS NOTES
Daily Progress Note        PNA (pneumonia)    Pneumonia      Assessment:      Right lower lobe pneumonia  COPD with acute exacerbation  HTN  DM  Active tobacco smoker  CT chest 12/5/2022: mild bibasilar infection, severe emphysema         Recommendations:   Oxygen titration currently on 5 L   Antibiotics currently on Rocephin and doxycycline   Steroids IV Solu-Medrol 40 mg daily   Bronchodilators DuoNeb              LOS: 5 days     Subjective         Objective     Vital signs for last 24 hours:  Vitals:    04/04/24 0328 04/04/24 0404 04/04/24 0407 04/04/24 0716   BP: 109/65   103/71   BP Location: Right arm   Right arm   Patient Position: Lying   Lying   Pulse: 89 85 89 86   Resp: 20 20 20 15   Temp: 98 °F (36.7 °C)   98.2 °F (36.8 °C)   TempSrc: Oral   Oral   SpO2: 97% 96% 94% 95%   Weight:       Height:           Intake/Output last 3 shifts:  I/O last 3 completed shifts:  In: 960 [P.O.:960]  Out: 2700 [Urine:2700]  Intake/Output this shift:  No intake/output data recorded.      Radiology  Imaging Results (Last 24 Hours)       ** No results found for the last 24 hours. **            Labs:  Results from last 7 days   Lab Units 04/04/24  0159   WBC 10*3/mm3 11.47*   HEMOGLOBIN g/dL 12.2*   HEMATOCRIT % 38.4   PLATELETS 10*3/mm3 275     Results from last 7 days   Lab Units 04/04/24  0159 04/01/24  0434 03/30/24  1953   SODIUM mmol/L 140   < > 138   POTASSIUM mmol/L 4.0   < > 4.3   CHLORIDE mmol/L 97*   < > 97*   CO2 mmol/L 37.0*   < > 31.0*   BUN mg/dL 12   < > 14   CREATININE mg/dL 0.62*   < > 0.69*   CALCIUM mg/dL 9.5   < > 9.4   BILIRUBIN mg/dL  --   --  0.3   ALK PHOS U/L  --   --  71   ALT (SGPT) U/L  --   --  10   AST (SGOT) U/L  --   --  13   GLUCOSE mg/dL 71   < > 204*    < > = values in this interval not displayed.     Results from last 7 days   Lab Units 03/30/24 2020   PH, ARTERIAL pH units 7.377   PO2 ART mm Hg 81.1*   PCO2, ARTERIAL mm Hg 47.2   HCO3 ART mmol/L 27.7     Results from last 7 days   Lab  Units 03/30/24 1953   ALBUMIN g/dL 4.1     Results from last 7 days   Lab Units 03/30/24 1953   HSTROP T ng/L 18                           Meds:   SCHEDULE  bisoprolol-hydrochlorothiazide, 1 tablet, Oral, Daily  cefTRIAXone, 1,000 mg, Intravenous, Q24H  doxycycline, 100 mg, Oral, Q12H  glipizide, 10 mg, Oral, Nightly  glipizide, 5 mg, Oral, QAM  guaiFENesin, 600 mg, Oral, Q12H  lisinopril, 40 mg, Oral, Q PM  metFORMIN, 1,000 mg, Oral, Nightly  metFORMIN, 500 mg, Oral, QAM  methylPREDNISolone sodium succinate, 20 mg, Intravenous, Daily  mirtazapine, 15 mg, Oral, Nightly  montelukast, 10 mg, Oral, Nightly  roflumilast, 500 mcg, Oral, Daily  sodium chloride, 10 mL, Intravenous, Q12H  theophylline, 300 mg, Oral, Daily      Infusions     PRNs    acetaminophen    senna-docusate sodium **AND** polyethylene glycol **AND** bisacodyl **AND** bisacodyl    Calcium Replacement - Follow Nurse / BPA Driven Protocol    ipratropium-albuterol    Magnesium Standard Dose Replacement - Follow Nurse / BPA Driven Protocol    ondansetron ODT **OR** ondansetron    Phosphorus Replacement - Follow Nurse / BPA Driven Protocol    Potassium Replacement - Follow Nurse / BPA Driven Protocol    sodium chloride    sodium chloride    sodium chloride    Physical Exam:  Physical Exam  Cardiovascular:      Heart sounds: No murmur heard.     No gallop.   Pulmonary:      Effort: No respiratory distress.      Breath sounds: No stridor. Rhonchi and rales present. No wheezing.   Chest:      Chest wall: No tenderness.         ROS  Review of Systems   Respiratory:  Positive for cough and shortness of breath. Negative for wheezing and stridor.    Cardiovascular:  Negative for chest pain, palpitations and leg swelling.             Total time spent with patient greater than: 45 Minutes

## 2024-04-04 NOTE — PLAN OF CARE
Goal Outcome Evaluation:      Patient alert and oriented x4. Not in respiratory distress. All needs attended. Kept safe and comfortable. Stable throughout shift

## 2024-04-04 NOTE — PROGRESS NOTES
LOS: 5 days   Patient Care Team:  Jareth Perea PA-C as PCP - General (Physician Assistant)    Subjective:  Improvement    Objective:   Less short of breath      Review of Systems:   Review of Systems   Constitutional:  Positive for activity change.   Respiratory:  Positive for cough and shortness of breath.    Neurological:  Positive for weakness.           Vital Signs  Temp:  [97.7 °F (36.5 °C)-98.2 °F (36.8 °C)] 98.2 °F (36.8 °C)  Heart Rate:  [74-91] 86  Resp:  [10-22] 15  BP: (103-124)/(65-78) 103/71    Physical Exam:  Physical Exam  Vitals reviewed.   Constitutional:       Appearance: Normal appearance.   Cardiovascular:      Rate and Rhythm: Normal rate.      Heart sounds: Normal heart sounds.   Pulmonary:      Breath sounds: Normal breath sounds.      Comments: Poor air exchange  Neurological:      Mental Status: He is alert.          Radiology:  CT Chest With Contrast Diagnostic    Result Date: 3/30/2024  Impression: 1.Patchy airspace disease seen within the right lower lobe and the base of the right middle lobe consistent with a mild pneumonia, new as compared to the previous study, superimposed on chronic changes. No suspicious pulmonary nodules. Follow-up to resolution recommended. 2.Ancillary findings as described above. Electronically Signed: Kathy Gonzalez MD  3/30/2024 10:47 PM EDT  Workstation ID: [id number]    XR Chest 1 View    Result Date: 3/30/2024  Impression: Lungs demonstrate coarsened interstitial markings, possibly related to underlying interstitial lung disease. Concomitant atypical infiltrates within the mid lungs cannot be entirely excluded. Electronically Signed: Bill Carlson MD  3/30/2024 7:57 PM EDT  Workstation ID: [id number]        Results Review:     I reviewed the patient's new clinical results.  I reviewed the patient's new imaging results and agree with the interpretation.    Medication Review:   Scheduled Meds:bisoprolol-hydrochlorothiazide, 1 tablet, Oral,  "Daily  cefTRIAXone, 1,000 mg, Intravenous, Q24H  doxycycline, 100 mg, Oral, Q12H  glipizide, 10 mg, Oral, Nightly  glipizide, 5 mg, Oral, QAM  guaiFENesin, 600 mg, Oral, Q12H  lisinopril, 40 mg, Oral, Q PM  metFORMIN, 1,000 mg, Oral, Nightly  metFORMIN, 500 mg, Oral, QAM  methylPREDNISolone sodium succinate, 20 mg, Intravenous, Daily  mirtazapine, 15 mg, Oral, Nightly  montelukast, 10 mg, Oral, Nightly  roflumilast, 500 mcg, Oral, Daily  sodium chloride, 10 mL, Intravenous, Q12H  theophylline, 300 mg, Oral, Daily      Continuous Infusions:   PRN Meds:.  acetaminophen    senna-docusate sodium **AND** polyethylene glycol **AND** bisacodyl **AND** bisacodyl    Calcium Replacement - Follow Nurse / BPA Driven Protocol    ipratropium-albuterol    Magnesium Standard Dose Replacement - Follow Nurse / BPA Driven Protocol    ondansetron ODT **OR** ondansetron    Phosphorus Replacement - Follow Nurse / BPA Driven Protocol    Potassium Replacement - Follow Nurse / BPA Driven Protocol    sodium chloride    sodium chloride    sodium chloride    Labs:    CBC    Results from last 7 days   Lab Units 04/04/24 0159 04/02/24 0444 04/01/24 0434 03/30/24 1953   WBC 10*3/mm3 11.47* 11.31* 14.82* 20.74*   HEMOGLOBIN g/dL 12.2* 12.7* 12.1* 11.7*   PLATELETS 10*3/mm3 275 273 234 181     BMP   Results from last 7 days   Lab Units 04/04/24 0159 04/02/24 0444 04/01/24 0434 03/30/24 1953   SODIUM mmol/L 140 143 141 138   POTASSIUM mmol/L 4.0 4.1 4.3 4.3   CHLORIDE mmol/L 97* 101 101 97*   CO2 mmol/L 37.0* 35.0* 32.0* 31.0*   BUN mg/dL 12 16 15 14   CREATININE mg/dL 0.62* 0.76 0.59* 0.69*   GLUCOSE mg/dL 71 79 62* 204*     Cr Clearance Estimated Creatinine Clearance: 136.9 mL/min (A) (by C-G formula based on SCr of 0.62 mg/dL (L)).  Coag     HbA1C No results found for: \"HGBA1C\"  Blood Glucose No results found for: \"POCGLU\"  Infection   Results from last 7 days   Lab Units 03/30/24 1956   BLOODCX  No growth at 4 days  No growth at 4 days "     CMP   Results from last 7 days   Lab Units 04/04/24  0159 04/02/24  0444 04/01/24  0434 03/30/24  1953   SODIUM mmol/L 140 143 141 138   POTASSIUM mmol/L 4.0 4.1 4.3 4.3   CHLORIDE mmol/L 97* 101 101 97*   CO2 mmol/L 37.0* 35.0* 32.0* 31.0*   BUN mg/dL 12 16 15 14   CREATININE mg/dL 0.62* 0.76 0.59* 0.69*   GLUCOSE mg/dL 71 79 62* 204*   ALBUMIN g/dL  --   --   --  4.1   BILIRUBIN mg/dL  --   --   --  0.3   ALK PHOS U/L  --   --   --  71   AST (SGOT) U/L  --   --   --  13   ALT (SGPT) U/L  --   --   --  10     UA    Results from last 7 days   Lab Units 03/30/24  2306   NITRITE UA  Negative   WBC UA /HPF 3-5*   BACTERIA UA /HPF None Seen   SQUAM EPITHEL UA /HPF 0-2     Radiology(recent) No radiology results for the last day   Assessment:      Right lower lobe and right middle lobe pneumonia present upon admission, community-acquired  Acute on chronic hypoxic respiratory failure  Acute exacerbation of panlobular COPD with emphysema  Acute exacerbation of mucopurulent chronic bronchitis  Supplemental oxygen dependency  Primary essential hypertension  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

## 2024-04-04 NOTE — THERAPY EVALUATION
Patient Name: Maximino Bhatia  : 1960    MRN: [id number]                              Today's Date: 2024       Admit Date: 3/30/2024    Visit Dx:     ICD-10-CM ICD-9-CM   1. Acute respiratory failure with hypoxia  J96.01 518.81   2. Shortness of breath  R06.02 786.05   3. Acute cough  R05.1 786.2   4. Leukocytosis, unspecified type  D72.829 288.60   5. COPD exacerbation  J44.1 491.21   6. Tachycardia  R00.0 785.0   7. Sepsis, due to unspecified organism, unspecified whether acute organ dysfunction present  A41.9 038.9     995.91   8. Pneumonia of right lower lobe due to infectious organism  J18.9 486     Patient Active Problem List   Diagnosis    COPD with acute exacerbation    Influenza A    Diabetes mellitus with coincident hypertension    Bacterial pneumonia    Leukocytosis    Acute hypoxemic respiratory failure    Pneumonia    PNA (pneumonia)    RSV (acute bronchiolitis due to respiratory syncytial virus)    Pneumonia due to respiratory syncytial virus (RSV)    Hypertension    PCO (posterior capsular opacification), bilateral    Type 2 diabetes mellitus     Past Medical History:   Diagnosis Date    COPD (chronic obstructive pulmonary disease)     Diabetes mellitus     Hypertension      History reviewed. No pertinent surgical history.   General Information       Row Name 24 1406          Physical Therapy Time and Intention    Document Type evaluation  -AM     Mode of Treatment physical therapy  -AM       Row Name 24 1406          General Information    Patient Profile Reviewed yes  -AM     Prior Level of Function independent:;all household mobility;community mobility;gait;transfer;bed mobility  -AM     Existing Precautions/Restrictions fall;oxygen therapy device and L/min  4L O2  -AM     Barriers to Rehab none identified  -AM       Row Name 24 1406          Living Environment    People in Home alone  -AM       Row Name 24 1406          Home Main Entrance    Number of Stairs, Main  Entrance none  -AM       [facility] Name 04/04/24 1406          Stairs Within Home, Primary    Number of Stairs, Within Home, Primary none  -AM       [facility] Name 04/04/24 1406          Cognition    Orientation Status (Cognition) oriented x 4  -AM       [facility] Name 04/04/24 1406          Safety Issues, Functional Mobility    Impairments Affecting Function (Mobility) balance;shortness of breath;endurance/activity tolerance;strength  -AM     Comment, Safety Issues/Impairments (Mobility) gait belt utilized  -AM               User Key  (r) = Recorded By, (t) = Taken By, (c) = Cosigned By      Initials Name Provider Type    AM Magan Stevens, PT Physical Therapist                   Mobility       Row Name 04/04/24 1407          Bed Mobility    Bed Mobility bed mobility (all) activities  -AM     All Activities, [place] (Bed Mobility) independent  -AM       [facility] Name 04/04/24 1407          Sit-Stand Transfer    Sit-Stand [place] (Transfers) contact guard;1 person assist  -AM       Row Name 04/04/24 1407          Gait/Stairs (Locomotion)    [place] Level (Gait) contact guard;1 person assist  -AM     Distance in Feet (Gait) 50  -AM     Deviations/Abnormal Patterns (Gait) gait speed decreased  -AM     Bilateral Gait Deviations forward flexed posture  -AM     Comment, (Gait/Stairs) pt with c/o dizziness with turns.  O2 on 6L after ambulation 86%.  DB/PLB exercises slowly increased O2 sats to mid 90s  -AM               User Key  (r) = Recorded By, (t) = Taken By, (c) = Cosigned By      Initials Name Provider Type    AM Magan Stevens, PT Physical Therapist                   Obj/Interventions       Row Name 04/04/24 1409          Range of Motion Comprehensive    General Range of Motion no range of motion deficits identified  -AM       Row Name 04/04/24 1409          Strength Comprehensive (MMT)    Comment, General Manual Muscle Testing (MMT) Assessment 4+/5 throughout  -AM       [facility] Name 04/04/24 1409          Motor Skills     Motor Skills functional endurance  -AM     Functional Endurance fair  -AM       Row Name 04/04/24 1409          Balance    Balance Assessment sitting static balance;sitting dynamic balance;sit to stand dynamic balance;standing static balance;standing dynamic balance  -AM     Static Sitting Balance independent  -AM     Dynamic Sitting Balance independent  -AM     Position, Sitting Balance unsupported;sitting edge of bed  -AM     Sit to Stand Dynamic Balance contact guard  -AM     Static Standing Balance contact guard  -AM     Dynamic Standing Balance contact guard  -AM     Position/Device Used, Standing Balance unsupported  -AM       Row Name 04/04/24 1409          Sensory Assessment (Somatosensory)    Sensory Assessment (Somatosensory) sensation intact  -AM               User Key  (r) = Recorded By, (t) = Taken By, (c) = Cosigned By      Initials Name Provider Type    AM Magan Stevens, PT Physical Therapist                   Goals/Plan       Row Name 04/04/24 1418          Transfer Goal 1 (PT)    Activity/Assistive Device (Transfer Goal 1, PT) transfers, all  -AM     [place] Level/Cues Needed (Transfer Goal 1, PT) modified independence  -AM     Time Frame (Transfer Goal 1, PT) long term goal (LTG)  -AM       Row Name 04/04/24 1418          Gait Training Goal 1 (PT)    Activity/Assistive Device (Gait Training Goal 1, PT) gait (walking locomotion)  -AM     [place] Level (Gait Training Goal 1, PT) modified independence  -AM     Distance (Gait Training Goal 1, PT) 150'  -AM     Time Frame (Gait Training Goal 1, PT) long term goal (LTG)  -AM     Strategies/Barriers (Gait Training Goal 1, PT) with O2 sats remaining above 90% throughout  -AM       Row Name 04/04/24 1418          Therapy Assessment/Plan (PT)    Planned Therapy Interventions (PT) balance training;gait training;strengthening;patient/family education;transfer training  -AM               User Key  (r) = Recorded By, (t) = Taken By, (c) = Cosigned By       Initials Name Provider Type    AM Magan Stevens, PT Physical Therapist                   Clinical Impression       Row Name 04/04/24 1409          Pain    Pretreatment Pain Rating 0/10 - no pain  -AM     Posttreatment Pain Rating 0/10 - no pain  -AM       Row Name 04/04/24 1409          Plan of Care Review    Plan of Care Reviewed With patient  -AM     Outcome Evaluation Pt is a 63 y/o male with c/o progressive worsening SOB and ROSALES.  Dx:  Community-acquired PRN, panlobular COPD with emphysema.  Chest X-ray: interstitial lung disease.  CT Chest: R lower lobe PNA.  Pt reports he lives alone in a 1 story home with no steps to enter into home.  Pt independent with all mobility tasks and did not use an assistive device prior to hospitalization.  Pt on 4L O2 and telemetry this date.  Pt was mod I for bed mobility, CGA for transfers.  Pt ambulated 50' without an assistive device with 6L O2 with CGA.  Pt voiced 2 episodes of dizziness during turns which quickly subsided.  O2 sats decreased to 86% after ambulation.  Several minute of DB/PLB exercises increased O2 sats to low 90s.  Pt is not interested in receiving PT services outside of hospital setting.  Will continue PT services while admitted.  -AM       Row Name 04/04/24 1409          Therapy Assessment/Plan (PT)    Patient/Family Therapy Goals Statement (PT) To go home  -AM     Rehab Potential (PT) good, to achieve stated therapy goals  -AM     Criteria for Skilled Interventions Met (PT) yes  -AM     Therapy Frequency (PT) 3 times/wk  -AM     Predicted Duration of Therapy Intervention (PT) until d/c  -AM       Row Name 04/04/24 1409          Vital Signs    Pre SpO2 (%) 94  -AM     O2 Delivery Pre Treatment nasal cannula  4L  -AM     Intra SpO2 (%) 86  -AM     O2 Delivery Intra Treatment nasal cannula  6L  -AM     Post SpO2 (%) 93  -AM     O2 Delivery Post Treatment nasal cannula  4L  -AM     Pre Patient Position Supine  -AM     Intra Patient Position Standing   -AM     Post Patient Position Sitting  -AM       Row Name 04/04/24 1409          Positioning and Restraints    Pre-Treatment Position in bed  -AM     Post Treatment Position bed  -AM     In Bed sitting EOB;encouraged to call for assist;call light within reach  -AM               User Key  (r) = Recorded By, (t) = Taken By, (c) = Cosigned By      Initials Name Provider Type    Magan Shoemaker, GINA Physical Therapist                   Outcome Measures       Row Name 04/04/24 1420 04/04/24 0830       How much help from another person do you currently need...    Turning from your back to your side while in flat bed without using bedrails? 4  -AM 4  -RH    Moving from lying on back to sitting on the side of a flat bed without bedrails? 4  -AM 4  -RH    Moving to and from a bed to a chair (including a wheelchair)? 3  -AM 4  -RH    Standing up from a chair using your arms (e.g., wheelchair, bedside chair)? 3  -AM 4  -RH    Climbing 3-5 steps with a railing? 2  -AM 4  -RH    To walk in hospital room? 3  -AM 4  -RH    AM-PAC 6 Clicks Score (PT) 19  -AM 24  -RH    Highest Level of Mobility Goal 6 --> Walk 10 steps or more  -AM 8 --> Walked 250 feet or more  -RH      Row Name 04/04/24 1420          Functional Assessment    Outcome Measure Options AM-PAC 6 Clicks Basic Mobility (PT)  -AM               User Key  (r) = Recorded By, (t) = Taken By, (c) = Cosigned By      Initials Name Provider Type     Dyan Hawk RN Registered Nurse    Magan Shoemaker, PT Physical Therapist                                 Physical Therapy Education       Title: PT OT SLP Therapies (Done)       Topic: Physical Therapy (Done)       Point: Mobility training (Done)       Learning Progress Summary             Patient Acceptance, E,TB, VU by AM at 4/4/2024 1420                         Point: Body mechanics (Done)       Learning Progress Summary             Patient Acceptance, E,TB, VU by AM at 4/4/2024 1420                         Point:  Precautions (Done)       Learning Progress Summary             Patient Acceptance, E,TB, VU by AM at 4/4/2024 1420                                         User Key       Initials Effective Dates Name Provider Type Discipline    AM 05/10/21 -  Magan Stevens PT Physical Therapist PT                  PT Recommendation and Plan  Planned Therapy Interventions (PT): balance training, gait training, strengthening, patient/family education, transfer training  Plan of Care Reviewed With: patient  Outcome Evaluation: Pt is a 63 y/o male with c/o progressive worsening SOB and ROSALES.  Dx:  Community-acquired PRN, panlobular COPD with emphysema.  Chest X-ray: interstitial lung disease.  CT Chest: R lower lobe PNA.  Pt reports he lives alone in a 1 story home with no steps to enter into home.  Pt independent with all mobility tasks and did not use an assistive device prior to hospitalization.  Pt on 4L O2 and telemetry this date.  Pt was mod I for bed mobility, CGA for transfers.  Pt ambulated 50' without an assistive device with 6L O2 with CGA.  Pt voiced 2 episodes of dizziness during turns which quickly subsided.  O2 sats decreased to 86% after ambulation.  Several minute of DB/PLB exercises increased O2 sats to low 90s.  Pt is not interested in receiving PT services outside of hospital setting.  Will continue PT services while admitted.     Time Calculation:         PT Charges       Row Name 04/04/24 1421             Time Calculation    Start Time 1048  -AM      Stop Time 1106  -AM      Time Calculation (min) 18 min  -AM      PT Received On 04/04/24  -AM      PT - Next Appointment 04/05/24  -AM      PT Goal Re-Cert Due Date 04/18/24  -AM                User Key  (r) = Recorded By, (t) = Taken By, (c) = Cosigned By      Initials Name Provider Type    AM Magan Stevens PT Physical Therapist                  Therapy Charges for Today       Code Description Service Date Service Provider Modifiers Qty    [id number] HC PT EVAL  MOD COMPLEXITY 3 4/4/2024 Magan Stevens, PT GP 1            PT G-Codes  Outcome Measure Options: AM-PAC 6 Clicks Basic Mobility (PT)  AM-PAC 6 Clicks Score (PT): 19  PT Discharge Summary  Anticipated Discharge Disposition (PT): home    Magan Stevens, PT  4/4/2024

## 2024-04-04 NOTE — PLAN OF CARE
Goal Outcome Evaluation:  Plan of Care Reviewed With: patient           Outcome Evaluation: Pt is a 65 y/o male with c/o progressive worsening SOB and ROSALES.  Dx:  Community-acquired PRN, panlobular COPD with emphysema.  Chest X-ray: interstitial lung disease.  CT Chest: R lower lobe PNA.  Pt reports he lives alone in a 1 story home with no steps to enter into home.  Pt independent with all mobility tasks and did not use an assistive device prior to hospitalization.  Pt on 4L O2 and telemetry this date.  Pt was mod I for bed mobility, CGA for transfers.  Pt ambulated 50' without an assistive device with 6L O2 with CGA.  Pt voiced 2 episodes of dizziness during turns which quickly subsided.  O2 sats decreased to 86% after ambulation.  Several minute of DB/PLB exercises increased O2 sats to low 90s.  Pt is not interested in receiving PT services outside of hospital setting.  Will continue PT services while admitted.      Anticipated Discharge Disposition (PT): home

## 2024-04-04 NOTE — CASE MANAGEMENT/SOCIAL WORK
Continued Stay Note  JHOAN Perry     Patient Name: Maximino Bhatia  MRN: [id number]  Today's Date: 4/4/2024    Admit Date: 3/30/2024    Plan: Return home. Home O2 3L w/ [place].   Discharge Plan       Row Name 04/04/24 1554       Plan    Plan Return home. Home O2 3L w/ [place].    Patient/Family in Agreement with Plan yes    Plan Comments DC barriers: Pulm following. PT evaluated pt today and he is safe to dc home. Abx changed to PO, continues on IV steroids.             Megan Naegele, RN     Office Phone: [phone]  Office Cell: [phone]

## 2024-04-05 ENCOUNTER — READMISSION MANAGEMENT (OUTPATIENT)
Dept: CALL CENTER | Facility: HOSPITAL | Age: 64
End: 2024-04-05
Payer: MEDICARE

## 2024-04-05 VITALS
HEART RATE: 84 BPM | TEMPERATURE: 97.7 F | SYSTOLIC BLOOD PRESSURE: 115 MMHG | BODY MASS INDEX: 28 KG/M2 | RESPIRATION RATE: 20 BRPM | WEIGHT: 200 LBS | DIASTOLIC BLOOD PRESSURE: 80 MMHG | HEIGHT: 71 IN | OXYGEN SATURATION: 95 %

## 2024-04-05 PROCEDURE — 94799 UNLISTED PULMONARY SVC/PX: CPT

## 2024-04-05 PROCEDURE — 94664 DEMO&/EVAL PT USE INHALER: CPT

## 2024-04-05 PROCEDURE — 25010000002 METHYLPREDNISOLONE PER 40 MG: Performed by: FAMILY MEDICINE

## 2024-04-05 PROCEDURE — 94761 N-INVAS EAR/PLS OXIMETRY MLT: CPT

## 2024-04-05 RX ORDER — GUAIFENESIN 600 MG/1
600 TABLET, EXTENDED RELEASE ORAL EVERY 12 HOURS SCHEDULED
Qty: 30 TABLET | Refills: 0 | Status: SHIPPED | OUTPATIENT
Start: 2024-04-05

## 2024-04-05 RX ORDER — DOXYCYCLINE 100 MG/1
100 CAPSULE ORAL EVERY 12 HOURS SCHEDULED
Qty: 17 CAPSULE | Refills: 0 | Status: SHIPPED | OUTPATIENT
Start: 2024-04-05 | End: 2024-04-14

## 2024-04-05 RX ORDER — PREDNISONE 10 MG/1
10 TABLET ORAL DAILY
Qty: 7 TABLET | Refills: 0 | Status: SHIPPED | OUTPATIENT
Start: 2024-04-05

## 2024-04-05 RX ADMIN — ROFLUMILAST 500 MCG: 500 TABLET ORAL at 08:43

## 2024-04-05 RX ADMIN — Medication 10 ML: at 08:43

## 2024-04-05 RX ADMIN — GLIPIZIDE 5 MG: 5 TABLET ORAL at 06:06

## 2024-04-05 RX ADMIN — IPRATROPIUM BROMIDE AND ALBUTEROL SULFATE 3 ML: .5; 3 SOLUTION RESPIRATORY (INHALATION) at 10:26

## 2024-04-05 RX ADMIN — THEOPHYLLINE 300 MG: 300 TABLET, EXTENDED RELEASE ORAL at 08:43

## 2024-04-05 RX ADMIN — BISOPROLOL FUMARATE AND HYDROCHLOROTHIAZIDE 1 TABLET: 10; 6.25 TABLET, FILM COATED ORAL at 08:43

## 2024-04-05 RX ADMIN — METHYLPREDNISOLONE SODIUM SUCCINATE 20 MG: 40 INJECTION, POWDER, FOR SOLUTION INTRAMUSCULAR; INTRAVENOUS at 08:43

## 2024-04-05 RX ADMIN — DOXYCYCLINE 100 MG: 100 CAPSULE ORAL at 08:43

## 2024-04-05 RX ADMIN — GUAIFENESIN 600 MG: 600 TABLET, EXTENDED RELEASE ORAL at 08:43

## 2024-04-05 RX ADMIN — IPRATROPIUM BROMIDE AND ALBUTEROL SULFATE 3 ML: .5; 3 SOLUTION RESPIRATORY (INHALATION) at 04:52

## 2024-04-05 RX ADMIN — METFORMIN HYDROCHLORIDE 500 MG: 500 TABLET ORAL at 06:06

## 2024-04-05 NOTE — DISCHARGE SUMMARY
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          Hospital Course  Patient is a 64 y.o. male who presented with progressive shortness of breath with evidence of an acute medial acquired pneumonia and an acute exacerbation of panlobular COPD with emphysema.  He was treated with aggressive pulmonary toilet including parenteral antimicrobial therapy and parenteral steroids.  He was evaluated by pulmonology and slowly improved.  He was deemed stable for discharge home on home oxygen today, enteral steroids and antimicrobial therapy and will have close outpatient follow-up with us.  He will call with any questions or concerns.      Procedures Performed         Consults:   Consults       Date and Time Order Name Status Description    4/1/2024  9:16 AM Inpatient Pulmonology Consult Completed             Pertinent Test Results:CT Chest With Contrast Diagnostic    Result Date: 3/30/2024  Impression: 1.Patchy airspace disease seen within the right lower lobe and the base of the right middle lobe consistent with a mild pneumonia, new as compared to the previous study, superimposed on chronic changes. No suspicious pulmonary nodules. Follow-up to resolution recommended. 2.Ancillary findings as described above. Electronically  Signed: Kathy Gonzalez MD  3/30/2024 10:47 PM EDT  Workstation ID: [id number]    XR Chest 1 View    Result Date: 3/30/2024  Impression: Lungs demonstrate coarsened interstitial markings, possibly related to underlying interstitial lung disease. Concomitant atypical infiltrates within the mid lungs cannot be entirely excluded. Electronically Signed: Bill Carlson MD  3/30/2024 7:57 PM EDT  Workstation ID: [id number]     Imaging Results (Last 7 Days)       Procedure Component Value Units Date/Time    CT Chest With Contrast Diagnostic [[id number]] Collected: 03/30/24 2245     Updated: 03/30/24 2249    Narrative:      CT CHEST W CONTRAST DIAGNOSTIC    Date of Exam: 3/30/2024 10:30 PM EDT    Indication: Abnormal xray - lung nodule, >= 1 cm  Diffuse/interstitial lung disease  Respiratory illness, nondiagnostic xray.    Comparison: 3/30/2024, 2/13/2024.    Technique: Axial CT images were obtained of the chest after the uneventful intravenous administration of iodinated contrast.  Sagittal and coronal reconstructions were performed.  Automated exposure control and iterative reconstruction methods were used.      Findings:  Hilum and Mediastinum: No pathologically enlarged lymph nodes.  Normal heart size.   No pericardial effusion.  Unremarkable thoracic aorta and pulmonary arteries. Atherosclerotic calcifications are present including within the coronary arteries.    Lung Parenchyma and Pleura: Patchy airspace disease is seen within the right lower lobe and the base of the right middle lobe. Curvilinear atelectatic changes are present which appear similar as compared to the previous study. Emphysematous changes are   present predominately within the bilateral upper lobes. Mild fibrotic changes are present. No evidence of honeycombing. No suspicious pulmonary nodules.  No endobronchial lesions.  No significant pleural effusions.    Upper Abdomen: No acute process.     Soft tissues: Unremarkable.    Osseous structures: No  aggressive focal lytic or sclerotic osseous lesions.      Impression:      Impression:  1.Patchy airspace disease seen within the right lower lobe and the base of the right middle lobe consistent with a mild pneumonia, new as compared to the previous study, superimposed on chronic changes. No suspicious pulmonary nodules. Follow-up to   resolution recommended.  2.Ancillary findings as described above.        Electronically Signed: Kathy Gonzalez MD    3/30/2024 10:47 PM EDT    Workstation ID: [id number]    XR Chest 1 View [[id number]] Collected: 03/30/24 1956     Updated: 03/1959    Narrative:      XR CHEST 1 VW    Date of Exam: 3/30/2024 7:47 PM EDT    Indication: CHF/COPD Protocol  CHF/COPD Protocol    Comparison: None available.    Findings:  Lungs demonstrate coarsened interstitial markings, possibly related to underlying interstitial lung disease. Concomitant atypical infiltrates within the mid lungs cannot be entirely excluded. No pleural effusion or pneumothorax is identified. The   cardiomediastinal silhouette and pulmonary vasculature appear within normal limits. No acute or suspicious osseous lesion is identified.       Impression:      Impression:  Lungs demonstrate coarsened interstitial markings, possibly related to underlying interstitial lung disease. Concomitant atypical infiltrates within the mid lungs cannot be entirely excluded.     Electronically Signed: Bill Carlson MD    3/30/2024 7:57 PM EDT    Workstation ID: [id number]                Condition on Discharge:  Fair    Vital Signs  Temp:  [97.4 °F (36.3 °C)-97.8 °F (36.6 °C)] 97.7 °F (36.5 °C)  Heart Rate:  [68-88] 83  Resp:  [10-24] 21  BP: (102-115)/(59-80) 115/80    Physical Exam:     General Appearance:    Alert, cooperative, in no acute distress   Head:    Normocephalic, without obvious abnormality, atraumatic   Eyes:           Conjunctivae and sclerae normal, no   icterus, no pallor, corneas clear, PERRLA   Throat:   No oral lesions, no  thrush, oral mucosa moist   Neck:   No adenopathy, supple, trachea midline, no thyromegaly, no   carotid bruit, no JVD   Lungs:     Clear to auscultation,respirations regular, even and                  unlabored    Heart:    Regular rhythm and normal rate, normal S1 and S2, no            murmur, no gallop, no rub, no click   Chest Wall:    No abnormalities observed   Abdomen:     Normal bowel sounds, no masses, no organomegaly, soft        non-tender, non-distended, no guarding, no rebound                tenderness   Rectal:     Deferred   Extremities:   Moves all extremities well, no edema, no cyanosis, no             redness   Pulses:   Pulses palpable and equal bilaterally   Skin:   No bleeding, bruising or rash   Lymph nodes:   No palpable adenopathy   Neurologic:   Cranial nerves 2 - 12 grossly intact, sensation intact, DTR       present and equal bilaterally         Discharge Disposition  Home or Self Care    Discharge Medications     Discharge Medications        New Medications        Instructions Start Date   doxycycline 100 MG capsule  Commonly known as: MONODOX   100 mg, Oral, Every 12 Hours Scheduled      guaiFENesin 600 MG 12 hr tablet  Commonly known as: MUCINEX   600 mg, Oral, Every 12 Hours Scheduled      predniSONE 10 MG tablet  Commonly known as: DELTASONE   10 mg, Oral, Daily             Continue These Medications        Instructions Start Date   albuterol sulfate  (90 Base) MCG/ACT inhaler  Commonly known as: PROVENTIL HFA;VENTOLIN HFA;PROAIR HFA   2 puffs, Inhalation, Every 4 Hours PRN      aspirin 81 MG EC tablet   81 mg, Oral, Daily      bisoprolol-hydrochlorothiazide 10-6.25 MG per tablet  Commonly known as: ZIAC   1 tablet, Oral, Daily      Budeson-Glycopyrrol-Formoterol 160-9-4.8 MCG/ACT aerosol inhaler  Commonly known as: BREZTRI   2 puffs, Inhalation, 2 Times Daily      glipizide 10 MG tablet  Commonly known as: GLUCOTROL   10 mg, Oral, Nightly      glipizide 10 MG tablet  Commonly  known as: GLUCOTROL   5 mg, Oral, Every Morning      ipratropium-albuterol 0.5-2.5 mg/3 ml nebulizer  Commonly known as: DUO-NEB   3 mL, Nebulization, Every 4 Hours PRN      lisinopril 40 MG tablet  Commonly known as: PRINIVIL,ZESTRIL   40 mg, Oral, Every Evening      metFORMIN 1000 MG tablet  Commonly known as: GLUCOPHAGE   1,000 mg, Oral, Nightly      metFORMIN 1000 MG tablet  Commonly known as: GLUCOPHAGE   500 mg, Oral, Every Morning      mirtazapine 15 MG tablet  Commonly known as: REMERON   15 mg, Oral, Nightly      montelukast 10 MG tablet  Commonly known as: SINGULAIR   10 mg, Oral, Nightly      multivitamin capsule   1 capsule, Oral, Daily      roflumilast 500 MCG tablet tablet  Commonly known as: DALIRESP   500 mcg, Oral, Daily      theophylline 300 MG 12 hr tablet  Commonly known as: THEODUR   300 mg, Oral, Daily               Discharge Diet:   Cardiac ADA 1800-calorie  Activity at Discharge:   As tolerated  Follow-up Appointments  Follow-up with us within 1 to 2 weeks time  Follow-up with pulmonary within 2 to 4 weeks time  Need outpatient follow-up with our respiratory department within 2 to 4 weeks time      Test Results Pending at Discharge       James Craig MD  04/05/24  09:12 EDT

## 2024-04-05 NOTE — OUTREACH NOTE
Prep Survey      Flowsheet Row Responses   [facility] facility patient discharged from? [place]   Is LACE score < 7 ? No   Eligibility Readm Mgmt   Discharge diagnosis PNA (pneumonia)  [COPD exacerbation]   Does the patient have one of the following disease processes/diagnoses(primary or secondary)? Pneumonia   Does the patient have Home health ordered? No   Is there a DME ordered? No  [current with [facility] for home O2 3L]   Medication alerts for this patient see AVS   Prep survey completed? Yes            Sarah SPENCER - Registered Nurse

## 2024-04-05 NOTE — PLAN OF CARE
Goal Outcome Evaluation:         Patient rested well overnight. Patient stable at this time.

## 2024-04-05 NOTE — PLAN OF CARE
Goal Outcome Evaluation: Patient is discharging home today.

## 2024-04-08 NOTE — CASE MANAGEMENT/SOCIAL WORK
Case Management Discharge Note      Final Note: Routine home.    Selected Continued Care - Discharged on 4/5/2024 Admission date: 3/30/2024 - Discharge disposition: Home or Self Care       Transportation Services  Private: Car    Final Discharge Disposition Code: 01 - home or self-care

## 2024-04-09 ENCOUNTER — READMISSION MANAGEMENT (OUTPATIENT)
Dept: CALL CENTER | Facility: HOSPITAL | Age: 64
End: 2024-04-09
Payer: MEDICARE

## 2024-04-09 NOTE — OUTREACH NOTE
Medical Week 1 Survey      Flowsheet Row Responses   [facility] facility patient discharged from? [place]   Does the patient have one of the following disease processes/diagnoses(primary or secondary)? Pneumonia   Discharge diagnosis PNA (pneumonia)            ARTI OJEDA - Registered Nurse

## 2024-04-12 NOTE — PROGRESS NOTES
Enter Query Response Below      Query Response:       No sepsis was identified  Electronically signed by James Craig MD, 04/12/24, 9:23 AM EDT.         If applicable, please update the problem list.      2.02

## 2024-04-16 ENCOUNTER — READMISSION MANAGEMENT (OUTPATIENT)
Dept: CALL CENTER | Facility: HOSPITAL | Age: 64
End: 2024-04-16
Payer: MEDICARE

## 2024-04-16 NOTE — OUTREACH NOTE
COPD/PN Week 2 Survey      Flowsheet Row Responses   [facility] facility patient discharged from? [place]   Does the patient have one of the following disease processes/diagnoses(primary or secondary)? Pneumonia   Week 2 attempt successful? No   Unsuccessful attempts Attempt 1            Laina Mcgill Licensed Nurse

## 2024-04-24 ENCOUNTER — READMISSION MANAGEMENT (OUTPATIENT)
Dept: CALL CENTER | Facility: HOSPITAL | Age: 64
End: 2024-04-24
Payer: MEDICARE

## 2024-04-24 NOTE — OUTREACH NOTE
COPD/PN Week 3 Survey      Flowsheet Row Responses   [facility] facility patient discharged from? [facility]   Does the patient have one of the following disease processes/diagnoses(primary or secondary)? Pneumonia   Week 3 attempt successful? No   Unsuccessful attempts Attempt 1            Laina Mcgill Licensed Nurse

## 2024-04-30 ENCOUNTER — READMISSION MANAGEMENT (OUTPATIENT)
Dept: CALL CENTER | Facility: HOSPITAL | Age: 64
End: 2024-04-30
Payer: MEDICARE

## 2024-04-30 NOTE — OUTREACH NOTE
COPD/PN Week 3 Survey      Flowsheet Row Responses   [facility] facility patient discharged from? [place]   Does the patient have one of the following disease processes/diagnoses(primary or secondary)? Pneumonia   Week 3 attempt successful? No   Unsuccessful attempts Attempt 2   Revoke Other  [multiple attempts made to reach patient]            Hina STILL - Registered Nurse

## 2024-05-29 ENCOUNTER — TRANSCRIBE ORDERS (OUTPATIENT)
Dept: ADMINISTRATIVE | Facility: HOSPITAL | Age: 64
End: 2024-05-29
Payer: MEDICARE

## 2024-05-29 DIAGNOSIS — J44.9 CHRONIC OBSTRUCTIVE PULMONARY DISEASE, UNSPECIFIED COPD TYPE: Primary | ICD-10-CM

## 2024-06-14 ENCOUNTER — HOSPITAL ENCOUNTER (OUTPATIENT)
Dept: RESPIRATORY THERAPY | Facility: HOSPITAL | Age: 64
Discharge: HOME | End: 2024-06-14
Payer: MEDICARE

## 2024-06-14 VITALS — RESPIRATION RATE: 17 BRPM | HEART RATE: 82 BPM | OXYGEN SATURATION: 98 %

## 2024-06-14 DIAGNOSIS — J44.9 CHRONIC OBSTRUCTIVE PULMONARY DISEASE, UNSPECIFIED COPD TYPE: ICD-10-CM

## 2024-06-14 PROCEDURE — 94727 GAS DIL/WSHOT DETER LNG VOL: CPT

## 2024-06-14 PROCEDURE — 94799 UNLISTED PULMONARY SVC/PX: CPT

## 2024-06-14 PROCEDURE — 94729 DIFFUSING CAPACITY: CPT

## 2024-06-14 PROCEDURE — 94664 DEMO&/EVAL PT USE INHALER: CPT

## 2024-06-14 PROCEDURE — 94060 EVALUATION OF WHEEZING: CPT

## 2024-06-14 RX ORDER — ALBUTEROL SULFATE 90 UG/1
2 AEROSOL, METERED RESPIRATORY (INHALATION) ONCE
Status: COMPLETED | OUTPATIENT
Start: 2024-06-14 | End: 2024-06-14

## 2024-06-14 RX ADMIN — ALBUTEROL SULFATE 2 PUFF: 108 AEROSOL, METERED RESPIRATORY (INHALATION) at 08:29
